# Patient Record
Sex: FEMALE | Race: WHITE | Employment: OTHER | URBAN - METROPOLITAN AREA
[De-identification: names, ages, dates, MRNs, and addresses within clinical notes are randomized per-mention and may not be internally consistent; named-entity substitution may affect disease eponyms.]

---

## 2017-01-17 DIAGNOSIS — M51.36 DDD (DEGENERATIVE DISC DISEASE), LUMBAR: ICD-10-CM

## 2017-01-17 DIAGNOSIS — F11.20 CHRONIC NARCOTIC DEPENDENCE (HCC): ICD-10-CM

## 2017-01-18 RX ORDER — HYDROCODONE BITARTRATE AND ACETAMINOPHEN 5; 325 MG/1; MG/1
TABLET ORAL
Qty: 60 TABLET | Refills: 0 | Status: SHIPPED | OUTPATIENT
Start: 2017-01-18 | End: 2017-02-15 | Stop reason: SDUPTHER

## 2017-02-15 ENCOUNTER — PATIENT MESSAGE (OUTPATIENT)
Dept: FAMILY MEDICINE CLINIC | Age: 62
End: 2017-02-15

## 2017-02-15 DIAGNOSIS — M51.36 DDD (DEGENERATIVE DISC DISEASE), LUMBAR: ICD-10-CM

## 2017-02-15 DIAGNOSIS — F11.20 CHRONIC NARCOTIC DEPENDENCE (HCC): ICD-10-CM

## 2017-02-15 RX ORDER — HYDROCODONE BITARTRATE AND ACETAMINOPHEN 5; 325 MG/1; MG/1
TABLET ORAL
Qty: 60 TABLET | Refills: 0 | Status: SHIPPED | OUTPATIENT
Start: 2017-02-15 | End: 2017-03-16 | Stop reason: SDUPTHER

## 2017-02-27 RX ORDER — TOPIRAMATE 100 MG/1
TABLET, FILM COATED ORAL
Qty: 90 TABLET | Refills: 0 | Status: SHIPPED | OUTPATIENT
Start: 2017-02-27 | End: 2017-06-28 | Stop reason: SDUPTHER

## 2017-03-02 ENCOUNTER — OFFICE VISIT (OUTPATIENT)
Dept: FAMILY MEDICINE CLINIC | Age: 62
End: 2017-03-02

## 2017-03-02 VITALS
BODY MASS INDEX: 48.82 KG/M2 | SYSTOLIC BLOOD PRESSURE: 120 MMHG | DIASTOLIC BLOOD PRESSURE: 70 MMHG | WEIGHT: 293 LBS | HEART RATE: 62 BPM | TEMPERATURE: 98.2 F | HEIGHT: 65 IN | RESPIRATION RATE: 18 BRPM

## 2017-03-02 DIAGNOSIS — M72.2 PLANTAR FASCIITIS OF LEFT FOOT: ICD-10-CM

## 2017-03-02 DIAGNOSIS — Z01.810 PRE-OPERATIVE CARDIOVASCULAR EXAMINATION: Primary | ICD-10-CM

## 2017-03-02 DIAGNOSIS — J45.40 MODERATE PERSISTENT ASTHMA WITHOUT COMPLICATION: ICD-10-CM

## 2017-03-02 DIAGNOSIS — I49.3 PREMATURE VENTRICULAR CONTRACTIONS: ICD-10-CM

## 2017-03-02 DIAGNOSIS — M77.32 CALCANEAL SPUR, LEFT: ICD-10-CM

## 2017-03-02 DIAGNOSIS — M77.50 TENDONITIS OF ANKLE OR FOOT: ICD-10-CM

## 2017-03-02 PROCEDURE — 93000 ELECTROCARDIOGRAM COMPLETE: CPT | Performed by: FAMILY MEDICINE

## 2017-03-02 PROCEDURE — 99243 OFF/OP CNSLTJ NEW/EST LOW 30: CPT | Performed by: FAMILY MEDICINE

## 2017-03-15 ENCOUNTER — PATIENT MESSAGE (OUTPATIENT)
Dept: FAMILY MEDICINE CLINIC | Age: 62
End: 2017-03-15

## 2017-03-15 DIAGNOSIS — F11.20 CHRONIC NARCOTIC DEPENDENCE (HCC): ICD-10-CM

## 2017-03-15 DIAGNOSIS — M51.36 DDD (DEGENERATIVE DISC DISEASE), LUMBAR: ICD-10-CM

## 2017-03-16 RX ORDER — HYDROCODONE BITARTRATE AND ACETAMINOPHEN 5; 325 MG/1; MG/1
TABLET ORAL
Qty: 60 TABLET | Refills: 0 | Status: SHIPPED | OUTPATIENT
Start: 2017-03-16 | End: 2017-03-17 | Stop reason: HOSPADM

## 2017-03-17 ENCOUNTER — HOSPITAL ENCOUNTER (OUTPATIENT)
Dept: SURGERY | Age: 62
Discharge: HOME | End: 2017-03-17
Attending: PODIATRIST | Admitting: PODIATRIST

## 2017-03-17 VITALS
OXYGEN SATURATION: 98 % | RESPIRATION RATE: 18 BRPM | SYSTOLIC BLOOD PRESSURE: 128 MMHG | HEART RATE: 78 BPM | BODY MASS INDEX: 48.82 KG/M2 | DIASTOLIC BLOOD PRESSURE: 73 MMHG | TEMPERATURE: 97 F | HEIGHT: 65 IN | WEIGHT: 293 LBS

## 2017-03-17 LAB
ANION GAP SERPL CALCULATED.3IONS-SCNC: 15 MMOL/L (ref 3–16)
BUN BLDV-MCNC: 20 MG/DL (ref 7–20)
CALCIUM SERPL-MCNC: 9.1 MG/DL (ref 8.3–10.6)
CHLORIDE BLD-SCNC: 105 MMOL/L (ref 99–110)
CO2: 21 MMOL/L (ref 21–32)
CREAT SERPL-MCNC: 0.9 MG/DL (ref 0.6–1.2)
GFR AFRICAN AMERICAN: >60
GFR NON-AFRICAN AMERICAN: >60
GLUCOSE BLD-MCNC: 106 MG/DL (ref 70–99)
HCT VFR BLD CALC: 36.1 % (ref 36–48)
HEMOGLOBIN: 11.6 G/DL (ref 12–16)
MCH RBC QN AUTO: 26.9 PG (ref 26–34)
MCHC RBC AUTO-ENTMCNC: 32.3 G/DL (ref 31–36)
MCV RBC AUTO: 83.2 FL (ref 80–100)
PDW BLD-RTO: 15.1 % (ref 12.4–15.4)
PLATELET # BLD: 213 K/UL (ref 135–450)
PMV BLD AUTO: 8.8 FL (ref 5–10.5)
POTASSIUM SERPL-SCNC: 4.1 MMOL/L (ref 3.5–5.1)
RBC # BLD: 4.33 M/UL (ref 4–5.2)
SODIUM BLD-SCNC: 141 MMOL/L (ref 136–145)
WBC # BLD: 8.5 K/UL (ref 4–11)

## 2017-03-17 RX ORDER — FESOTERODINE FUMARATE 8 MG/1
8 TABLET, EXTENDED RELEASE ORAL DAILY
Qty: 90 TABLET | Refills: 3 | Status: SHIPPED | OUTPATIENT
Start: 2017-03-17 | End: 2017-05-09 | Stop reason: SDUPTHER

## 2017-03-17 RX ORDER — OXYCODONE HYDROCHLORIDE AND ACETAMINOPHEN 5; 325 MG/1; MG/1
2 TABLET ORAL PRN
Status: ACTIVE | OUTPATIENT
Start: 2017-03-17 | End: 2017-03-17

## 2017-03-17 RX ORDER — SODIUM CHLORIDE 0.9 % (FLUSH) 0.9 %
10 SYRINGE (ML) INJECTION EVERY 12 HOURS SCHEDULED
Status: DISCONTINUED | OUTPATIENT
Start: 2017-03-17 | End: 2017-03-18 | Stop reason: HOSPADM

## 2017-03-17 RX ORDER — FENTANYL CITRATE 50 UG/ML
25 INJECTION, SOLUTION INTRAMUSCULAR; INTRAVENOUS EVERY 5 MIN PRN
Status: DISCONTINUED | OUTPATIENT
Start: 2017-03-17 | End: 2017-03-18 | Stop reason: HOSPADM

## 2017-03-17 RX ORDER — ONDANSETRON 2 MG/ML
4 INJECTION INTRAMUSCULAR; INTRAVENOUS
Status: ACTIVE | OUTPATIENT
Start: 2017-03-17 | End: 2017-03-17

## 2017-03-17 RX ORDER — SODIUM CHLORIDE 9 MG/ML
INJECTION, SOLUTION INTRAVENOUS CONTINUOUS
Status: DISCONTINUED | OUTPATIENT
Start: 2017-03-17 | End: 2017-03-18 | Stop reason: HOSPADM

## 2017-03-17 RX ORDER — OXYCODONE HYDROCHLORIDE AND ACETAMINOPHEN 5; 325 MG/1; MG/1
1 TABLET ORAL PRN
Status: ACTIVE | OUTPATIENT
Start: 2017-03-17 | End: 2017-03-17

## 2017-03-17 RX ORDER — OXYCODONE HYDROCHLORIDE AND ACETAMINOPHEN 5; 325 MG/1; MG/1
TABLET ORAL
Qty: 30 TABLET | Refills: 0 | Status: SHIPPED | OUTPATIENT
Start: 2017-03-17 | End: 2017-04-21 | Stop reason: SDUPTHER

## 2017-03-17 RX ORDER — APREPITANT 40 MG/1
40 CAPSULE ORAL ONCE
Status: COMPLETED | OUTPATIENT
Start: 2017-03-17 | End: 2017-03-17

## 2017-03-17 RX ORDER — SODIUM CHLORIDE 0.9 % (FLUSH) 0.9 %
10 SYRINGE (ML) INJECTION PRN
Status: DISCONTINUED | OUTPATIENT
Start: 2017-03-17 | End: 2017-03-18 | Stop reason: HOSPADM

## 2017-03-17 RX ADMIN — APREPITANT 40 MG: 40 CAPSULE ORAL at 14:24

## 2017-03-17 RX ADMIN — SODIUM CHLORIDE: 9 INJECTION, SOLUTION INTRAVENOUS at 13:46

## 2017-03-17 ASSESSMENT — PAIN SCALES - GENERAL
PAINLEVEL_OUTOF10: 0
PAINLEVEL_OUTOF10: 0

## 2017-03-17 ASSESSMENT — PAIN DESCRIPTION - DESCRIPTORS: DESCRIPTORS: THROBBING;SHOOTING

## 2017-03-17 ASSESSMENT — PAIN - FUNCTIONAL ASSESSMENT: PAIN_FUNCTIONAL_ASSESSMENT: 0-10

## 2017-04-04 RX ORDER — LEVOTHYROXINE SODIUM 0.1 MG/1
100 TABLET ORAL DAILY
Qty: 90 TABLET | Refills: 1 | Status: SHIPPED | OUTPATIENT
Start: 2017-04-04 | End: 2017-09-06 | Stop reason: SDUPTHER

## 2017-04-04 RX ORDER — DICLOFENAC SODIUM 75 MG/1
75 TABLET, DELAYED RELEASE ORAL 2 TIMES DAILY
Qty: 60 TABLET | Refills: 5 | Status: SHIPPED | OUTPATIENT
Start: 2017-04-04 | End: 2017-06-05 | Stop reason: SDUPTHER

## 2017-04-17 ENCOUNTER — PATIENT MESSAGE (OUTPATIENT)
Dept: FAMILY MEDICINE CLINIC | Age: 62
End: 2017-04-17

## 2017-04-17 RX ORDER — TROSPIUM CHLORIDE 20 MG/1
20 TABLET, FILM COATED ORAL 2 TIMES DAILY
Qty: 180 TABLET | Refills: 0 | Status: SHIPPED | OUTPATIENT
Start: 2017-04-17 | End: 2017-06-05 | Stop reason: ALTCHOICE

## 2017-04-21 ENCOUNTER — PATIENT MESSAGE (OUTPATIENT)
Dept: FAMILY MEDICINE CLINIC | Age: 62
End: 2017-04-21

## 2017-04-21 RX ORDER — OXYCODONE HYDROCHLORIDE AND ACETAMINOPHEN 5; 325 MG/1; MG/1
TABLET ORAL
Qty: 30 TABLET | Refills: 0 | Status: SHIPPED | OUTPATIENT
Start: 2017-04-21 | End: 2017-06-05 | Stop reason: ALTCHOICE

## 2017-05-04 ENCOUNTER — HOSPITAL ENCOUNTER (OUTPATIENT)
Dept: OTHER | Age: 62
Discharge: HOME | End: 2017-05-31
Attending: PODIATRIST | Admitting: PODIATRIST

## 2017-05-08 ENCOUNTER — PATIENT MESSAGE (OUTPATIENT)
Dept: FAMILY MEDICINE CLINIC | Age: 62
End: 2017-05-08

## 2017-05-08 ENCOUNTER — HOSPITAL ENCOUNTER (OUTPATIENT)
Dept: PHYSICAL THERAPY | Age: 62
Discharge: HOME | End: 2017-05-09
Admitting: PODIATRIST

## 2017-05-08 ENCOUNTER — TELEPHONE (OUTPATIENT)
Dept: FAMILY MEDICINE CLINIC | Age: 62
End: 2017-05-08

## 2017-05-08 DIAGNOSIS — M51.36 DDD (DEGENERATIVE DISC DISEASE), LUMBAR: ICD-10-CM

## 2017-05-08 DIAGNOSIS — F11.20 CHRONIC NARCOTIC DEPENDENCE (HCC): ICD-10-CM

## 2017-05-08 RX ORDER — HYDROCODONE BITARTRATE AND ACETAMINOPHEN 5; 325 MG/1; MG/1
TABLET ORAL
Qty: 60 TABLET | Refills: 0 | Status: SHIPPED | OUTPATIENT
Start: 2017-05-08 | End: 2017-06-05 | Stop reason: SDUPTHER

## 2017-05-09 RX ORDER — FESOTERODINE FUMARATE 8 MG/1
8 TABLET, EXTENDED RELEASE ORAL DAILY
Qty: 90 TABLET | Refills: 3 | Status: SHIPPED | OUTPATIENT
Start: 2017-05-09 | End: 2018-04-11 | Stop reason: SDUPTHER

## 2017-05-11 ENCOUNTER — HOSPITAL ENCOUNTER (OUTPATIENT)
Dept: PHYSICAL THERAPY | Age: 62
Discharge: HOME | End: 2017-05-12
Admitting: PODIATRIST

## 2017-05-15 ENCOUNTER — HOSPITAL ENCOUNTER (OUTPATIENT)
Dept: PHYSICAL THERAPY | Age: 62
Discharge: HOME | End: 2017-05-16
Admitting: PODIATRIST

## 2017-05-18 ENCOUNTER — HOSPITAL ENCOUNTER (OUTPATIENT)
Dept: PHYSICAL THERAPY | Age: 62
Discharge: HOME | End: 2017-05-19
Admitting: PODIATRIST

## 2017-05-20 DIAGNOSIS — F40.10 SOCIAL PHOBIA: ICD-10-CM

## 2017-05-20 DIAGNOSIS — F32.4 MAJOR DEPRESSIVE DISORDER WITH SINGLE EPISODE, IN PARTIAL REMISSION (HCC): ICD-10-CM

## 2017-05-22 ENCOUNTER — HOSPITAL ENCOUNTER (OUTPATIENT)
Dept: PHYSICAL THERAPY | Age: 62
Discharge: HOME | End: 2017-05-23
Admitting: PODIATRIST

## 2017-05-22 RX ORDER — FLUOXETINE 10 MG/1
TABLET, FILM COATED ORAL
Qty: 90 TABLET | Refills: 1 | Status: SHIPPED | OUTPATIENT
Start: 2017-05-22 | End: 2017-06-05 | Stop reason: SDUPTHER

## 2017-05-26 ENCOUNTER — HOSPITAL ENCOUNTER (OUTPATIENT)
Dept: PHYSICAL THERAPY | Age: 62
Discharge: HOME | End: 2017-05-27
Admitting: PODIATRIST

## 2017-06-01 ENCOUNTER — HOSPITAL ENCOUNTER (OUTPATIENT)
Dept: PHYSICAL THERAPY | Age: 62
Discharge: HOME | End: 2017-06-02
Admitting: PODIATRIST

## 2017-06-01 RX ORDER — DULOXETIN HYDROCHLORIDE 60 MG/1
CAPSULE, DELAYED RELEASE ORAL
Qty: 90 CAPSULE | Refills: 0 | Status: SHIPPED | OUTPATIENT
Start: 2017-06-01 | End: 2017-11-27 | Stop reason: SDUPTHER

## 2017-06-02 ENCOUNTER — PATIENT MESSAGE (OUTPATIENT)
Dept: FAMILY MEDICINE CLINIC | Age: 62
End: 2017-06-02

## 2017-06-02 DIAGNOSIS — F11.20 CHRONIC NARCOTIC DEPENDENCE (HCC): ICD-10-CM

## 2017-06-02 DIAGNOSIS — M51.36 DDD (DEGENERATIVE DISC DISEASE), LUMBAR: ICD-10-CM

## 2017-06-05 ENCOUNTER — OFFICE VISIT (OUTPATIENT)
Dept: FAMILY MEDICINE CLINIC | Age: 62
End: 2017-06-05

## 2017-06-05 VITALS
BODY MASS INDEX: 52.22 KG/M2 | WEIGHT: 293 LBS | OXYGEN SATURATION: 98 % | TEMPERATURE: 98.3 F | SYSTOLIC BLOOD PRESSURE: 130 MMHG | RESPIRATION RATE: 18 BRPM | DIASTOLIC BLOOD PRESSURE: 74 MMHG | HEART RATE: 75 BPM

## 2017-06-05 DIAGNOSIS — Z12.31 SCREENING MAMMOGRAM, ENCOUNTER FOR: ICD-10-CM

## 2017-06-05 DIAGNOSIS — G47.09 OTHER INSOMNIA: ICD-10-CM

## 2017-06-05 DIAGNOSIS — E55.9 VITAMIN D DEFICIENCY: ICD-10-CM

## 2017-06-05 DIAGNOSIS — R73.9 HYPERGLYCEMIA: Primary | ICD-10-CM

## 2017-06-05 DIAGNOSIS — F32.4 MAJOR DEPRESSIVE DISORDER WITH SINGLE EPISODE, IN PARTIAL REMISSION (HCC): ICD-10-CM

## 2017-06-05 DIAGNOSIS — F40.10 SOCIAL PHOBIA: ICD-10-CM

## 2017-06-05 DIAGNOSIS — M62.81 MUSCLE WEAKNESS OF RIGHT UPPER EXTREMITY: ICD-10-CM

## 2017-06-05 DIAGNOSIS — R25.3 MUSCLE TWITCH: ICD-10-CM

## 2017-06-05 DIAGNOSIS — M51.36 DDD (DEGENERATIVE DISC DISEASE), LUMBAR: ICD-10-CM

## 2017-06-05 PROBLEM — G47.00 INSOMNIA: Status: ACTIVE | Noted: 2017-06-05

## 2017-06-05 LAB — HBA1C MFR BLD: 6 %

## 2017-06-05 PROCEDURE — 99214 OFFICE O/P EST MOD 30 MIN: CPT | Performed by: FAMILY MEDICINE

## 2017-06-05 PROCEDURE — 83036 HEMOGLOBIN GLYCOSYLATED A1C: CPT | Performed by: FAMILY MEDICINE

## 2017-06-05 RX ORDER — FLUOXETINE 10 MG/1
TABLET, FILM COATED ORAL
Qty: 90 TABLET | Refills: 1 | Status: SHIPPED | OUTPATIENT
Start: 2017-06-05 | End: 2017-10-27 | Stop reason: SDUPTHER

## 2017-06-05 RX ORDER — DICLOFENAC SODIUM 75 MG/1
75 TABLET, DELAYED RELEASE ORAL 2 TIMES DAILY
Qty: 60 TABLET | Refills: 5 | Status: SHIPPED | OUTPATIENT
Start: 2017-06-05 | End: 2018-01-11 | Stop reason: SDUPTHER

## 2017-06-05 RX ORDER — HYDROCODONE BITARTRATE AND ACETAMINOPHEN 5; 325 MG/1; MG/1
TABLET ORAL
Qty: 60 TABLET | Refills: 0 | Status: SHIPPED | OUTPATIENT
Start: 2017-06-05 | End: 2017-07-13 | Stop reason: SDUPTHER

## 2017-06-05 RX ORDER — ERGOCALCIFEROL 1.25 MG/1
CAPSULE ORAL
Qty: 18 CAPSULE | Refills: 1 | Status: SHIPPED | OUTPATIENT
Start: 2017-06-05 | End: 2017-10-16 | Stop reason: SDUPTHER

## 2017-06-05 ASSESSMENT — PATIENT HEALTH QUESTIONNAIRE - PHQ9
SUM OF ALL RESPONSES TO PHQ QUESTIONS 1-9: 0
1. LITTLE INTEREST OR PLEASURE IN DOING THINGS: 0
SUM OF ALL RESPONSES TO PHQ9 QUESTIONS 1 & 2: 0
2. FEELING DOWN, DEPRESSED OR HOPELESS: 0

## 2017-06-07 ENCOUNTER — TELEPHONE (OUTPATIENT)
Dept: FAMILY MEDICINE CLINIC | Age: 62
End: 2017-06-07

## 2017-06-07 ENCOUNTER — HOSPITAL ENCOUNTER (OUTPATIENT)
Dept: PHYSICAL THERAPY | Age: 62
Discharge: HOME | End: 2017-06-08
Admitting: PODIATRIST

## 2017-06-07 DIAGNOSIS — M25.561 CHRONIC PAIN OF RIGHT KNEE: ICD-10-CM

## 2017-06-07 DIAGNOSIS — G89.29 CHRONIC PAIN OF RIGHT KNEE: ICD-10-CM

## 2017-06-07 DIAGNOSIS — M22.2X9 PATELLOFEMORAL PAIN SYNDROME, UNSPECIFIED LATERALITY: Primary | ICD-10-CM

## 2017-06-27 ENCOUNTER — PATIENT MESSAGE (OUTPATIENT)
Dept: FAMILY MEDICINE CLINIC | Age: 62
End: 2017-06-27

## 2017-06-28 RX ORDER — TOPIRAMATE 100 MG/1
TABLET, FILM COATED ORAL
Qty: 90 TABLET | Refills: 1 | Status: SHIPPED | OUTPATIENT
Start: 2017-06-28 | End: 2017-06-28 | Stop reason: SDUPTHER

## 2017-06-28 RX ORDER — TOPIRAMATE 100 MG/1
TABLET, FILM COATED ORAL
Qty: 90 TABLET | Refills: 1 | Status: SHIPPED | OUTPATIENT
Start: 2017-06-28 | End: 2017-10-27 | Stop reason: SDUPTHER

## 2017-07-13 ENCOUNTER — PATIENT MESSAGE (OUTPATIENT)
Dept: FAMILY MEDICINE CLINIC | Age: 62
End: 2017-07-13

## 2017-07-13 DIAGNOSIS — M51.36 DDD (DEGENERATIVE DISC DISEASE), LUMBAR: ICD-10-CM

## 2017-07-13 DIAGNOSIS — F11.20 CHRONIC NARCOTIC DEPENDENCE (HCC): ICD-10-CM

## 2017-07-13 RX ORDER — HYDROCODONE BITARTRATE AND ACETAMINOPHEN 5; 325 MG/1; MG/1
TABLET ORAL
Qty: 60 TABLET | Refills: 0 | Status: SHIPPED | OUTPATIENT
Start: 2017-07-13 | End: 2017-08-18 | Stop reason: SDUPTHER

## 2017-08-18 ENCOUNTER — PATIENT MESSAGE (OUTPATIENT)
Dept: FAMILY MEDICINE CLINIC | Age: 62
End: 2017-08-18

## 2017-08-18 DIAGNOSIS — F11.20 CHRONIC NARCOTIC DEPENDENCE (HCC): ICD-10-CM

## 2017-08-18 DIAGNOSIS — M51.36 DDD (DEGENERATIVE DISC DISEASE), LUMBAR: ICD-10-CM

## 2017-08-18 RX ORDER — HYDROCODONE BITARTRATE AND ACETAMINOPHEN 5; 325 MG/1; MG/1
TABLET ORAL
Qty: 60 TABLET | Refills: 0 | Status: SHIPPED | OUTPATIENT
Start: 2017-08-18 | End: 2017-09-22 | Stop reason: SDUPTHER

## 2017-08-18 RX ORDER — HYDROCODONE BITARTRATE AND ACETAMINOPHEN 5; 325 MG/1; MG/1
TABLET ORAL
Qty: 60 TABLET | Refills: 0 | Status: SHIPPED | OUTPATIENT
Start: 2017-08-18 | End: 2017-08-18 | Stop reason: SDUPTHER

## 2017-09-01 ENCOUNTER — TELEPHONE (OUTPATIENT)
Dept: FAMILY MEDICINE CLINIC | Age: 62
End: 2017-09-01

## 2017-09-01 RX ORDER — PRAMIPEXOLE DIHYDROCHLORIDE 0.5 MG/1
0.5 TABLET ORAL 2 TIMES DAILY
Qty: 180 TABLET | Refills: 1 | Status: SHIPPED | OUTPATIENT
Start: 2017-09-01 | End: 2017-09-01 | Stop reason: SDUPTHER

## 2017-09-01 RX ORDER — PRAMIPEXOLE DIHYDROCHLORIDE 0.5 MG/1
0.5 TABLET ORAL 2 TIMES DAILY
Qty: 60 TABLET | Refills: 0 | Status: SHIPPED | OUTPATIENT
Start: 2017-09-01 | End: 2017-09-25 | Stop reason: SDUPTHER

## 2017-09-06 RX ORDER — LEVOTHYROXINE SODIUM 0.1 MG/1
TABLET ORAL
Qty: 90 TABLET | Refills: 0 | Status: SHIPPED | OUTPATIENT
Start: 2017-09-06 | End: 2017-11-17 | Stop reason: SDUPTHER

## 2017-09-21 ENCOUNTER — PATIENT MESSAGE (OUTPATIENT)
Dept: FAMILY MEDICINE CLINIC | Age: 62
End: 2017-09-21

## 2017-09-21 DIAGNOSIS — F11.20 CHRONIC NARCOTIC DEPENDENCE (HCC): ICD-10-CM

## 2017-09-21 DIAGNOSIS — M51.36 DDD (DEGENERATIVE DISC DISEASE), LUMBAR: ICD-10-CM

## 2017-09-22 RX ORDER — HYDROCODONE BITARTRATE AND ACETAMINOPHEN 5; 325 MG/1; MG/1
TABLET ORAL
Qty: 60 TABLET | Refills: 0 | Status: SHIPPED | OUTPATIENT
Start: 2017-09-22 | End: 2017-10-31 | Stop reason: SDUPTHER

## 2017-09-25 RX ORDER — PRAMIPEXOLE DIHYDROCHLORIDE 0.5 MG/1
TABLET ORAL
Qty: 60 TABLET | Refills: 0 | Status: SHIPPED | OUTPATIENT
Start: 2017-09-25 | End: 2018-02-28

## 2017-10-16 DIAGNOSIS — E55.9 VITAMIN D DEFICIENCY: ICD-10-CM

## 2017-10-16 NOTE — TELEPHONE ENCOUNTER
From: Raina Kelley  Sent: 10/16/2017 1:51 PM EDT  Subject: Medication Renewal Request    [facility].  Althea Root would like a refill of the following medications:  esomeprazole (NEXIUM) 40 MG capsule Lacho Cao MD]    Preferred pharmacy: [place] [phone] - F [phone]    Comment:

## 2017-10-17 RX ORDER — ERGOCALCIFEROL 1.25 MG/1
CAPSULE ORAL
Qty: 18 CAPSULE | Refills: 1 | Status: SHIPPED | OUTPATIENT
Start: 2017-10-17 | End: 2018-01-31 | Stop reason: SDUPTHER

## 2017-10-17 RX ORDER — ESOMEPRAZOLE MAGNESIUM 40 MG/1
40 CAPSULE, DELAYED RELEASE ORAL
Qty: 90 CAPSULE | Refills: 3 | Status: SHIPPED | OUTPATIENT
Start: 2017-10-17 | End: 2018-12-03

## 2017-10-25 RX ORDER — ATENOLOL 25 MG/1
TABLET ORAL
Qty: 90 TABLET | Refills: 1 | Status: SHIPPED | OUTPATIENT
Start: 2017-10-25 | End: 2018-03-06 | Stop reason: SDUPTHER

## 2017-10-27 DIAGNOSIS — F32.4 MAJOR DEPRESSIVE DISORDER WITH SINGLE EPISODE, IN PARTIAL REMISSION (HCC): ICD-10-CM

## 2017-10-27 DIAGNOSIS — F40.10 SOCIAL PHOBIA: ICD-10-CM

## 2017-10-27 RX ORDER — TOPIRAMATE 100 MG/1
TABLET, FILM COATED ORAL
Qty: 90 TABLET | Refills: 0 | Status: SHIPPED | OUTPATIENT
Start: 2017-10-27 | End: 2018-01-07 | Stop reason: SDUPTHER

## 2017-10-27 RX ORDER — FLUOXETINE 10 MG/1
TABLET, FILM COATED ORAL
Qty: 90 TABLET | Refills: 0 | Status: SHIPPED | OUTPATIENT
Start: 2017-10-27 | End: 2017-11-27 | Stop reason: SDUPTHER

## 2017-10-31 DIAGNOSIS — F11.20 CHRONIC NARCOTIC DEPENDENCE (HCC): ICD-10-CM

## 2017-10-31 DIAGNOSIS — M51.36 DDD (DEGENERATIVE DISC DISEASE), LUMBAR: ICD-10-CM

## 2017-10-31 RX ORDER — HYDROCODONE BITARTRATE AND ACETAMINOPHEN 5; 325 MG/1; MG/1
TABLET ORAL
Qty: 60 TABLET | Refills: 0 | Status: SHIPPED | OUTPATIENT
Start: 2017-10-31 | End: 2017-11-30

## 2017-11-07 ENCOUNTER — OFFICE VISIT (OUTPATIENT)
Dept: FAMILY MEDICINE CLINIC | Age: 62
End: 2017-11-07

## 2017-11-07 ENCOUNTER — HOSPITAL ENCOUNTER (OUTPATIENT)
Dept: OTHER | Age: 62
Discharge: HOME | End: 2017-11-07
Attending: NURSE PRACTITIONER | Admitting: NURSE PRACTITIONER

## 2017-11-07 VITALS
HEIGHT: 64 IN | WEIGHT: 293 LBS | SYSTOLIC BLOOD PRESSURE: 130 MMHG | HEART RATE: 74 BPM | DIASTOLIC BLOOD PRESSURE: 70 MMHG | OXYGEN SATURATION: 97 % | BODY MASS INDEX: 50.02 KG/M2 | TEMPERATURE: 98.2 F

## 2017-11-07 DIAGNOSIS — R60.0 BILATERAL LEG EDEMA: ICD-10-CM

## 2017-11-07 DIAGNOSIS — N30.01 ACUTE CYSTITIS WITH HEMATURIA: ICD-10-CM

## 2017-11-07 DIAGNOSIS — R06.09 DYSPNEA ON EXERTION: ICD-10-CM

## 2017-11-07 DIAGNOSIS — R35.0 URINARY FREQUENCY: Primary | ICD-10-CM

## 2017-11-07 DIAGNOSIS — R05.9 COUGH: ICD-10-CM

## 2017-11-07 LAB
A/G RATIO: 1.6 (ref 1.1–2.2)
ALBUMIN SERPL-MCNC: 4.2 G/DL (ref 3.4–5)
ALP BLD-CCNC: 133 U/L (ref 40–129)
ALT SERPL-CCNC: 16 U/L (ref 10–40)
ANION GAP SERPL CALCULATED.3IONS-SCNC: 13 MMOL/L (ref 3–16)
AST SERPL-CCNC: 14 U/L (ref 15–37)
BASOPHILS ABSOLUTE: 0.1 K/UL (ref 0–0.2)
BASOPHILS RELATIVE PERCENT: 0.6 %
BILIRUB SERPL-MCNC: 0.4 MG/DL (ref 0–1)
BILIRUBIN, POC: NORMAL
BLOOD URINE, POC: NORMAL
BUN BLDV-MCNC: 21 MG/DL (ref 7–20)
CALCIUM SERPL-MCNC: 9.4 MG/DL (ref 8.3–10.6)
CHLORIDE BLD-SCNC: 106 MMOL/L (ref 99–110)
CLARITY, POC: NORMAL
CO2: 25 MMOL/L (ref 21–32)
COLOR, POC: NORMAL
CREAT SERPL-MCNC: 1 MG/DL (ref 0.6–1.2)
EOSINOPHILS ABSOLUTE: 0.2 K/UL (ref 0–0.6)
EOSINOPHILS RELATIVE PERCENT: 2.5 %
GFR AFRICAN AMERICAN: >60
GFR NON-AFRICAN AMERICAN: 56
GLOBULIN: 2.6 G/DL
GLUCOSE BLD-MCNC: 88 MG/DL (ref 70–99)
GLUCOSE URINE, POC: NEGATIVE
HCT VFR BLD CALC: 36.7 % (ref 36–48)
HEMOGLOBIN: 11.8 G/DL (ref 12–16)
KETONES, POC: NORMAL
LEUKOCYTE EST, POC: NORMAL
LYMPHOCYTES ABSOLUTE: 1.9 K/UL (ref 1–5.1)
LYMPHOCYTES RELATIVE PERCENT: 21.7 %
MCH RBC QN AUTO: 26.6 PG (ref 26–34)
MCHC RBC AUTO-ENTMCNC: 32.2 G/DL (ref 31–36)
MCV RBC AUTO: 82.8 FL (ref 80–100)
MONOCYTES ABSOLUTE: 0.5 K/UL (ref 0–1.3)
MONOCYTES RELATIVE PERCENT: 5.6 %
NEUTROPHILS ABSOLUTE: 6.1 K/UL (ref 1.7–7.7)
NEUTROPHILS RELATIVE PERCENT: 69.6 %
NITRITE, POC: POSITIVE
PDW BLD-RTO: 15.7 % (ref 12.4–15.4)
PH, POC: 6
PLATELET # BLD: 215 K/UL (ref 135–450)
PMV BLD AUTO: 9.3 FL (ref 5–10.5)
POTASSIUM SERPL-SCNC: 4 MMOL/L (ref 3.5–5.1)
PRO-BNP: 188 PG/ML (ref 0–124)
PROTEIN, POC: 300
RBC # BLD: 4.42 M/UL (ref 4–5.2)
SODIUM BLD-SCNC: 144 MMOL/L (ref 136–145)
SPECIFIC GRAVITY, POC: 1.03
TOTAL PROTEIN: 6.8 G/DL (ref 6.4–8.2)
TSH REFLEX: 2.67 UIU/ML (ref 0.27–4.2)
UROBILINOGEN, POC: 1
WBC # BLD: 8.7 K/UL (ref 4–11)

## 2017-11-07 PROCEDURE — 81002 URINALYSIS NONAUTO W/O SCOPE: CPT | Performed by: NURSE PRACTITIONER

## 2017-11-07 PROCEDURE — 93000 ELECTROCARDIOGRAM COMPLETE: CPT | Performed by: NURSE PRACTITIONER

## 2017-11-07 PROCEDURE — 99214 OFFICE O/P EST MOD 30 MIN: CPT | Performed by: NURSE PRACTITIONER

## 2017-11-07 RX ORDER — CLONAZEPAM 0.5 MG/1
0.5 TABLET ORAL DAILY PRN
COMMUNITY

## 2017-11-07 RX ORDER — SULFAMETHOXAZOLE AND TRIMETHOPRIM 800; 160 MG/1; MG/1
1 TABLET ORAL 2 TIMES DAILY
Qty: 20 TABLET | Refills: 0 | Status: SHIPPED | OUTPATIENT
Start: 2017-11-07 | End: 2017-11-17

## 2017-11-07 ASSESSMENT — ENCOUNTER SYMPTOMS
SHORTNESS OF BREATH: 1
COUGH: 1
VOMITING: 0
NAUSEA: 0

## 2017-11-07 NOTE — PROGRESS NOTES
11/7/2017    This is a 65 y.o. female   Chief Complaint   Patient presents with    Urinary Tract Infection     poss x5    . Urinary Frequency    This is a new problem. The current episode started in the past 7 days. The problem occurs every urination. The problem has been gradually worsening. The quality of the pain is described as burning. The pain is at a severity of 7/10. The pain is moderate. There has been no fever. Associated symptoms include chills, flank pain, frequency, hematuria, hesitancy and urgency. Pertinent negatives include no nausea or vomiting. She has tried increased fluids for the symptoms. The treatment provided no relief. Patient also complains of increased shortness of breath. Was walking in the hospital to visit her mother yesterday and got severe short of breath. Felt like she had to sit down and rest, but she did not. Denies chest pain. Positive for dry cough and increased leg swelling.         Patient Active Problem List   Diagnosis    Strain of thoracic region    Social phobia    Eczema    Rotator cuff syndrome    Hypothyroidism    Hyperlipidemia LDL goal <160    GERD (gastroesophageal reflux disease)    IBS (irritable bowel syndrome)    Sciatica    Premature ventricular contractions    Hearing loss,Tinnitus    RLS (restless legs syndrome)    Sleep apnea    Migraine headache    Depression    Screening mammogram, encounter for    Vitamin B12 deficiency    Vitamin D deficiency    DDD (degenerative disc disease), lumbar    Obesity    Allergic Conjunctivitis    Mixed incontinence- failed tropesium 2017, failed detrol 2016, failed ditropan 2013    Moderate persistent asthma without complication    Needs flu shot    Osteopenia DXA -1.7 (10/13), -2.1 (11/16)    Intertrigo labialis    Well adult health check    Chronic narcotic dependence ([facility] [address]    Eczema of hand    Obesity, morbid, BMI 40.0-49.9 ([facility])    Chronic thoracic spine pain    Chronic neck pain    Screening mammogram, encounter for    Insomnia    Muscle twitch    Muscle weakness of right upper extremity       Current Outpatient Prescriptions   Medication Sig Dispense Refill    clonazePAM (KLONOPIN) 0.5 MG tablet Take 0.5 mg by mouth 2 times daily as needed .  HYDROcodone-acetaminophen (NORCO) 5-325 MG per tablet TAKE ONE TABLET EVERY 8 HOURS AS NEEDED (MAX 60 PER MONTH). . 60 tablet 0    topiramate (TOPAMAX) 100 MG tablet TAKE 1 TABLET BY MOUTH AT BEDTIME 90 tablet 0    FLUoxetine (PROZAC) 10 MG tablet TAKE 1 TABLET BY MOUTH     DAILY 90 tablet 0    atenolol (TENORMIN) 25 MG tablet Take 1 tablet by mouth  daily 90 tablet 1    esomeprazole (NEXIUM) 40 MG delayed release capsule Take 1 capsule by mouth every morning (before breakfast) 90 capsule 3    vitamin D (ERGOCALCIFEROL) 48004 units CAPS capsule TAKE 1 CAPSULE TWICE WEEKLY 18 capsule 1    pramipexole (MIRAPEX) 0.5 MG tablet TAKE 1 TABLET BY MOUTH TWICE DAILY 60 tablet 0    levothyroxine (SYNTHROID) 100 MCG tablet TAKE 1 TABLET BY MOUTH DAILY 90 tablet 0    diclofenac (VOLTAREN) 75 MG EC tablet Take 1 tablet by mouth 2 times daily 60 tablet 5    DULoxetine (CYMBALTA) 60 MG extended release capsule Take 1 capsule by mouth  daily 90 capsule 0    Fesoterodine Fumarate ER (TOVIAZ) 8 MG TB24 Take 8 mg by mouth daily 90 tablet 3    atorvastatin (LIPITOR) 20 MG tablet Take 1 tablet by mouth  daily (Patient taking differently: Take 1 tablet by mouth nightly) 90 tablet 1    SUMAtriptan (IMITREX) 50 MG tablet Take 1 tablet by mouth once as needed .  May repeat in 2 hours, maximum 200mg per  day 27 tablet 0    triamcinolone (KENALOG) 0.1 % cream Apply topically two times  daily (Patient taking differently: Apply topically two times  daily prn) 90 g 1    albuterol sulfate  (90 BASE) MCG/ACT inhaler Inhale 2 puffs into the lungs every 4 hours as needed for Wheezing May substitute for insurance preferred (Ventolin, Proventil, ProAir) 1

## 2017-11-09 LAB
ORGANISM: ABNORMAL
URINE CULTURE, ROUTINE: ABNORMAL
URINE CULTURE, ROUTINE: ABNORMAL

## 2017-11-17 RX ORDER — LEVOTHYROXINE SODIUM 0.1 MG/1
TABLET ORAL
Qty: 90 TABLET | Refills: 0 | Status: SHIPPED | OUTPATIENT
Start: 2017-11-17 | End: 2018-01-31 | Stop reason: SDUPTHER

## 2017-11-21 ENCOUNTER — OFFICE VISIT (OUTPATIENT)
Dept: FAMILY MEDICINE CLINIC | Age: 62
End: 2017-11-21

## 2017-11-21 VITALS
BODY MASS INDEX: 50.02 KG/M2 | WEIGHT: 293 LBS | SYSTOLIC BLOOD PRESSURE: 118 MMHG | HEIGHT: 64 IN | TEMPERATURE: 97.8 F | DIASTOLIC BLOOD PRESSURE: 78 MMHG | HEART RATE: 70 BPM | RESPIRATION RATE: 18 BRPM

## 2017-11-21 DIAGNOSIS — N30.00 ACUTE CYSTITIS WITHOUT HEMATURIA: Primary | ICD-10-CM

## 2017-11-21 DIAGNOSIS — R07.9 CHEST PAIN ON EXERTION: ICD-10-CM

## 2017-11-21 DIAGNOSIS — R06.09 DYSPNEA ON EXERTION: ICD-10-CM

## 2017-11-21 LAB
BILIRUBIN, POC: ABNORMAL
BLOOD URINE, POC: ABNORMAL
CLARITY, POC: CLEAR
COLOR, POC: YELLOW
GLUCOSE URINE, POC: ABNORMAL
KETONES, POC: ABNORMAL
LEUKOCYTE EST, POC: ABNORMAL
NITRITE, POC: ABNORMAL
PH, POC: 6
PRO-BNP: 102 PG/ML (ref 0–124)
PROTEIN, POC: ABNORMAL
SPECIFIC GRAVITY, POC: 1.02
UROBILINOGEN, POC: 1

## 2017-11-21 PROCEDURE — 99213 OFFICE O/P EST LOW 20 MIN: CPT | Performed by: NURSE PRACTITIONER

## 2017-11-21 PROCEDURE — 81002 URINALYSIS NONAUTO W/O SCOPE: CPT | Performed by: NURSE PRACTITIONER

## 2017-11-21 ASSESSMENT — ENCOUNTER SYMPTOMS
VOMITING: 0
CHEST TIGHTNESS: 0
SHORTNESS OF BREATH: 1
DIARRHEA: 0
CONSTIPATION: 0
COUGH: 0
NAUSEA: 0

## 2017-11-21 NOTE — PROGRESS NOTES
Constitutional: Negative for activity change and fever. Respiratory: Positive for shortness of breath. Negative for cough and chest tightness. Cardiovascular: Positive for leg swelling. Negative for chest pain and palpitations. Gastrointestinal: Negative for constipation, diarrhea, nausea and vomiting. Genitourinary: Negative for difficulty urinating, dysuria, frequency, hematuria and pelvic pain. Neurological: Negative for dizziness, syncope, weakness and headaches. Psychiatric/Behavioral: Negative for confusion. Vitals:    11/21/17 1112   BP: 118/78   Site: Right Arm   Position: Sitting   Cuff Size: Large Adult   Pulse: 70   Resp: 18   Temp: 97.8 °F (36.6 °C)   TempSrc: Oral   Weight: 297 lb (134.7 kg)   Height: 5' 4\" (1.626 m)       Body mass index is 50.98 kg/m². Wt Readings from Last 3 Encounters:   11/21/17 297 lb (134.7 kg)   11/07/17 (!) 304 lb (137.9 kg)   06/05/17 (!) 309 lb (140.2 kg)       BP Readings from Last 3 Encounters:   11/21/17 118/78   11/07/17 130/70   06/05/17 130/74       Physical Exam   Constitutional: She is oriented to person, place, and time. She appears well-developed and well-nourished. No distress. HENT:   Head: Normocephalic and atraumatic. Eyes: EOM are normal.   Neck: Neck supple. Cardiovascular: Normal rate, regular rhythm and normal heart sounds. Exam reveals no gallop and no friction rub. No murmur heard. Pulmonary/Chest: Effort normal and breath sounds normal.   Abdominal: Soft. Normal appearance. There is no tenderness. There is no rebound, no guarding and no CVA tenderness. Musculoskeletal: She exhibits edema. Trace rocío ankle edema. Neurological: She is alert and oriented to person, place, and time. Skin: Skin is warm and dry. Psychiatric: She has a normal mood and affect. Her behavior is normal. Judgment and thought content normal.   Nursing note and vitals reviewed.       Assessment and Plan  Venessa Orellana was seen today for follow-up. Diagnoses and all orders for this visit:    Acute cystitis without hematuria: treated with bactrim BID for 10 days on 11/7/17.   -     POCT Urinalysis no Micro  -     URINE CULTURE  Symptoms mostly have resolved. Increase fluids    Dyspnea on exertion/ Chest pain on exertion: chronic issue, but worsening symptoms.   -     Stress test, lexiscan  -     BRAIN NATRIURETIC PEPTIDE (BNP); Future  Chest xray was normal on 11/7/17. EKG on 11/7/17 was unchanged from prior tracing. Patient is to call or go to the ER if symptoms worsen before testing is completed. Return in about 4 weeks (around 12/19/2017), or if symptoms worsen or fail to improve, for physical, with Dr. Naya Youssef.

## 2017-11-23 LAB — URINE CULTURE, ROUTINE: NORMAL

## 2017-11-27 DIAGNOSIS — F40.10 SOCIAL PHOBIA: ICD-10-CM

## 2017-11-27 DIAGNOSIS — F32.4 MAJOR DEPRESSIVE DISORDER WITH SINGLE EPISODE, IN PARTIAL REMISSION (HCC): ICD-10-CM

## 2017-11-28 RX ORDER — FLUOXETINE 10 MG/1
TABLET, FILM COATED ORAL
Qty: 90 TABLET | Refills: 0 | Status: SHIPPED | OUTPATIENT
Start: 2017-11-28 | End: 2018-04-18

## 2017-11-28 RX ORDER — DULOXETIN HYDROCHLORIDE 60 MG/1
CAPSULE, DELAYED RELEASE ORAL
Qty: 90 CAPSULE | Refills: 0 | Status: SHIPPED | OUTPATIENT
Start: 2017-11-28 | End: 2018-02-03 | Stop reason: SDUPTHER

## 2017-12-11 ENCOUNTER — TELEPHONE (OUTPATIENT)
Dept: FAMILY MEDICINE CLINIC | Age: 62
End: 2017-12-11

## 2017-12-11 DIAGNOSIS — M51.36 DDD (DEGENERATIVE DISC DISEASE), LUMBAR: ICD-10-CM

## 2017-12-11 DIAGNOSIS — F11.20 CHRONIC NARCOTIC DEPENDENCE (HCC): ICD-10-CM

## 2017-12-11 RX ORDER — HYDROCODONE BITARTRATE AND ACETAMINOPHEN 5; 325 MG/1; MG/1
TABLET ORAL
Qty: 60 TABLET | Refills: 0 | Status: SHIPPED | OUTPATIENT
Start: 2017-12-11 | End: 2018-01-26 | Stop reason: SDUPTHER

## 2018-01-08 RX ORDER — TOPIRAMATE 100 MG/1
TABLET, FILM COATED ORAL
Qty: 90 TABLET | Refills: 0 | Status: SHIPPED | OUTPATIENT
Start: 2018-01-08 | End: 2018-04-18

## 2018-01-11 DIAGNOSIS — M51.36 DDD (DEGENERATIVE DISC DISEASE), LUMBAR: ICD-10-CM

## 2018-01-11 RX ORDER — DICLOFENAC SODIUM 75 MG/1
TABLET, DELAYED RELEASE ORAL
Qty: 60 TABLET | Refills: 5 | Status: SHIPPED | OUTPATIENT
Start: 2018-01-11 | End: 2018-06-10 | Stop reason: SDUPTHER

## 2018-01-22 ENCOUNTER — HOSPITAL ENCOUNTER (OUTPATIENT)
Dept: WOMENS IMAGING | Age: 63
Discharge: HOME | End: 2018-01-22
Attending: FAMILY MEDICINE | Admitting: FAMILY MEDICINE

## 2018-01-22 DIAGNOSIS — Z12.31 SCREENING MAMMOGRAM, ENCOUNTER FOR: ICD-10-CM

## 2018-01-26 ENCOUNTER — PATIENT MESSAGE (OUTPATIENT)
Dept: FAMILY MEDICINE CLINIC | Age: 63
End: 2018-01-26

## 2018-01-26 DIAGNOSIS — F11.20 CHRONIC NARCOTIC DEPENDENCE (HCC): ICD-10-CM

## 2018-01-26 DIAGNOSIS — M51.36 DDD (DEGENERATIVE DISC DISEASE), LUMBAR: ICD-10-CM

## 2018-01-26 RX ORDER — HYDROCODONE BITARTRATE AND ACETAMINOPHEN 5; 325 MG/1; MG/1
TABLET ORAL
Qty: 60 TABLET | Refills: 0 | Status: SHIPPED | OUTPATIENT
Start: 2018-01-26 | End: 2018-02-28 | Stop reason: SDUPTHER

## 2018-01-31 ENCOUNTER — OFFICE VISIT (OUTPATIENT)
Dept: FAMILY MEDICINE CLINIC | Age: 63
End: 2018-01-31

## 2018-01-31 VITALS
BODY MASS INDEX: 50.02 KG/M2 | WEIGHT: 293 LBS | RESPIRATION RATE: 18 BRPM | TEMPERATURE: 98.3 F | SYSTOLIC BLOOD PRESSURE: 116 MMHG | HEIGHT: 64 IN | DIASTOLIC BLOOD PRESSURE: 68 MMHG | HEART RATE: 82 BPM

## 2018-01-31 DIAGNOSIS — E78.5 HYPERLIPIDEMIA LDL GOAL <160: ICD-10-CM

## 2018-01-31 DIAGNOSIS — R73.03 PREDIABETES: ICD-10-CM

## 2018-01-31 DIAGNOSIS — K21.9 GASTROESOPHAGEAL REFLUX DISEASE WITHOUT ESOPHAGITIS: ICD-10-CM

## 2018-01-31 DIAGNOSIS — E53.8 VITAMIN B12 DEFICIENCY: ICD-10-CM

## 2018-01-31 DIAGNOSIS — F32.4 MAJOR DEPRESSIVE DISORDER WITH SINGLE EPISODE, IN PARTIAL REMISSION (HCC): ICD-10-CM

## 2018-01-31 DIAGNOSIS — N39.46 MIXED INCONTINENCE: ICD-10-CM

## 2018-01-31 DIAGNOSIS — I49.3 PREMATURE VENTRICULAR CONTRACTIONS: ICD-10-CM

## 2018-01-31 DIAGNOSIS — E55.9 VITAMIN D DEFICIENCY: ICD-10-CM

## 2018-01-31 DIAGNOSIS — F40.10 SOCIAL PHOBIA: ICD-10-CM

## 2018-01-31 DIAGNOSIS — Z00.00 WELL ADULT HEALTH CHECK: Primary | ICD-10-CM

## 2018-01-31 DIAGNOSIS — E03.9 ACQUIRED HYPOTHYROIDISM: ICD-10-CM

## 2018-01-31 DIAGNOSIS — Z11.4 SCREENING FOR HIV (HUMAN IMMUNODEFICIENCY VIRUS): ICD-10-CM

## 2018-01-31 DIAGNOSIS — J45.40 MODERATE PERSISTENT ASTHMA WITHOUT COMPLICATION: ICD-10-CM

## 2018-01-31 PROCEDURE — 99396 PREV VISIT EST AGE 40-64: CPT | Performed by: FAMILY MEDICINE

## 2018-01-31 RX ORDER — ERGOCALCIFEROL 1.25 MG/1
CAPSULE ORAL
Qty: 18 CAPSULE | Refills: 1 | Status: SHIPPED | OUTPATIENT
Start: 2018-01-31 | End: 2018-05-22 | Stop reason: SDUPTHER

## 2018-01-31 RX ORDER — LEVOTHYROXINE SODIUM 0.1 MG/1
TABLET ORAL
Qty: 90 TABLET | Refills: 0 | Status: SHIPPED | OUTPATIENT
Start: 2018-01-31 | End: 2018-02-05 | Stop reason: SDUPTHER

## 2018-01-31 ASSESSMENT — PATIENT HEALTH QUESTIONNAIRE - PHQ9
SUM OF ALL RESPONSES TO PHQ9 QUESTIONS 1 & 2: 2
1. LITTLE INTEREST OR PLEASURE IN DOING THINGS: 1
2. FEELING DOWN, DEPRESSED OR HOPELESS: 1
SUM OF ALL RESPONSES TO PHQ QUESTIONS 1-9: 2

## 2018-01-31 NOTE — PATIENT INSTRUCTIONS
and insulin levels to remain in the normal range. What happens with insulin resistance? In insulin resistance, muscle, fat, and liver cells do not respond properly to insulin and thus cannot easily absorb glucose from the bloodstream. As a result, the body needs higher levels of insulin to help glucose enter cells. The beta cells in the pancreas try to keep up with this increased demand for insulin by producing more. As long as the beta cells are able to produce enough insulin to overcome the insulin resistance, blood glucose levels stay in the healthy range. Over time, insulin resistance can lead to type 2 diabetes and prediabetes because the beta cells fail to keep up with the body's increased need for insulin. Without enough insulin, excess glucose builds up in the bloodstream, leading to diabetes, prediabetes, and other serious health disorders. The pancreas contains clusters of cells called islets. Beta cells within the islets make insulin and release it into the blood. What causes insulin resistance? Although the exact causes of insulin resistance are not completely understood, scientists think the major contributors to insulin resistance are excess weight and physical inactivity. Excess Weight  Some experts believe obesity, especially excess fat around the waist, is a primary cause of insulin resistance. Scientists used to think that fat tissue functioned solely as energy storage. However, studies have shown that belly fat produces hormones and other substances that can cause serious health problems such as insulin resistance, high blood pressure, imbalanced cholesterol, and cardiovascular disease (CVD). Belly fat plays a part in developing chronic, or long-lasting, inflammation in the body. Chronic inflammation can damage the body over time, without any signs or symptoms.  Scientists have found that complex interactions in fat tissue draw immune cells to the area and trigger low-level chronic inflammation. This inflammation can contribute to the development of insulin resistance, type 2 diabetes, and CVD. Studies show that losing the weight can reduce insulin resistance and prevent or delay type 2 diabetes. Physical Inactivity  Many studies have shown that physical inactivity is associated with insulin resistance, often leading to type 2 diabetes. In the body, more glucose is used by muscle than other tissues. Normally, active muscles burn their stored glucose for energy and refill their reserves with glucose taken from the bloodstream, keeping blood glucose levels in balance. Studies show that after exercising, muscles become more sensitive to insulin, reversing insulin resistance and lowering blood glucose levels. Exercise also helps muscles absorb more glucose without the need for insulin. The more muscle a body has, the more glucose it can burn to control blood glucose levels. Other Causes  Other causes of insulin resistance may include ethnicity; certain diseases; hormones; steroid use; some medications; older age; sleep problems, especially sleep apnea; and cigarette smoking. Does sleep matter? Yes. Studies show that untreated sleep problems, especially sleep apnea, can increase the risk of obesity, insulin resistance, and type 2 diabetes. Night shift workers may also be at increased risk for these problems. Sleep apnea is a common disorder in which a person's breathing is interrupted during sleep. People may often move out of deep sleep and into light sleep when their breathing pauses or becomes shallow. This results in poor sleep quality that causes problem sleepiness, or excessive tiredness, during the day. Many people aren't aware of their symptoms and aren't diagnosed. People who think they might have sleep problems should talk with their health care provider. What is Prediabetes?   Prediabetes is a condition in which blood glucose or A1C levelswhich reflect average blood glucose levelsare higher than normal but not high enough for a diagnosis of diabetes. Prediabetes is becoming more common in the [place]. The U.S. Department of Health and Human Services estimates that at least 80 million [place] adults ages 21 or older had prediabetes in 2012.1 People with prediabetes are at increased risk of developing type 2 diabetes and CVD, which can lead to heart attack or stroke. How does insulin resistance relate to type 2 diabetes and prediabetes? Insulin resistance increases the risk of developing type 2 diabetes and prediabetes. Prediabetes usually occurs in people who already have insulin resistance. Although insulin resistance alone does not cause type 2 diabetes, it often sets the stage for the disease by placing a high demand on the insulin-producing beta cells. In prediabetes, the beta cells can no longer produce enough insulin to overcome insulin resistance, causing blood glucose levels to rise above the normal range. Once a person has prediabetes, continued loss of beta cell function usually leads to type 2 diabetes. People with type 2 diabetes have high blood glucose. Over time, high blood glucose damages nerves and blood vessels, leading to complications such as heart disease, stroke, blindness, kidney failure, and lower-limb amputations. Studies have shown that most people with prediabetes develop type 2 diabetes within 10 years, unless they change their lifestyle. Lifestyle changes include losing 5 to 7 percent of their body weight10 to 14 pounds for people who weigh 200 poundsby making changes in their diet and level of physical activity. What are the symptoms of insulin resistance and prediabetes? Insulin resistance and prediabetes usually have no symptoms. People may have one or both conditions for several years without knowing they have them.  Even without symptoms, health care providers can identify people at high risk by their physical CVD.    How are insulin resistance and prediabetes diagnosed? Health care providers use blood tests to determine whether a person has prediabetes, but they do not usually test specifically for insulin resistance. Insulin resistance can be assessed by measuring the level of insulin in the blood. However, the test that most accurately measures insulin resistance, called the euglycemic clamp, is too costly and complicated to be used in most health care providers' offices. The clamp is a research tool used by scientists to learn more about glucose metabolism. Research has shown that if blood tests indicate prediabetes, insulin resistance most likely is present. Blood Tests for Prediabetes  All blood tests involve drawing blood at a health care provider's office or commercial facility and sending the sample to a lab for analysis. Lab analysis of blood is needed to ensure test results are accurate. Glucose measuring devices used in a health care provider's office, such as finger-stick devices, are not accurate enough for diagnosis but may be used as a quick indicator of high blood glucose. Prediabetes can be detected with one of the following blood tests:   the A1C test   the fasting plasma glucose (FPG) test   the oral glucose tolerance test (OGTT)    A1C test. Sometimes called hemoglobin A1c, HbA1c, or glycohemoglobin test, this test reflects average blood glucose levels over the past 3 months. This test is the most reliable test for prediabetes, but it is not as sensitive as the other tests. In some individuals, it may miss prediabetes that could be caught by glucose tests. Although some health care providers can quickly measure A1C in their office, that type of measurementcalled a point-of-care testis not considered reliable for diagnosis. For diagnosis of prediabetes, the A1C test should be analyzed in a laboratory using a method that is certified by the [facility].   The A1C test can be unreliable for diagnosing test results may be in an early stage of the disease, where blood glucose levels have not risen high enough to show on every test.  Health care providers repeat laboratory tests to confirm test results. Diabetes develops over time, so even with variations in test results, health care providers can tell when overall blood glucose levels are becoming too high. Can insulin resistance and prediabetes be reversed? Yes. Physical activity and weight loss help the body respond better to insulin. The Diabetes Prevention Program (DPP) was a federally funded study of 3,234 people at high risk for diabetes. The DPP and other large studies proved that people with prediabetes can often prevent or delay diabetes if they lose a modest amount of weight by cutting fat and calorie intake and increasing physical activityfor example, walking 30 minutes a day, 5 days a week. People at [address] [place] [address] for Diabetes  DPP study participants were overweight and had prediabetes. Many had family members with type 2 diabetes. Prediabetes, obesity, and a family history of diabetes are strong risk factors for type 2 diabetes. About half of the DPP participants were from minority groups with high rates of diabetes, including  Americans, [address] [place] [address] Dr , American Indians,  Americans, Hispanics/Latinos, and  Americans. DPP participants also included others at high risk for developing type 2 diabetes, such as women with a history of gestational diabetes and people ages 61 and older. Approaches to Preventing Diabetes  The DPP tested three approaches to preventing diabetes:   Making lifestyle changes. People in the lifestyle change group exercised, usually by walking 5 days a week for about 30 minutes a day, and lowered their intake of fat and calories. Taking the diabetes medication metformin. Those who took metformin also received information about physical activity and diet. Receiving education about diabetes. The third group only received information about physical activity and diet and took a placeboa pill without medication in it. People in the lifestyle change group showed the best outcomes. However people who took metformin also benefited. The results showed that by losing an average of 15 pounds in the first year of the study, people in the lifestyle change group reduced their risk of developing type 2 diabetes by 58 percent over 3 years. Lifestyle change was even more effective in those ages 61 and older. People in this group reduced their risk by 71 percent. People in the metformin group also benefited, reducing their risk by 31 percent. Lasting Results  The Diabetes Prevention Program Outcomes Study (DPPOS) has shown that the benefits of weight loss and metformin last for at least 10 years. The DPPOS has continued to follow most DPP participants since the DPP ended in 2001. The DPPOS showed that 10 years after enrolling in the DPP   people in the lifestyle change group reduced their risk for developing diabetes by 34 percent   those in the lifestyle change group ages 61 or older had even greater benefit, reducing their risk of developing diabetes by 49 percent   participants in the lifestyle change group also had fewer heart and blood vessel disease risk factors, including lower blood pressure and triglyceride levels, even though they took fewer medications to control their heart disease risk   those in the metformin group reduced their risk of developing diabetes by 18 percent  Even though controlling weight with lifestyle changes is challenging, it produces long-term health rewards by lowering the risk for type 2 diabetes, lowering blood glucose levels, and reducing other heart disease risk factors. What steps can help reverse insulin resistance and prediabetes?   By losing weight and being more physically active, people can reverse insulin resistance and prediabetes, thus preventing or delaying type 2 diabetes. People can decrease their risk by   eating a healthy diet and reaching and maintaining a healthy weight   increasing physical activity   not smoking   taking medication    Eating, Diet, and Nutrition  Adopting healthy eating habits can help people lose a modest amount of weight and reverse insulin resistance. Experts encourage people to slowly adopt healthy eating habits that they can maintain, rather than trying extreme weight-loss solutions. People may need to get help from a dietitian or join a weight-loss program for support. In general, people should lose weight by choosing healthy foods, controlling portions, eating less fat, and increasing physical activity. People are better able to lose weight and keep it off when they learn how to adapt their favorite foods to a healthy eating plan. The DASH (Dietary Approaches to Stop Hypertension) eating plan, developed by the NIH, has been shown to be effective in decreasing insulin resistance when combined with weight loss and physical activity. More information on DASH is available at www.nhlbi.nih.gov/health/health-topics/topics/dash . The U.S[place] Dietary Guidelines for Americans also offers healthy eating advice and tools for changing eating habits at www. choosemyplate.gov . Dietary Supplements  Vitamin D studies show a link between people's ability to maintain healthy blood glucose levels and having enough vitamin D in their blood. However, studies to determine the proper vitamin D levels for preventing diabetes are ongoing; no special recommendations have been made about vitamin D levels or supplements for people with prediabetes. Currently, the [address] (IOM), the agency that recommends supplementation levels based on current science, provides the following guidelines for daily vitamin D intake:   People ages 1 to 79 years may require 600 International Units (IUs). People ages 70 and older may require as much as 800 IUs.   The IOM and have   combined IGT and IFG   A1C above 6 percent   low HDL cholesterol   elevated triglycerides   a parent or sibling with diabetes   a BMI of at least 35  Metformin also lowers the risk of diabetes in women who have had gestational diabetes. People at high risk should ask their health care provider if they should take metformin to prevent type 2 diabetes. Several medications have been shown to reduce type 2 diabetes risk to varying degrees, but the only medication recommended by the ADA for type 2 diabetes prevention is metformin. Other medications that have delayed diabetes have side effects or haven't shown long-lasting benefits. No medication, including metformin, is approved by the U.S. Food and Drug Administration to treat insulin resistance or prediabetes or to prevent type 2 diabetes. Points to Remember  Insulin is a hormone that helps cells throughout the body absorb glucose and use it for energy. Insulin resistance is a condition in which the body produces insulin but does not use it effectively. Insulin resistance increases the risk of developing type 2 diabetes and prediabetes. The major contributors to insulin resistance are excess weight, especially around the waist, and physical inactivity. Prediabetes is a condition in which blood glucose or A1C levelswhich reflect average blood glucose levelsare higher than normal but not high enough for a diagnosis of diabetes. The Diabetes Prevention Program (DPP) study and its follow-up study, the Diabetes Prevention Program Outcomes Study (DPPOS), confirmed that people with prediabetes can often prevent or delay diabetes if they lose a modest amount of weight by cutting fat and calorie intake and increasing physical activity. By losing weight and being more physically active, people can reverse insulin resistance and prediabetes, thus preventing or delaying type 2 diabetes.    People with insulin resistance and prediabetes can decrease their

## 2018-02-05 DIAGNOSIS — F40.10 SOCIAL PHOBIA: ICD-10-CM

## 2018-02-05 DIAGNOSIS — F32.4 MAJOR DEPRESSIVE DISORDER WITH SINGLE EPISODE, IN PARTIAL REMISSION (HCC): ICD-10-CM

## 2018-02-05 RX ORDER — DULOXETIN HYDROCHLORIDE 60 MG/1
CAPSULE, DELAYED RELEASE ORAL
Qty: 90 CAPSULE | Refills: 1 | Status: SHIPPED | OUTPATIENT
Start: 2018-02-05 | End: 2018-09-18

## 2018-02-07 RX ORDER — LEVOTHYROXINE SODIUM 0.1 MG/1
TABLET ORAL
Qty: 90 TABLET | Refills: 0 | Status: SHIPPED | OUTPATIENT
Start: 2018-02-07 | End: 2018-11-27 | Stop reason: SDUPTHER

## 2018-02-07 RX ORDER — FLUOXETINE 10 MG/1
TABLET, FILM COATED ORAL
Qty: 90 TABLET | Refills: 0 | Status: SHIPPED | OUTPATIENT
Start: 2018-02-07 | End: 2018-04-17 | Stop reason: SDUPTHER

## 2018-02-13 DIAGNOSIS — Z11.4 SCREENING FOR HIV (HUMAN IMMUNODEFICIENCY VIRUS): ICD-10-CM

## 2018-02-13 DIAGNOSIS — E53.8 VITAMIN B12 DEFICIENCY: ICD-10-CM

## 2018-02-13 DIAGNOSIS — E55.9 VITAMIN D DEFICIENCY: ICD-10-CM

## 2018-02-13 DIAGNOSIS — E78.5 HYPERLIPIDEMIA LDL GOAL <160: ICD-10-CM

## 2018-02-13 DIAGNOSIS — R73.03 PREDIABETES: ICD-10-CM

## 2018-02-13 LAB
CHOLESTEROL, TOTAL: 177 MG/DL (ref 0–199)
ESTIMATED AVERAGE GLUCOSE: 111.2 MG/DL
HBA1C MFR BLD: 5.5 %
HDLC SERPL-MCNC: 54 MG/DL (ref 40–60)
LDL CHOLESTEROL CALCULATED: 94 MG/DL
TRIGL SERPL-MCNC: 145 MG/DL (ref 0–150)
VITAMIN B-12: 412 PG/ML (ref 211–911)
VITAMIN D 25-HYDROXY: 51.4 NG/ML
VLDLC SERPL CALC-MCNC: 29 MG/DL

## 2018-02-14 LAB
HIV AG/AB: NORMAL
HIV ANTIGEN: NORMAL
HIV-1 ANTIBODY: NORMAL
HIV-2 AB: NORMAL

## 2018-02-28 ENCOUNTER — PATIENT MESSAGE (OUTPATIENT)
Dept: FAMILY MEDICINE CLINIC | Age: 63
End: 2018-02-28

## 2018-02-28 DIAGNOSIS — M51.36 DDD (DEGENERATIVE DISC DISEASE), LUMBAR: ICD-10-CM

## 2018-02-28 DIAGNOSIS — F11.20 CHRONIC NARCOTIC DEPENDENCE (HCC): ICD-10-CM

## 2018-02-28 RX ORDER — HYDROCODONE BITARTRATE AND ACETAMINOPHEN 5; 325 MG/1; MG/1
TABLET ORAL
Qty: 60 TABLET | Refills: 0 | Status: SHIPPED | OUTPATIENT
Start: 2018-02-28 | End: 2018-03-31

## 2018-02-28 RX ORDER — PRAMIPEXOLE DIHYDROCHLORIDE 0.5 MG/1
TABLET ORAL
Qty: 180 TABLET | Refills: 1 | Status: SHIPPED | OUTPATIENT
Start: 2018-02-28 | End: 2018-08-27 | Stop reason: SDUPTHER

## 2018-02-28 NOTE — TELEPHONE ENCOUNTER
From: Landy Baca  To: Leeann Fernandez MD  Sent: 2/28/2018 6:10 AM EST  Subject: Prescription Question    Good Morning Dr. Anneliese Danielson. Would you send my monthly prescription for hydrocodone to the [facility] at the [facility] and [facility]?     Thank you,    Lei Whalen

## 2018-03-07 RX ORDER — ATENOLOL 25 MG/1
TABLET ORAL
Qty: 90 TABLET | Refills: 1 | Status: SHIPPED | OUTPATIENT
Start: 2018-03-07 | End: 2018-09-05 | Stop reason: SDUPTHER

## 2018-04-11 ENCOUNTER — PATIENT MESSAGE (OUTPATIENT)
Dept: FAMILY MEDICINE CLINIC | Age: 63
End: 2018-04-11

## 2018-04-11 DIAGNOSIS — F11.20 CHRONIC NARCOTIC DEPENDENCE (HCC): ICD-10-CM

## 2018-04-11 DIAGNOSIS — M51.36 DDD (DEGENERATIVE DISC DISEASE), LUMBAR: ICD-10-CM

## 2018-04-12 RX ORDER — HYDROCODONE BITARTRATE AND ACETAMINOPHEN 5; 325 MG/1; MG/1
TABLET ORAL
Qty: 60 TABLET | Refills: 0 | Status: SHIPPED | OUTPATIENT
Start: 2018-04-12 | End: 2018-05-22 | Stop reason: SDUPTHER

## 2018-04-12 RX ORDER — FESOTERODINE FUMARATE 8 MG/1
TABLET, FILM COATED, EXTENDED RELEASE ORAL
Qty: 90 TABLET | Refills: 3 | Status: SHIPPED | OUTPATIENT
Start: 2018-04-12 | End: 2019-04-10 | Stop reason: SDUPTHER

## 2018-04-17 DIAGNOSIS — F32.4 MAJOR DEPRESSIVE DISORDER WITH SINGLE EPISODE, IN PARTIAL REMISSION (HCC): ICD-10-CM

## 2018-04-17 DIAGNOSIS — F40.10 SOCIAL PHOBIA: ICD-10-CM

## 2018-04-18 RX ORDER — FLUOXETINE 10 MG/1
TABLET, FILM COATED ORAL
Qty: 90 TABLET | Refills: 1 | Status: SHIPPED | OUTPATIENT
Start: 2018-04-18 | End: 2018-08-06 | Stop reason: SDUPTHER

## 2018-04-18 RX ORDER — TOPIRAMATE 100 MG/1
TABLET, FILM COATED ORAL
Qty: 90 TABLET | Refills: 1 | Status: SHIPPED | OUTPATIENT
Start: 2018-04-18 | End: 2018-08-06 | Stop reason: SDUPTHER

## 2018-05-17 ENCOUNTER — TELEPHONE (OUTPATIENT)
Dept: FAMILY MEDICINE CLINIC | Age: 63
End: 2018-05-17

## 2018-05-21 DIAGNOSIS — J45.40 RAD (REACTIVE AIRWAY DISEASE), MODERATE PERSISTENT, UNCOMPLICATED: ICD-10-CM

## 2018-05-22 ENCOUNTER — PATIENT MESSAGE (OUTPATIENT)
Dept: FAMILY MEDICINE CLINIC | Age: 63
End: 2018-05-22

## 2018-05-22 DIAGNOSIS — M51.36 DDD (DEGENERATIVE DISC DISEASE), LUMBAR: ICD-10-CM

## 2018-05-22 DIAGNOSIS — E55.9 VITAMIN D DEFICIENCY: ICD-10-CM

## 2018-05-22 DIAGNOSIS — F11.20 CHRONIC NARCOTIC DEPENDENCE (HCC): ICD-10-CM

## 2018-05-22 RX ORDER — ALBUTEROL SULFATE 90 UG/1
2 AEROSOL, METERED RESPIRATORY (INHALATION) EVERY 4 HOURS PRN
Qty: 1 INHALER | Refills: 3 | Status: SHIPPED | OUTPATIENT
Start: 2018-05-22 | End: 2020-06-08 | Stop reason: SDUPTHER

## 2018-05-23 RX ORDER — ERGOCALCIFEROL 1.25 MG/1
CAPSULE ORAL
Qty: 18 CAPSULE | Refills: 1 | Status: SHIPPED | OUTPATIENT
Start: 2018-05-23 | End: 2018-09-23 | Stop reason: SDUPTHER

## 2018-05-23 RX ORDER — HYDROCODONE BITARTRATE AND ACETAMINOPHEN 5; 325 MG/1; MG/1
TABLET ORAL
Qty: 60 TABLET | Refills: 0 | Status: SHIPPED | OUTPATIENT
Start: 2018-05-23 | End: 2018-07-16 | Stop reason: SDUPTHER

## 2018-06-10 DIAGNOSIS — M51.36 DDD (DEGENERATIVE DISC DISEASE), LUMBAR: ICD-10-CM

## 2018-06-11 RX ORDER — DICLOFENAC SODIUM 75 MG/1
TABLET, DELAYED RELEASE ORAL
Qty: 60 TABLET | Refills: 5 | Status: SHIPPED | OUTPATIENT
Start: 2018-06-11 | End: 2019-01-03 | Stop reason: SDUPTHER

## 2018-07-16 ENCOUNTER — PATIENT MESSAGE (OUTPATIENT)
Dept: FAMILY MEDICINE CLINIC | Age: 63
End: 2018-07-16

## 2018-07-16 DIAGNOSIS — M51.36 DDD (DEGENERATIVE DISC DISEASE), LUMBAR: ICD-10-CM

## 2018-07-16 DIAGNOSIS — F11.20 CHRONIC NARCOTIC DEPENDENCE (HCC): ICD-10-CM

## 2018-07-16 RX ORDER — DULOXETIN HYDROCHLORIDE 60 MG/1
CAPSULE, DELAYED RELEASE ORAL
Qty: 90 CAPSULE | Refills: 0 | Status: SHIPPED | OUTPATIENT
Start: 2018-07-16 | End: 2018-09-18

## 2018-07-17 RX ORDER — HYDROCODONE BITARTRATE AND ACETAMINOPHEN 5; 325 MG/1; MG/1
TABLET ORAL
Qty: 60 TABLET | Refills: 0 | Status: SHIPPED | OUTPATIENT
Start: 2018-07-17 | End: 2018-07-18 | Stop reason: SDUPTHER

## 2018-07-18 ENCOUNTER — PATIENT MESSAGE (OUTPATIENT)
Dept: FAMILY MEDICINE CLINIC | Age: 63
End: 2018-07-18

## 2018-07-18 DIAGNOSIS — F11.20 CHRONIC NARCOTIC DEPENDENCE (HCC): ICD-10-CM

## 2018-07-18 DIAGNOSIS — M51.36 DDD (DEGENERATIVE DISC DISEASE), LUMBAR: ICD-10-CM

## 2018-07-18 RX ORDER — HYDROCODONE BITARTRATE AND ACETAMINOPHEN 5; 325 MG/1; MG/1
TABLET ORAL
Qty: 60 TABLET | Refills: 0 | Status: SHIPPED | OUTPATIENT
Start: 2018-07-18 | End: 2018-09-11 | Stop reason: SDUPTHER

## 2018-07-18 NOTE — TELEPHONE ENCOUNTER
From: Deniz Paez  To: Andres Box MD  Sent: 7/18/2018 11:58 AM EDT  Subject: Prescription Question    I sent a request for a refill of my hydrocodone on Monday and haven't received it yet, so I'm checking to see if you received my original request.

## 2018-08-01 ENCOUNTER — OFFICE VISIT (OUTPATIENT)
Dept: FAMILY MEDICINE CLINIC | Age: 63
End: 2018-08-01

## 2018-08-01 VITALS
RESPIRATION RATE: 18 BRPM | SYSTOLIC BLOOD PRESSURE: 122 MMHG | DIASTOLIC BLOOD PRESSURE: 82 MMHG | OXYGEN SATURATION: 96 % | TEMPERATURE: 98.1 F | HEART RATE: 70 BPM | WEIGHT: 289 LBS | BODY MASS INDEX: 49.34 KG/M2 | HEIGHT: 64 IN

## 2018-08-01 DIAGNOSIS — R73.03 PREDIABETES: Primary | ICD-10-CM

## 2018-08-01 DIAGNOSIS — M51.36 DDD (DEGENERATIVE DISC DISEASE), LUMBAR: ICD-10-CM

## 2018-08-01 DIAGNOSIS — N30.00 ACUTE CYSTITIS WITHOUT HEMATURIA: ICD-10-CM

## 2018-08-01 DIAGNOSIS — R35.0 URINARY FREQUENCY: ICD-10-CM

## 2018-08-01 DIAGNOSIS — Z23 NEED FOR TETANUS BOOSTER: ICD-10-CM

## 2018-08-01 DIAGNOSIS — L30.9 ECZEMA, UNSPECIFIED TYPE: ICD-10-CM

## 2018-08-01 DIAGNOSIS — K58.0 IRRITABLE BOWEL SYNDROME WITH DIARRHEA: ICD-10-CM

## 2018-08-01 LAB — HBA1C MFR BLD: 5.9 %

## 2018-08-01 PROCEDURE — 83036 HEMOGLOBIN GLYCOSYLATED A1C: CPT | Performed by: FAMILY MEDICINE

## 2018-08-01 PROCEDURE — 90715 TDAP VACCINE 7 YRS/> IM: CPT | Performed by: FAMILY MEDICINE

## 2018-08-01 PROCEDURE — 90471 IMMUNIZATION ADMIN: CPT | Performed by: FAMILY MEDICINE

## 2018-08-01 PROCEDURE — 99214 OFFICE O/P EST MOD 30 MIN: CPT | Performed by: FAMILY MEDICINE

## 2018-08-01 PROCEDURE — 81002 URINALYSIS NONAUTO W/O SCOPE: CPT | Performed by: FAMILY MEDICINE

## 2018-08-01 RX ORDER — CIPROFLOXACIN 500 MG/1
500 TABLET, FILM COATED ORAL 2 TIMES DAILY
Qty: 14 TABLET | Refills: 0 | Status: SHIPPED | OUTPATIENT
Start: 2018-08-01 | End: 2018-08-08

## 2018-08-01 RX ORDER — DICYCLOMINE HCL 20 MG
20 TABLET ORAL 3 TIMES DAILY PRN
Qty: 270 TABLET | Refills: 3 | Status: ON HOLD | OUTPATIENT
Start: 2018-08-01 | End: 2019-06-17

## 2018-08-01 NOTE — PROGRESS NOTES
11/07/17 130/70   06/05/17 130/74     Weight is decreased. Wt Readings from Last 5 Encounters:   08/01/18 289 lb (131.1 kg)   01/31/18 297 lb (134.7 kg)   11/21/17 297 lb (134.7 kg)   11/07/17 (!) 304 lb (137.9 kg)   06/05/17 (!) 309 lb (140.2 kg)      GENERAL:   · well-developed, well-nourished, alert, no distress. EYES:   · External findings: lids and lashes normal and conjunctivae and sclerae normal  LUNGS:    · Breathing unlabored  · clear to auscultation bilaterally and good air movement  · Palpation: Normal  CARDIOVASC:   · regular rate and rhythm, S1, S2 normal. No murmur, click, rub or gallop  · Apical impulse normal  · LEGS:  Lower extremity edema: none    SKIN: warm and dry  · Dry skin fingertips with some scaling patches and cracking  PSYCH:    · Alert and oriented  · Normal reasoning, insight good  · Facial expressions full, mood appropriate  · No memory disturbance noted  MUSCULOSKEL:    · No significant finger or nail findings     Assessment and Plan:      Diagnosis Orders   1. Prediabetes  POCT glycosylated hemoglobin (Hb A1C)   2. Urinary frequency  POCT Urinalysis no Micro    Urine Culture   3. Eczema, unspecified type     4. Need for tetanus booster  Tdap (age 10y-63y) IM (Adacel)   5. Irritable bowel syndrome with diarrhea  dicyclomine (BENTYL) 20 MG tablet   6. DDD (degenerative disc disease), lumbar     7. Acute cystitis without hematuria  ciprofloxacin (CIPRO) 500 MG tablet   RISK FACTORS AND COUNSELLING  · Behavioral Risks- :\"None identified\". Counseling provided on the following healthy behaviors: N/A.     INSTRUCTIONS  NEXT APPOINTMENT: Please schedule fasting check-up in 4 months. OK to have water, black coffee and medications. · PLEASE TAKE THIS FORM TO CHECK-OUT WINDOW TO SCHEDULE NEXT VISIT. · Please get flu vaccine when available in fall. Can get either at this office or at stores such as [employer]. · STOP diclofenac. See if eczema improves.   · May take

## 2018-08-01 NOTE — PATIENT INSTRUCTIONS
baths or showers. Keep them less than 15 minutes. Try showering every other day to avoid dryness. Use mild, unscented bar soap or non-soap cleanser (eg, Arlin, Neutrogena, Dove, Oil of [employer] [facility]). Use it sparingly. Air-dry or gently pat dry after bathing. Apply moisturizer right after, every time. Apply moisturizer 2-3 times per day. Follow the skin care guidelines above even after your symptoms go away. Avoid skin infection by protecting the affected area from contact with other surfaces or people. Infections can spread in spa or public settings if you have open sores. Treat skin infections immediately. Diet   Certain foods may worsen eczema. Try keeping a food diary to determine which foods, if any, make your condition worse. Discuss your findings with your doctor or dermatologist before you eliminate foods entirely. It is important to get enough nutrients in your diet. Physical Activity   You may continue to be physically active. Keep in mind that sweating may make your condition worse. Medications   Your doctor may recommend the following medications to treat your symptoms:   Over-the-counter nonsteriod medicated creams and ointments   Prescription creams and ointments containing a cortisone, tacrolimus , or pimecrolimus   Oral medications (eg, prednisone or cyclosporine )for the most severe cases   Antibiotics applied directly to the skin or taken by mouth (only for treating infections)   Prescription or over-the-counter antihistamines to help prevent itching     Lifestyle Changes   You and your doctor will plan changes that will help you recover. Some things to keep in mind include:   Avoid known allergens and irritants. It may be helpful to keep a diary of activity when your symptoms seem to get worse. Wear loose, soft clothing made of cotton. Avoid wool. Apply moisturizer every day to keep your skin from getting dry, especially in winter. Continue to moisturize even when your symptoms go away. Alter your bathing schedule and products to keep your skin from irritation and drying. Prevention   It is difficult to prevent eczema. This is most true when there is a strong family history. If you already have eczema, there are several things you can do to try to control it: Follow guidelines to limit house dust mites in bedding. Avoid direct contact with wool to the skin. Talk to your doctor about any natural or herbal treatments. Some of these may make eczema worse. Avoid scratching or rubbing when possible. Wear gloves in cold weather and when using water for long periods of time. (eg, cleaning dishes)   Follow your treatment plan. Improvement may take several weeks or even months after a new medicine is started. Maintain a cool stable environment. Keep consistent humidity levels. Recognize and limit emotional stress.

## 2018-08-03 LAB
ORGANISM: ABNORMAL
URINE CULTURE, ROUTINE: ABNORMAL
URINE CULTURE, ROUTINE: ABNORMAL

## 2018-08-04 LAB
BILIRUBIN, POC: ABNORMAL
BLOOD URINE, POC: ABNORMAL
CLARITY, POC: ABNORMAL
COLOR, POC: ABNORMAL
GLUCOSE URINE, POC: ABNORMAL
KETONES, POC: ABNORMAL
LEUKOCYTE EST, POC: ABNORMAL
NITRITE, POC: ABNORMAL
PH, POC: 6.5
PROTEIN, POC: ABNORMAL
SPECIFIC GRAVITY, POC: 1.02
UROBILINOGEN, POC: ABNORMAL

## 2018-08-06 DIAGNOSIS — F40.10 SOCIAL PHOBIA: ICD-10-CM

## 2018-08-06 DIAGNOSIS — F32.4 MAJOR DEPRESSIVE DISORDER WITH SINGLE EPISODE, IN PARTIAL REMISSION (HCC): ICD-10-CM

## 2018-08-07 RX ORDER — FLUOXETINE 10 MG/1
TABLET, FILM COATED ORAL
Qty: 90 TABLET | Refills: 1 | Status: SHIPPED | OUTPATIENT
Start: 2018-08-07 | End: 2019-08-02

## 2018-08-07 RX ORDER — TOPIRAMATE 100 MG/1
TABLET, FILM COATED ORAL
Qty: 90 TABLET | Refills: 1 | Status: SHIPPED | OUTPATIENT
Start: 2018-08-07 | End: 2019-05-30 | Stop reason: SDUPTHER

## 2018-08-27 RX ORDER — PRAMIPEXOLE DIHYDROCHLORIDE 0.5 MG/1
TABLET ORAL
Qty: 180 TABLET | Refills: 1 | Status: SHIPPED | OUTPATIENT
Start: 2018-08-27 | End: 2018-10-17 | Stop reason: SDUPTHER

## 2018-09-05 RX ORDER — ATENOLOL 25 MG/1
TABLET ORAL
Qty: 90 TABLET | Refills: 1 | Status: SHIPPED | OUTPATIENT
Start: 2018-09-05 | End: 2019-03-13 | Stop reason: SDUPTHER

## 2018-09-11 ENCOUNTER — PATIENT MESSAGE (OUTPATIENT)
Dept: FAMILY MEDICINE CLINIC | Age: 63
End: 2018-09-11

## 2018-09-11 DIAGNOSIS — M51.36 DDD (DEGENERATIVE DISC DISEASE), LUMBAR: ICD-10-CM

## 2018-09-11 DIAGNOSIS — F11.20 CHRONIC NARCOTIC DEPENDENCE (HCC): ICD-10-CM

## 2018-09-11 RX ORDER — HYDROCODONE BITARTRATE AND ACETAMINOPHEN 5; 325 MG/1; MG/1
1 TABLET ORAL EVERY 8 HOURS PRN
Qty: 60 TABLET | Refills: 0 | Status: SHIPPED | OUTPATIENT
Start: 2018-09-11 | End: 2018-11-21 | Stop reason: SDUPTHER

## 2018-09-11 NOTE — TELEPHONE ENCOUNTER
From: Radha Galeano  To: Edna Bai MD  Sent: 9/11/2018 8:19 AM EDT  Subject: Prescription Question    Hi Dr. Danilo Weiss. It's time for my monthly refill of hydrocodone.  I'm on vacation in [place] this week so would you please call the prescription into the following [facility] this time?:    [address]  [phone]    Thank you

## 2018-09-18 RX ORDER — DULOXETIN HYDROCHLORIDE 60 MG/1
CAPSULE, DELAYED RELEASE ORAL
Qty: 90 CAPSULE | Refills: 0 | Status: SHIPPED | OUTPATIENT
Start: 2018-09-18 | End: 2018-11-27 | Stop reason: SDUPTHER

## 2018-09-23 DIAGNOSIS — E55.9 VITAMIN D DEFICIENCY: ICD-10-CM

## 2018-09-24 RX ORDER — ERGOCALCIFEROL 1.25 MG/1
CAPSULE ORAL
Qty: 18 CAPSULE | Refills: 1 | Status: SHIPPED | OUTPATIENT
Start: 2018-09-24 | End: 2019-01-29 | Stop reason: SDUPTHER

## 2018-09-26 PROBLEM — Z12.31 SCREENING MAMMOGRAM, ENCOUNTER FOR: Status: RESOLVED | Noted: 2017-06-05 | Resolved: 2018-09-26

## 2018-10-17 RX ORDER — PRAMIPEXOLE DIHYDROCHLORIDE 0.5 MG/1
0.5 TABLET ORAL 2 TIMES DAILY
Qty: 180 TABLET | Refills: 1 | Status: SHIPPED | OUTPATIENT
Start: 2018-10-17 | End: 2018-10-18 | Stop reason: SDUPTHER

## 2018-10-18 ENCOUNTER — PATIENT MESSAGE (OUTPATIENT)
Dept: FAMILY MEDICINE CLINIC | Age: 63
End: 2018-10-18

## 2018-10-18 RX ORDER — PRAMIPEXOLE DIHYDROCHLORIDE 0.5 MG/1
0.5 TABLET ORAL 2 TIMES DAILY
Qty: 30 TABLET | Refills: 0 | Status: SHIPPED | OUTPATIENT
Start: 2018-10-18 | End: 2019-03-13

## 2018-11-28 RX ORDER — DULOXETIN HYDROCHLORIDE 60 MG/1
CAPSULE, DELAYED RELEASE ORAL
Qty: 90 CAPSULE | Refills: 0 | Status: SHIPPED | OUTPATIENT
Start: 2018-11-28 | End: 2019-01-25 | Stop reason: SDUPTHER

## 2018-11-28 RX ORDER — LEVOTHYROXINE SODIUM 0.1 MG/1
TABLET ORAL
Qty: 90 TABLET | Refills: 0 | Status: SHIPPED | OUTPATIENT
Start: 2018-11-28 | End: 2019-02-28 | Stop reason: SDUPTHER

## 2018-12-03 ENCOUNTER — OFFICE VISIT (OUTPATIENT)
Dept: FAMILY MEDICINE CLINIC | Age: 63
End: 2018-12-03
Payer: COMMERCIAL

## 2018-12-03 ENCOUNTER — PATIENT MESSAGE (OUTPATIENT)
Dept: FAMILY MEDICINE CLINIC | Age: 63
End: 2018-12-03

## 2018-12-03 VITALS
BODY MASS INDEX: 50.02 KG/M2 | WEIGHT: 293 LBS | RESPIRATION RATE: 16 BRPM | DIASTOLIC BLOOD PRESSURE: 78 MMHG | SYSTOLIC BLOOD PRESSURE: 124 MMHG | HEIGHT: 64 IN | HEART RATE: 66 BPM | TEMPERATURE: 97.9 F

## 2018-12-03 DIAGNOSIS — Z23 NEEDS FLU SHOT: ICD-10-CM

## 2018-12-03 DIAGNOSIS — E53.8 VITAMIN B12 DEFICIENCY: ICD-10-CM

## 2018-12-03 DIAGNOSIS — E55.9 VITAMIN D DEFICIENCY: ICD-10-CM

## 2018-12-03 DIAGNOSIS — F40.10 SOCIAL PHOBIA: ICD-10-CM

## 2018-12-03 DIAGNOSIS — E78.5 HYPERLIPIDEMIA LDL GOAL <160: ICD-10-CM

## 2018-12-03 DIAGNOSIS — E03.9 ACQUIRED HYPOTHYROIDISM: ICD-10-CM

## 2018-12-03 DIAGNOSIS — K21.9 GASTROESOPHAGEAL REFLUX DISEASE WITHOUT ESOPHAGITIS: ICD-10-CM

## 2018-12-03 DIAGNOSIS — R73.03 PREDIABETES: Primary | ICD-10-CM

## 2018-12-03 DIAGNOSIS — J45.40 MODERATE PERSISTENT ASTHMA WITHOUT COMPLICATION: ICD-10-CM

## 2018-12-03 DIAGNOSIS — Z90.3 HISTORY OF SLEEVE GASTRECTOMY: ICD-10-CM

## 2018-12-03 DIAGNOSIS — F32.4 MAJOR DEPRESSIVE DISORDER WITH SINGLE EPISODE, IN PARTIAL REMISSION (HCC): ICD-10-CM

## 2018-12-03 DIAGNOSIS — K58.0 IRRITABLE BOWEL SYNDROME WITH DIARRHEA: ICD-10-CM

## 2018-12-03 DIAGNOSIS — E66.01 OBESITY, MORBID, BMI 50 OR HIGHER (HCC): ICD-10-CM

## 2018-12-03 PROBLEM — R25.3 MUSCLE TWITCH: Status: RESOLVED | Noted: 2017-06-05 | Resolved: 2018-12-03

## 2018-12-03 PROBLEM — M62.81 MUSCLE WEAKNESS OF RIGHT UPPER EXTREMITY: Status: RESOLVED | Noted: 2017-06-05 | Resolved: 2018-12-03

## 2018-12-03 LAB
A/G RATIO: 1.8 (ref 1.1–2.2)
ALBUMIN SERPL-MCNC: 4.4 G/DL (ref 3.4–5)
ALP BLD-CCNC: 121 U/L (ref 40–129)
ALT SERPL-CCNC: 16 U/L (ref 10–40)
ANION GAP SERPL CALCULATED.3IONS-SCNC: 14 MMOL/L (ref 3–16)
AST SERPL-CCNC: 15 U/L (ref 15–37)
BASOPHILS ABSOLUTE: 0 K/UL (ref 0–0.2)
BASOPHILS RELATIVE PERCENT: 0.6 %
BILIRUB SERPL-MCNC: 0.4 MG/DL (ref 0–1)
BUN BLDV-MCNC: 18 MG/DL (ref 7–20)
CALCIUM SERPL-MCNC: 9.3 MG/DL (ref 8.3–10.6)
CHLORIDE BLD-SCNC: 110 MMOL/L (ref 99–110)
CHOLESTEROL, TOTAL: 209 MG/DL (ref 0–199)
CO2: 21 MMOL/L (ref 21–32)
CREAT SERPL-MCNC: 1 MG/DL (ref 0.6–1.2)
EOSINOPHILS ABSOLUTE: 0.2 K/UL (ref 0–0.6)
EOSINOPHILS RELATIVE PERCENT: 2.5 %
GFR AFRICAN AMERICAN: >60
GFR NON-AFRICAN AMERICAN: 56
GLOBULIN: 2.4 G/DL
GLUCOSE BLD-MCNC: 90 MG/DL (ref 70–99)
HBA1C MFR BLD: 6 %
HCT VFR BLD CALC: 35.5 % (ref 36–48)
HDLC SERPL-MCNC: 56 MG/DL (ref 40–60)
HEMOGLOBIN: 11.2 G/DL (ref 12–16)
LDL CHOLESTEROL CALCULATED: 119 MG/DL
LYMPHOCYTES ABSOLUTE: 1.9 K/UL (ref 1–5.1)
LYMPHOCYTES RELATIVE PERCENT: 30.4 %
MCH RBC QN AUTO: 26.4 PG (ref 26–34)
MCHC RBC AUTO-ENTMCNC: 31.6 G/DL (ref 31–36)
MCV RBC AUTO: 83.5 FL (ref 80–100)
MONOCYTES ABSOLUTE: 0.3 K/UL (ref 0–1.3)
MONOCYTES RELATIVE PERCENT: 4.4 %
NEUTROPHILS ABSOLUTE: 3.9 K/UL (ref 1.7–7.7)
NEUTROPHILS RELATIVE PERCENT: 62.1 %
PDW BLD-RTO: 16.3 % (ref 12.4–15.4)
PLATELET # BLD: 225 K/UL (ref 135–450)
PMV BLD AUTO: 10 FL (ref 5–10.5)
POTASSIUM SERPL-SCNC: 4.2 MMOL/L (ref 3.5–5.1)
RBC # BLD: 4.26 M/UL (ref 4–5.2)
SODIUM BLD-SCNC: 145 MMOL/L (ref 136–145)
TOTAL PROTEIN: 6.8 G/DL (ref 6.4–8.2)
TRIGL SERPL-MCNC: 169 MG/DL (ref 0–150)
TSH SERPL DL<=0.05 MIU/L-ACNC: 2.9 UIU/ML (ref 0.27–4.2)
VITAMIN B-12: 456 PG/ML (ref 211–911)
VITAMIN D 25-HYDROXY: 75.4 NG/ML
VLDLC SERPL CALC-MCNC: 34 MG/DL
WBC # BLD: 6.4 K/UL (ref 4–11)

## 2018-12-03 PROCEDURE — 90682 RIV4 VACC RECOMBINANT DNA IM: CPT | Performed by: FAMILY MEDICINE

## 2018-12-03 PROCEDURE — 99214 OFFICE O/P EST MOD 30 MIN: CPT | Performed by: FAMILY MEDICINE

## 2018-12-03 PROCEDURE — 90471 IMMUNIZATION ADMIN: CPT | Performed by: FAMILY MEDICINE

## 2018-12-03 PROCEDURE — 83036 HEMOGLOBIN GLYCOSYLATED A1C: CPT | Performed by: FAMILY MEDICINE

## 2018-12-03 RX ORDER — ESOMEPRAZOLE MAGNESIUM 40 MG/1
CAPSULE, DELAYED RELEASE ORAL
Qty: 90 CAPSULE | Refills: 3 | Status: SHIPPED | OUTPATIENT
Start: 2018-12-03 | End: 2019-11-08 | Stop reason: SDUPTHER

## 2018-12-03 RX ORDER — METFORMIN HYDROCHLORIDE 500 MG/1
1000 TABLET, EXTENDED RELEASE ORAL
Qty: 60 TABLET | Refills: 3 | Status: SHIPPED | OUTPATIENT
Start: 2018-12-03 | End: 2019-03-22 | Stop reason: SDUPTHER

## 2018-12-03 NOTE — PROGRESS NOTES
Vaccine Information Sheet, \"Influenza - Inactivated\"  given to Poncho Alvares, or parent/legal guardian of  Poncho Alvares and verbalized understanding. Patient responses:    Have you ever had a reaction to a flu vaccine? No  Are you able to eat eggs without adverse effects? Yes  Do you have any current illness? No  Have you ever had Guillian Standish Syndrome? No    Flu vaccine given per order. Please see immunization tab.

## 2018-12-03 NOTE — PROGRESS NOTES
CARDIOVASCULAR VISIT NOTE  Scribe documentation: Cameron Vincent am scribing for Joshua Jean-Baptiste MD. Electronically signed by Elda Cadena  on 12/3/2018 at 11:14 AM.  MD Attestation: Antoni Yadav,  personally performed the services described in this documentation, as scribed by the user listed above, and it is both accurate and complete.    CHART REVIEW  Health Maintenance   Topic Date Due    Shingles Vaccine (1 of 2 - 2 Dose Series) 12/26/2016    Flu vaccine (1) 09/01/2018    TSH testing  11/07/2018    A1C test (Diabetic or Prediabetic)  08/01/2019    Colon cancer screen colonoscopy  11/01/2019    Breast cancer screen  01/22/2020    Lipid screen  02/13/2023    DTaP/Tdap/Td vaccine (3 - Td) 08/01/2028    Pneumococcal med risk  Completed    Hepatitis C screen  Completed    HIV screen  Completed      Patient Active Problem List   Diagnosis    Strain of thoracic region    Social phobia    Eczema    Rotator cuff syndrome    Hypothyroidism    Hyperlipidemia LDL goal <160    GERD (gastroesophageal reflux disease)    IBS (irritable bowel syndrome)    Sciatica    Premature ventricular contractions    Hearing loss,Tinnitus    RLS (restless legs syndrome)    Sleep apnea    Migraine headache    Depression    Vitamin B12 deficiency    Vitamin D deficiency    DDD (degenerative disc disease), lumbar    Allergic Conjunctivitis    Mixed incontinence- failed tropesium 2017, failed detrol 2016, failed ditropan 2013    Moderate persistent asthma without complication    Needs flu shot    Osteopenia DXA -1.7 (10/13), -2.1 (11/16)    Intertrigo labialis    Chronic narcotic dependence (HCC)    Eczema of hand    Obesity, morbid, BMI 50 or higher (HCC)    Chronic thoracic spine pain    Chronic neck pain    Insomnia    Prediabetes    History of sleeve gastrectomy     The 10-year ASCVD risk score (Troy Givens, et al., 2013) is: 3.6%    Values used to calculate the score:      Age: 59 years

## 2018-12-03 NOTE — PATIENT INSTRUCTIONS
INSTRUCTIONS  NEXT APPOINTMENT: Please schedule annual complete physical (30 minutes) in 6 months. · PLEASE TAKE THIS FORM TO CHECK-OUT WINDOW TO SCHEDULE NEXT VISIT. · PLEASE GET BLOODWORK DRAWN TODAY ON FIRST FLOOR in 170. Take orders with you. RESULTS- most blood tests back in couple days. We will call you if any problems. If bloodwork good, you will get letter in mail or notified thru [address] (if signed up) within 2 weeks. If you do not, please call office. · Eat less, move more! You can do it! · Snack on lower calories foods. · Join fitness club. · Work on stress reduction. · Check mg on Prozac and let me know by [facility]. · START metformin in morning. If lots of GI upset, take only one for a week and then go to 2 tabs. · Resume taking dicyclomine. Patient Education     STRESS: HOW TO BETTER COPE    What causes stress? Feelings of stress are caused by the body's instinct to defend itself. This instinct is good in emergencies, such as getting out of the way of a speeding car. But stress can cause unhealthy physical symptoms if it goes on for too long, such as in response to life's daily challenges and changes. When this happens, it's as though your body gets ready to jump out of the way of the car, but you're sitting still. Your body is working overtime, with no place to put all the extra energy. This can make you feel anxious, afraid, worried and uptight. What changes may be stressful? Any sort of change can make you feel stressed, even good change. It's not just the change or event itself, but also how you react to it that matters. What's stressful is different for each person. For example, one person may feel stressed by retiring from work, while someone else may not.   Other things that may be stressful include being laid off from your job, your child leaving or returning home, the death of your spouse, divorce or marriage, an illness, an injury, a job promotion, money problems, moving, or having a baby. Can stress hurt my health? Stress can cause health problems or make health problems worse. Talk to your family doctor if you think some of your symptoms are caused by stress. It's important to make sure that your symptoms aren't caused by other health problems. Possible signs of stress  Anxiety   Back pain   Constipation or diarrhea   Depression   Fatigue   Headaches   High blood pressure   Trouble sleeping or insomnia   Problems with relationships   Shortness of breath   Stiff neck or jaw   Upset stomach   Weight gain or loss     What can I do to manage my stress? The first step is to learn to recognize when you're feeling stressed. Early warning signs of stress include tension in your shoulders and neck, or clenching your hands into fists. The next step is to choose a way to deal with your stress. One way is to avoid the event or thing that leads to your stress--but often this is not possible. A second way is to change how you react to stress. This is often the more practical way. Tips for dealing with stress  Don't worry about things you can't control, such as the weather. Solve the little problems. This can help you gain a feeling of control. Prepare to the best of your ability for events you know may be stressful, such as a job interview. Try to look at change as a positive challenge, not as a threat. Work to resolve conflicts with other people. Talk with a trusted friend, family member or counselor. Set realistic goals at home and at work. Avoid overscheduling. Exercise on a regular basis. Eat regular, well-balanced meals and get enough sleep. Meditate. Participate in something you don't find stressful, such as sports, social events or hobbies. Why is exercise useful? Exercise is a good way to deal with stress because it's a healthy way to relieve your pent-up energy and tension. Exercise is known to release feel-good brain chemicals.  It also helps you get in better shape, which makes you feel better overall. Steps to deep breathing  Lie down on a flat surface. Place a hand on your stomach, just above your navel. Place the other hand on your chest.   Breathe in slowly and try to make your stomach rise a little. Hold your breath for a second. Breathe out slowly and let your stomach go back down. What is meditation? Meditation is a form of guided thought. It can take many forms. You can do it with exercise that uses the same motions over and over, like walking or swimming. You can meditate by practicing relaxation training, by stretching or by breathing deeply. Relaxation training is simple. Start with one muscle. Hold it tight for a few seconds then relax the muscle. Do this with each of your muscles, beginning with the toes and feet and working your way up through the rest of your body, one muscle group at a time. Stretching can also help relieve tension. Roll your head in a gentle [place]. Reach toward the ceiling and bend side to side slowly. Roll your shoulders. Deep, relaxed breathing by itself may help relieve stress (see the box to the right). This helps you get plenty of oxygen and activates the relaxation response, the bodys antidote to stress. TIPS ON WEIGHT LOSS    Weight loss maintenance is considered successful if you lose at least 10 percent of your body weight and keep that weight off for at least one year. Ideas for better weight control. Try implementing one a week. · Drink only sugar free beverages. · Drink at least 8 cups per day. · Do not eat after 7 PM.  · Snack every 2 hours during the day on 100 calorie snacks (apple, strawberry, almonds, pistachio, walnuts, cheese cubes, raw veggies like bell peppers, tomato, celery, zucchini, snow peas, broccoli). · At meals, limit portion sizes to what you could hold in your hand of a meat.   · Eat all of the raw vegetables and salads that you want with vinegar or low jaden dressing. · Sleep 8 hours at night. · Minimize white starches- bread, pasta, rice potatoes. Try high fiber cereal, breads, granola. · Move more- try walking 15 minutes per day. · Use small plates and bowls to make serving look bigger. · Keeping track of calories and fat grams. Try cell phone rey called \"Lose-it\"  · Planning your meals ahead of time  · Eating breakfast every day  · Keeping your diet steady. Eating the same on weekends,vacations and special occasions. · Watching less than 10 hours of television per week    Should I take weight loss medicines? Taking medicines may work better than diet and exercise alone for some people. OTC orlistat (brand: thePlatform Ivory) can help weight loss. A multivitamin must be taken every day to prevent vitamin deficiencies. Many people regain weight after they stop taking these medicines. Should I have weight loss surgery? Surgery can help with long-term weight loss maintenance, BUT people who make major lifestyle changes can get the same results.

## 2018-12-11 RX ORDER — ATORVASTATIN CALCIUM 20 MG/1
20 TABLET, FILM COATED ORAL DAILY
Qty: 90 TABLET | Refills: 3 | Status: SHIPPED | OUTPATIENT
Start: 2018-12-11 | End: 2019-11-25 | Stop reason: SDUPTHER

## 2018-12-11 NOTE — TELEPHONE ENCOUNTER
From: Raghav Wolf  Sent: 12/8/2018 10:35 AM EST  Subject: Medication Renewal Request    Hiral Pérez Poster would like a refill of the following medications:     atorvastatin (LIPITOR) 20 MG tablet Cody Boucher MD]    Preferred pharmacy: [address] [place] [address] 75 [phone] - F [phone]

## 2019-01-03 DIAGNOSIS — M51.36 DDD (DEGENERATIVE DISC DISEASE), LUMBAR: ICD-10-CM

## 2019-01-03 RX ORDER — DICLOFENAC SODIUM 75 MG/1
TABLET, DELAYED RELEASE ORAL
Qty: 60 TABLET | Refills: 2 | Status: SHIPPED | OUTPATIENT
Start: 2019-01-03 | End: 2019-03-13

## 2019-01-29 DIAGNOSIS — E55.9 VITAMIN D DEFICIENCY: ICD-10-CM

## 2019-01-29 RX ORDER — ERGOCALCIFEROL 1.25 MG/1
CAPSULE ORAL
Qty: 18 CAPSULE | Refills: 0 | Status: SHIPPED | OUTPATIENT
Start: 2019-01-29 | End: 2019-03-22

## 2019-02-08 ENCOUNTER — PATIENT MESSAGE (OUTPATIENT)
Dept: FAMILY MEDICINE CLINIC | Age: 64
End: 2019-02-08

## 2019-02-08 DIAGNOSIS — F11.20 CHRONIC NARCOTIC DEPENDENCE (HCC): ICD-10-CM

## 2019-02-08 DIAGNOSIS — M51.36 DDD (DEGENERATIVE DISC DISEASE), LUMBAR: ICD-10-CM

## 2019-02-11 RX ORDER — HYDROCODONE BITARTRATE AND ACETAMINOPHEN 5; 325 MG/1; MG/1
1 TABLET ORAL EVERY 8 HOURS PRN
Qty: 60 TABLET | Refills: 0 | Status: SHIPPED | OUTPATIENT
Start: 2019-02-11 | End: 2019-03-11

## 2019-03-01 RX ORDER — LEVOTHYROXINE SODIUM 0.1 MG/1
TABLET ORAL
Qty: 90 TABLET | Refills: 1 | Status: SHIPPED | OUTPATIENT
Start: 2019-03-01 | End: 2019-09-24 | Stop reason: SDUPTHER

## 2019-03-13 DIAGNOSIS — M51.36 DDD (DEGENERATIVE DISC DISEASE), LUMBAR: ICD-10-CM

## 2019-03-13 RX ORDER — DICLOFENAC SODIUM 75 MG/1
TABLET, DELAYED RELEASE ORAL
Qty: 60 TABLET | Refills: 2 | Status: ON HOLD | OUTPATIENT
Start: 2019-03-13 | End: 2019-06-18 | Stop reason: HOSPADM

## 2019-03-13 RX ORDER — ATENOLOL 25 MG/1
TABLET ORAL
Qty: 90 TABLET | Refills: 1 | Status: SHIPPED | OUTPATIENT
Start: 2019-03-13 | End: 2019-09-06 | Stop reason: SDUPTHER

## 2019-03-13 RX ORDER — PRAMIPEXOLE DIHYDROCHLORIDE 0.5 MG/1
TABLET ORAL
Qty: 180 TABLET | Refills: 1 | Status: SHIPPED | OUTPATIENT
Start: 2019-03-13 | End: 2019-04-29

## 2019-03-22 DIAGNOSIS — R73.03 PREDIABETES: ICD-10-CM

## 2019-03-22 RX ORDER — METFORMIN HYDROCHLORIDE 500 MG/1
TABLET, EXTENDED RELEASE ORAL
Qty: 60 TABLET | Refills: 3 | Status: SHIPPED | OUTPATIENT
Start: 2019-03-22 | End: 2019-03-25 | Stop reason: SDUPTHER

## 2019-03-25 DIAGNOSIS — R73.03 PREDIABETES: ICD-10-CM

## 2019-03-26 RX ORDER — METFORMIN HYDROCHLORIDE 500 MG/1
TABLET, EXTENDED RELEASE ORAL
Qty: 60 TABLET | Refills: 3 | Status: SHIPPED | OUTPATIENT
Start: 2019-03-26 | End: 2019-07-20 | Stop reason: SDUPTHER

## 2019-04-10 RX ORDER — FESOTERODINE FUMARATE 8 MG/1
TABLET, FILM COATED, EXTENDED RELEASE ORAL
Qty: 90 TABLET | Refills: 0 | Status: SHIPPED | OUTPATIENT
Start: 2019-04-10 | End: 2019-06-13

## 2019-04-12 RX ORDER — TROSPIUM CHLORIDE 20 MG/1
20 TABLET, FILM COATED ORAL 2 TIMES DAILY
Qty: 180 TABLET | Refills: 1 | Status: SHIPPED | OUTPATIENT
Start: 2019-04-12 | End: 2020-02-21

## 2019-04-15 ENCOUNTER — PATIENT MESSAGE (OUTPATIENT)
Dept: FAMILY MEDICINE CLINIC | Age: 64
End: 2019-04-15

## 2019-04-15 RX ORDER — HYDROXYZINE HYDROCHLORIDE 25 MG/1
12.5-25 TABLET, FILM COATED ORAL EVERY 6 HOURS PRN
Qty: 30 TABLET | Refills: 0 | Status: SHIPPED | OUTPATIENT
Start: 2019-04-15 | End: 2019-05-15

## 2019-04-15 NOTE — TELEPHONE ENCOUNTER
From: Alverto Calabrese  To: José Miguel Nam MD  Sent: 4/15/2019 12:18 PM EDT  Subject: Prescription Question    Would you prescribe me something to relieve anxiety? 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 It's getting harder and harder for me to be able to put thought processes together in a coherent way and I feel like just running away. I need something to calm me down.

## 2019-04-18 ENCOUNTER — TELEPHONE (OUTPATIENT)
Dept: INTERNAL MEDICINE CLINIC | Age: 64
End: 2019-04-18

## 2019-04-29 RX ORDER — PRAMIPEXOLE DIHYDROCHLORIDE 0.5 MG/1
TABLET ORAL
Qty: 180 TABLET | Refills: 0 | Status: SHIPPED | OUTPATIENT
Start: 2019-04-29 | End: 2019-11-02 | Stop reason: SDUPTHER

## 2019-05-04 DIAGNOSIS — F40.10 SOCIAL PHOBIA: ICD-10-CM

## 2019-05-04 DIAGNOSIS — F32.4 MAJOR DEPRESSIVE DISORDER WITH SINGLE EPISODE, IN PARTIAL REMISSION (HCC): ICD-10-CM

## 2019-05-04 RX ORDER — FLUOXETINE 10 MG/1
TABLET, FILM COATED ORAL
Qty: 90 TABLET | Refills: 0 | Status: SHIPPED | OUTPATIENT
Start: 2019-05-04 | End: 2019-06-13

## 2019-05-30 RX ORDER — TOPIRAMATE 100 MG/1
TABLET, FILM COATED ORAL
Qty: 90 TABLET | Refills: 0 | Status: SHIPPED | OUTPATIENT
Start: 2019-05-30 | End: 2019-08-20 | Stop reason: SDUPTHER

## 2019-06-03 ENCOUNTER — TELEPHONE (OUTPATIENT)
Dept: ORTHOPEDIC SURGERY | Age: 64
End: 2019-06-03

## 2019-06-03 ENCOUNTER — OFFICE VISIT (OUTPATIENT)
Dept: FAMILY MEDICINE CLINIC | Age: 64
End: 2019-06-03
Payer: COMMERCIAL

## 2019-06-03 ENCOUNTER — APPOINTMENT (OUTPATIENT)
Dept: GENERAL RADIOLOGY | Age: 64
End: 2019-06-03
Payer: COMMERCIAL

## 2019-06-03 ENCOUNTER — APPOINTMENT (OUTPATIENT)
Dept: CT IMAGING | Age: 64
End: 2019-06-03
Payer: COMMERCIAL

## 2019-06-03 ENCOUNTER — HOSPITAL ENCOUNTER (EMERGENCY)
Age: 64
Discharge: HOME | End: 2019-06-03
Payer: COMMERCIAL

## 2019-06-03 VITALS
OXYGEN SATURATION: 100 % | RESPIRATION RATE: 16 BRPM | SYSTOLIC BLOOD PRESSURE: 122 MMHG | WEIGHT: 293 LBS | BODY MASS INDEX: 52.05 KG/M2 | DIASTOLIC BLOOD PRESSURE: 68 MMHG | TEMPERATURE: 97.6 F | HEART RATE: 62 BPM

## 2019-06-03 VITALS
RESPIRATION RATE: 18 BRPM | HEART RATE: 60 BPM | DIASTOLIC BLOOD PRESSURE: 76 MMHG | TEMPERATURE: 97.6 F | HEIGHT: 64 IN | BODY MASS INDEX: 49.34 KG/M2 | WEIGHT: 289 LBS | OXYGEN SATURATION: 100 % | SYSTOLIC BLOOD PRESSURE: 116 MMHG

## 2019-06-03 DIAGNOSIS — Z00.00 WELL ADULT HEALTH CHECK: Primary | ICD-10-CM

## 2019-06-03 DIAGNOSIS — N39.46 MIXED INCONTINENCE: ICD-10-CM

## 2019-06-03 DIAGNOSIS — S42.91XA CLOSED FRACTURE OF RIGHT SHOULDER, INITIAL ENCOUNTER: Primary | ICD-10-CM

## 2019-06-03 DIAGNOSIS — E53.8 VITAMIN B12 DEFICIENCY: ICD-10-CM

## 2019-06-03 DIAGNOSIS — E66.01 OBESITY, MORBID, BMI 50 OR HIGHER (HCC): ICD-10-CM

## 2019-06-03 DIAGNOSIS — N95.1 MENOPAUSAL STATE: ICD-10-CM

## 2019-06-03 DIAGNOSIS — E55.9 VITAMIN D DEFICIENCY: ICD-10-CM

## 2019-06-03 DIAGNOSIS — F11.20 CHRONIC NARCOTIC DEPENDENCE (HCC): ICD-10-CM

## 2019-06-03 DIAGNOSIS — J45.40 MODERATE PERSISTENT ASTHMA WITHOUT COMPLICATION: ICD-10-CM

## 2019-06-03 DIAGNOSIS — E78.5 HYPERLIPIDEMIA LDL GOAL <160: ICD-10-CM

## 2019-06-03 DIAGNOSIS — R73.03 PREDIABETES: ICD-10-CM

## 2019-06-03 DIAGNOSIS — E03.9 ACQUIRED HYPOTHYROIDISM: ICD-10-CM
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
## 2019-06-03 DIAGNOSIS — S49.91XA INJURY OF RIGHT SHOULDER, INITIAL ENCOUNTER: ICD-10-CM

## 2019-06-03 LAB — HBA1C MFR BLD: 5.5 %

## 2019-06-03 PROCEDURE — 73030 X-RAY EXAM OF SHOULDER: CPT

## 2019-06-03 PROCEDURE — 6370000000 HC RX 637 (ALT 250 FOR IP): Performed by: PHYSICIAN ASSISTANT

## 2019-06-03 PROCEDURE — 83036 HEMOGLOBIN GLYCOSYLATED A1C: CPT | Performed by: FAMILY MEDICINE

## 2019-06-03 PROCEDURE — 99396 PREV VISIT EST AGE 40-64: CPT | Performed by: FAMILY MEDICINE

## 2019-06-03 PROCEDURE — 73200 CT UPPER EXTREMITY W/O DYE: CPT

## 2019-06-03 PROCEDURE — 99284 EMERGENCY DEPT VISIT MOD MDM: CPT

## 2019-06-03 RX ORDER — HYDROCODONE BITARTRATE AND ACETAMINOPHEN 5; 325 MG/1; MG/1
1 TABLET ORAL ONCE
Status: COMPLETED | OUTPATIENT
Start: 2019-06-03 | End: 2019-06-03

## 2019-06-03 RX ORDER — HYDROCODONE BITARTRATE AND ACETAMINOPHEN 5; 325 MG/1; MG/1
1 TABLET ORAL EVERY 6 HOURS PRN
Qty: 15 TABLET | Refills: 0 | Status: SHIPPED | OUTPATIENT
Start: 2019-06-03 | End: 2019-06-05 | Stop reason: SDUPTHER

## 2019-06-03 RX ADMIN — HYDROCODONE BITARTRATE AND ACETAMINOPHEN 1 TABLET: 5; 325 TABLET ORAL at 10:38

## 2019-06-03 RX ADMIN — HYDROCODONE BITARTRATE AND ACETAMINOPHEN 1 TABLET: 5; 325 TABLET ORAL at 14:07

## 2019-06-03 ASSESSMENT — PAIN - FUNCTIONAL ASSESSMENT
PAIN_FUNCTIONAL_ASSESSMENT: PREVENTS OR INTERFERES SOME ACTIVE ACTIVITIES AND ADLS
PAIN_FUNCTIONAL_ASSESSMENT: PREVENTS OR INTERFERES WITH ALL ACTIVE AND SOME PASSIVE ACTIVITIES
PAIN_FUNCTIONAL_ASSESSMENT: 0-10
PAIN_FUNCTIONAL_ASSESSMENT: PREVENTS OR INTERFERES SOME ACTIVE ACTIVITIES AND ADLS

## 2019-06-03 ASSESSMENT — PAIN SCALES - GENERAL
PAINLEVEL_OUTOF10: 10
PAINLEVEL_OUTOF10: 9
PAINLEVEL_OUTOF10: 9
PAINLEVEL_OUTOF10: 10
PAINLEVEL_OUTOF10: 9

## 2019-06-03 ASSESSMENT — PAIN DESCRIPTION - ORIENTATION
ORIENTATION: RIGHT

## 2019-06-03 ASSESSMENT — PAIN DESCRIPTION - PAIN TYPE
TYPE: ACUTE PAIN
TYPE: ACUTE PAIN

## 2019-06-03 ASSESSMENT — PAIN DESCRIPTION - FREQUENCY
FREQUENCY: CONTINUOUS
FREQUENCY: CONTINUOUS

## 2019-06-03 ASSESSMENT — PAIN DESCRIPTION - ONSET
ONSET: ON-GOING
ONSET: ON-GOING

## 2019-06-03 ASSESSMENT — PAIN DESCRIPTION - LOCATION
LOCATION: SHOULDER
LOCATION: ARM
LOCATION: SHOULDER

## 2019-06-03 ASSESSMENT — PAIN DESCRIPTION - PROGRESSION
CLINICAL_PROGRESSION: NOT CHANGED
CLINICAL_PROGRESSION: GRADUALLY IMPROVING

## 2019-06-03 ASSESSMENT — PAIN DESCRIPTION - DESCRIPTORS
DESCRIPTORS: ACHING
DESCRIPTORS: ACHING;CONSTANT;SHARP

## 2019-06-03 NOTE — TELEPHONE ENCOUNTER
Patient in ED seen by KALLIE Miranda shoulder fx.     [phone]) Please call   ED         Notified Shruthi

## 2019-06-03 NOTE — ED PROVIDER NOTES
light-headedness, numbness and headaches. Except as noted above the remainder of the review of systems was reviewed and negative. PAST MEDICAL HISTORY         Diagnosis Date    Abnormal finding on pulmonary function testing- nl except decreased diffusing capacity 4/11 7/11/2011    Activities treadmill- neg GXT arron, EF 82% 3/11     Allergic rhinitis     Depression     GERD (gastroesophageal reflux disease)     Hearing loss,Tinnitus     Hyperlipidemia     Hypothyroidism     IBS (irritable bowel syndrome)     Migraine headache     Nocturia     PONV (postoperative nausea and vomiting)     Premature ventricular contractions     Pulmonary function testing- nl except decreased diffusing capacity 4/11     Relevant prior imaging: normal examination- neg CT chest 4/11     RLS (restless legs syndrome)     Sleep apnea        SURGICAL HISTORY           Procedure Laterality Date    CHOLECYSTECTOMY      EYE SURGERY      FOOT SURGERY Left 03/17/2017    HYSTERECTOMY      SLEEVE GASTRECTOMY  7.2012       CURRENT MEDICATIONS       Discharge Medication List as of 6/3/2019  1:37 PM      CONTINUE these medications which have NOT CHANGED    Details   topiramate (TOPAMAX) 100 MG tablet TAKE 1 TABLET AT BEDTIME, Disp-90 tablet, R-0Normal      !!  FLUoxetine (PROZAC) 10 MG tablet TAKE 1 TABLET BY MOUTH     DAILY, Disp-90 tablet, R-0Normal      pramipexole (MIRAPEX) 0.5 MG tablet TAKE 1 TABLET TWICE A DAY, Disp-180 tablet, R-0Normal      trospium (SANCTURA) 20 MG tablet Take 1 tablet by mouth 2 times daily Replacing Toviaz, Disp-180 tablet, R-1Normal      TOVIAZ 8 MG TB24 TAKE 1 TABLET DAILY, Disp-90 tablet, R-0Normal      metFORMIN (GLUCOPHAGE-XR) 500 MG extended release tablet TAKE 2 TABLETS DAILY WITH  BREAKFAST, Disp-60 tablet, R-3Normal      vitamin D (ERGOCALCIFEROL) 38461 units CAPS capsule TAKE 1 CAPSULE TWICE WEEKLY, Disp-18 capsule, R-3Normal      diclofenac (VOLTAREN) 75 MG EC tablet TAKE 1 TABLET TWICE A DAY, Disp-60 tablet, R-2Normal      atenolol (TENORMIN) 25 MG tablet TAKE 1 TABLET DAILY, Disp-90 tablet, R-1Normal      levothyroxine (SYNTHROID) 100 MCG tablet TAKE 1 TABLET BY MOUTH DAILY, Disp-90 tablet, R-1Normal      DULoxetine (CYMBALTA) 60 MG extended release capsule TAKE 1 CAPSULE DAILY, Disp-90 capsule, R-1Normal      atorvastatin (LIPITOR) 20 MG tablet Take 1 tablet by mouth daily, Disp-90 tablet, R-3Normal      esomeprazole (NEXIUM) 40 MG delayed release capsule Take 1 capsule by mouth  every morning, Disp-90 capsule, R-3Normal      !! FLUoxetine (PROZAC) 10 MG tablet TAKE 1 TABLET DAILY, Disp-90 tablet, R-1Normal      dicyclomine (BENTYL) 20 MG tablet Take 1 tablet by mouth 3 times daily as needed (ibs), Disp-270 tablet, R-3Normal      albuterol sulfate  (90 Base) MCG/ACT inhaler Inhale 2 puffs into the lungs every 4 hours as needed for Wheezing May substitute for insurance preferred (Ventolin, Proventil, ProAir), Disp-1 Inhaler, R-3Normal      clonazePAM (KLONOPIN) 0.5 MG tablet Take 0.5 mg by mouth 2 times daily as needed . Historical Med      triamcinolone (KENALOG) 0.1 % cream Apply topically two times  daily, Disp-90 g, R-1, Normal      SUMAtriptan (IMITREX) 50 MG tablet Take 1 tablet by mouth once as needed . May repeat in 2 hours, maximum 200mg per  day, Disp-27 tablet, R-0       !! - Potential duplicate medications found. Please discuss with provider. ALLERGIES     Iodine and Contrast [iodides]    FAMILY HISTORY           Problem Relation Age of Onset    Lung Cancer Father     Heart Disease Maternal Grandmother     Tuberculosis Maternal Grandmother     Other Brother      Family Status   Relation Name Status    Father  (Not Specified)    MGM  (Not Specified)    Brother  (Not Specified)        recent PE 2017        SOCIAL HISTORY      reports that she quit smoking about 29 years ago. Her smoking use included cigarettes. She has a 30.00 pack-year smoking history.  She has never used smokeless tobacco. She reports that she drinks alcohol. She reports that she does not use drugs. PHYSICAL EXAM    (up to 7 for level 4, 8 or more for level 5)     ED Triage Vitals [06/03/19 0955]   BP Temp Temp Source Pulse Resp SpO2 Height Weight   122/68 97.6 °F (36.4 °C) Oral 62 16 100 % -- 298 lb 8 oz (135.4 kg)       Constitutional:  Appearing well-developed and well-nourished. Patient in some distress. HENT:  Normocephalic and atraumatic. Cardiovascular:  Normal rate, regular rhythm, normal heart sounds and intact distal pulses. Pulmonary/Chest:  Effort normal and breath sounds normal. No respiratory distress. Musculoskeletal:  Decreased range of motion. Pain reported with passive range of motion. Significant tenderness with palpation over the anterior right shoulder, no visible deformity. No edema exhibited. No midline tenderness of neck and back. 2+ radial pulse on the right. Normal examination of the right elbow and wrist, negative for bony tenderness, with full range of motion. Neurological:  Oriented to person, place, and time. No cranial nerve deficit. Bilateral  strength intact. Skin:  Skin is warm and dry. Not diaphoretic. Psychiatric:  Normal mood, affect, behavior, judgment and thought content. DIAGNOSTIC RESULTS     RADIOLOGY:     Interpretation per the Radiologist below, if available at the time of this note:    CT SHOULDER RIGHT WO CONTRAST   Preliminary Result   Acute comminuted proximal right humeral head neck fracture with moderate   impaction. Inferior subluxation of the major humeral head fragment relation   to the bony glenoid. XR SHOULDER RIGHT (MIN 2 VIEWS)   Final Result   Comminuted acute proximal right humeral fracture with suspected impaction and   distraction of a fragment laterally. Glenohumeral subluxation is noted.       Mild cortical irregularity along the posterior aspect of the bony glenoid on   one view of uncertain significance. This could represent fracture. CT scan of the right shoulder is now recommended. This will better define   the fracture anatomy of the proximal humerus and better evaluate alignment of   the glenohumeral joint. Additionally, it could better detect acute bony   abnormality of the scapula/glenoid. Orthopedic consultation recommended. LABS:  Labs Reviewed - No data to display    All other labs were within normal range or not returned as of this dictation. EMERGENCY DEPARTMENT COURSE and DIFFERENTIAL DIAGNOSIS/MDM:   Vitals:    Vitals:    06/03/19 0955 06/03/19 1414   BP: 122/68 122/68   Pulse: 62 62   Resp: 16 16   Temp: 97.6 °F (36.4 °C) 97.6 °F (36.4 °C)   TempSrc: Oral Oral   SpO2: 100%    Weight: 298 lb 8 oz (135.4 kg)        The patient's condition in the ED was good, the patient was afebrile and nontoxic in appearance, and the patient's physical exam was unremarkable other then for the right upper extremity findings noted above. Good neurovascular status in the extremity. No visible deformity. X-ray revealed a right humeral head and neck fracture, confirmed on CT scan without contrast, no visible glenoid fracture. There was some comminution of the fracture, and some inferior subluxation. I consulted the orthopedic physician assistant on call, who then spoke to the orthopedic specialist Dr. Marika Gross, who advised discharge with close follow-up for likely surgery. There was no indication for hospitalization or further workup. Patient was given a sling and swath in the ED, along with pain medication. She'll be discharged with instructions for follow-up with the orthopedic specialist Dr. Alejandro Munroe, on the advice of Dr. Marika Gross, and a prescription for a course of pain medication. The patient verbalized understanding and agreement with this plan of care.  The patient was advised to return to the emergency department if symptoms should significantly worsen or if new and concerning symptoms should appear. I estimate there is LOW risk for COMPARTMENT SYNDROME, DEEP VENOUS THROMBOSIS, SEPTIC ARTHRITIS, TENDON OR NEUROVASCULAR INJURY, thus I consider the discharge disposition reasonable. PROCEDURES:  None    FINAL IMPRESSION      1. Closed fracture of right shoulder, initial encounter          DISPOSITION/PLAN   DISPOSITION        PATIENT REFERRED TO:  Brock Tomlinson, [address] [facility]  [address] [place] [address]  [phone]    Schedule an appointment as soon as possible for a visit   For orthopedic follow-up care      DISCHARGE MEDICATIONS:  Discharge Medication List as of 6/3/2019  1:37 PM      START taking these medications    Details   HYDROcodone-acetaminophen (NORCO) 5-325 MG per tablet Take 1 tablet by mouth every 6 hours as needed for Pain for up to 5 days. , Disp-15 tablet, R-0Print             (Please note that portions of this note were completed with a voice recognition program.  Efforts were made to edit the dictations but occasionally words are mis-transcribed.)    Kareem Santamaria, [address] [place], [place]  06/03/19 7774

## 2019-06-03 NOTE — PATIENT INSTRUCTIONS
INSTRUCTIONS  NEXT APPOINTMENT: Please schedule check-up in 2 weeks. ·  Since you are taking a CONTROLLED MEDICATION, will need to be seen at least every 6 months AND will need to have a future appointment scheduled for any refills. Be sure to schedule on way out of office today to avoid denial of future refills. · Go to ER now.

## 2019-06-03 NOTE — PROGRESS NOTES
PHYSICAL-VISIT NOTE   Subjective:     Chief Complaint   Patient presents with    Annual Exam       Padmini Gaitan is a 59 y.o. female who presents for annual testing/preventive review and check-up of medical problems listed below:  1. Well adult health check    2. Vitamin B12 deficiency    3. Vitamin D deficiency    4. Prediabetes    5. Obesity, morbid, BMI 50 or higher ([facility] [address]    6. Moderate persistent asthma without complication    7. Mixed incontinence- failed tropesium 2017, failed detrol 2016, failed ditropan 2013    8. Acquired hypothyroidism    9. Hyperlipidemia LDL goal <160    10. Chronic narcotic dependence ([facility] [address]    11. Menopausal state    12. Injury of right shoulder, initial encounter    13. Fall, initial encounter        Complaints: feel and right arm and shoulder all the way to her hands hurt, worried about her shrinking and bone density. Natalee Hanksderick in office hallway just outside exam room. Landed on R shoulder. Lots of pain and unable to move it. Thinks tripped over her ne foot which seems to keep catching in floor recently. * Documentation provided by medical assistant reviewed and updated by provider. ROS  See scanned \"Annual Adult Health Checklist\". Pertinent positives addressed above. HISTORY:  Patient's medications, allergies, past medical, and social histories were reviewed and updated as appropriate.      CHART REVIEW  Health Maintenance   Topic Date Due    Shingles Vaccine (2 of 3) 06/03/2020 (Originally 12/21/2016)    Colon cancer screen colonoscopy  11/01/2019    A1C test (Diabetic or Prediabetic)  12/03/2019    TSH testing  12/03/2019    Breast cancer screen  01/22/2020    Lipid screen  12/03/2023    DTaP/Tdap/Td vaccine (3 - Td) 08/01/2028    Flu vaccine  Completed    Pneumococcal 0-64 years Vaccine  Completed    Hepatitis C screen  Completed    HIV screen  Completed     The 10-year ASCVD risk score ([place]., et al., 2013) is: 3.4%    Values used to calculate the score:      Age: 59 years      Sex: Female      Is Non- : No      Diabetic: No      Tobacco smoker: No      Systolic Blood Pressure: 747 mmHg      Is BP treated: No      HDL Cholesterol: 56 mg/dL      Total Cholesterol: 209 mg/dL  Prior to Visit Medications    Medication Sig Taking?  Authorizing Provider   topiramate (TOPAMAX) 100 MG tablet TAKE 1 TABLET AT BEDTIME Yes Freddy Guillaume MD   FLUoxetine (PROZAC) 10 MG tablet TAKE 1 TABLET BY MOUTH     DAILY Yes Freddy Guillaume MD   pramipexole (MIRAPEX) 0.5 MG tablet TAKE 1 TABLET TWICE A DAY Yes Freddy Guillaume MD   trospium (SANCTURA) 20 MG tablet Take 1 tablet by mouth 2 times daily Replacing Adam Mireles Yes Freddy Guillaume MD   TOVIAZ 8 MG TB24 TAKE 1 TABLET DAILY Yes Freddy Guillaume MD   metFORMIN (GLUCOPHAGE-XR) 500 MG extended release tablet TAKE 2 TABLETS DAILY WITH  BREAKFAST Yes Freddy Guillaume MD   vitamin D (ERGOCALCIFEROL) 65431 units CAPS capsule TAKE 1 CAPSULE TWICE WEEKLY Yes Freddy Guillaume MD   diclofenac (VOLTAREN) 75 MG EC tablet TAKE 1 TABLET TWICE A DAY Yes Freddy Guillaume MD   atenolol (TENORMIN) 25 MG tablet TAKE 1 TABLET DAILY Yes Freddy Guillaume MD   levothyroxine (SYNTHROID) 100 MCG tablet TAKE 1 TABLET BY MOUTH DAILY Yes Freddy Guillaume MD   DULoxetine (CYMBALTA) 60 MG extended release capsule TAKE 1 CAPSULE DAILY Yes Freddy Guillaume MD   atorvastatin (LIPITOR) 20 MG tablet Take 1 tablet by mouth daily Yes Freddy Guillaume MD   esomeprazole (NEXIUM) 40 MG delayed release capsule Take 1 capsule by mouth  every morning Yes Freddy Guillaume MD   FLUoxetine (PROZAC) 10 MG tablet TAKE 1 TABLET DAILY Yes Freddy Guillaume MD   dicyclomine (BENTYL) 20 MG tablet Take 1 tablet by mouth 3 times daily as needed (ibs) Yes Freddy Guillaume MD   albuterol sulfate  (90 Base) MCG/ACT inhaler Inhale 2 puffs into the lungs every 4 hours as needed for Wheezing May substitute for insurance preferred (Ventolin, Proventil, ProAir) Yes Freddy Guillaume MD   clonazePAM (KLONOPIN) 0.5 MG tablet Take 0.5 mg by mouth 2 times daily as needed . Yes Historical Provider, MD   triamcinolone (KENALOG) 0.1 % cream Apply topically two times  daily  Patient taking differently: Apply topically two times  daily prn Yes Joselyn Flores MD   SUMAtriptan (IMITREX) 50 MG tablet Take 1 tablet by mouth once as needed . May repeat in 2 hours, maximum 200mg per  day Yes Nestgurjit Burnson, APRN - CNP      Family History   Problem Relation Age of Onset    Lung Cancer Father     Heart Disease Maternal Grandmother     Tuberculosis Maternal Grandmother     Other Brother      Social History     Tobacco Use    Smoking status: Former Smoker     Packs/day: 2.00     Years: 15.00     Pack years: 30.00     Types: Cigarettes     Last attempt to quit: 1989     Years since quittin.9    Smokeless tobacco: Never Used    Tobacco comment: congrats on quitting   Substance Use Topics    Alcohol use: Yes     Alcohol/week: 0.0 oz     Comment: occ.      Drug use: No      LAST LABS  Cholesterol, Total   Date Value Ref Range Status   2018 209 (H) 0 - 199 mg/dL Final     LDL Calculated   Date Value Ref Range Status   2018 119 (H) <100 mg/dL Final     HDL   Date Value Ref Range Status   2018 56 40 - 60 mg/dL Final   2012 49 40 - 60 mg/dl Final     Triglycerides   Date Value Ref Range Status   2018 169 (H) 0 - 150 mg/dL Final     Lab Results   Component Value Date    GLUCOSE 90 2018     Lab Results   Component Value Date     2018    K 4.2 2018    CREATININE 1.0 2018     Lab Results   Component Value Date    WBC 6.4 2018    HGB 11.2 (L) 2018    HCT 35.5 (L) 2018    MCV 83.5 2018     2018     Lab Results   Component Value Date    ALT 16 2018    AST 15 2018    ALKPHOS 121 2018    BILITOT 0.4 2018     TSH (uIU/mL)   Date Value   2018 2.90     Lab Results   Component Value Date    LABA1C 6.0 12/03/2018     Objective:   PHYSICAL EXAM   /76 (Site: Left Upper Arm, Position: Sitting, Cuff Size: Large Adult)   Pulse 60   Temp 97.6 °F (36.4 °C) (Oral)   Resp 18   Ht 5' 3.5\" (1.613 m)   Wt 289 lb (131.1 kg)   SpO2 100%   BMI 50.39 kg/m²   BP Readings from Last 5 Encounters:   06/03/19 116/76   12/03/18 124/78   08/01/18 122/82   01/31/18 116/68   11/21/17 118/78     Wt Readings from Last 5 Encounters:   06/03/19 289 lb (131.1 kg)   12/03/18 296 lb (134.3 kg)   08/01/18 289 lb (131.1 kg)   01/31/18 297 lb (134.7 kg)   11/21/17 297 lb (134.7 kg)      GENERAL:   · well-developed, well-nourished, alert, no distress. EYES:   · External findings: lids and lashes normal and conjunctivae and sclerae normal  · Eyes: no periorbital cellulitis. ENT:   · External nose and ears appear normal  · normal TM's and external ear canals both ears  · Pharynx: normal. Exudates: None  · Lips, mucosa, and tongue normal  · Hearing grossly normal.     NECK:   · Supple, symmetrical, trachea midline  · Thyroid not enlarged, symmetric, no tenderness/mass/nodules  LYMPH:  · no cervical nodes, no supraclavicular nodes  LUNGS:    · Breathing unlabored  · clear to auscultation bilaterally and good air movement  CARDIOVASC:   · regular rate and rhythm, S1, S2 normal. No murmur, click, rub or gallop  · Apical impulse normal  · LEGS:  Lower extremity edema: none    · No carotid bruits  SKIN: warm and dry  · No rashes or suspicious lesions  · No nodules or induration  PSYCH:    · Alert and oriented  · Normal reasoning, insight good  · Facial expressions full, mood appropriate  · No memory disturbance noted  MUSCULOSKEL:    · Gait normal, assistive device: none  · No significant finger or nail findings  · Spine symmetric, moderate- severe kyphosis   · R shoulder, unable to move due to pain  · R shoulder much lower than left. Clavicle non-tender. Assessment and Plan:      Diagnosis Orders   1. Well adult health check     2. Vitamin B12 deficiency     3. Vitamin D deficiency     4. Prediabetes  POCT glycosylated hemoglobin (Hb A1C)   5. Obesity, morbid, BMI 50 or higher ([facility] [address]     6. Moderate persistent asthma without complication     7. Mixed incontinence- failed tropesium 2017, failed detrol 2016, failed ditropan 2013     8. Acquired hypothyroidism     9. Hyperlipidemia LDL goal <160     10. Chronic narcotic dependence ([facility] [address]     11. Menopausal state  HM DEXA SCAN   12. Injury of right shoulder, initial encounter     13. Fall, initial encounter     Medical issues stable. Injury NEW. Suspect dislocation. Sent to ER. Report given to PA. INSTRUCTIONS  NEXT APPOINTMENT: Please schedule check-up in 2 weeks shoulder and gait. ·  Since you are taking a CONTROLLED MEDICATION, will need to be seen at least every 6 months AND will need to have a future appointment scheduled for any refills. Be sure to schedule on way out of office today to avoid denial of future refills. · Go to ER now.

## 2019-06-04 DIAGNOSIS — S42.91XA SHOULDER FRACTURE, RIGHT, CLOSED, INITIAL ENCOUNTER: Primary | ICD-10-CM

## 2019-06-11 ENCOUNTER — OFFICE VISIT (OUTPATIENT)
Dept: ORTHOPEDIC SURGERY | Age: 64
End: 2019-06-11
Payer: COMMERCIAL

## 2019-06-11 ENCOUNTER — PREP FOR PROCEDURE (OUTPATIENT)
Dept: ORTHOPEDIC SURGERY | Age: 64
End: 2019-06-11

## 2019-06-11 VITALS — BODY MASS INDEX: 51.91 KG/M2 | HEIGHT: 63 IN | WEIGHT: 293 LBS | RESPIRATION RATE: 16 BRPM

## 2019-06-11 DIAGNOSIS — S42.291A CLOSED 4-PART FRACTURE OF PROXIMAL HUMERUS, RIGHT, INITIAL ENCOUNTER: Primary | ICD-10-CM

## 2019-06-11 DIAGNOSIS — S42.91XA SHOULDER FRACTURE, RIGHT, CLOSED, INITIAL ENCOUNTER: Primary | ICD-10-CM

## 2019-06-11 DIAGNOSIS — E66.01 MORBID OBESITY WITH BMI OF 50.0-59.9, ADULT (HCC): ICD-10-CM

## 2019-06-11 PROCEDURE — L3660 SO 8 AB RSTR CAN/WEB PRE OTS: HCPCS | Performed by: ORTHOPAEDIC SURGERY

## 2019-06-11 PROCEDURE — 99204 OFFICE O/P NEW MOD 45 MIN: CPT | Performed by: ORTHOPAEDIC SURGERY

## 2019-06-11 NOTE — LETTER
Ht Readings from Last 1 Encounters:   06/11/19 5' 3\" (1.6 m)      Wt Readings from Last 1 Encounters:   06/11/19 298 lb (135.2 kg)     TOTAL SHOULDER REPLACEMENT PHYSICIANS ORDERS   I hereby authorize the pharmacy, under the formulary system to dispense a different brand of drug identical composition and comparable quality unless the brand name I have prescribed is circled on this order sheet  All orders without checkboxes will be processed automatically unless crossed out. Orders with checkboxes MUST be checked in order to be carried out.     PRE-OP [email or web address]  [ ] X-ray operative site-standing view  Agus.Melchor ] CBC without differential [X] Albumin  Agus.Melchor ] BMP   [ ] Coag profile  [X] PT/INR    [X] aPTT   [ ] Sed rate on revisions of total joints only Agus.Melchor ] EKG Agus.Melchor ] Type and screen  Agus.Melchor ] UAR       [X] A1C  Agus.Melchor ] Clean catch urine for routine analysis, if positive for nitrites and/or leukocytes, do urine for C & S  Lucian ] Nasal culture for MRSA  Agus.Melchor ] Physical therapy evaluation and teaching  AgusGilma ] Occupational therapy evaluation and teaching  Agus.Melchor ] Inform patient to stop all anti-inflammatory medications including aspirin for 7 days before surgery    DAY OF SURGERY  Agus.Melchor ] Cefazolin 2 gram IVPB; if patient weighs > 80 kg and serum creatinine <2.5 mg/dl give 2 gram dose within 1 hour of incision  OR  If the pre-op nasal culture for MRSA was positive, repeat nasal swab before surgery and give: Vancomycin 1 gram IVPB, reduce dose of Vancomycin to 500 mg IVPB if PT < 55 kg or serum creatinine > 2 mg/dl (Vancomycin must be administered over 1 hour)& Cefazolin 2 gram IVPB; if patient weighs>80 kg and serum creatinine <2.3 mg/dl give 2 gram dose within 1 hour of incision  OR  If allergic to Penicillin or Cephalosporins give: Vancomycin 1 gram IVPB, reduce dose to 500 mg IVPB if PT < 55 kg or serum creatinine > 2 mg/dl (Vancomycin must be administered over 1 hour)  [ ] Low molecular weight Heparin therapy   chest guidelines suggest Enoxaparin 30 mg S.C. BID               Drug: _____________________  Dose: __________   Administer: _____ hours pre-op    Conchis.Cons ] Apply thigh high antiembolic hose and pneumonboots to unoperative leg    Other order: Celebrex 400mg pre-op    Conchis.Cons ] Type and screen    T.O: _____________________________/___________________Date:_______Time:_______   Physician    RN    Physician Signature:       6/11/19 11:45am

## 2019-06-11 NOTE — PROGRESS NOTES
ORTHOPAEDIC H&P NOTE    Chief Complaint   Patient presents with    Shoulder Injury     rt shoulder injury        HPI  59 y.o. female seen for evaluation of right shoulder injury/fracture  She is RHD    Onset 6/3/2019  History of symptoms denies  Injury/trauma yes, mechanical fall onto right side  Pain is located right shoulder, down arm  Worse with any movement, pressure, WB  Better with arm at side  Associated with swelling and bruising down arm    Neck pain = no  Radicular symptoms = no  Numbness and/or tingling = no      I have reviewed and discussed the below pain assessment findings with the patient. Pain Assessment  Location of Pain: Shoulder  Location Modifiers: Right  Severity of Pain: 4  Quality of Pain: Dull, Aching  Duration of Pain: Persistent  Frequency of Pain: Constant  Date Pain First Started: 06/03/19  Aggravating Factors: Stretching  Limiting Behavior: Yes  Relieving Factors: Rest  Result of Injury: Yes  Work-Related Injury: No  Are there other pain locations you wish to document?: No    Review of Systems  I have read over the ROS from the Patient History Form dated on 6/11/2019  Pertinent positives include hearing aids, thyroid disease, asthma, SOB, peipheral edema, hx of cholecystectomy, urinary incontinence, frequency, back pain, left foot neuropathy, chronic LBP, depression  Rest of 13 point ROS otherwise negative except per HPI, and scanned into the patient's chart under the Media tab.       Allergies   Allergen Reactions    Iodine Hives    Contrast [Iodides]      Hives          Current Outpatient Medications   Medication Sig Dispense Refill    topiramate (TOPAMAX) 100 MG tablet TAKE 1 TABLET AT BEDTIME 90 tablet 0    FLUoxetine (PROZAC) 10 MG tablet TAKE 1 TABLET BY MOUTH     DAILY 90 tablet 0    pramipexole (MIRAPEX) 0.5 MG tablet TAKE 1 TABLET TWICE A  tablet 0    trospium (SANCTURA) 20 MG tablet Take 1 tablet by mouth 2 times daily Replacing Toviaz 180 tablet 1    TOVIAZ 8 MG TB24 TAKE 1 TABLET DAILY 90 tablet 0    metFORMIN (GLUCOPHAGE-XR) 500 MG extended release tablet TAKE 2 TABLETS DAILY WITH  BREAKFAST 60 tablet 3    vitamin D (ERGOCALCIFEROL) 63498 units CAPS capsule TAKE 1 CAPSULE TWICE WEEKLY 18 capsule 3    diclofenac (VOLTAREN) 75 MG EC tablet TAKE 1 TABLET TWICE A DAY 60 tablet 2    atenolol (TENORMIN) 25 MG tablet TAKE 1 TABLET DAILY 90 tablet 1    levothyroxine (SYNTHROID) 100 MCG tablet TAKE 1 TABLET BY MOUTH DAILY 90 tablet 1    DULoxetine (CYMBALTA) 60 MG extended release capsule TAKE 1 CAPSULE DAILY 90 capsule 1    atorvastatin (LIPITOR) 20 MG tablet Take 1 tablet by mouth daily 90 tablet 3    esomeprazole (NEXIUM) 40 MG delayed release capsule Take 1 capsule by mouth  every morning 90 capsule 3    FLUoxetine (PROZAC) 10 MG tablet TAKE 1 TABLET DAILY 90 tablet 1    dicyclomine (BENTYL) 20 MG tablet Take 1 tablet by mouth 3 times daily as needed (ibs) 270 tablet 3    albuterol sulfate  (90 Base) MCG/ACT inhaler Inhale 2 puffs into the lungs every 4 hours as needed for Wheezing May substitute for insurance preferred (Ventolin, Proventil, ProAir) 1 Inhaler 3    clonazePAM (KLONOPIN) 0.5 MG tablet Take 0.5 mg by mouth 2 times daily as needed .  triamcinolone (KENALOG) 0.1 % cream Apply topically two times  daily (Patient taking differently: Apply topically two times  daily prn) 90 g 1    SUMAtriptan (IMITREX) 50 MG tablet Take 1 tablet by mouth once as needed . May repeat in 2 hours, maximum 200mg per  day 27 tablet 0     No current facility-administered medications for this visit.         Past Medical History:   Diagnosis Date    Abnormal finding on pulmonary function testing- nl except decreased diffusing capacity 4/11 7/11/2011    Activities treadmill- neg GXT arron, EF 82% 3/11     Allergic rhinitis     Depression     GERD (gastroesophageal reflux disease)     Hearing loss,Tinnitus     Hyperlipidemia     Hypothyroidism     IBS (irritable bowel syndrome)     Migraine headache     Nocturia     PONV (postoperative nausea and vomiting)     Premature ventricular contractions     Pulmonary function testing- nl except decreased diffusing capacity      Relevant prior imaging: normal examination- neg CT chest      RLS (restless legs syndrome)     Sleep apnea         Past Surgical History:   Procedure Laterality Date    CHOLECYSTECTOMY      EYE SURGERY      FOOT SURGERY Left 2017    HYSTERECTOMY      SLEEVE GASTRECTOMY  7.       Family History   Problem Relation Age of Onset    Lung Cancer Father     Heart Disease Maternal Grandmother     Tuberculosis Maternal Grandmother     Other Brother        Social History     Socioeconomic History    Marital status:      Spouse name: Not on file    Number of children: Not on file    Years of education: Not on file    Highest education level: Not on file   Occupational History    Not on file   Social Needs    Financial resource strain: Not on file    Food insecurity:     Worry: Not on file     Inability: Not on file    Transportation needs:     Medical: Not on file     Non-medical: Not on file   Tobacco Use    Smoking status: Former Smoker     Packs/day: 2.00     Years: 15.00     Pack years: 30.00     Types: Cigarettes     Last attempt to quit: 1989     Years since quittin.0    Smokeless tobacco: Never Used    Tobacco comment: congrats on quitting   Substance and Sexual Activity    Alcohol use: Yes     Alcohol/week: 0.0 oz     Comment: occ.      Drug use: No    Sexual activity: Yes     Partners: Male     Comment:    Lifestyle    Physical activity:     Days per week: Not on file     Minutes per session: Not on file    Stress: Not on file   Relationships    Social connections:     Talks on phone: Not on file     Gets together: Not on file     Attends Cheondoism service: Not on file     Active member of club or organization: Not on file     Attends meetings of clubs or organizations: Not on file     Relationship status: Not on file    Intimate partner violence:     Fear of current or ex partner: Not on file     Emotionally abused: Not on file     Physically abused: Not on file     Forced sexual activity: Not on file   Other Topics Concern    Not on file   Social History Narrative    Self-breast exams: yes. Exercise: walking and cardiovascular equipment sometimes. Seatbelt use: Always. Living will: yes,   copy requested        Vitals:    06/11/19 0817   Resp: 16   Weight: 298 lb (135.2 kg)   Height: 5' 3\" (1.6 m)       Physical Exam  Constitutional - well-groomed, well-nourished, Body mass index is 52.79 kg/m². , morbidly obese  Psychiatric - pleasant, normal mood & affect, oriented to place, person, and situation  Cardiovascular - RRR, positive peripheral edema, radial pulse 2+  Respiratory - respirations even and unlabored  Gastrointestinal- protuberant belly  Skin - no rashes, wounds, or lesions seen  Neck/Spine - mildly decreased cervical ROM, no TTP of spinous processes, no TTP of paraspinal musculature,  negative Spurling's  Neurological - SILT M/U/R/A nerve distributions; AIN/PIN/IO intact  Right shoulder - TTP proximal humerus   No TTP clavicle or scapula   Swelling and ecchymosis shoulder/arm   Gentle elbow, forearm, wrist, hand ROM maintained without issue    Imaging:  Images were personally reviewed by myself and discussed with the patient  Narrative   EXAMINATION:   3 XRAY VIEWS OF THE RIGHT SHOULDER       6/3/2019 10:22 am       COMPARISON:   10/24/2006       HISTORY:   ORDERING SYSTEM PROVIDED HISTORY: Trauma, R/O fx   TECHNOLOGIST PROVIDED HISTORY:   Reason for exam:->Trauma, R/O fx   Ordering Physician Provided Reason for Exam: fll this am stacy to md office   Acuity: Acute   Type of Exam: Initial       FINDINGS:   Comminuted proximal humeral fracture is noted.  There is lateral distraction   of a fragment, possibly involving the greater tubercle. [place] is suspected   impaction.  There is subluxation of the glenohumeral joint without convincing   evidence of gross dislocation.       Distal clavicle unremarkable.       Minimal cortical irregularity along the posterior bony glenoid on one view.           Impression   Comminuted acute proximal right humeral fracture with suspected impaction and   distraction of a fragment laterally.  Glenohumeral subluxation is noted.       Mild cortical irregularity along the posterior aspect of the bony glenoid on   one view of uncertain significance.  This could represent fracture.       CT scan of the right shoulder is now recommended.  This will better define   the fracture anatomy of the proximal humerus and better evaluate alignment of   the glenohumeral joint.  Additionally, it could better detect acute bony   abnormality of the scapula/glenoid.       Orthopedic consultation recommended.           Narrative   EXAMINATION:   CT OF THE RIGHT SHOULDER WITHOUT CONTRAST 6/3/2019 11:00 am       TECHNIQUE:   CT of the right shoulder was performed without the administration of   intravenous contrast.  Multiplanar reformatted images are provided for   review. Dose modulation, iterative reconstruction, and/or weight based   adjustment of the mA/kV was utilized to reduce the radiation dose to as low   as reasonably achievable.       COMPARISON:   Radiographs dated 06/03/2019       HISTORY   ORDERING SYSTEM PROVIDED HISTORY: trauma, abnormal xray   TECHNOLOGIST PROVIDED HISTORY:   Ordering Physician Provided Reason for Exam: Shoulder Injury (Right shoulder   pain from mechanical fall at doctor's office today. )   Acuity: Acute   Type of Exam: Initial       FINDINGS:   There is a severely comminuted proximal right humeral fracture involving the   head and neck.  There is inferior subluxation of the major humeral head   fragment in relation to the bony glenoid.  There is moderate impaction.    There is displacement of greater tuberosity fracture fragments.  The fracture   involves the surgical neck.  Majority of the anatomic neck is spared.       A bony glenoid fracture is not identified.  The AC joint is intact with mild   degenerative change.  The rotator cuff muscular bulk is within normal limits. The visualized right lung is clear.           Impression   Acute comminuted proximal right humeral head neck fracture with moderate   impaction.  Inferior subluxation of the major humeral head fragment relation   to the bony glenoid.           Right shoulder 4 views performed today in clinic   - 4-part proximal humerus fracture with humeral head impaction. Greater tuberosity fragment is displaced posterosuperiorly. The is progressive valgus collapse. Fracture through anatomic neck. Inferior humeral head subluxation is present as well. Assessment & Plan:  59 y.o. female who presents with    Diagnosis Orders   1. Closed 4-part fracture of proximal humerus, right, initial encounter  XR SHOULDER RIGHT (MIN 2 VIEWS)    Velpeau/Shure Shoulder Immobilizer Brace   2. Morbid obesity with BMI of 50.0-59.9, adult ([facility] [address]             Procedures    Velpeau/Shure Shoulder Immobilizer Brace     Patient was prescribed a Breg Shure Shoulder Immobilizer. The right shoulder will require stabilization / immobilization from this orthosis. The orthosis will assist in protecting the affected area, provide functional support and facilitate healing. The patient was educated and fit by a healthcare professional with expert knowledge and specialization in brace application while under the direct supervision of the treating physician. Verbal and written instructions for the use of and application of this item were provided. They were instructed to contact the office immediately should the brace result in increased pain, decreased sensation, increased swelling or worsening of the condition. BMI - Body mass index is 52.79 kg/m².  - morbidly obese  DM - prediabetic, on metformin   If yes, most recent A1c = 5.5  Tobacco - no  On blood thinners - no  Personal Hx of DVT/PE - no  Afib - no  CAD - no  Hx of stroke/TIA - no  Narcotics pre-op - yes - chronic back pain - hydrocodone few tabs/week  Depression - yes, cymbalta  Anxiety - yes  HIV - no  Hep C - no  DMARDs and/or biologics - none  Dentition - yes    Discussed both conservative and surgical treatment options  Recommend surgery due to fracture pattern and displacement  Recommend reverse shoulder arthroplasty over ORIF due to patient's age, bone quality, fracture pattern, and functional demands     Realistic expectations and outcomes discussed in relation to reverse TSA in setting of proximal humerus fx  Primary goal is pain relief, with some improvement in function as well  Overhead function is difficult to predict, but forward elevation to shoulder level or slightly above is a realistic goal  Internal and external rotation as well as strength are hard to predict    Informed the patient main goal of surgery is pain relief; shoulder level function or just above is to be expected, but not full elevation. Additionally, internal and external rotation are much harder to predict. Risks and benefits of right reverse shoulder arthroplasty with tuberosity fixation were discussed at length with the patient and an opportunity for questions was afforded. Possible complications of this surgery include, but are not limited to, dislocation, stiffness, loss of range of motion, persistent pain, weakness, component loosening and wear, periprosthetic fracture, scapular notching, scapular and/or acromial stress fractures, infection, neurovascular injury, bleeding, poor wound healing, and hypertrophic scarring/keloids.  The patient demonstrated a good understanding of the procedure, anticipated outcomes, possible complications, the post-operative restrictions and therapy required, and at this time voiced desire to proceed. An informed consent form was signed in the office and the patient was given pre-operative instructions. Preoperative clearance and H&P from primary care physician will be required prior to surgery, JET class attendance this week, and I will see the patient back in clinic for the first post-operative visit. Given her morbid obesity with BMI > 50, she is at increased risk for dislocation and possibly infection. The patient is at risk for osteoporosis and likely has or is at risk for fragility fractures. Therefore, I have recommended treatment with bisphosphonates, and DEXA screening if not recently performed to establish baseline values. Calcium and vitamin D supplementation is indicated as well. Will defer to PCP.     Marie Reynolds

## 2019-06-11 NOTE — PROGRESS NOTES
RAPT  RISK ASSESSMENT and PREDICTION TOOL    Name: Octavio Brown  YOB: 1955  Surgeon: Clair Ochoa MD         Value Score    1). What is your age group? 50 - 65 years  = 2      66 - 75 years = 1     > 75 years = 0       Your score = 2   2). Gender? Male = 2     Female = 1       Your score = 1   3). How far on average can you walk? Two blocks or more (+/- rest) = 2    (a block is 200 mxmbql=130 ft)  1 - 2 blocks (+/- rest) = 1     Housebound (most of time) = 0       Your score = 2   4). Which gait aid do you use? None = 2    (more often than not) Single-point stick = 1     Crutches/walker = 0       Your score = 2   5). Do you use community supports? None or one per week = 1    (home help, meals on wheels, district nursing) Two or more per week = 0       Your score = 1   6). Will you live with someone who can care for you after your operation? yes = 3     no = 0       Your score = 3    Your Total Score (out of 12) = 11       Key: Destination at discharge from acute care predicted by score.   Score < 6  = extended inpatient rehabilitation  Score 6 - 9  = additional intervention to discharge directly home (Rehab in the home)  Score > 9  = directly home      Patient's Preference Prediction Score Agreed destination   outpatient 11 home   Octavio Brown  Date: 6/11/19      will be with patient after surgery

## 2019-06-12 ENCOUNTER — HOSPITAL ENCOUNTER (OUTPATIENT)
Dept: PREADMISSION TESTING | Age: 64
Discharge: HOME | End: 2019-06-16
Payer: COMMERCIAL

## 2019-06-12 DIAGNOSIS — S42.91XA SHOULDER FRACTURE, RIGHT, CLOSED, INITIAL ENCOUNTER: ICD-10-CM

## 2019-06-12 LAB
ALBUMIN SERPL-MCNC: 4.1 G/DL (ref 3.4–5)
ANION GAP SERPL CALCULATED.3IONS-SCNC: 12 MMOL/L (ref 3–16)
BASOPHILS ABSOLUTE: 0 K/UL (ref 0–0.2)
BASOPHILS RELATIVE PERCENT: 0.5 %
BILIRUBIN URINE: NEGATIVE
BLOOD, URINE: NEGATIVE
BUN BLDV-MCNC: 14 MG/DL (ref 7–20)
CALCIUM SERPL-MCNC: 9.4 MG/DL (ref 8.3–10.6)
CHLORIDE BLD-SCNC: 106 MMOL/L (ref 99–110)
CLARITY: CLEAR
CO2: 23 MMOL/L (ref 21–32)
COLOR: YELLOW
CREAT SERPL-MCNC: 1 MG/DL (ref 0.6–1.2)
EOSINOPHILS ABSOLUTE: 0.2 K/UL (ref 0–0.6)
EOSINOPHILS RELATIVE PERCENT: 3.7 %
ESTIMATED AVERAGE GLUCOSE: 108.3 MG/DL
GFR AFRICAN AMERICAN: >60
GFR NON-AFRICAN AMERICAN: 56
GLUCOSE BLD-MCNC: 94 MG/DL (ref 70–99)
GLUCOSE URINE: NEGATIVE MG/DL
HBA1C MFR BLD: 5.4 %
HCT VFR BLD CALC: 31.6 % (ref 36–48)
HEMOGLOBIN: 10.4 G/DL (ref 12–16)
INR BLD: 1.01 (ref 0.86–1.14)
KETONES, URINE: NEGATIVE MG/DL
LEUKOCYTE ESTERASE, URINE: NEGATIVE
LYMPHOCYTES ABSOLUTE: 1.5 K/UL (ref 1–5.1)
LYMPHOCYTES RELATIVE PERCENT: 26.1 %
MCH RBC QN AUTO: 28.4 PG (ref 26–34)
MCHC RBC AUTO-ENTMCNC: 32.8 G/DL (ref 31–36)
MCV RBC AUTO: 86.5 FL (ref 80–100)
MICROSCOPIC EXAMINATION: NORMAL
MONOCYTES ABSOLUTE: 0.3 K/UL (ref 0–1.3)
MONOCYTES RELATIVE PERCENT: 4.8 %
MRSA SCREEN RT-PCR: NORMAL
NEUTROPHILS ABSOLUTE: 3.8 K/UL (ref 1.7–7.7)
NEUTROPHILS RELATIVE PERCENT: 64.9 %
NITRITE, URINE: NEGATIVE
PDW BLD-RTO: 15.9 % (ref 12.4–15.4)
PH UA: 7 (ref 5–8)
PLATELET # BLD: 227 K/UL (ref 135–450)
PMV BLD AUTO: 8 FL (ref 5–10.5)
POTASSIUM SERPL-SCNC: 4.1 MMOL/L (ref 3.5–5.1)
PROTEIN UA: NEGATIVE MG/DL
PROTHROMBIN TIME: 11.5 SEC (ref 9.8–13)
RBC # BLD: 3.66 M/UL (ref 4–5.2)
SODIUM BLD-SCNC: 141 MMOL/L (ref 136–145)
SPECIFIC GRAVITY UA: 1.01 (ref 1–1.03)
URINE REFLEX TO CULTURE: NORMAL
URINE TYPE: NORMAL
UROBILINOGEN, URINE: 1 E.U./DL
WBC # BLD: 5.8 K/UL (ref 4–11)

## 2019-06-12 PROCEDURE — 93005 ELECTROCARDIOGRAM TRACING: CPT

## 2019-06-12 PROCEDURE — 87641 MR-STAPH DNA AMP PROBE: CPT

## 2019-06-12 PROCEDURE — 82040 ASSAY OF SERUM ALBUMIN: CPT

## 2019-06-12 PROCEDURE — 85610 PROTHROMBIN TIME: CPT

## 2019-06-12 PROCEDURE — 80048 BASIC METABOLIC PNL TOTAL CA: CPT

## 2019-06-12 PROCEDURE — 83036 HEMOGLOBIN GLYCOSYLATED A1C: CPT

## 2019-06-12 PROCEDURE — 86900 BLOOD TYPING SEROLOGIC ABO: CPT

## 2019-06-12 PROCEDURE — 81003 URINALYSIS AUTO W/O SCOPE: CPT

## 2019-06-12 PROCEDURE — 86850 RBC ANTIBODY SCREEN: CPT

## 2019-06-12 PROCEDURE — 86901 BLOOD TYPING SEROLOGIC RH(D): CPT

## 2019-06-12 PROCEDURE — 85025 COMPLETE CBC W/AUTO DIFF WBC: CPT

## 2019-06-12 NOTE — CARE COORDINATION
DATE OF JET CLASS INTERVIEW: 6/12/19 - here alone  FIRST JOINT REPLACEMENT? yes     CHOICES FOR HHC, DME VENDORS AND SKILLED/ REHAB FACILITIES PROVIDED TO PT DURING THIS INTERVIEW. DISCHARGE PLAN:/ INCLUDING WHO WILL BE STAYING WITH YOU AT HOME? Patient lives at home with her  and plans to return there at [place]. They live in a house with 13 steps in with a railing. Once inside all living space is on one level. She currently uses a cane for ambulating     LENGTH OF STAY HAS BEEN DISCUSSED WITH THE PT, APPROPRIATE TO HIS/ HER PROCEDURE. PT HAS BEEN INFORMED THAT THEY WILL BE DISCHARGED WHEN THE PHYSICIAN DEEMS THEM MEDICALLY READY. MOST PATIENTS CAN EXPECT  TO BE IN THE HOSPITAL ONE NIGHT AS AN OBSERVATION ONLY, OR 1-2 DAYS AS AN ADMISSION FOR THOSE WITH MEDICAL HEALTH  ISSUES/COMPLICATIONS. HOME CARE:  None needed  SNF/REHAB:  Denies need  DME:  Currently uses a cane for ambulating --denies other needs  PT AGREEABLE TO MEDS TO BEDS FROM [facility]. TRANSPORTATION:   transports  Contact information for case management provided to pt. Will follow with therapies and social service.   Electronically signed by Denise Boone on 6/12/2019 at 4:35 PM

## 2019-06-13 ENCOUNTER — PREP FOR PROCEDURE (OUTPATIENT)
Dept: ORTHOPEDICS UNIT | Age: 64
End: 2019-06-13

## 2019-06-13 ENCOUNTER — OFFICE VISIT (OUTPATIENT)
Dept: FAMILY MEDICINE CLINIC | Age: 64
End: 2019-06-13
Payer: COMMERCIAL

## 2019-06-13 VITALS
DIASTOLIC BLOOD PRESSURE: 76 MMHG | HEIGHT: 63 IN | HEART RATE: 73 BPM | WEIGHT: 293 LBS | TEMPERATURE: 97.8 F | BODY MASS INDEX: 51.91 KG/M2 | SYSTOLIC BLOOD PRESSURE: 120 MMHG | OXYGEN SATURATION: 99 % | RESPIRATION RATE: 16 BRPM

## 2019-06-13 DIAGNOSIS — D64.9 ANEMIA, UNSPECIFIED TYPE: ICD-10-CM

## 2019-06-13 DIAGNOSIS — Z01.810 PREOP CARDIOVASCULAR EXAM: Primary | ICD-10-CM

## 2019-06-13 DIAGNOSIS — S42.91XK CLOSED FRACTURE OF RIGHT SHOULDER WITH NONUNION, SUBSEQUENT ENCOUNTER: ICD-10-CM

## 2019-06-13 DIAGNOSIS — J45.40 MODERATE PERSISTENT ASTHMA WITHOUT COMPLICATION: ICD-10-CM

## 2019-06-13 DIAGNOSIS — G47.33 OBSTRUCTIVE SLEEP APNEA SYNDROME: ICD-10-CM

## 2019-06-13 DIAGNOSIS — I49.3 PREMATURE VENTRICULAR CONTRACTIONS: ICD-10-CM

## 2019-06-13 LAB
EKG ATRIAL RATE: 70 BPM
EKG DIAGNOSIS: NORMAL
EKG P AXIS: 32 DEGREES
EKG P-R INTERVAL: 164 MS
EKG Q-T INTERVAL: 438 MS
EKG QRS DURATION: 84 MS
EKG QTC CALCULATION (BAZETT): 473 MS
EKG R AXIS: 2 DEGREES
EKG T AXIS: 20 DEGREES
EKG VENTRICULAR RATE: 70 BPM

## 2019-06-13 PROCEDURE — 93010 ELECTROCARDIOGRAM REPORT: CPT | Performed by: INTERNAL MEDICINE

## 2019-06-13 PROCEDURE — 99242 OFF/OP CONSLTJ NEW/EST SF 20: CPT | Performed by: FAMILY MEDICINE

## 2019-06-13 RX ORDER — CELECOXIB 200 MG/1
400 CAPSULE ORAL ONCE
Status: CANCELLED | OUTPATIENT
Start: 2019-06-13 | End: 2019-06-13

## 2019-06-13 RX ORDER — HYDROCODONE BITARTRATE AND ACETAMINOPHEN 5; 325 MG/1; MG/1
1 TABLET ORAL EVERY 6 HOURS PRN
COMMUNITY
End: 2019-06-13 | Stop reason: SDUPTHER

## 2019-06-13 RX ORDER — CALCIUM CARBONATE 500(1250)
500 TABLET ORAL DAILY
COMMUNITY

## 2019-06-13 RX ORDER — HYDROCODONE BITARTRATE AND ACETAMINOPHEN 5; 325 MG/1; MG/1
1 TABLET ORAL EVERY 6 HOURS PRN
Qty: 90 TABLET | Refills: 0 | Status: ON HOLD | OUTPATIENT
Start: 2019-06-13 | End: 2019-06-18 | Stop reason: SDUPTHER

## 2019-06-13 RX ORDER — FERROUS SULFATE 325(65) MG
325 TABLET ORAL 2 TIMES DAILY
Qty: 60 TABLET | Refills: 0 | Status: SHIPPED | OUTPATIENT
Start: 2019-06-13 | End: 2020-02-21

## 2019-06-13 NOTE — PROGRESS NOTES
Phone message left on home/cell #, to call PAT dept at [phone]  for history review and surgery instructions.

## 2019-06-13 NOTE — PATIENT INSTRUCTIONS
INSTRUCTIONS  · Hold diclofenac for now. · AM of surgery take the following with a sip of water: Nexium and atenolol  · NEXT APPOINTMENT: Please schedule check-up in 6 months. · PLEASE TAKE THIS FORM TO CHECK-OUT WINDOW TO SCHEDULE NEXT VISIT. · Please get flu vaccine when available in fall. Can get either at this office or at stores such as [employer] and [address]. · Take iron twice a day for at least a week.

## 2019-06-13 NOTE — PROGRESS NOTES
C-Difficile admission screening and protocol:     * Admitted with diarrhea?no   *Prior history of C-Diff. In last 3 months?no     *Antibiotic use in the past 6-8 weeks?no     *Prior hospitalization or nursing home in the last month?  no        PRE-OP INSTRUCTIONS   BRING C-PAP    · Do not eat or drink anything after 12:00 midnight prior to surgery. · This includes water, chewing gum, mints and ice chips. · You may brush your teeth and gargle the morning of surgery but DO  NOT SWALLOW THE WATER. Take the following medications with a small sip of water on the morning of procedure. Melyssa Umanzor Follow your MD/Surgeons pre-procedure instructions regarding your medications    · You may be asked to stop blood thinners such as:  Coumadin, Plavix, Fragmin and lovenox. Please check with your doctor before stopping these or any other medications. · Aspirin, ibuprofen, advil and naproxen, any anti-inflammatory products should be stopped for a week prior to your surgery. · Do not smoke and do not drink any alcoholic beverages 24 hours prior to your surgery. · Please do not wear any jewelry or body piercings on the day of surgery. · Please wear something simple, loose fitting clothing to the hospital.  Do not wear any make-up(including eye make-up) or nail polish on your fingers and toes. As part of our patient safety program to minimize surgical infections, we ask you to do the following:    Please notify your surgeon if you develop any illness between now and the day of your surgery. This includes a cough, cold, fever, sore  throat, nausea, vomiting, diarrhea, etc.   Please notify your surgeon if you experience dizziness, shortness of  breath or blurred vision between now and the time of your surgery. Please notify your surgeon of any open or redden areas that may look infected       DO NOT shave your operative site 96 hours(four days) prior to surgery.           Shower the week before surgery with an antibacterial soap such as:dial,safeguard, etc.       Three(3) days prior to your surgery, cleanse the operative site with Hibiclens(anti-microbial soap). This soap may dry your skin, please do not apply any oils or lotions     · Please bring your insurance card and picture ID day of surgery    · If you have a living will or durable power of attorny. Please bring in a copy of you advanced directives. · If you have dentures, they will be removed before going to the OR, we will provide you with a container. If you wear contact lenses/glasses, they will be removes, please bring a case    · Have you seen your family doctor for a pre-op history and physical.      · Surgery scheduler will call you 48 hours prior to your surgery to notify you of the time of your surgery and the time you will need to be at hospital...patients are asked to arrive 2 1/2 hours prior to surgery. ·  Please call Pre-Admission testing if you have any further questions.                   [id number] [facility] [place] testing phone number:  [phone]      Thank You for choosing [facility]!!

## 2019-06-13 NOTE — PROGRESS NOTES
<160    GERD (gastroesophageal reflux disease)    IBS (irritable bowel syndrome)    Sciatica    Premature ventricular contractions    Hearing loss,Tinnitus    RLS (restless legs syndrome)    Sleep apnea    Migraine headache    Depression    Vitamin B12 deficiency    Vitamin D deficiency    DDD (degenerative disc disease), lumbar    Allergic Conjunctivitis    Mixed incontinence- failed tropesium 2017, failed detrol 2016, failed ditropan 2013    Moderate persistent asthma without complication    Needs flu shot    Osteopenia DXA -1.7 (10/13), -2.1 (11/16)    Intertrigo labialis    Well adult health check    Chronic narcotic dependence (HCC)    Eczema of hand    Obesity, morbid, BMI 50 or higher (HCC)    Chronic thoracic spine pain    Chronic neck pain    Insomnia    Prediabetes    History of sleeve gastrectomy     Past Medical History:   Diagnosis Date    Abnormal finding on pulmonary function testing- nl except decreased diffusing capacity 4/11 7/11/2011    Activities treadmill- neg GXT arron, EF 82% 3/11     Allergic rhinitis     Asthma     exerise induced    Depression     GERD (gastroesophageal reflux disease)     Hearing loss,Tinnitus     Hyperlipidemia     Hypothyroidism     IBS (irritable bowel syndrome)     Migraine headache     Nocturia     PONV (postoperative nausea and vomiting)     Premature ventricular contractions     Pulmonary function testing- nl except decreased diffusing capacity 4/11     Relevant prior imaging: normal examination- neg CT chest 4/11     RLS (restless legs syndrome)     Sleep apnea     uses C-PAP     Past Surgical History:   Procedure Laterality Date    CHOLECYSTECTOMY      COLONOSCOPY      ENDOSCOPY, COLON, DIAGNOSTIC      EYE SURGERY Bilateral     cataracts    FOOT SURGERY Left 03/17/2017    HYSTERECTOMY      SLEEVE GASTRECTOMY  7.2012    WISDOM TOOTH EXTRACTION       Family History   Problem Relation Age of Onset    Lung Cancer Father     Heart Disease Maternal Grandmother     Tuberculosis Maternal Grandmother     Other Brother      Social History     Tobacco Use    Smoking status: Former Smoker     Packs/day: 2.00     Years: 15.00     Pack years: 30.00     Types: Cigarettes     Last attempt to quit: 1989     Years since quittin.0    Smokeless tobacco: Never Used    Tobacco comment: congrats on quitting   Substance Use Topics    Alcohol use: Yes     Alcohol/week: 0.0 oz     Comment: occ.  Drug use: No     Allergies   Allergen Reactions    Iodine Hives    Contrast [Iodides]      Hives       Prior to Visit Medications    Medication Sig Taking? Authorizing Provider   calcium carbonate (OSCAL) 500 MG TABS tablet Take 500 mg by mouth daily Yes Historical Provider, MD   HYDROcodone-acetaminophen (NORCO) 5-325 MG per tablet Take 1 tablet by mouth every 6 hours as needed for Pain for up to 30 days.  Yes Fidel Montana MD   ferrous sulfate 325 (65 Fe) MG tablet Take 1 tablet by mouth 2 times daily Yes Fidel Montana MD   topiramate (TOPAMAX) 100 MG tablet TAKE 1 TABLET AT BEDTIME Yes Fidel Montana MD   pramipexole (MIRAPEX) 0.5 MG tablet TAKE 1 TABLET TWICE A DAY Yes Fidel Montana MD   trospium (SANCTURA) 20 MG tablet Take 1 tablet by mouth 2 times daily Replacing Sadaf Jimenez Yes Fidel Montana MD   metFORMIN (GLUCOPHAGE-XR) 500 MG extended release tablet TAKE 2 TABLETS DAILY WITH  BREAKFAST Yes Fidel Montana MD   vitamin D (ERGOCALCIFEROL) 79589 units CAPS capsule TAKE 1 CAPSULE TWICE WEEKLY Yes Fidel Montana MD   diclofenac (VOLTAREN) 75 MG EC tablet TAKE 1 TABLET TWICE A DAY Yes Fidel Montana MD   atenolol (TENORMIN) 25 MG tablet TAKE 1 TABLET DAILY Yes Fidel Montana MD   levothyroxine (SYNTHROID) 100 MCG tablet TAKE 1 TABLET BY MOUTH DAILY Yes Fidel Montana MD   DULoxetine (CYMBALTA) 60 MG extended release capsule TAKE 1 CAPSULE DAILY Yes Fidel Montana MD   atorvastatin (LIPITOR) 20 MG tablet Take 1 tablet by mouth daily  Patient taking differently: Take 20 mg by mouth every evening  Yes Joe Guzman MD   esomeprazole (NEXIUM) 40 MG delayed release capsule Take 1 capsule by mouth  every morning Yes Joe Guzman MD   FLUoxetine (PROZAC) 10 MG tablet TAKE 1 TABLET DAILY Yes Joe Guzman MD   dicyclomine (BENTYL) 20 MG tablet Take 1 tablet by mouth 3 times daily as needed (ibs) Yes Joe Guzman MD   albuterol sulfate  (90 Base) MCG/ACT inhaler Inhale 2 puffs into the lungs every 4 hours as needed for Wheezing May substitute for insurance preferred (Ventolin, Proventil, ProAir) Yes Joe Guzman MD   clonazePAM (KLONOPIN) 0.5 MG tablet Take 0.5 mg by mouth 2 times daily as needed . Yes Historical Provider, MD   triamcinolone (KENALOG) 0.1 % cream Apply topically two times  daily  Patient taking differently: Apply topically two times  daily prn Yes Joe Guzman MD   SUMAtriptan (IMITREX) 50 MG tablet Take 1 tablet by mouth once as needed . May repeat in 2 hours, maximum 200mg per  day Yes DEA Greco CNP       HISTORY:  Patient's medications, allergies, past medical, surgical, social and family histories were reviewed and updated as appropriate (See above). Objective:   PHYSICAL EXAM  /76 (Site: Left Upper Arm, Position: Sitting, Cuff Size: Large Adult)   Pulse 73   Temp 97.8 °F (36.6 °C) (Oral)   Resp 16   Ht 5' 3\" (1.6 m)   Wt 294 lb (133.4 kg)   SpO2 99%   BMI 52.08 kg/m²   BP Readings from Last 5 Encounters:   06/13/19 120/76   06/03/19 122/68   06/03/19 116/76   12/03/18 124/78   08/01/18 122/82     Wt Readings from Last 5 Encounters:   06/13/19 294 lb (133.4 kg)   06/11/19 298 lb (135.2 kg)   06/03/19 298 lb 8 oz (135.4 kg)   06/03/19 289 lb (131.1 kg)   12/03/18 296 lb (134.3 kg)      GENERAL:   · well-developed, well-nourished, alert, no distress. EYES:   · External findings: lids and lashes normal and conjunctivae and sclerae normal  · Eyes: no periorbital cellulitis.    ENT:   · External nose and ears appear normal  · normal TM's and external ear canals both ears  · Pharynx: normal. Exudates: None  · Lips, mucosa, and tongue normal and teeth normal   · Hearing grossly normal.     NECK:   · No adenopathy, supple, symmetrical, trachea midline  · Thyroid not enlarged, symmetric, no tenderness/mass/nodules  LYMPH:  · no cervical nodes, no supraclavicular nodes  LUNGS:    · Breathing unlabored  · clear to auscultation bilaterally and good air movement  CARDIOVASC:   · regular rate and rhythm, S1, S2 normal. No murmur, click, rub or gallop  · Apical impulse normal  · LEGS:  Lower extremity edema: none    · No carotid bruits  SKIN: warm and dry  · No rashes or suspicious lesions  PSYCH:    · Alert and oriented  · Normal reasoning, insight good  · Facial expressions full, mood appropriate  · No memory disturbance noted    Lab Review   Lab Results   Component Value Date     06/12/2019    K 4.1 06/12/2019    CREATININE 1.0 06/12/2019     Lab Results   Component Value Date    WBC 5.8 06/12/2019    HGB 10.4 (L) 06/12/2019    HCT 31.6 (L) 06/12/2019    MCV 86.5 06/12/2019     06/12/2019     Lab Results   Component Value Date    LABA1C 5.4 06/12/2019      Assessment:    59 y.o. female with planned surgery as above. Diagnosis Orders   1. Preop cardiovascular exam     2. 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 Moderate persistent asthma without complication     4. Obstructive sleep apnea syndrome     5. Premature ventricular contractions     6. Anemia, unspecified type  ferrous sulfate 325 (65 Fe) MG tablet       Plan:   Ok to proceed with the surgery. Low cardiac risk. Pt. advised to stop all Rx except beta blocker and PPI the a.m. of surgery. Patient advised to hold blood thinning medication 7 days prior to procedure, if applicable.   Prophylaxis for cardiac events with perioperative beta-blockers:

## 2019-06-14 ENCOUNTER — ANESTHESIA EVENT (OUTPATIENT)
Dept: OPERATING ROOM | Age: 64
End: 2019-06-14
Payer: COMMERCIAL

## 2019-06-14 LAB
ABO/RH: NORMAL
ABO/RH: NORMAL
ANTIBODY SCREEN: NORMAL

## 2019-06-17 ENCOUNTER — HOSPITAL ENCOUNTER (OUTPATIENT)
Age: 64
Discharge: HOME | End: 2019-06-18
Attending: ORTHOPAEDIC SURGERY | Admitting: ORTHOPAEDIC SURGERY
Payer: COMMERCIAL

## 2019-06-17 ENCOUNTER — ANESTHESIA (OUTPATIENT)
Dept: OPERATING ROOM | Age: 64
End: 2019-06-17
Payer: COMMERCIAL

## 2019-06-17 ENCOUNTER — APPOINTMENT (OUTPATIENT)
Dept: GENERAL RADIOLOGY | Age: 64
End: 2019-06-17
Attending: ORTHOPAEDIC SURGERY
Payer: COMMERCIAL

## 2019-06-17 VITALS
OXYGEN SATURATION: 100 % | DIASTOLIC BLOOD PRESSURE: 58 MMHG | RESPIRATION RATE: 9 BRPM | SYSTOLIC BLOOD PRESSURE: 109 MMHG | TEMPERATURE: 96.1 F

## 2019-06-17 DIAGNOSIS — S42.91XK CLOSED FRACTURE OF RIGHT SHOULDER WITH NONUNION, SUBSEQUENT ENCOUNTER: ICD-10-CM

## 2019-06-17 PROBLEM — Z96.611 STATUS POST REVERSE ARTHROPLASTY OF RIGHT SHOULDER: Status: ACTIVE | Noted: 2019-06-17

## 2019-06-17 PROBLEM — E66.01 MORBID OBESITY WITH BMI OF 50.0-59.9, ADULT (HCC): Status: ACTIVE | Noted: 2019-06-17

## 2019-06-17 PROBLEM — S42.291A CLOSED 4-PART FRACTURE OF PROXIMAL HUMERUS, RIGHT, INITIAL ENCOUNTER: Status: ACTIVE | Noted: 2019-06-17

## 2019-06-17 LAB
ABO/RH: NORMAL
ABO/RH: NORMAL
ANTIBODY SCREEN: NORMAL
GLUCOSE BLD-MCNC: 107 MG/DL (ref 70–99)
GLUCOSE BLD-MCNC: 200 MG/DL (ref 70–99)
GLUCOSE BLD-MCNC: 208 MG/DL (ref 70–99)
GLUCOSE BLD-MCNC: 99 MG/DL (ref 70–99)
PERFORMED ON: ABNORMAL
PERFORMED ON: NORMAL

## 2019-06-17 PROCEDURE — 6360000002 HC RX W HCPCS: Performed by: ORTHOPAEDIC SURGERY

## 2019-06-17 PROCEDURE — 94760 N-INVAS EAR/PLS OXIMETRY 1: CPT

## 2019-06-17 PROCEDURE — 2580000003 HC RX 258: Performed by: ORTHOPAEDIC SURGERY

## 2019-06-17 PROCEDURE — C1776 JOINT DEVICE (IMPLANTABLE): HCPCS | Performed by: ORTHOPAEDIC SURGERY

## 2019-06-17 PROCEDURE — 2580000003 HC RX 258: Performed by: ANESTHESIOLOGY

## 2019-06-17 PROCEDURE — L3650 SO 8 ABD RESTRAINT PRE OTS: HCPCS | Performed by: ORTHOPAEDIC SURGERY

## 2019-06-17 PROCEDURE — 2500000003 HC RX 250 WO HCPCS: Performed by: NURSE ANESTHETIST, CERTIFIED REGISTERED

## 2019-06-17 PROCEDURE — 7100000001 HC PACU RECOVERY - ADDTL 15 MIN: Performed by: ORTHOPAEDIC SURGERY

## 2019-06-17 PROCEDURE — 2500000003 HC RX 250 WO HCPCS: Performed by: ORTHOPAEDIC SURGERY

## 2019-06-17 PROCEDURE — 94660 CPAP INITIATION&MGMT: CPT

## 2019-06-17 PROCEDURE — 86850 RBC ANTIBODY SCREEN: CPT

## 2019-06-17 PROCEDURE — 23472 RECONSTRUCT SHOULDER JOINT: CPT | Performed by: ORTHOPAEDIC SURGERY

## 2019-06-17 PROCEDURE — 7100000000 HC PACU RECOVERY - FIRST 15 MIN: Performed by: ORTHOPAEDIC SURGERY

## 2019-06-17 PROCEDURE — 3700000000 HC ANESTHESIA ATTENDED CARE: Performed by: ORTHOPAEDIC SURGERY

## 2019-06-17 PROCEDURE — 3600000015 HC SURGERY LEVEL 5 ADDTL 15MIN: Performed by: ORTHOPAEDIC SURGERY

## 2019-06-17 PROCEDURE — 3600000005 HC SURGERY LEVEL 5 BASE: Performed by: ORTHOPAEDIC SURGERY

## 2019-06-17 PROCEDURE — 2580000003 HC RX 258: Performed by: NURSE ANESTHETIST, CERTIFIED REGISTERED

## 2019-06-17 PROCEDURE — 88307 TISSUE EXAM BY PATHOLOGIST: CPT

## 2019-06-17 PROCEDURE — 3700000001 HC ADD 15 MINUTES (ANESTHESIA): Performed by: ORTHOPAEDIC SURGERY

## 2019-06-17 PROCEDURE — 86901 BLOOD TYPING SEROLOGIC RH(D): CPT

## 2019-06-17 PROCEDURE — 6360000002 HC RX W HCPCS

## 2019-06-17 PROCEDURE — 86900 BLOOD TYPING SEROLOGIC ABO: CPT

## 2019-06-17 PROCEDURE — 6360000002 HC RX W HCPCS: Performed by: NURSE ANESTHETIST, CERTIFIED REGISTERED

## 2019-06-17 PROCEDURE — C1713 ANCHOR/SCREW BN/BN,TIS/BN: HCPCS | Performed by: ORTHOPAEDIC SURGERY

## 2019-06-17 PROCEDURE — 73020 X-RAY EXAM OF SHOULDER: CPT

## 2019-06-17 PROCEDURE — 6360000002 HC RX W HCPCS: Performed by: ANESTHESIOLOGY

## 2019-06-17 PROCEDURE — 6370000000 HC RX 637 (ALT 250 FOR IP): Performed by: ORTHOPAEDIC SURGERY

## 2019-06-17 PROCEDURE — 94664 DEMO&/EVAL PT USE INHALER: CPT

## 2019-06-17 PROCEDURE — 88311 DECALCIFY TISSUE: CPT

## 2019-06-17 PROCEDURE — 1200000000 HC SEMI PRIVATE

## 2019-06-17 PROCEDURE — 2709999900 HC NON-CHARGEABLE SUPPLY: Performed by: ORTHOPAEDIC SURGERY

## 2019-06-17 PROCEDURE — 3209999900 FLUORO FOR SURGICAL PROCEDURES

## 2019-06-17 PROCEDURE — 94150 VITAL CAPACITY TEST: CPT

## 2019-06-17 DEVICE — SUTURE N ABSRB BRAIDED 5-0 24 IN 49 MM GRN NICELOOP SMSL50101: Type: IMPLANTABLE DEVICE | Site: SHOULDER | Status: FUNCTIONAL

## 2019-06-17 DEVICE — BASEPLATE GLEN DIA25MM STD REVERSED AEQUALIS PERFORM: Type: IMPLANTABLE DEVICE | Site: SHOULDER | Status: FUNCTIONAL

## 2019-06-17 DEVICE — INSERT SHLDR DIA33MM THK6MM 7.5DEG REVERSED FLX CONV: Type: IMPLANTABLE DEVICE | Site: SHOULDER | Status: FUNCTIONAL

## 2019-06-17 DEVICE — SCREW BONE L22MM DIA5MM TI ST FULL THRD PERIPH FOR GLEN: Type: IMPLANTABLE DEVICE | Site: SHOULDER | Status: FUNCTIONAL

## 2019-06-17 DEVICE — SUTURE NICELOOP SZ 5 L24IN NONABSORBABLE WHT KAC-25 L49MM SMSL50201: Type: IMPLANTABLE DEVICE | Site: SHOULDER | Status: FUNCTIONAL

## 2019-06-17 DEVICE — SPHERE GLEN L33MM +3MM LAT AEQUALIS PERFORM REVERSED: Type: IMPLANTABLE DEVICE | Site: SHOULDER | Status: FUNCTIONAL

## 2019-06-17 DEVICE — TRAY HUM THK+0MM 0MM OFFSET CNTR REVERSED AEQUALIS ASCEND: Type: IMPLANTABLE DEVICE | Site: SHOULDER | Status: FUNCTIONAL

## 2019-06-17 DEVICE — SCREW BONE L35MM DIA6.5MM TI ST FULL THRD CTRL FOR GLEN: Type: IMPLANTABLE DEVICE | Site: SHOULDER | Status: FUNCTIONAL

## 2019-06-17 DEVICE — IMPLANTABLE DEVICE: Type: IMPLANTABLE DEVICE | Site: SHOULDER | Status: FUNCTIONAL

## 2019-06-17 DEVICE — SCREW BONE L30MM DIA5MM TI ST FULL THRD PERIPH FOR GLEN: Type: IMPLANTABLE DEVICE | Site: SHOULDER | Status: FUNCTIONAL

## 2019-06-17 RX ORDER — SUCCINYLCHOLINE/SOD CL,ISO/PF 200MG/10ML
SYRINGE (ML) INTRAVENOUS PRN
Status: DISCONTINUED | OUTPATIENT
Start: 2019-06-17 | End: 2019-06-17 | Stop reason: SDUPTHER

## 2019-06-17 RX ORDER — OXYCODONE HYDROCHLORIDE AND ACETAMINOPHEN 5; 325 MG/1; MG/1
2 TABLET ORAL PRN
Status: DISCONTINUED | OUTPATIENT
Start: 2019-06-17 | End: 2019-06-17 | Stop reason: HOSPADM

## 2019-06-17 RX ORDER — ONDANSETRON 2 MG/ML
INJECTION INTRAMUSCULAR; INTRAVENOUS PRN
Status: DISCONTINUED | OUTPATIENT
Start: 2019-06-17 | End: 2019-06-17 | Stop reason: SDUPTHER

## 2019-06-17 RX ORDER — OXYCODONE HYDROCHLORIDE AND ACETAMINOPHEN 5; 325 MG/1; MG/1
2 TABLET ORAL EVERY 4 HOURS PRN
Status: DISCONTINUED | OUTPATIENT
Start: 2019-06-17 | End: 2019-06-18 | Stop reason: HOSPADM

## 2019-06-17 RX ORDER — EPHEDRINE SULFATE/0.9% NACL/PF 50 MG/5 ML
SYRINGE (ML) INTRAVENOUS PRN
Status: DISCONTINUED | OUTPATIENT
Start: 2019-06-17 | End: 2019-06-17 | Stop reason: SDUPTHER

## 2019-06-17 RX ORDER — SODIUM CHLORIDE 0.9 % (FLUSH) 0.9 %
10 SYRINGE (ML) INJECTION PRN
Status: DISCONTINUED | OUTPATIENT
Start: 2019-06-17 | End: 2019-06-17 | Stop reason: HOSPADM

## 2019-06-17 RX ORDER — SODIUM CHLORIDE 9 MG/ML
INJECTION, SOLUTION INTRAVENOUS CONTINUOUS PRN
Status: DISCONTINUED | OUTPATIENT
Start: 2019-06-17 | End: 2019-06-17 | Stop reason: SDUPTHER

## 2019-06-17 RX ORDER — FENTANYL CITRATE 50 UG/ML
25 INJECTION, SOLUTION INTRAMUSCULAR; INTRAVENOUS EVERY 5 MIN PRN
Status: DISCONTINUED | OUTPATIENT
Start: 2019-06-17 | End: 2019-06-17 | Stop reason: HOSPADM

## 2019-06-17 RX ORDER — FERROUS SULFATE TAB EC 324 MG (65 MG FE EQUIVALENT) 324 (65 FE) MG
324 TABLET DELAYED RESPONSE ORAL 2 TIMES DAILY WITH MEALS
Status: DISCONTINUED | OUTPATIENT
Start: 2019-06-17 | End: 2019-06-18 | Stop reason: HOSPADM

## 2019-06-17 RX ORDER — PROPOFOL 10 MG/ML
INJECTION, EMULSION INTRAVENOUS PRN
Status: DISCONTINUED | OUTPATIENT
Start: 2019-06-17 | End: 2019-06-17 | Stop reason: SDUPTHER

## 2019-06-17 RX ORDER — ATORVASTATIN CALCIUM 20 MG/1
20 TABLET, FILM COATED ORAL NIGHTLY
Status: DISCONTINUED | OUTPATIENT
Start: 2019-06-17 | End: 2019-06-18 | Stop reason: HOSPADM

## 2019-06-17 RX ORDER — OXYCODONE HYDROCHLORIDE AND ACETAMINOPHEN 5; 325 MG/1; MG/1
1 TABLET ORAL EVERY 4 HOURS PRN
Status: DISCONTINUED | OUTPATIENT
Start: 2019-06-17 | End: 2019-06-18 | Stop reason: HOSPADM

## 2019-06-17 RX ORDER — SODIUM CHLORIDE 0.9 % (FLUSH) 0.9 %
10 SYRINGE (ML) INJECTION PRN
Status: DISCONTINUED | OUTPATIENT
Start: 2019-06-17 | End: 2019-06-18 | Stop reason: HOSPADM

## 2019-06-17 RX ORDER — INSULIN GLARGINE 100 [IU]/ML
0.25 INJECTION, SOLUTION SUBCUTANEOUS NIGHTLY
Status: DISCONTINUED | OUTPATIENT
Start: 2019-06-17 | End: 2019-06-17

## 2019-06-17 RX ORDER — DEXTROSE MONOHYDRATE 25 G/50ML
12.5 INJECTION, SOLUTION INTRAVENOUS PRN
Status: DISCONTINUED | OUTPATIENT
Start: 2019-06-17 | End: 2019-06-18 | Stop reason: HOSPADM

## 2019-06-17 RX ORDER — APREPITANT 40 MG/1
40 CAPSULE ORAL ONCE
Status: COMPLETED | OUTPATIENT
Start: 2019-06-17 | End: 2019-06-17

## 2019-06-17 RX ORDER — DOCUSATE SODIUM 100 MG/1
100 CAPSULE, LIQUID FILLED ORAL 2 TIMES DAILY
Status: DISCONTINUED | OUTPATIENT
Start: 2019-06-17 | End: 2019-06-18 | Stop reason: HOSPADM

## 2019-06-17 RX ORDER — SODIUM CHLORIDE 9 MG/ML
INJECTION, SOLUTION INTRAVENOUS CONTINUOUS
Status: DISCONTINUED | OUTPATIENT
Start: 2019-06-17 | End: 2019-06-17

## 2019-06-17 RX ORDER — PANTOPRAZOLE SODIUM 40 MG/1
40 TABLET, DELAYED RELEASE ORAL
Status: DISCONTINUED | OUTPATIENT
Start: 2019-06-18 | End: 2019-06-18 | Stop reason: HOSPADM

## 2019-06-17 RX ORDER — LEVOTHYROXINE SODIUM 0.1 MG/1
100 TABLET ORAL
Status: DISCONTINUED | OUTPATIENT
Start: 2019-06-18 | End: 2019-06-18 | Stop reason: HOSPADM

## 2019-06-17 RX ORDER — PRAMIPEXOLE DIHYDROCHLORIDE 0.5 MG/1
0.5 TABLET ORAL 2 TIMES DAILY
Status: DISCONTINUED | OUTPATIENT
Start: 2019-06-17 | End: 2019-06-18 | Stop reason: HOSPADM

## 2019-06-17 RX ORDER — ONDANSETRON 2 MG/ML
4 INJECTION INTRAMUSCULAR; INTRAVENOUS
Status: DISCONTINUED | OUTPATIENT
Start: 2019-06-17 | End: 2019-06-17 | Stop reason: HOSPADM

## 2019-06-17 RX ORDER — SODIUM CHLORIDE 0.9 % (FLUSH) 0.9 %
10 SYRINGE (ML) INJECTION EVERY 12 HOURS SCHEDULED
Status: DISCONTINUED | OUTPATIENT
Start: 2019-06-17 | End: 2019-06-17 | Stop reason: HOSPADM

## 2019-06-17 RX ORDER — GLYCOPYRROLATE 0.2 MG/ML
INJECTION INTRAMUSCULAR; INTRAVENOUS PRN
Status: DISCONTINUED | OUTPATIENT
Start: 2019-06-17 | End: 2019-06-17 | Stop reason: SDUPTHER

## 2019-06-17 RX ORDER — SODIUM CHLORIDE, SODIUM LACTATE, POTASSIUM CHLORIDE, CALCIUM CHLORIDE 600; 310; 30; 20 MG/100ML; MG/100ML; MG/100ML; MG/100ML
INJECTION, SOLUTION INTRAVENOUS CONTINUOUS PRN
Status: DISCONTINUED | OUTPATIENT
Start: 2019-06-17 | End: 2019-06-17 | Stop reason: SDUPTHER

## 2019-06-17 RX ORDER — MORPHINE SULFATE 2 MG/ML
2 INJECTION, SOLUTION INTRAMUSCULAR; INTRAVENOUS EVERY 4 HOURS PRN
Status: DISCONTINUED | OUTPATIENT
Start: 2019-06-17 | End: 2019-06-18 | Stop reason: HOSPADM

## 2019-06-17 RX ORDER — CELECOXIB 200 MG/1
400 CAPSULE ORAL ONCE
Status: COMPLETED | OUTPATIENT
Start: 2019-06-17 | End: 2019-06-17

## 2019-06-17 RX ORDER — LIDOCAINE HYDROCHLORIDE 20 MG/ML
INJECTION, SOLUTION EPIDURAL; INFILTRATION; INTRACAUDAL; PERINEURAL PRN
Status: DISCONTINUED | OUTPATIENT
Start: 2019-06-17 | End: 2019-06-17 | Stop reason: SDUPTHER

## 2019-06-17 RX ORDER — ONDANSETRON 2 MG/ML
4 INJECTION INTRAMUSCULAR; INTRAVENOUS EVERY 6 HOURS PRN
Status: DISCONTINUED | OUTPATIENT
Start: 2019-06-17 | End: 2019-06-18 | Stop reason: HOSPADM

## 2019-06-17 RX ORDER — SODIUM CHLORIDE 0.9 % (FLUSH) 0.9 %
10 SYRINGE (ML) INJECTION EVERY 12 HOURS SCHEDULED
Status: DISCONTINUED | OUTPATIENT
Start: 2019-06-17 | End: 2019-06-18 | Stop reason: HOSPADM

## 2019-06-17 RX ORDER — CALCIUM CARBONATE 500(1250)
500 TABLET ORAL DAILY
Status: DISCONTINUED | OUTPATIENT
Start: 2019-06-18 | End: 2019-06-18 | Stop reason: HOSPADM

## 2019-06-17 RX ORDER — DULOXETIN HYDROCHLORIDE 60 MG/1
60 CAPSULE, DELAYED RELEASE ORAL NIGHTLY
Status: DISCONTINUED | OUTPATIENT
Start: 2019-06-17 | End: 2019-06-18 | Stop reason: HOSPADM

## 2019-06-17 RX ORDER — NICOTINE POLACRILEX 4 MG
15 LOZENGE BUCCAL PRN
Status: DISCONTINUED | OUTPATIENT
Start: 2019-06-17 | End: 2019-06-18 | Stop reason: HOSPADM

## 2019-06-17 RX ORDER — CLONAZEPAM 0.5 MG/1
0.5 TABLET ORAL 2 TIMES DAILY PRN
Status: DISCONTINUED | OUTPATIENT
Start: 2019-06-17 | End: 2019-06-18 | Stop reason: HOSPADM

## 2019-06-17 RX ORDER — OXYCODONE HYDROCHLORIDE AND ACETAMINOPHEN 5; 325 MG/1; MG/1
1 TABLET ORAL PRN
Status: DISCONTINUED | OUTPATIENT
Start: 2019-06-17 | End: 2019-06-17 | Stop reason: HOSPADM

## 2019-06-17 RX ORDER — MIDAZOLAM HYDROCHLORIDE 1 MG/ML
INJECTION INTRAMUSCULAR; INTRAVENOUS PRN
Status: DISCONTINUED | OUTPATIENT
Start: 2019-06-17 | End: 2019-06-17 | Stop reason: SDUPTHER

## 2019-06-17 RX ORDER — ALBUTEROL SULFATE 90 UG/1
2 AEROSOL, METERED RESPIRATORY (INHALATION) EVERY 4 HOURS PRN
Status: DISCONTINUED | OUTPATIENT
Start: 2019-06-17 | End: 2019-06-18 | Stop reason: HOSPADM

## 2019-06-17 RX ORDER — MORPHINE SULFATE 4 MG/ML
4 INJECTION, SOLUTION INTRAMUSCULAR; INTRAVENOUS EVERY 4 HOURS PRN
Status: DISCONTINUED | OUTPATIENT
Start: 2019-06-17 | End: 2019-06-18 | Stop reason: HOSPADM

## 2019-06-17 RX ORDER — FENTANYL CITRATE 50 UG/ML
INJECTION, SOLUTION INTRAMUSCULAR; INTRAVENOUS PRN
Status: DISCONTINUED | OUTPATIENT
Start: 2019-06-17 | End: 2019-06-17 | Stop reason: SDUPTHER

## 2019-06-17 RX ORDER — DEXTROSE MONOHYDRATE 50 MG/ML
100 INJECTION, SOLUTION INTRAVENOUS PRN
Status: DISCONTINUED | OUTPATIENT
Start: 2019-06-17 | End: 2019-06-18 | Stop reason: HOSPADM

## 2019-06-17 RX ORDER — ROCURONIUM BROMIDE 10 MG/ML
INJECTION, SOLUTION INTRAVENOUS PRN
Status: DISCONTINUED | OUTPATIENT
Start: 2019-06-17 | End: 2019-06-17 | Stop reason: SDUPTHER

## 2019-06-17 RX ORDER — FLUOXETINE 10 MG/1
10 TABLET, FILM COATED ORAL NIGHTLY
Status: DISCONTINUED | OUTPATIENT
Start: 2019-06-17 | End: 2019-06-18 | Stop reason: HOSPADM

## 2019-06-17 RX ORDER — DEXAMETHASONE SODIUM PHOSPHATE 4 MG/ML
INJECTION, SOLUTION INTRA-ARTICULAR; INTRALESIONAL; INTRAMUSCULAR; INTRAVENOUS; SOFT TISSUE PRN
Status: DISCONTINUED | OUTPATIENT
Start: 2019-06-17 | End: 2019-06-17 | Stop reason: SDUPTHER

## 2019-06-17 RX ORDER — ATENOLOL 25 MG/1
25 TABLET ORAL DAILY
Status: DISCONTINUED | OUTPATIENT
Start: 2019-06-18 | End: 2019-06-18 | Stop reason: HOSPADM

## 2019-06-17 RX ADMIN — ONDANSETRON 4 MG: 2 INJECTION INTRAMUSCULAR; INTRAVENOUS at 09:01

## 2019-06-17 RX ADMIN — PRAMIPEXOLE DIHYDROCHLORIDE 0.5 MG: 0.5 TABLET ORAL at 20:49

## 2019-06-17 RX ADMIN — SUGAMMADEX 200 MG: 100 INJECTION, SOLUTION INTRAVENOUS at 10:34

## 2019-06-17 RX ADMIN — SODIUM CHLORIDE: 9 INJECTION, SOLUTION INTRAVENOUS at 07:17

## 2019-06-17 RX ADMIN — Medication 10 MG: at 08:26

## 2019-06-17 RX ADMIN — SODIUM CHLORIDE, SODIUM LACTATE, POTASSIUM CHLORIDE, AND CALCIUM CHLORIDE: .6; .31; .03; .02 INJECTION, SOLUTION INTRAVENOUS at 10:01

## 2019-06-17 RX ADMIN — ROCURONIUM BROMIDE 30 MG: 10 INJECTION INTRAVENOUS at 07:45

## 2019-06-17 RX ADMIN — DOCUSATE SODIUM 100 MG: 100 CAPSULE, LIQUID FILLED ORAL at 20:49

## 2019-06-17 RX ADMIN — PROPOFOL 150 MG: 10 INJECTION, EMULSION INTRAVENOUS at 07:38

## 2019-06-17 RX ADMIN — Medication 100 MG: at 07:38

## 2019-06-17 RX ADMIN — GLYCOPYRROLATE 0.2 MG: 0.2 INJECTION, SOLUTION INTRAMUSCULAR; INTRAVENOUS at 08:07

## 2019-06-17 RX ADMIN — FENTANYL CITRATE 50 MCG: 50 INJECTION INTRAMUSCULAR; INTRAVENOUS at 07:28

## 2019-06-17 RX ADMIN — OXYCODONE HYDROCHLORIDE AND ACETAMINOPHEN 2 TABLET: 5; 325 TABLET ORAL at 17:57

## 2019-06-17 RX ADMIN — ATORVASTATIN CALCIUM 20 MG: 20 TABLET, FILM COATED ORAL at 20:49

## 2019-06-17 RX ADMIN — CELECOXIB 400 MG: 200 CAPSULE ORAL at 06:54

## 2019-06-17 RX ADMIN — DULOXETINE HYDROCHLORIDE 60 MG: 60 CAPSULE, DELAYED RELEASE ORAL at 20:50

## 2019-06-17 RX ADMIN — FLUOXETINE 10 MG: 10 TABLET, FILM COATED ORAL at 20:49

## 2019-06-17 RX ADMIN — INSULIN LISPRO 1 UNITS: 100 INJECTION, SOLUTION INTRAVENOUS; SUBCUTANEOUS at 20:53

## 2019-06-17 RX ADMIN — SODIUM CHLORIDE: 9 INJECTION, SOLUTION INTRAVENOUS at 15:44

## 2019-06-17 RX ADMIN — Medication 3 G: at 16:38

## 2019-06-17 RX ADMIN — FENTANYL CITRATE 50 MCG: 50 INJECTION INTRAMUSCULAR; INTRAVENOUS at 07:38

## 2019-06-17 RX ADMIN — LIDOCAINE HYDROCHLORIDE 40 MG: 20 INJECTION, SOLUTION EPIDURAL; INFILTRATION; INTRACAUDAL; PERINEURAL at 07:38

## 2019-06-17 RX ADMIN — MIDAZOLAM 2 MG: 1 INJECTION INTRAMUSCULAR; INTRAVENOUS at 07:28

## 2019-06-17 RX ADMIN — TRANEXAMIC ACID 1 G: 100 INJECTION, SOLUTION INTRAVENOUS at 08:26

## 2019-06-17 RX ADMIN — SODIUM CHLORIDE, PRESERVATIVE FREE 10 ML: 5 INJECTION INTRAVENOUS at 09:00

## 2019-06-17 RX ADMIN — SODIUM CHLORIDE, PRESERVATIVE FREE 10 ML: 5 INJECTION INTRAVENOUS at 20:51

## 2019-06-17 RX ADMIN — APREPITANT 40 MG: 40 CAPSULE ORAL at 07:33

## 2019-06-17 RX ADMIN — Medication 3 G: at 23:32

## 2019-06-17 RX ADMIN — ROCURONIUM BROMIDE 10 MG: 10 INJECTION INTRAVENOUS at 09:20

## 2019-06-17 RX ADMIN — DEXAMETHASONE SODIUM PHOSPHATE 8 MG: 4 INJECTION, SOLUTION INTRAMUSCULAR; INTRAVENOUS at 09:01

## 2019-06-17 RX ADMIN — SODIUM CHLORIDE: 9 INJECTION, SOLUTION INTRAVENOUS at 06:55

## 2019-06-17 ASSESSMENT — PULMONARY FUNCTION TESTS
PIF_VALUE: 19
PIF_VALUE: 18
PIF_VALUE: 20
PIF_VALUE: 19
PIF_VALUE: 20
PIF_VALUE: 18
PIF_VALUE: 21
PIF_VALUE: 17
PIF_VALUE: 20
PIF_VALUE: 9
PIF_VALUE: 27
PIF_VALUE: 20
PIF_VALUE: 19
PIF_VALUE: 19
PIF_VALUE: 1
PIF_VALUE: 21
PIF_VALUE: 21
PIF_VALUE: 22
PIF_VALUE: 24
PIF_VALUE: 18
PIF_VALUE: 20
PIF_VALUE: 18
PIF_VALUE: 17
PIF_VALUE: 20
PIF_VALUE: 29
PIF_VALUE: 21
PIF_VALUE: 18
PIF_VALUE: 19
PIF_VALUE: 21
PIF_VALUE: 17
PIF_VALUE: 20
PIF_VALUE: 20
PIF_VALUE: 28
PIF_VALUE: 18
PIF_VALUE: 20
PIF_VALUE: 18
PIF_VALUE: 20
PIF_VALUE: 20
PIF_VALUE: 19
PIF_VALUE: 18
PIF_VALUE: 20
PIF_VALUE: 17
PIF_VALUE: 20
PIF_VALUE: 21
PIF_VALUE: 20
PIF_VALUE: 24
PIF_VALUE: 21
PIF_VALUE: 20
PIF_VALUE: 19
PIF_VALUE: 21
PIF_VALUE: 17
PIF_VALUE: 18
PIF_VALUE: 20
PIF_VALUE: 0
PIF_VALUE: 18
PIF_VALUE: 10
PIF_VALUE: 20
PIF_VALUE: 1
PIF_VALUE: 18
PIF_VALUE: 20
PIF_VALUE: 20
PIF_VALUE: 17
PIF_VALUE: 17
PIF_VALUE: 15
PIF_VALUE: 20
PIF_VALUE: 21
PIF_VALUE: 20
PIF_VALUE: 18
PIF_VALUE: 18
PIF_VALUE: 20
PIF_VALUE: 17
PIF_VALUE: 20
PIF_VALUE: 18
PIF_VALUE: 20
PIF_VALUE: 19
PIF_VALUE: 20
PIF_VALUE: 4
PIF_VALUE: 20
PIF_VALUE: 18
PIF_VALUE: 14
PIF_VALUE: 20
PIF_VALUE: 18
PIF_VALUE: 20
PIF_VALUE: 17
PIF_VALUE: 20
PIF_VALUE: 18
PIF_VALUE: 20
PIF_VALUE: 20
PIF_VALUE: 27
PIF_VALUE: 19
PIF_VALUE: 20
PIF_VALUE: 19
PIF_VALUE: 20
PIF_VALUE: 18
PIF_VALUE: 18
PIF_VALUE: 20
PIF_VALUE: 18
PIF_VALUE: 19
PIF_VALUE: 19
PIF_VALUE: 17
PIF_VALUE: 18
PIF_VALUE: 17
PIF_VALUE: 20
PIF_VALUE: 19
PIF_VALUE: 21
PIF_VALUE: 20
PIF_VALUE: 20
PIF_VALUE: 18
PIF_VALUE: 14
PIF_VALUE: 19
PIF_VALUE: 15
PIF_VALUE: 20
PIF_VALUE: 20
PIF_VALUE: 19
PIF_VALUE: 20
PIF_VALUE: 17
PIF_VALUE: 20
PIF_VALUE: 2
PIF_VALUE: 20
PIF_VALUE: 2
PIF_VALUE: 20
PIF_VALUE: 21
PIF_VALUE: 20
PIF_VALUE: 20
PIF_VALUE: 17
PIF_VALUE: 6
PIF_VALUE: 19
PIF_VALUE: 20
PIF_VALUE: 21
PIF_VALUE: 20
PIF_VALUE: 17
PIF_VALUE: 20
PIF_VALUE: 17
PIF_VALUE: 20
PIF_VALUE: 18
PIF_VALUE: 17
PIF_VALUE: 20
PIF_VALUE: 1
PIF_VALUE: 18
PIF_VALUE: 19
PIF_VALUE: 18
PIF_VALUE: 19
PIF_VALUE: 21
PIF_VALUE: 18
PIF_VALUE: 20
PIF_VALUE: 20
PIF_VALUE: 21
PIF_VALUE: 19
PIF_VALUE: 19
PIF_VALUE: 18
PIF_VALUE: 20
PIF_VALUE: 19
PIF_VALUE: 20
PIF_VALUE: 18
PIF_VALUE: 19
PIF_VALUE: 19
PIF_VALUE: 20
PIF_VALUE: 17
PIF_VALUE: 20
PIF_VALUE: 19
PIF_VALUE: 20

## 2019-06-17 ASSESSMENT — PAIN DESCRIPTION - DESCRIPTORS
DESCRIPTORS: ACHING

## 2019-06-17 ASSESSMENT — PAIN DESCRIPTION - PROGRESSION
CLINICAL_PROGRESSION: GRADUALLY IMPROVING
CLINICAL_PROGRESSION: GRADUALLY WORSENING

## 2019-06-17 ASSESSMENT — PAIN - FUNCTIONAL ASSESSMENT
PAIN_FUNCTIONAL_ASSESSMENT: PREVENTS OR INTERFERES SOME ACTIVE ACTIVITIES AND ADLS
PAIN_FUNCTIONAL_ASSESSMENT: PREVENTS OR INTERFERES SOME ACTIVE ACTIVITIES AND ADLS
PAIN_FUNCTIONAL_ASSESSMENT: 0-10
PAIN_FUNCTIONAL_ASSESSMENT: PREVENTS OR INTERFERES SOME ACTIVE ACTIVITIES AND ADLS

## 2019-06-17 ASSESSMENT — PAIN SCALES - GENERAL
PAINLEVEL_OUTOF10: 4
PAINLEVEL_OUTOF10: 7
PAINLEVEL_OUTOF10: 0
PAINLEVEL_OUTOF10: 3
PAINLEVEL_OUTOF10: 0
PAINLEVEL_OUTOF10: 0
PAINLEVEL_OUTOF10: 3
PAINLEVEL_OUTOF10: 0
PAINLEVEL_OUTOF10: 0

## 2019-06-17 ASSESSMENT — PAIN DESCRIPTION - FREQUENCY
FREQUENCY: CONTINUOUS
FREQUENCY: CONTINUOUS

## 2019-06-17 ASSESSMENT — PAIN DESCRIPTION - ONSET
ONSET: ON-GOING
ONSET: ON-GOING

## 2019-06-17 ASSESSMENT — PAIN DESCRIPTION - PAIN TYPE
TYPE: SURGICAL PAIN
TYPE: SURGICAL PAIN

## 2019-06-17 ASSESSMENT — PAIN DESCRIPTION - ORIENTATION
ORIENTATION: RIGHT
ORIENTATION: RIGHT

## 2019-06-17 ASSESSMENT — PAIN DESCRIPTION - LOCATION
LOCATION: ARM;SHOULDER
LOCATION: ARM;SHOULDER

## 2019-06-17 NOTE — PROCEDURES
[facility] Department of Anesthesiology  Procedure Note - Interscalene Nerve Block (Single Shot)       Name:  Vandana Etienne                                         Age:  59 y.o. MRN:  [id number]     ALLERGIES: Iodine and Contrast [iodides]    NERVE BLOCK SPECIFICALLY REQUESTED FOR MANAGEMENT OF PAIN BY: Dr. Poncho Santacruz  Risks and Benefits of the peripheral nerve block(s) was discussed and the patient was given the opportunity to ask questions. Informed consent was obtained. PERIPHERAL NERVE BLOCK TYPE:  right Interscalene Nerve Block    POSITION: supine    ULTRASOUND GUIDANCE:  Yes    NERVE STIMULATOR GUIDED:  Yes. Appropriate Motor Evoked Response obtained at 1.5mAmp. The motor response was maintained with a current as low as 0.5mA. PRIMARY INDICATION:  Post operative analgesia    H&P STATUS: H&P was reviewed, the patient was examined and no change has occurred in patient's condition since H&P was completed. DIAGNOSTIC DATA REVIEW:    Lab Results   Component Value Date    PROTIME 11.5 06/12/2019    INR 1.01 06/12/2019       FULL BARRIER PRECAUTIONS & STERILE TECHNIQUE:    As documented on Pre-Procedure Check List  Chlorhexadine         TECHNIQUE:  The block was performed with a 21 gauge, 3 cm, Stimuplex needle. Injection was made through the needle 30ml Ropivacaine 0.5%. Injection was made incrementally with constant monitoring and aspiration every 5 mls.     CATHETER PLACED:  No    LOCAL ANESTHETIC:    Bolus: Ropivicaine 0.5% x 30mls   Infusion: None    Local anesthetic injected incrementally with intermittent aspiration negative for blood:  yes    COMPLICATIONS: no     Konrad Orosco MD  11:39 AM

## 2019-06-17 NOTE — PROGRESS NOTES
The patients OARRS report has been reviewed online and any prescribing of pain related medications is based on our findings. The patient has been issued narcotics to safely reduce postoperative pain and promote tolerance with physical therapies and ADL's. Reduction in dosing will be addressed with the next narcotic refill request. Dosing is adjusted for patients with history of chronic pain disorders. Pt received #90 Hydrocodone 5mg from Dr Demian Agosto on 6/13. Will plan to continue this med at home postop perhaps with increase in frequency.  Will eval postop

## 2019-06-17 NOTE — PROGRESS NOTES
1049--pt admitted to PACU from OR. Monitors placed. o2 on at 3l/nc. IV patent. Right shoulder dressing dry and intact with mepilex. Ice pack to right shoulder. Right arm immobilized with sling. Right fingers warm with quick cap refill. Palpable right radial pulse.   Moves right fingers to command

## 2019-06-17 NOTE — PROGRESS NOTES
Pt alert and oriented, aware of procedure to be done today. Pt's  at bedside. Vital signs within normal limits. Pt's arm in sling. Occasional numbness/tingling in right arm. Pain 4/10. Goal 5/10.

## 2019-06-17 NOTE — BRIEF OP NOTE
Brief Postoperative Note  ______________________________________________________________    Patient: Vandana Etienne  YOB: 1955  MRN: [id number]  Date of Procedure: 6/17/2019    Pre-Op Diagnosis: PROXIMAL HUMERUS FRACTURE- RIGHT SHOULDER    Post-Op Diagnosis: Same       Procedure(s):  REVERSE SHOULDER ARTHROPLASTY WITH TUBEROSITY FIXATION - RIGHT SHOULDER WITH MINI C-ARM    Anesthesia: General    Surgeon(s):  Scott Blackwood MD    Assistant: angie hale and farida gonzalez    Estimated Blood Loss (mL): 248BN    Complications: None    Specimens:   ID Type Source Tests Collected by Time Destination   A : A) Right shoulder bone and tissue Specimen Joint, Shoulder SURGICAL PATHOLOGY Scott Blackwood MD 6/17/2019 5721        Implants:  Implant Name Type Inv.  Item Serial No.  Lot No. LRB No. Used   ANCHOR NICELOOP SZ5 GRN Fastener ANCHOR NICELOOP 5 [facility] [employer] [id number] Right 1   ANCHOR NICELOOP SZ5 GRN Fastener ANCHOR NICELOOP 5 [facility] Ordr[employer]in [id number] Right 1   ANCHOR NICELOOP SZ5 GRN Fastener ANCHOR NICELOOP SZ5 [employer] [id number] Right 1   IMPL SHLDR BASE STD PERFM REV 25MM - [id number] Shoulder/Arm/Wrist/Hand IMPL SHLDR BASE STD PERFM REV 25MM [id number] [employer]  Right 1   SCREW PERFORM REV CENTRAL 6.5X35MM Screw/Plate/Nail/Oswaldo SCREW PERFORM REV CENTRAL 6.5X35MM  [employer]  Right 1   SCREW PERF REVRSED PERIPHERAL 22MM Screw/Plate/Nail/Oswaldo SCREW PERF REVRSED PERIPHERAL 22MM  [employer]  Right 1   SCREW PERFORM REV PERIPHERAL 30MM Screw/Plate/Nail/Oswaldo SCREW PERFORM REV PERIPHERAL 30MM  [employer]  Right 1   IMPL SHOULDER PERFORM REV Brendan Brooklyn Shoulder/Arm/Wrist/Hand IMPL SHOULDER PERFORM REV Bonnee Nail [id number] [employer]  Right 1   ANCHOR [address] [place] [address] [id number] Right 1   IMPL SHLDR STEM ASCEND FLX LNG PTC SZ 3B 98MM -

## 2019-06-17 NOTE — ANESTHESIA POSTPROCEDURE EVALUATION
Department of Anesthesiology  Postprocedure Note    Patient: Kem Beal  MRN: [id number]  YOB: 1955  Date of evaluation: 6/17/2019  Time:  11:07 AM     Procedure Summary     Date:  06/17/19 Room / Location:  [address]    Anesthesia Start:  0357 Anesthesia Stop:  1053    Procedure:  REVERSE SHOULDER ARTHROPLASTY- RIGHT SHOULDER WITH MINI C-ARM (Right Shoulder) Diagnosis:  (PROXIMAL HUMERUS FRACTURE- RIGHT SHOULDER)    Surgeon:  Pao Mclaughlin MD Responsible Provider:  Parag Klein MD    Anesthesia Type:  general ASA Status:  3          Anesthesia Type: general    Live Phase I: Live Score: 8    Live Phase II:      Last vitals: Reviewed and per EMR flowsheets.        Anesthesia Post Evaluation    Patient location during evaluation: PACU  Patient participation: complete - patient participated  Level of consciousness: awake  Pain score: 3  Airway patency: patent  Nausea & Vomiting: no nausea and no vomiting  Complications: no  Cardiovascular status: blood pressure returned to baseline  Respiratory status: acceptable  Hydration status: euvolemic

## 2019-06-17 NOTE — PROGRESS NOTES
[facility] Orthopedic Surgery   Progress Note      S/P :  SUBJECTIVE  In PACU. Talita Wright Pain is   described in right shoulder and with the intensity of none but right hand \"tingling burn\". Alert and oriented. OBJECTIVE              Physical                      VITALS:  /62   Pulse 69   Temp 97 °F (36.1 °C) (Temporal)   Resp 16   Ht 5' 3\" (1.6 m)   Wt 290 lb 11.2 oz (131.9 kg)   SpO2 100%   BMI 51.50 kg/m²                     MUSCULOSKELETAL:  Right hand grossly NVI, some numbness due to shoulder block                   NEUROLOGIC:                                  Sensory:  Touch:  Right Upper Extremity:  abnormal - tingling in hand only                                                 Surgical wound appears clean and dry . ARm in sling in bed. 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      Data       CBC:   Lab Results   Component Value Date    WBC 5.8 06/12/2019    RBC 3.66 06/12/2019    HGB 10.4 06/12/2019    HCT 31.6 06/12/2019    MCV 86.5 06/12/2019    MCH 28.4 06/12/2019    MCHC 32.8 06/12/2019    RDW 15.9 06/12/2019     06/12/2019    MPV 8.0 06/12/2019        WBC:    Lab Results   Component Value Date    WBC 5.8 06/12/2019        Hemoglobin/Hematocrit:    Lab Results   Component Value Date    HGB 10.4 06/12/2019    HCT 31.6 06/12/2019        PT/INR:    Lab Results   Component Value Date    PROTIME 11.5 06/12/2019    INR 1.01 06/12/2019              Current Inpatient Medications             Current Facility-Administered Medications: 0.9 % sodium chloride infusion, , Intravenous, Continuous  sodium chloride flush 0.9 % injection 10 mL, 10 mL, Intravenous, 2 times per day  sodium chloride flush 0.9 % injection 10 mL, 10 mL, Intravenous, PRN  fentaNYL (SUBLIMAZE) injection 25 mcg, 25 mcg, Intravenous, Q5 Min PRN  HYDROmorphone (DILAUDID) injection 0.5 mg, 0.5 mg, Intravenous, Q5 Min PRN  fentaNYL (SUBLIMAZE) injection 25 mcg, 25 mcg, Intravenous, Q5 Min PRN  HYDROmorphone (DILAUDID) injection 0.5 mg, 0.5 mg, Intravenous, Q5 Min PRN  oxyCODONE-acetaminophen (PERCOCET) 5-325 MG per tablet 1 tablet, 1 tablet, Oral, PRN **OR** oxyCODONE-acetaminophen (PERCOCET) 5-325 MG per tablet 2 tablet, 2 tablet, Oral, PRN  ondansetron (ZOFRAN) injection 4 mg, 4 mg, Intravenous, Once PRN  tranexamic acid (CYKLOKAPRON) 3,000 mg in dextrose 5 % 100 mL IVPB, , , Continuous PRN    ASSESSMENT AND PLAN      Post right shoulder arthroplasty with tuberosity fixation per DR Rocael Remy, stable exam  Right hand numb, right eye Horners.  Both stable after shoulder block, should improve with time  NWB right arm in sling  Plan DC home tomorrow    Nai Giraldo [facility]  6/17/2019  11:45 AM

## 2019-06-17 NOTE — CARE COORDINATION
6/17 met with patient re:DC plan - as per JET-patient plans to return home with her  -- denies needs. .Electronically signed by Duyen Merino on 6/17/2019 at 3:17 PM

## 2019-06-17 NOTE — ANESTHESIA PRE PROCEDURE
Department of Anesthesiology  Preprocedure Note       Name:  Jerson Hobson   Age:  59 y.o.  :  1955                                          MRN:  [id number]         Date:  2019      Surgeon: Jagjit Desai):  Alberto Sandoval MD    Procedure: REVERSE SHOULDER ARTHROPLASTY- RIGHT SHOULDER WITH MINI C-ARM (Right Shoulder)    Medications prior to admission:   Prior to Admission medications    Medication Sig Start Date End Date Taking? Authorizing Provider   calcium carbonate (OSCAL) 500 MG TABS tablet Take 500 mg by mouth daily   Yes Historical Provider, MD   HYDROcodone-acetaminophen (NORCO) 5-325 MG per tablet Take 1 tablet by mouth every 6 hours as needed for Pain for up to 30 days.  19 Yes Vanesa Lopez MD   topiramate (TOPAMAX) 100 MG tablet TAKE 1 TABLET AT BEDTIME 19  Yes Vanesa Lopez MD   pramipexole (MIRAPEX) 0.5 MG tablet TAKE 1 TABLET TWICE A DAY 19  Yes Vanesa Lopez MD   metFORMIN (GLUCOPHAGE-XR) 500 MG extended release tablet TAKE 2 TABLETS DAILY WITH  BREAKFAST 3/26/19  Yes Vanesa Lopez MD   vitamin D (ERGOCALCIFEROL) 04927 units CAPS capsule TAKE 1 CAPSULE TWICE WEEKLY 3/22/19  Yes Vanesa Lopez MD   diclofenac (VOLTAREN) 75 MG EC tablet TAKE 1 TABLET TWICE A DAY 3/13/19  Yes Vanesa Lopez MD   atenolol (TENORMIN) 25 MG tablet TAKE 1 TABLET DAILY 3/13/19  Yes Vanesa Lopez MD   levothyroxine (SYNTHROID) 100 MCG tablet TAKE 1 TABLET BY MOUTH DAILY 3/1/19  Yes Vanesa Lopez MD   DULoxetine (CYMBALTA) 60 MG extended release capsule TAKE 1 CAPSULE DAILY 19  Yes Vanesa Lopez MD   atorvastatin (LIPITOR) 20 MG tablet Take 1 tablet by mouth daily  Patient taking differently: Take 20 mg by mouth every evening  18  Yes Vanesa Lopez MD   esomeprazole ([address]) 40 MG delayed release capsule Take 1 capsule by mouth  every morning 12/3/18  Yes Vanesa Lopez MD   FLUoxetine (PROZAC) 10 MG tablet TAKE 1 TABLET DAILY 18  Yes Vanesa Lopez MD   albuterol sulfate  (90 Base) MCG/ACT inhaler Inhale 2 puffs into the lungs every 4 hours as needed for Wheezing May substitute for insurance preferred (Ventolin, Proventil, ProAir) 5/22/18 6/13/19 Yes Joe Guzman MD   clonazePAM (KLONOPIN) 0.5 MG tablet Take 0.5 mg by mouth 2 times daily as needed . Yes Historical Provider, MD   triamcinolone (KENALOG) 0.1 % cream Apply topically two times  daily  Patient taking differently: Apply topically two times  daily prn 12/1/16  Yes Joe Guzman MD   ferrous sulfate 325 (65 Fe) MG tablet Take 1 tablet by mouth 2 times daily 6/13/19 6/12/20  Joe Guzman MD   trospium (SANCTURA) 20 MG tablet Take 1 tablet by mouth 2 times daily Replacing Toviaz 4/12/19   Joe Guzman MD   SUMAtriptan (IMITREX) 50 MG tablet Take 1 tablet by mouth once as needed . May repeat in 2 hours, maximum 200mg per  day 5/26/16   DEA Greco - CNP       Current medications:    Current Facility-Administered Medications   Medication Dose Route Frequency Provider Last Rate Last Dose    0.9 % sodium chloride infusion   Intravenous Continuous Chivo Akers MD        sodium chloride flush 0.9 % injection 10 mL  10 mL Intravenous 2 times per day Chivo Akers MD        sodium chloride flush 0.9 % injection 10 mL  10 mL Intravenous PRN Chivo Akers MD        celecoxib (CELEBREX) capsule 400 mg  400 mg Oral Once Karina Dan MD        ceFAZolin (ANCEF) 3 g in dextrose 5 % 100 mL IVPB  3 g Intravenous Once Karina Dan MD           Allergies:     Allergies   Allergen Reactions    Iodine Hives    Contrast [Iodides]      Hives         Problem List:    Patient Active Problem List   Diagnosis Code    Strain of thoracic region S29.019A    Social phobia F40.10    Eczema L30.9    Rotator cuff syndrome M75.100    Hypothyroidism E03.9    Hyperlipidemia LDL goal <160 E78.5    GERD (gastroesophageal reflux disease) K21.9    IBS (irritable bowel syndrome) K58.9    Sciatica M54.30    Premature ventricular contractions I49.3    Hearing loss,Tinnitus H91.90    RLS (restless legs syndrome) G25.81    Sleep apnea G47.30    Migraine headache G43.909    Depression F32.9    Vitamin B12 deficiency E53.8    Vitamin D deficiency E55.9    DDD (degenerative disc disease), lumbar M51.36    Allergic Conjunctivitis     Mixed incontinence- failed tropesium 2017, failed detrol 2016, failed ditropan 2013 N39.46    Moderate persistent asthma without complication S17.76    Needs flu shot Z23    Osteopenia DXA -1.7 (10/13), -2.1 (11/16) M85.80    Intertrigo labialis K13.0    Well adult health check Z00.00    Chronic narcotic dependence (HCC) F11.20    Eczema of hand L30.9    Obesity, morbid, BMI 50 or higher (HCC) E66.01    Chronic thoracic spine pain M54.6, G89.29    Chronic neck pain M54.2, G89.29    Insomnia G47.00    Prediabetes R73.03    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       Past Medical History:        Diagnosis Date    Abnormal finding on pulmonary function testing- nl except decreased diffusing capacity 4/11 7/11/2011    Activities treadmill- neg GXT arron, EF 82% 3/11     Allergic rhinitis     Asthma     exerise induced    Depression     GERD (gastroesophageal reflux disease)     Hearing loss,Tinnitus     Hyperlipidemia     Hypothyroidism     IBS (irritable bowel syndrome)     Migraine headache     Nocturia     PONV (postoperative nausea and vomiting)     Premature ventricular contractions     Pulmonary function testing- nl except decreased diffusing capacity 4/11     Relevant prior imaging: normal examination- neg CT chest 4/11     RLS (restless legs syndrome)     Sleep apnea     uses C-PAP       Past Surgical History:        Procedure Laterality Date    CHOLECYSTECTOMY      COLONOSCOPY      ENDOSCOPY, COLON, DIAGNOSTIC      EYE SURGERY Bilateral     cataracts    FOOT SURGERY Left 03/17/2017    HYSTERECTOMY      SLEEVE GASTRECTOMY  7.2012    WISDOM TOOTH 12/03/2018       POC Tests: No results for input(s): POCGLU, POCNA, POCK, POCCL, POCBUN, POCHEMO, POCHCT in the last 72 hours. Coags:   Lab Results   Component Value Date    PROTIME 11.5 06/12/2019    INR 1.01 06/12/2019       HCG (If Applicable): No results found for: PREGTESTUR, PREGSERUM, HCG, HCGQUANT     ABGs: No results found for: PHART, PO2ART, PEL9MEK, VLD8FJC, BEART, X8QWCVUA     Type & Screen (If Applicable):  No results found for: LABABO, [address]    Anesthesia Evaluation     history of anesthetic complications: PONV. Airway: Mallampati: III  TM distance: >3 FB   Neck ROM: full  Mouth opening: > = 3 FB Dental:          Pulmonary: breath sounds clear to auscultation  (+) sleep apnea:  asthma: exercise-induced asthma,                            Cardiovascular:        (-) pacemaker, valvular problems/murmurs, past MI, dysrhythmias,  angina and  CHF      Rhythm: regular  Rate: normal                 ROS comment: Preserved EF     Neuro/Psych:   (+) neuromuscular disease:, headaches:, psychiatric history:             ROS comment: sciatica GI/Hepatic/Renal:   (+) GERD:, morbid obesity         ROS comment: IBS. Endo/Other:    (+) hypothyroidism::., .                 Abdominal:           Vascular:                                        Anesthesia Plan      general     ASA 3       Induction: intravenous. MIPS: Prophylactic antiemetics administered. Anesthetic plan and risks discussed with patient. Plan discussed with CRNA. Attending anesthesiologist reviewed and agrees with Pre Eval content        This pre-anesthesia assessment may be used as a history and physical.    DOS STAFF ADDENDUM:    Pt seen and examined, chart reviewed (including anesthesia, drug and allergy history). No interval changes to history and physical examination. Anesthetic plan, risks, benefits, alternatives, and personnel involved discussed with patient. Patient verbalized an understanding and agrees to proceed.       Rivas Gayle Tia Irizarry MD  June 17, 2019  6:54 AM        Naren Reid MD   6/17/2019

## 2019-06-18 ENCOUNTER — TELEPHONE (OUTPATIENT)
Dept: FAMILY MEDICINE CLINIC | Age: 64
End: 2019-06-18

## 2019-06-18 VITALS
OXYGEN SATURATION: 100 % | TEMPERATURE: 98.5 F | HEIGHT: 63 IN | HEART RATE: 77 BPM | DIASTOLIC BLOOD PRESSURE: 71 MMHG | SYSTOLIC BLOOD PRESSURE: 106 MMHG | WEIGHT: 293 LBS | RESPIRATION RATE: 14 BRPM | BODY MASS INDEX: 51.91 KG/M2

## 2019-06-18 LAB
GLUCOSE BLD-MCNC: 130 MG/DL (ref 70–99)
PERFORMED ON: ABNORMAL

## 2019-06-18 PROCEDURE — 94760 N-INVAS EAR/PLS OXIMETRY 1: CPT

## 2019-06-18 PROCEDURE — 99024 POSTOP FOLLOW-UP VISIT: CPT | Performed by: ORTHOPAEDIC SURGERY

## 2019-06-18 PROCEDURE — 6370000000 HC RX 637 (ALT 250 FOR IP): Performed by: ORTHOPAEDIC SURGERY

## 2019-06-18 RX ORDER — ASPIRIN 81 MG/1
81 TABLET ORAL 2 TIMES DAILY
Qty: 60 TABLET | Refills: 0
Start: 2019-06-18 | End: 2020-02-21

## 2019-06-18 RX ORDER — HYDROCODONE BITARTRATE AND ACETAMINOPHEN 5; 325 MG/1; MG/1
1-2 TABLET ORAL EVERY 6 HOURS PRN
Qty: 1 TABLET | Refills: 0 | Status: SHIPPED | OUTPATIENT
Start: 2019-06-18 | End: 2019-08-08 | Stop reason: SDUPTHER

## 2019-06-18 RX ORDER — ASPIRIN 81 MG/1
81 TABLET ORAL 2 TIMES DAILY
Qty: 60 TABLET | Refills: 0 | Status: SHIPPED | OUTPATIENT
Start: 2019-06-18 | End: 2019-06-18 | Stop reason: HOSPADM

## 2019-06-18 RX ADMIN — LEVOTHYROXINE SODIUM 100 MCG: 100 TABLET ORAL at 05:12

## 2019-06-18 RX ADMIN — PANTOPRAZOLE SODIUM 40 MG: 40 TABLET, DELAYED RELEASE ORAL at 05:12

## 2019-06-18 RX ADMIN — PRAMIPEXOLE DIHYDROCHLORIDE 0.5 MG: 0.5 TABLET ORAL at 08:26

## 2019-06-18 RX ADMIN — ATENOLOL 25 MG: 25 TABLET ORAL at 08:27

## 2019-06-18 RX ADMIN — OXYCODONE HYDROCHLORIDE AND ACETAMINOPHEN 2 TABLET: 5; 325 TABLET ORAL at 08:26

## 2019-06-18 RX ADMIN — CALCIUM 500 MG: 500 TABLET ORAL at 08:27

## 2019-06-18 RX ADMIN — OXYCODONE HYDROCHLORIDE AND ACETAMINOPHEN 2 TABLET: 5; 325 TABLET ORAL at 04:16

## 2019-06-18 RX ADMIN — DOCUSATE SODIUM 100 MG: 100 CAPSULE, LIQUID FILLED ORAL at 08:27

## 2019-06-18 ASSESSMENT — PAIN DESCRIPTION - FREQUENCY: FREQUENCY: CONTINUOUS

## 2019-06-18 ASSESSMENT — PAIN DESCRIPTION - ORIENTATION
ORIENTATION: RIGHT

## 2019-06-18 ASSESSMENT — PAIN DESCRIPTION - LOCATION
LOCATION: ARM
LOCATION: SHOULDER
LOCATION: ARM

## 2019-06-18 ASSESSMENT — PAIN DESCRIPTION - ONSET: ONSET: ON-GOING

## 2019-06-18 ASSESSMENT — PAIN DESCRIPTION - DESCRIPTORS
DESCRIPTORS: ACHING
DESCRIPTORS: ACHING

## 2019-06-18 ASSESSMENT — PAIN DESCRIPTION - PAIN TYPE
TYPE: ACUTE PAIN;SURGICAL PAIN
TYPE: SURGICAL PAIN
TYPE: SURGICAL PAIN

## 2019-06-18 ASSESSMENT — PAIN SCALES - GENERAL
PAINLEVEL_OUTOF10: 7
PAINLEVEL_OUTOF10: 5
PAINLEVEL_OUTOF10: 7
PAINLEVEL_OUTOF10: 2
PAINLEVEL_OUTOF10: 3

## 2019-06-18 NOTE — PROGRESS NOTES
Data- discharge order received, patient verbalized agreement to discharge, disposition to previous residence. Action- discharge instructions prepared and given to patient and family, patient verbalized understanding. Medication information packet given r/t NEW and/or CHANGED prescriptions emphasizing name/purpose/side effects, pt verbalized understanding. Provided dressing handouts for shirt jacket and sling, patient demonstrated understanding. Educated on removal of silver dressing and prineo surgical glue instructions, patient verbalize understanding. Discharge instruction summary: Diet- general, Activity- NWB RUE, Primary Care Physician as follows: Marianne Sandifer, [phone]. f/u appointment with orthopedic office noted below, immunizations reviewed and discussed with patient, prescription medications to be filled by retail pharmacy and then delivered. Inpatient surgical procedure precautions reviewed: . Neurovascular check performed and patient is WNLs, denies numbness/tingling in extremties. Incision site silver dressing assessed and is  clean,dry, and intact, no signs of redness, drainage, or odor noted. patient's bedside RN bridger notified of patient completing discharge instructions. Response- Medications to be delivered to patient via meds to bed program. Disposition is home with family, to be transported with family, no complications.      Future Appointments   Date Time Provider Roderick Allred   7/9/2019  8:15 AM MD Carmelina Adair MMA           Electronically signed by Trae Echols RN on 6/18/2019 at 9:51 AM

## 2019-06-18 NOTE — OP NOTE
[address] [place] [address] [facility]                                OPERATIVE REPORT    PATIENT NAME: Amelia Marshall                   :        1955  MED REC NO:   [id number]                          ROOM:       3126  ACCOUNT NO:   [de-identified]                           ADMIT DATE: 2019  PROVIDER:     Karina Dan MD      DATE OF PROCEDURE:  2019    PREOPERATIVE DIAGNOSES:  1. Right acute displaced four-part proximal humerus fracture. 2.  Morbid obesity with BMI of 51.5. POSTOPERATIVE DIAGNOSES:  1. Right acute displaced four-part proximal humerus fracture. 2.  Morbid obesity with BMI of 51.5. OPERATION PERFORMED:  Right reverse shoulder arthroplasty with  tuberosity fixation. SURGEON:  Karina Dan MD    ASSISTANTS:  1. Nolan Forrest. [address] [place] [address]. ANESTHESIA:  General endotracheal with preoperative interscalene nerve  block. BLOOD LOSS:  Approximately 150 mL. COMPLICATIONS:  None. IMPLANTS:  Tornier shoulder arthroplasty system. 1.  25-mm standard glenoid baseplate. 2.  6.5 mm x 35 mm central screw. 3.  22 mm x 5.0 mm peripheral screw, 30 mm x 5.0 mm peripheral screw. 4.  33 mm, +3 mm lateralized Glenosphere. 5.  Long size 3B humeral stem. 6.  0-mm standard humeral tray. 7.  33 mm, +6 mm polyethylene insert. INDICATIONS:  The patient is a 71-year-old female who was seen in the  clinic after sustaining a right proximal humerus fracture after a  mechanical fall. Given the patient's age, functional demands as well as  fracture pattern, I did recommend reverse shoulder arthroplasty. Please  refer to my preoperative office note for full details of my discussion  with the patient including risks and benefits and the postoperative  course.     OPERATIVE PROCEDURE:  The patient was brought back to the OR and  transferred onto the operating room, general anesthesia was  administered. A Tenet shoulder attachment was used for modified  beach-chair positioning as well as spider-arm kaur to facilitate  shoulder positioning. A wedge was placed under both legs to relieve  tension on the sciatic nerve and all pressure points were well padded. The entire right upper extremity up to the base of the neck was  subsequently prepped and draped in the usual sterile fashion. Jovanna Dakotah was  used to occlude off the axillary region. A full time-out was performed  with all parties in agreement and the patient did receive preoperative  IV antibiotics as well as IV tranexamic acid. A deltopectoral incision was made and medial and lateral skin flaps were  elevated. Dissection was carried out medially until the fat stripe   was encountered, and the cephalic vein was mobilized and retracted  medially. The deltopectoral interval was entered and the clavipectoral  fascia was incised just lateral to conjoint tendon and the deep deltoid  retractor was then placed. The biceps tendon was unroofed and tenodesed  to the superior pectoralis major tendon. The rotator interval was then split in line with the proximal biceps. The main fracture line was posterior to the bicipital groove and this  plane was developed with an osteotome. The humeral head was easily  identified and extracted. There was no soft tissue attachment to the  humeral head fracture fragment. The greater tuberosity fracture  fragment was comminuted and with two major pieces, this was mobilized  and then four NiceLoops were placed at the musculotendinous junction and  tagged for retraction and later repair. The lesser tuberosity with the  subscapularis was also tagged with #5 Ethibond and used for retraction  and also later repair as well. Circumferential retractors were now placed around the glenoid and a  360-degree labrectomy was performed.   Inferior capsular releases were  performed from the 3 to 9 o'clock positions topical  tranexamic acid was also applied topically at this time and allowed to  sit in the wound for a few minutes. This was then suctioned out. The  deltopectoral interval was then reapproximated in a watertight fashion  and the skin was closed with inverted Vicryls and 3-0 Monocryl running  subcuticular stitch. It was cleaned with wet and dry and then dressed  with Steri-Strips and a simple Mepilex dressing. The drapes were  removed and a simple sling was applied. The patient was transferred  back onto hospital bed where she was extubated and then transferred to  the PACU in stable condition. 25 MODIFIER:  Given the patient's body habitus and morbid obesity with  BMI of 51.5, the entire procedure took approximately one and a half  hours longer than typical.  This extra time was spent moving the  patient, positioning the patient, obtaining adequate exposure,  manipulating the arm in order to adequately broach, ensuring appropriate  alignment of implants, and finally layered wound closure.         Jamie Costa MD    D: 06/17/2019 11:03:31       T: 06/17/2019 13:57:21     [email or web address]  Job#: [id number]     Doc#: [id number]    CC:

## 2019-06-18 NOTE — TELEPHONE ENCOUNTER
Janett 45 Transitions Initial Follow Up Call    Outreach made within 2 business days of discharge: Yes    Patient: Jerson Hobson Patient : 1955   MRN: [id number]  Reason for Admission: There are no discharge diagnoses documented for the most recent discharge. Discharge Date: 19       Spoke with: [facility]    Discharge department/facility: [facility] Interactive Patient Contact:  Was patient able to fill all prescriptions: NA  Was patient instructed to bring all medications to the follow-up visit: NA  Is patient taking all medications as directed in the discharge summary?  NADoes patient understand their discharge instructions: NA  Does patient have questions or concerns that need addressed prior to 7-14 day follow up office visit: NA    Scheduled appointment with PCP within 7-14 days  Pt will f/u w surgeon for post op care  Follow Up  Future Appointments   Date Time Provider Roderick Allred   2019  8:15 AM MD MARVA Al MA

## 2019-06-18 NOTE — DISCHARGE INSTR - COC
Continuity of Care Form    Patient Name: Octavio Brown   :  1955  MRN:  [id number]    Admit date:  2019  Discharge date:  ***    Code Status Order: Full Code   Advance Directives:   Advance Care Flowsheet Documentation     Date/Time Healthcare Directive Type of Healthcare Directive Copy in [address] Agent's Name Healthcare Agent's Phone Number    19  No, patient does not have an advance directive for healthcare treatment  --  -- -- --  --    19 1335  Yes, patient has an advance directive for healthcare treatment  Durable power of  for health care  No, copy requested from family -- --   Marnie negrete to share medical information-cell # [phone]          Admitting Physician:  Samara Parada MD  PCP: Shameka Jamil MD    Discharging Nurse: [facility] Unit/Room#: S2J-2749/3126-01  Discharging Unit Phone Number: ***    Emergency Contact:   Extended Emergency Contact Information  Primary Emergency Contact: Chidi Schulte  Address: [address] [place] [address], [address] [place] [address],[address] Phone: [phone]  Mobile Phone: [phone]  Relation: Spouse    Past Surgical History:  Past Surgical History:   Procedure Laterality Date    CHOLECYSTECTOMY      COLONOSCOPY      ENDOSCOPY, COLON, DIAGNOSTIC      EYE SURGERY Bilateral     cataracts    FOOT SURGERY Left 2017    HYSTERECTOMY      REVISION OF TOTAL SHOULDER Right 2019    REVERSE SHOULDER ARTHROPLASTY- RIGHT SHOULDER WITH MINI C-ARM performed by Samara Parada MD at [address] [place] [address]  7.    WISDOM TOOTH EXTRACTION         Immunization History:   Immunization History   Administered Date(s) Administered    Influenza Virus Vaccine 2012, 2018    Influenza, Intradermal, Preservative free 2012, 10/24/2013, 10/02/2014, 2015    Influenza, Intradermal, Quadrivalent, Preservative Free 10/26/2016    Influenza, Quadv, Recombinant, IM PF (Flublok 18 yrs and older) 12/03/2018    Pneumococcal Polysaccharide ([id number]) 04/21/2008    Tdap (Boostrix, Adacel) 04/21/2008, 08/01/2018    Zoster Live (Zostavax) 10/26/2016       Active Problems:  Patient Active Problem List   Diagnosis Code    Strain of thoracic region S29.019A    Social phobia F40.10    Eczema L30.9    Rotator cuff syndrome M75.100    Hypothyroidism E03.9    Hyperlipidemia LDL goal <160 E78.5    GERD (gastroesophageal reflux disease) K21.9    IBS (irritable bowel syndrome) K58.9    Sciatica M54.30    Premature ventricular contractions I49.3    Hearing loss,Tinnitus H91.90    RLS (restless legs syndrome) G25.81    Sleep apnea G47.30    Migraine headache G43.909    Depression F32.9    Vitamin B12 deficiency E53.8    Vitamin D deficiency E55.9    DDD (degenerative disc disease), lumbar M51.36    Allergic Conjunctivitis     Mixed incontinence- failed tropesium 2017, failed detrol 2016, failed ditropan 2013 N39.46    Moderate persistent asthma without complication P65.11    Needs flu shot Z23    Osteopenia DXA -1.7 (10/13), -2.1 (11/16) M85.80    Intertrigo labialis K13.0    Well adult health check Z00.00    Chronic narcotic dependence (HCC) F11.20    Eczema of hand L30.9    Obesity, morbid, BMI 50 or higher ([facility]) E66.01    Chronic thoracic spine pain M54.6, G89.29    Chronic neck pain M54.2, G89.29    Insomnia G47.00    Prediabetes R73.03    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    Morbid obesity with BMI of 50.0-59.9, adult (HCC) E66.01, Z68.43    Closed 4-part fracture of proximal humerus, right, initial encounter S42.291A    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       Isolation/Infection:   Isolation          No Isolation            Nurse Assessment:  Last Vital Signs: /71   Pulse 77   Temp 98.5 °F (36.9 °C) (Oral)   Resp 14   Ht 5' 3\" (1.6 m)   Wt 295 lb 13.7 oz (134.2 kg)   SpO2 100% BMI 52.41 kg/m²     Last documented pain score (0-10 scale): Pain Level: 3  Last Weight:   Wt Readings from Last 1 Encounters:   19 295 lb 13.7 oz (134.2 kg)     Mental Status:  {IP PT MENTAL STATUS:90679}    IV Access:  { XOCHILT IV ACCESS:113025984}    Nursing Mobility/ADLs:  Walking   {CHP DME QLEJ:004917251}  Transfer  {CHP DME ZDLQ:389465079}  Bathing  {CHP DME NQJL:138512360}  Dressing  {CHP DME GEKF:428423733}  Toileting  {CHP DME TYHE:239290576}  Feeding  {P DME RMAV:273653910}  Med Admin  {P DME JNLE:241826777}  Med Delivery   { XOCHILT MED Delivery:736504513}    Wound Care Documentation and Therapy:  Incision 17 Foot Left (Active)   Number of days: 822        Elimination:  Continence:   · Bowel: {YES / YQ:86367}  · Bladder: {YES / SI:94360}  Urinary Catheter: {Urinary Catheter:463011127}   Colostomy/Ileostomy/Ileal Conduit: {YES / FD:46071}       Date of Last BM: ***    Intake/Output Summary (Last 24 hours) at 2019 0825  Last data filed at 2019 0405  Gross per 24 hour   Intake 3180 ml   Output 1400 ml   Net 1780 ml     I/O last 3 completed shifts:   In: 3180 [P.O.:480; I.V.:2700]  Out: 1400 [Urine:1100; Blood:300]    Safety Concerns:     [address] Safety Concerns:250283881}    Impairments/Disabilities:      [address] Impairments/Disabilities:994349563}    Nutrition Therapy:  Current Nutrition Therapy:   [address] Diet List:297986464}    Routes of Feeding: {[facility] DME Other Feedings:335057859}  Liquids: {Slp liquid thickness:38248}  Daily Fluid Restriction: {CHP DME Yes amt example:519384028}  Last Modified Barium Swallow with Video (Video Swallowing Test): {Done Not Done KHWY:857641491}    Treatments at the Time of Hospital Discharge:   Respiratory Treatments: ***  Oxygen Therapy:  {Therapy; copd oxygen:19145}  Ventilator:    {LUCY DURAN Vent JDKD:304464477}    Rehab Therapies: {THERAPEUTIC INTERVENTION:8803018258}  Weight Bearing Status/Restrictions: {LUCY DURAN Weight Bearin}  Other

## 2019-06-18 NOTE — PROGRESS NOTES
Nightly  pantoprazole (PROTONIX) tablet 40 mg, 40 mg, Oral, QAM AC  ferrous sulfate EC tablet 324 mg, 324 mg, Oral, BID WC  FLUoxetine (PROZAC) tablet 10 mg, 10 mg, Oral, Nightly  levothyroxine (SYNTHROID) tablet 100 mcg, 100 mcg, Oral, QAM AC  pramipexole (MIRAPEX) tablet 0.5 mg, 0.5 mg, Oral, BID  sodium chloride flush 0.9 % injection 10 mL, 10 mL, Intravenous, 2 times per day  sodium chloride flush 0.9 % injection 10 mL, 10 mL, Intravenous, PRN  oxyCODONE-acetaminophen (PERCOCET) 5-325 MG per tablet 1 tablet, 1 tablet, Oral, Q4H PRN **OR** oxyCODONE-acetaminophen (PERCOCET) 5-325 MG per tablet 2 tablet, 2 tablet, Oral, Q4H PRN  morphine (PF) injection 2 mg, 2 mg, Intravenous, Q4H PRN **OR** morphine (PF) injection 4 mg, 4 mg, Intravenous, Q4H PRN  docusate sodium (COLACE) capsule 100 mg, 100 mg, Oral, BID  ondansetron (ZOFRAN) injection 4 mg, 4 mg, Intravenous, Q6H PRN  insulin lispro (HUMALOG) injection vial 0-6 Units, 0-6 Units, Subcutaneous, TID WC  insulin lispro (HUMALOG) injection vial 0-3 Units, 0-3 Units, Subcutaneous, Nightly  glucose (GLUTOSE) 40 % oral gel 15 g, 15 g, Oral, PRN  dextrose 50 % IV solution, 12.5 g, Intravenous, PRN  glucagon (rDNA) injection 1 mg, 1 mg, Intramuscular, PRN  dextrose 5 % solution, 100 mL/hr, Intravenous, PRN    ASSESSMENT AND PLAN    Post right shoulder arthroplasty with tuberosity fixation per DR Rufina Neal, stable exam  Plan ASA at home for 30 days as ordered to limited mobility and obesity  NWB right arm in sling unless dressing or bathing, Keep right arm close to side all times. May use hand for pencil or knitting if elbow close to side. NO reaching with right arm in any direction. These instructions reviewed with pt and  at this time and thye both verbalized understanding. Take Norco as she has at home from DR Shahram Quinones using the new frequency orders on new DO NOT FILL rx I gave her. Home today  FU Dr Rufina Neal in 2 weeks.          Jacob Delgado  6/18/2019  9:35 AM 6

## 2019-06-18 NOTE — PROGRESS NOTES
Patient to room 3126 from PACU by bed transport . Patient is alert and oriented X4. Fluids: infusing. Vitals: stable.l Dressing: to the right shoulder is clean, dry, intact. Pulse to the  Right wrist is intact, she can wiggle her fingers, the hand is warm, she has brisk cap refill. Review of floor, room, call light, phone and hospital policies complete. Patient and family verbalized understanding. All questions have been answered.  Will monitor

## 2019-06-18 NOTE — PLAN OF CARE
Problem: Discharge Planning:  Goal: Discharged to appropriate level of care  Outcome: Ongoing     Problem: Mobility - Impaired:  Goal: Mobility will improve  Outcome: Ongoing  Note:   Pt assessed for barriers to mobility and patient encouraged to ambulate and participate in activities as able to tolerate. Patient encouraged to ambulate and participate, and emotional and physiological responses monitored to ensure patients activity level remains appropriate. Adaptive equipment provided as needed. Will continue to monitor. Problem: Infection - Surgical Site:  Goal: Will show no infection signs and symptoms  Outcome: Ongoing  Note:   Assessment of surgical site complete. No signs of redness, warmth or swelling noted. Afebrile. Education provided on signs and symptoms of surgical site infections. Problem: Pain - Acute:  Goal: Pain level will decrease  Outcome: Ongoing  Note:   Pain/discomfort being managed with PRN analgesics per MD orders. Pt able to express presence and absence of pain and rate pain appropriately using numerical scale. Problem: Pain - Acute:  Goal: Pain level will decrease  Outcome: Ongoing  Note:   Pain/discomfort being managed with PRN analgesics per MD orders. Pt able to express presence and absence of pain and rate pain appropriately using numerical scale. Problem: Falls - Risk of:  Goal: Will remain free from falls  Description  Will remain free from falls  Outcome: Ongoing  Note:   Fall risk assessment completed every shift. All precautions in place. Pt has call light within reach at all times. Room clear of clutter. Pt aware to call for assistance when getting up.

## 2019-06-18 NOTE — PROGRESS NOTES
Data- discharge order received, pt verbalized agreement to discharge, disposition to previous residence, no needs for HHC/DME. Action- discharge instructions prepared and given to pt. And spouse  pt verbalized understanding. Medication information packet given r/t NEW and/or CHANGED prescriptions emphasizing name/purpose/side effects, pt verbalized understanding. Discharge instruction summary: Diet- general , Activity- up with assist , Primary Care Physician as follows: Litzy Rivers -[phone] f/u appointment can be made outpatient , immunizations reviewed and  Up to date  prescription medications filled for baby asa . Inpatient surgical procedure precautions reviewed: with spouse and pt. Response- Pt belongings gathered, IV removed. Disposition is home (no HHC/DME needs), transported with spouse , taken to lobby via w/c w/ spouse , no complications.

## 2019-06-20 ENCOUNTER — TELEPHONE (OUTPATIENT)
Dept: ORTHOPEDICS UNIT | Age: 64
End: 2019-06-20

## 2019-06-20 NOTE — TELEPHONE ENCOUNTER
Spoke with patient regarding post discharge from hospital.    Incision status: No drainage, odor, or redness noted per patient    Edema/Swelling/Teds: edema noted \"not bad\", wearing teds    Pain level and status: 4/10 tolerable     Use of pain medications: yes ; Patient stated they are taking their pain medication norco as prescribed. Use of ice therapy: yes     Blood thinner: aspirin ; Verified with patient that they are taking their anticoagulant as prescribed twice a day. Bowels: no bm since Sunday, educated patient to increase fluid and fiber intake and to start laxative today , patient verbalized understanding. Home Care Agency active: n/a   Outpatient therapy: n/a    Do you have all of your medications: yes    Changes in medications: no    Ortho Vitals: n/a      No other questions/concerns at this time. Encouraged patient to call Orthopedic Nurse [phone] or Orthopedic office if has any questions/concerns.       Follow up appointments:    Future Appointments   Date Time Provider Roderick Allred   7/9/2019  8:15 AM MD Monica Morrison [facility]     Electronically signed by Ivan Benito RN on 6/20/2019 at 4:37 PM

## 2019-07-09 ENCOUNTER — OFFICE VISIT (OUTPATIENT)
Dept: ORTHOPEDIC SURGERY | Age: 64
End: 2019-07-09

## 2019-07-09 VITALS — HEIGHT: 63 IN | BODY MASS INDEX: 51.91 KG/M2 | WEIGHT: 293 LBS

## 2019-07-09 DIAGNOSIS — Z96.611 S/P REVERSE TOTAL SHOULDER ARTHROPLASTY, RIGHT: ICD-10-CM

## 2019-07-09 DIAGNOSIS — S42.291A CLOSED 4-PART FRACTURE OF PROXIMAL HUMERUS, RIGHT, INITIAL ENCOUNTER: Primary | ICD-10-CM

## 2019-07-09 PROCEDURE — 99024 POSTOP FOLLOW-UP VISIT: CPT | Performed by: ORTHOPAEDIC SURGERY

## 2019-07-15 DIAGNOSIS — K58.0 IRRITABLE BOWEL SYNDROME WITH DIARRHEA: ICD-10-CM

## 2019-07-16 RX ORDER — DICYCLOMINE HCL 20 MG
TABLET ORAL
Qty: 270 TABLET | Refills: 3 | Status: SHIPPED | OUTPATIENT
Start: 2019-07-16 | End: 2020-12-29

## 2019-07-20 DIAGNOSIS — R73.03 PREDIABETES: ICD-10-CM

## 2019-07-20 RX ORDER — METFORMIN HYDROCHLORIDE 500 MG/1
TABLET, EXTENDED RELEASE ORAL
Qty: 60 TABLET | Refills: 0 | Status: SHIPPED | OUTPATIENT
Start: 2019-07-20 | End: 2019-08-20 | Stop reason: SDUPTHER

## 2019-08-02 DIAGNOSIS — F40.10 SOCIAL PHOBIA: ICD-10-CM

## 2019-08-02 DIAGNOSIS — F32.4 MAJOR DEPRESSIVE DISORDER WITH SINGLE EPISODE, IN PARTIAL REMISSION (HCC): ICD-10-CM

## 2019-08-02 RX ORDER — FLUOXETINE 10 MG/1
TABLET, FILM COATED ORAL
Qty: 90 TABLET | Refills: 0 | Status: SHIPPED | OUTPATIENT
Start: 2019-08-02 | End: 2019-10-18 | Stop reason: SDUPTHER

## 2019-08-06 ENCOUNTER — OFFICE VISIT (OUTPATIENT)
Dept: ORTHOPEDIC SURGERY | Age: 64
End: 2019-08-06

## 2019-08-06 VITALS — BODY MASS INDEX: 51.91 KG/M2 | HEIGHT: 63 IN | WEIGHT: 293 LBS | RESPIRATION RATE: 16 BRPM

## 2019-08-06 DIAGNOSIS — S42.291D: ICD-10-CM

## 2019-08-06 DIAGNOSIS — Z96.611 S/P REVERSE TOTAL SHOULDER ARTHROPLASTY, RIGHT: Primary | ICD-10-CM

## 2019-08-06 PROCEDURE — 99024 POSTOP FOLLOW-UP VISIT: CPT | Performed by: ORTHOPAEDIC SURGERY

## 2019-08-07 ENCOUNTER — PATIENT MESSAGE (OUTPATIENT)
Dept: FAMILY MEDICINE CLINIC | Age: 64
End: 2019-08-07

## 2019-08-07 ENCOUNTER — HOSPITAL ENCOUNTER (OUTPATIENT)
Dept: PHYSICAL THERAPY | Age: 64
Setting detail: THERAPIES SERIES
Discharge: HOME | End: 2019-08-07
Payer: COMMERCIAL

## 2019-08-07 DIAGNOSIS — S42.91XA CLOSED FRACTURE OF RIGHT SHOULDER, INITIAL ENCOUNTER: ICD-10-CM

## 2019-08-07 PROCEDURE — 97162 PT EVAL MOD COMPLEX 30 MIN: CPT

## 2019-08-07 PROCEDURE — 97110 THERAPEUTIC EXERCISES: CPT

## 2019-08-07 PROCEDURE — 97140 MANUAL THERAPY 1/> REGIONS: CPT

## 2019-08-07 NOTE — TELEPHONE ENCOUNTER
From: Catrachito Randall  To: Anisha Ramírez MD  Sent: 8/7/2019 9:46 AM EDT  Subject: Prescription Question    Would you please send my monthly prescription for hydrocodone to [facility] at the [address] of [id number]? Thank you.     Austen Segura

## 2019-08-08 ENCOUNTER — PATIENT MESSAGE (OUTPATIENT)
Dept: FAMILY MEDICINE CLINIC | Age: 64
End: 2019-08-08

## 2019-08-08 DIAGNOSIS — S42.91XK CLOSED FRACTURE OF RIGHT SHOULDER WITH NONUNION, SUBSEQUENT ENCOUNTER: ICD-10-CM

## 2019-08-08 RX ORDER — HYDROCODONE BITARTRATE AND ACETAMINOPHEN 5; 325 MG/1; MG/1
1 TABLET ORAL EVERY 6 HOURS PRN
OUTPATIENT
Start: 2019-08-08

## 2019-08-08 RX ORDER — HYDROCODONE BITARTRATE AND ACETAMINOPHEN 5; 325 MG/1; MG/1
1-2 TABLET ORAL EVERY 6 HOURS PRN
Qty: 1 TABLET | Refills: 0 | Status: SHIPPED | OUTPATIENT
Start: 2019-08-08 | End: 2019-08-08 | Stop reason: SDUPTHER

## 2019-08-08 RX ORDER — HYDROCODONE BITARTRATE AND ACETAMINOPHEN 5; 325 MG/1; MG/1
1 TABLET ORAL EVERY 6 HOURS PRN
Qty: 20 TABLET | Refills: 0 | Status: SHIPPED | OUTPATIENT
Start: 2019-08-08 | End: 2019-08-08

## 2019-08-08 RX ORDER — HYDROCODONE BITARTRATE AND ACETAMINOPHEN 5; 325 MG/1; MG/1
1-2 TABLET ORAL EVERY 6 HOURS PRN
Qty: 90 TABLET | Refills: 0 | Status: SHIPPED | OUTPATIENT
Start: 2019-08-08 | End: 2019-11-06 | Stop reason: SDUPTHER

## 2019-08-08 NOTE — TELEPHONE ENCOUNTER
From: Tiago Gomez  To: Jared Villanueva MD  Sent: 8/8/2019 10:16 AM EDT  Subject: Prescription Question    Gonzalez Ibrahim,    Did you see my request for hydrocodone to Dr. Magan Adames yesterday?     Ney Goodson

## 2019-08-13 ENCOUNTER — HOSPITAL ENCOUNTER (OUTPATIENT)
Dept: PHYSICAL THERAPY | Age: 64
Setting detail: THERAPIES SERIES
Discharge: HOME | End: 2019-08-13
Payer: COMMERCIAL

## 2019-08-13 ENCOUNTER — TELEPHONE (OUTPATIENT)
Dept: FAMILY MEDICINE CLINIC | Age: 64
End: 2019-08-13

## 2019-08-13 PROCEDURE — 97140 MANUAL THERAPY 1/> REGIONS: CPT

## 2019-08-13 PROCEDURE — 97110 THERAPEUTIC EXERCISES: CPT

## 2019-08-15 ENCOUNTER — HOSPITAL ENCOUNTER (OUTPATIENT)
Dept: PHYSICAL THERAPY | Age: 64
Setting detail: THERAPIES SERIES
Discharge: HOME | End: 2019-08-15
Payer: COMMERCIAL

## 2019-08-15 PROCEDURE — 97140 MANUAL THERAPY 1/> REGIONS: CPT

## 2019-08-15 PROCEDURE — 97110 THERAPEUTIC EXERCISES: CPT

## 2019-08-15 NOTE — FLOWSHEET NOTE
Patient tolerated treatment well [] Patient limited by fatigue  [] Patient limited by pain  [] Patient limited by other medical complications  [] Other:         Prognosis: [x] Good [] Fair  [] Poor    Patient Requires Follow-up: [x] Yes  [] No    Plan:   [x] Continue per plan of care [] Alter current plan (see comments)  [] Plan of care initiated [] Hold pending MD visit [] Discharge    Plan for Next Session:   Start full PROM, AAROM, and AROM  No cuff/band work until 3 months postop - 8/19/18  CP for pain    Electronically signed by:  Jono king, PT

## 2019-08-20 ENCOUNTER — OFFICE VISIT (OUTPATIENT)
Dept: FAMILY MEDICINE CLINIC | Age: 64
End: 2019-08-20
Payer: COMMERCIAL

## 2019-08-20 ENCOUNTER — HOSPITAL ENCOUNTER (OUTPATIENT)
Dept: PHYSICAL THERAPY | Age: 64
Setting detail: THERAPIES SERIES
Discharge: HOME | End: 2019-08-20
Payer: COMMERCIAL

## 2019-08-20 VITALS
DIASTOLIC BLOOD PRESSURE: 76 MMHG | HEIGHT: 63 IN | RESPIRATION RATE: 16 BRPM | HEART RATE: 72 BPM | TEMPERATURE: 98.1 F | WEIGHT: 278 LBS | SYSTOLIC BLOOD PRESSURE: 118 MMHG | BODY MASS INDEX: 49.26 KG/M2

## 2019-08-20 DIAGNOSIS — R73.03 PREDIABETES: ICD-10-CM

## 2019-08-20 DIAGNOSIS — H93.292 AUDITORY COMPLAINTS OF LEFT EAR: Primary | ICD-10-CM

## 2019-08-20 PROCEDURE — 99213 OFFICE O/P EST LOW 20 MIN: CPT | Performed by: FAMILY MEDICINE

## 2019-08-20 PROCEDURE — 97110 THERAPEUTIC EXERCISES: CPT

## 2019-08-20 PROCEDURE — 97140 MANUAL THERAPY 1/> REGIONS: CPT

## 2019-08-20 NOTE — PROGRESS NOTES
PROBLEM VISIT NOTE     Subjective:     Chief Complaint   Patient presents with   Blas Sánchez is a 59 y.o. female   who presents pt hears air in ear when her hearing aid isn't in. Feels like she is in wind storm. No pain just constantwhooshing sound and haven't been swimming. HeaRING slightly worse than usual. Constant whirring of middle pitch  Duration x 2 weeks   Denies pain  Tried put otc wax drops in it and it didn't help at all   Mild nasaL congestion  Worse when lean over  Mild balance issue    Patient's medications, allergies, past medical, and social histories were reviewed and updated as appropriate (see below). Review of Systems   General ROS: fever? No,    Night sweats? No  Endocrine ROS:malaise/lethargy? No   Unexpected weight changes? No  Respiratory ROS: cough? No   Shortness of breath? No  Cardiovascular ROS:chest pain? No   Shortness of breath with exertion? No  Gastrointestinal ROS: abdominal pain? No   Change in stools? No  Genito-Urinary ROS: painful urination? No   Trouble voiding? No  Neurological ROS: TIA or stroke symptoms? No   Numbness/tingling in feet? No    * Documentation provided by medical assistant reviewed and updated by provider. HISTORY:  Patient's medications, allergies, past medical, and social histories were reviewed and updated as appropriate.      CHART REVIEW  Health Maintenance   Topic Date Due    Shingles Vaccine (2 of 3) 06/03/2020 (Originally 12/21/2016)    Flu vaccine (1) 09/01/2019    Colon cancer screen colonoscopy  11/01/2019    TSH testing  12/03/2019    Breast cancer screen  01/22/2020    A1C test (Diabetic or Prediabetic)  06/12/2020    Lipid screen  12/03/2023    DTaP/Tdap/Td vaccine (3 - Td) 08/01/2028    Pneumococcal 0-64 years Vaccine  Completed    Hepatitis C screen  Completed    HIV screen  Completed     The 10-year ASCVD risk score (Rafael Si., et al., 2013) is: 3.5%    Values used to calculate the score:      Age: 59 differently: Take 20 mg by mouth every evening  Yes David Fenton MD   esomeprazole (NEXIUM) 40 MG delayed release capsule Take 1 capsule by mouth  every morning Yes David Fenton MD   albuterol sulfate  (90 Base) MCG/ACT inhaler Inhale 2 puffs into the lungs every 4 hours as needed for Wheezing May substitute for insurance preferred (Ventolin, Proventil, ProAir) Yes David Fenton MD   clonazePAM (KLONOPIN) 0.5 MG tablet Take 0.5 mg by mouth 2 times daily as needed . Yes Historical Provider, MD   triamcinolone (KENALOG) 0.1 % cream Apply topically two times  daily  Patient taking differently: Apply topically two times  daily prn Yes David Fenton MD   SUMAtriptan (IMITREX) 50 MG tablet Take 1 tablet by mouth once as needed . May repeat in 2 hours, maximum 200mg per  day Yes Anita Mc, APRN - CNP      Family History   Problem Relation Age of Onset    Lung Cancer Father     Heart Disease Maternal Grandmother     Tuberculosis Maternal Grandmother     Other Brother      Social History     Tobacco Use    Smoking status: Former Smoker     Packs/day: 2.00     Years: 15.00     Pack years: 30.00     Types: Cigarettes     Last attempt to quit: 1989     Years since quittin.2    Smokeless tobacco: Never Used    Tobacco comment: congrats on quitting   Substance Use Topics    Alcohol use: Yes     Alcohol/week: 0.0 standard drinks     Comment: occ.      Drug use: No      LAST LABS  Cholesterol, Total   Date Value Ref Range Status   2018 209 (H) 0 - 199 mg/dL Final     LDL Calculated   Date Value Ref Range Status   2018 119 (H) <100 mg/dL Final     HDL   Date Value Ref Range Status   2018 56 40 - 60 mg/dL Final   2012 49 40 - 60 mg/dl Final     Triglycerides   Date Value Ref Range Status   2018 169 (H) 0 - 150 mg/dL Final     Lab Results   Component Value Date    GLUCOSE 94 2019     Lab Results   Component Value Date     2019    K 4.1 2019

## 2019-08-21 RX ORDER — DULOXETIN HYDROCHLORIDE 60 MG/1
CAPSULE, DELAYED RELEASE ORAL
Qty: 90 CAPSULE | Refills: 1 | Status: SHIPPED | OUTPATIENT
Start: 2019-08-21 | End: 2020-02-07

## 2019-08-21 RX ORDER — TOPIRAMATE 100 MG/1
TABLET, FILM COATED ORAL
Qty: 90 TABLET | Refills: 0 | Status: SHIPPED | OUTPATIENT
Start: 2019-08-21 | End: 2019-11-20 | Stop reason: SDUPTHER

## 2019-08-21 RX ORDER — METFORMIN HYDROCHLORIDE 500 MG/1
TABLET, EXTENDED RELEASE ORAL
Qty: 60 TABLET | Refills: 0 | Status: SHIPPED | OUTPATIENT
Start: 2019-08-21 | End: 2019-09-18 | Stop reason: SDUPTHER

## 2019-08-22 ENCOUNTER — HOSPITAL ENCOUNTER (OUTPATIENT)
Dept: PHYSICAL THERAPY | Age: 64
Setting detail: THERAPIES SERIES
Discharge: HOME | End: 2019-08-22
Payer: COMMERCIAL

## 2019-08-22 PROCEDURE — 97110 THERAPEUTIC EXERCISES: CPT

## 2019-08-22 PROCEDURE — 97140 MANUAL THERAPY 1/> REGIONS: CPT

## 2019-08-27 ENCOUNTER — HOSPITAL ENCOUNTER (OUTPATIENT)
Dept: PHYSICAL THERAPY | Age: 64
Setting detail: THERAPIES SERIES
Discharge: HOME | End: 2019-08-27
Payer: COMMERCIAL

## 2019-08-27 PROCEDURE — 97140 MANUAL THERAPY 1/> REGIONS: CPT

## 2019-08-27 PROCEDURE — 97110 THERAPEUTIC EXERCISES: CPT

## 2019-08-29 ENCOUNTER — HOSPITAL ENCOUNTER (OUTPATIENT)
Dept: PHYSICAL THERAPY | Age: 64
Setting detail: THERAPIES SERIES
Discharge: HOME | End: 2019-08-29
Payer: COMMERCIAL

## 2019-08-29 ENCOUNTER — OFFICE VISIT (OUTPATIENT)
Dept: ENT CLINIC | Age: 64
End: 2019-08-29
Payer: COMMERCIAL

## 2019-08-29 ENCOUNTER — PROCEDURE VISIT (OUTPATIENT)
Dept: AUDIOLOGY | Age: 64
End: 2019-08-29
Payer: COMMERCIAL

## 2019-08-29 VITALS
HEART RATE: 67 BPM | SYSTOLIC BLOOD PRESSURE: 107 MMHG | TEMPERATURE: 97.8 F | WEIGHT: 282 LBS | DIASTOLIC BLOOD PRESSURE: 72 MMHG | HEIGHT: 63 IN | BODY MASS INDEX: 49.96 KG/M2

## 2019-08-29 DIAGNOSIS — H93.13 TINNITUS OF BOTH EARS: ICD-10-CM

## 2019-08-29 DIAGNOSIS — H93.12 TINNITUS OF LEFT EAR: Primary | ICD-10-CM

## 2019-08-29 DIAGNOSIS — H90.3 SENSORINEURAL HEARING LOSS, BILATERAL: Primary | ICD-10-CM

## 2019-08-29 PROCEDURE — 92557 COMPREHENSIVE HEARING TEST: CPT | Performed by: AUDIOLOGIST

## 2019-08-29 PROCEDURE — 97140 MANUAL THERAPY 1/> REGIONS: CPT

## 2019-08-29 PROCEDURE — 99243 OFF/OP CNSLTJ NEW/EST LOW 30: CPT | Performed by: OTOLARYNGOLOGY

## 2019-08-29 PROCEDURE — 92567 TYMPANOMETRY: CPT | Performed by: AUDIOLOGIST

## 2019-08-29 PROCEDURE — 97110 THERAPEUTIC EXERCISES: CPT

## 2019-08-29 ASSESSMENT — ENCOUNTER SYMPTOMS
TROUBLE SWALLOWING: 0
NAUSEA: 0
SHORTNESS OF BREATH: 0
VOICE CHANGE: 0
RHINORRHEA: 0
PHOTOPHOBIA: 0
WHEEZING: 0
COUGH: 0
EYE ITCHING: 0
SINUS PRESSURE: 0
DIARRHEA: 0
BACK PAIN: 0
STRIDOR: 0
VOMITING: 0
COLOR CHANGE: 0
CHOKING: 0
EYE DISCHARGE: 0
FACIAL SWELLING: 0
SORE THROAT: 0
CONSTIPATION: 0
SINUS PAIN: 0
BLOOD IN STOOL: 0

## 2019-08-29 NOTE — PATIENT INSTRUCTIONS
giving your brain better access to the sounds it is missing. There are some hearing aids with built-in noise generator programs, which may help when amplification alone is not enough. Additional resources may be found through the [employer] at www.alyssa.org    When should you call for help? Call 911 anytime you think you may need emergency care. For example, call if:    · You have symptoms of a stroke. These may include:  ? Sudden numbness, tingling, weakness, or loss of movement in your face, arm, or leg, especially on only one side of your body. ? Sudden vision changes. ? Sudden trouble speaking. ? Sudden confusion or trouble understanding simple statements. ? Sudden problems with walking or balance. ? A sudden, severe headache that is different from past headaches. Call your doctor now or seek immediate medical care if:    · You develop other symptoms. These may include hearing loss (or worse hearing loss), balance problems, dizziness, nausea, or vomiting. Watch closely for changes in your health, and be sure to contact your doctor if:    · Your tinnitus moves from both ears to one ear. · Your hearing loss gets worse within 1 day after an ear injury. · Your tinnitus or hearing loss does not get better within 1 week after an ear injury. · Your tinnitus bothers you enough that you want to take medicines to help you cope with it. If you notice changes in your tinnitus and/or your hearing, it is recommended that you have your hearing tested by your audiologist and to follow-up with your physician that manages your hearing loss (such as your ENT or Primary Care doctor). Hearing Loss: Care Instructions  Your Care Instructions      Hearing loss is a sudden or slow decrease in how well you hear. It can range from mild to profound. Permanent hearing loss can occur with aging, and it can happen when you are exposed long-term to loud noise.  Examples include listening to loud music, riding motorcycles, or being around other loud machines. Hearing loss can affect your work and home life. It can make you feel lonely or depressed. You may feel that you have lost your independence. But hearing aids and other devices can help you hear better and feel connected to others. Follow-up care is a key part of your treatment and safety. Be sure to make and go to all appointments, and call your doctor if you are having problems. It's also a good idea to know your test results and keep a list of the medicines you take. How can you care for yourself at home? · Avoid loud noises whenever possible. This helps keep your hearing from getting worse. Always wear hearing protection around loud noises. · If appropriate, wear hearing aid(s) as directed. It is recommended that hearing aids are worn during all waking hours to keep your brain active and give it access to the sounds it is missing. · If you are beginning your process with hearing aid(s), schedule a \"Hearing Aid Evaluation\" with an audiologist to discuss your lifestyle, features of hearing aid technology, and styles of hearing aids available. It is recommended that you contact your insurance company to determine if you have a hearing aid benefit, as this may dictate who you can see for these services. · Have hearing tests as your doctor suggests. They can show whether your hearing has changed. Your hearing aid may need to be adjusted. · Use other assistive devices as needed. These may include:  ? Telephone amplifiers and hearing aids that can connect to a television, stereo, radio, or microphone. ? Devices that use lights or vibrations. These alert you to the doorbell, a ringing telephone, or a baby monitor. ? Television closed-captioning. This shows the words at the bottom of the screen. Most new TVs can do this. ? TTY (text telephone).  This lets you type messages back and forth on the telephone instead of talking or

## 2019-08-29 NOTE — PROGRESS NOTES
Not on file   Social History Narrative    Self-breast exams: yes. Exercise: walking and cardiovascular equipment sometimes. Seatbelt use: Always. Living will: yes,   copy requested       Allergies     Allergies   Allergen Reactions    Iodine Hives    Contrast [Iodides]      Hives         Medications     Current Outpatient Medications   Medication Sig Dispense Refill    metFORMIN (GLUCOPHAGE-XR) 500 MG extended release tablet TAKE 2 TABLETS DAILY WITH  BREAKFAST 60 tablet 0    DULoxetine (CYMBALTA) 60 MG extended release capsule TAKE 1 CAPSULE DAILY 90 capsule 1    topiramate (TOPAMAX) 100 MG tablet TAKE 1 TABLET AT BEDTIME 90 tablet 0    diclofenac (VOLTAREN) 75 MG EC tablet TAKE 1 TABLET TWICE A  tablet 0    HYDROcodone-acetaminophen (NORCO) 5-325 MG per tablet Take 1-2 tablets by mouth every 6 hours as needed for Pain for up to 30 days. 90 tablet 0    FLUoxetine (PROZAC) 10 MG tablet TAKE 1 TABLET BY MOUTH     DAILY 90 tablet 0    dicyclomine (BENTYL) 20 MG tablet TAKE 1 TABLET 3 TIMES DAILYAS NEEDED FOR IRRITABLE    BOWEL SYNDROME 270 tablet 3    aspirin EC 81 MG EC tablet Take 1 tablet by mouth 2 times daily Please avoid missing doses.  60 tablet 0    calcium carbonate (OSCAL) 500 MG TABS tablet Take 500 mg by mouth daily      ferrous sulfate 325 (65 Fe) MG tablet Take 1 tablet by mouth 2 times daily 60 tablet 0    pramipexole (MIRAPEX) 0.5 MG tablet TAKE 1 TABLET TWICE A  tablet 0    trospium (SANCTURA) 20 MG tablet Take 1 tablet by mouth 2 times daily Replacing Toviaz 180 tablet 1    vitamin D (ERGOCALCIFEROL) 99076 units CAPS capsule TAKE 1 CAPSULE TWICE WEEKLY 18 capsule 3    atenolol (TENORMIN) 25 MG tablet TAKE 1 TABLET DAILY 90 tablet 1    levothyroxine (SYNTHROID) 100 MCG tablet TAKE 1 TABLET BY MOUTH DAILY 90 tablet 1    atorvastatin (LIPITOR) 20 MG tablet Take 1 tablet by mouth daily (Patient taking differently: Take 20 mg by mouth every evening ) 90 tablet 3    esomeprazole (NEXIUM) 40 MG delayed release capsule Take 1 capsule by mouth  every morning 90 capsule 3    albuterol sulfate  (90 Base) MCG/ACT inhaler Inhale 2 puffs into the lungs every 4 hours as needed for Wheezing May substitute for insurance preferred (Ventolin, Proventil, ProAir) 1 Inhaler 3    clonazePAM (KLONOPIN) 0.5 MG tablet Take 0.5 mg by mouth 2 times daily as needed .  triamcinolone (KENALOG) 0.1 % cream Apply topically two times  daily (Patient taking differently: Apply topically two times  daily prn) 90 g 1    SUMAtriptan (IMITREX) 50 MG tablet Take 1 tablet by mouth once as needed . May repeat in 2 hours, maximum 200mg per  day 27 tablet 0     No current facility-administered medications for this visit. Review of Systems     Review of Systems   Constitutional: Negative for activity change, appetite change, chills, diaphoresis, fatigue, fever and unexpected weight change. HENT: Positive for hearing loss and tinnitus. Negative for congestion, dental problem, drooling, ear discharge, ear pain, facial swelling, mouth sores, nosebleeds, postnasal drip, rhinorrhea, sinus pressure, sinus pain, sneezing, sore throat, trouble swallowing and voice change. Eyes: Negative for photophobia, discharge, itching and visual disturbance. Respiratory: Negative for cough, choking, shortness of breath, wheezing and stridor. Gastrointestinal: Negative for blood in stool, constipation, diarrhea, nausea and vomiting. Endocrine: Negative for cold intolerance, heat intolerance, polyphagia and polyuria. Musculoskeletal: Negative for back pain, gait problem, neck pain and neck stiffness. Skin: Negative for color change, pallor, rash and wound. Neurological: Negative for dizziness, syncope, facial asymmetry, speech difficulty, light-headedness, numbness and headaches. Hematological: Negative for adenopathy. Does not bruise/bleed easily.    Psychiatric/Behavioral: Negative for agitation, Future      Return for after CT. Portions of this note were dictated using Dragon.  There may be linguistic errors secondary to the use of this program.

## 2019-09-04 ENCOUNTER — HOSPITAL ENCOUNTER (OUTPATIENT)
Dept: PHYSICAL THERAPY | Age: 64
Setting detail: THERAPIES SERIES
Discharge: HOME | End: 2019-09-04
Payer: COMMERCIAL

## 2019-09-04 PROCEDURE — 97110 THERAPEUTIC EXERCISES: CPT

## 2019-09-04 PROCEDURE — 97140 MANUAL THERAPY 1/> REGIONS: CPT

## 2019-09-04 NOTE — FLOWSHEET NOTE
Physical Therapy Daily Treatment Note  Date:  2019    Patient Name:  Pennie Paiz    :  1955  MRN: [id number]  Restrictions/Precautions:  R TSA on 19. 5 lb lifting restriction at waist level   Pertinent Medical History: Back Pain-bulging disc, L foot sx, R TSA 19  Medical/Treatment Diagnosis Information:  · Diagnosis: R TSA   Treatment Diagnosis: Decreased R shoulder AROM and strength  Insurance/Certification information:  PT Insurance Information: [place]- 60 visits  Physician Information:  Referring Practitioner: Dr. Adeline Lopez of care signed (Y/N):    Visit# / total visits:    Pain level: 3/10     G-Code (if applicable):      Date / Visit # G-Code Applied:  /       Progress Note: []  Yes  []  No  Next due by: Visit #10      History of Injury:  Pt states on 19 she had reverse TSA. States she fell and broke her arm on 6/3/9. MD took the sling off yesterday and said no lifting more than 10# but can use her arm otherwise. Pain is 2-6/10 and is mostly in R elbow and shoulder. Pain is sharp. Wants to be able to use her arm again. Subjective:     19: States her shoulder has been hurting is she moves it a certain way. At rest it is not bad  8/15/19: States her shoulder is not hurting as bad  19: States her shoulder has been aching. Using it more during the day  19: Patient reports increased soreness in right shoulder last few days for some reason. 19: States she is not as sore.   19: Feeling pretty good, just a little sore today  19: Pt states she is feeling a little more sore    Objective:  Observation:   Test measurements:      Exercises:  Exercise/Equipment Resistance/Repetitions Other comments   Pulley X 4 min    Pendulum     Elbow Curl X 20 R    Tslide High Row 2 x 20 Blue    Wand Press Up x 20  Flexion  x 20    Sub Max  Isometrics Shoulder in all directions 10 x 10\" R    AROM [place]  x 20 R    HEP     REviewed current HEP      Added Scap

## 2019-09-05 ENCOUNTER — TELEPHONE (OUTPATIENT)
Dept: FAMILY MEDICINE CLINIC | Age: 64
End: 2019-09-05

## 2019-09-06 ENCOUNTER — HOSPITAL ENCOUNTER (OUTPATIENT)
Dept: PHYSICAL THERAPY | Age: 64
Setting detail: THERAPIES SERIES
Discharge: HOME | End: 2019-09-06
Payer: COMMERCIAL

## 2019-09-06 PROCEDURE — 97140 MANUAL THERAPY 1/> REGIONS: CPT

## 2019-09-06 PROCEDURE — 97110 THERAPEUTIC EXERCISES: CPT

## 2019-09-06 NOTE — FLOWSHEET NOTE
Physical Therapy Daily Treatment Note  Date:  2019    Patient Name:  Pennie Paiz    :  1955  MRN: [id number]  Restrictions/Precautions:  R TSA on 19. 5 lb lifting restriction at waist level   Pertinent Medical History: Back Pain-bulging disc, L foot sx, R TSA 19  Medical/Treatment Diagnosis Information:  · Diagnosis: R TSA   Treatment Diagnosis: Decreased R shoulder AROM and strength  Insurance/Certification information:  PT Insurance Information: [place]- 60 visits  Physician Information:  Referring Practitioner: Dr. Adeline Lopez of care signed (Y/N):    Visit# / total visits:    Pain level: 3/10     G-Code (if applicable):      Date / Visit # G-Code Applied:  /       Progress Note: []  Yes  []  No  Next due by: Visit #10      History of Injury:  Pt states on 19 she had reverse TSA. States she fell and broke her arm on 6/3/9. MD took the sling off yesterday and said no lifting more than 10# but can use her arm otherwise. Pain is 2-6/10 and is mostly in R elbow and shoulder. Pain is sharp. Wants to be able to use her arm again. Subjective:     19: States her shoulder has been hurting is she moves it a certain way. At rest it is not bad  8/15/19: States her shoulder is not hurting as bad  19: States her shoulder has been aching. Using it more during the day  19: Patient reports increased soreness in right shoulder last few days for some reason. 19: States she is not as sore. 19: Feeling pretty good, just a little sore today  19: Pt states she is feeling a little more sore  19: Patient reports shoulder is doing ok,still gets sore after she does her hair or dressing.     Objective:  Observation:   Test measurements:      Exercises:  Exercise/Equipment Resistance/Repetitions Other comments   Pulley X 4 min    Pendulum     Elbow Curl X 20 R    Tslide High Row 2 x 20 Blue    Wand Press Up x 20  Flexion  x 20    Sub Max  Isometrics Shoulder in all directions 10 x 10\" R    AROM [place]  x 20 R    HEP     REviewed current HEP  8/8    Added Scap squeeze and table slide-flexion and scaption  8/8   Table Slide Flexion 8/8                    Other Therapeutic Activities:      Home Exercise Program:      Manual Treatments:    PROM R shoulder - Flexion to 90 deg, Abd to 45 deg, ER to 20 deg in open pack.     Modalities:  CP x 10 min    Timed Code Treatment Minutes:  40    Total Treatment Minutes:  55    Assessment  [x] Patient tolerated treatment well [] Patient limited by fatigue  [] Patient limited by pain  [] Patient limited by other medical complications  [] Other:         Prognosis: [x] Good [] Fair  [] Poor    Patient Requires Follow-up: [x] Yes  [] No    Plan:   [x] Continue per plan of care [] Alter current plan (see comments)  [] Plan of care initiated [] Hold pending MD visit [] Discharge    Plan for Next Session:   Start full PROM, AAROM, and AROM  No cuff/band work until 3 months postop - 8/19/18  CP for pain    Electronically signed by:  David Perry PTA

## 2019-09-09 ENCOUNTER — HOSPITAL ENCOUNTER (OUTPATIENT)
Dept: CT IMAGING | Age: 64
Discharge: HOME | End: 2019-09-09
Payer: COMMERCIAL

## 2019-09-09 DIAGNOSIS — H93.12 TINNITUS OF LEFT EAR: ICD-10-CM

## 2019-09-09 PROCEDURE — 70480 CT ORBIT/EAR/FOSSA W/O DYE: CPT

## 2019-09-09 RX ORDER — ATENOLOL 25 MG/1
TABLET ORAL
Qty: 90 TABLET | Refills: 0 | Status: SHIPPED | OUTPATIENT
Start: 2019-09-09 | End: 2019-09-16 | Stop reason: SDUPTHER

## 2019-09-10 ENCOUNTER — HOSPITAL ENCOUNTER (OUTPATIENT)
Dept: PHYSICAL THERAPY | Age: 64
Setting detail: THERAPIES SERIES
Discharge: HOME | End: 2019-09-10
Payer: COMMERCIAL

## 2019-09-10 PROCEDURE — 97110 THERAPEUTIC EXERCISES: CPT

## 2019-09-10 PROCEDURE — 97140 MANUAL THERAPY 1/> REGIONS: CPT

## 2019-09-12 ENCOUNTER — HOSPITAL ENCOUNTER (OUTPATIENT)
Dept: PHYSICAL THERAPY | Age: 64
Setting detail: THERAPIES SERIES
Discharge: HOME | End: 2019-09-12
Payer: COMMERCIAL

## 2019-09-12 PROCEDURE — 97140 MANUAL THERAPY 1/> REGIONS: CPT

## 2019-09-12 PROCEDURE — 97110 THERAPEUTIC EXERCISES: CPT

## 2019-09-16 RX ORDER — ATENOLOL 25 MG/1
TABLET ORAL
Qty: 90 TABLET | Refills: 0 | Status: SHIPPED | OUTPATIENT
Start: 2019-09-16 | End: 2020-02-13

## 2019-09-17 ENCOUNTER — HOSPITAL ENCOUNTER (OUTPATIENT)
Dept: PHYSICAL THERAPY | Age: 64
Setting detail: THERAPIES SERIES
Discharge: HOME | End: 2019-09-17
Payer: COMMERCIAL

## 2019-09-18 ENCOUNTER — HOSPITAL ENCOUNTER (OUTPATIENT)
Dept: WOMENS IMAGING | Age: 64
Discharge: HOME | End: 2019-09-18
Payer: COMMERCIAL

## 2019-09-18 DIAGNOSIS — R73.03 PREDIABETES: ICD-10-CM

## 2019-09-18 DIAGNOSIS — Z78.0 MENOPAUSE: ICD-10-CM

## 2019-09-18 PROCEDURE — 77080 DXA BONE DENSITY AXIAL: CPT

## 2019-09-19 ENCOUNTER — HOSPITAL ENCOUNTER (OUTPATIENT)
Dept: PHYSICAL THERAPY | Age: 64
Setting detail: THERAPIES SERIES
Discharge: HOME | End: 2019-09-19
Payer: COMMERCIAL

## 2019-09-19 PROCEDURE — 97110 THERAPEUTIC EXERCISES: CPT

## 2019-09-19 PROCEDURE — 97140 MANUAL THERAPY 1/> REGIONS: CPT

## 2019-09-19 NOTE — PLAN OF CARE
Outpatient Physical Therapy  [] [facility]    Phone: [phone]   Fax: [phone]   [x] [facility]  Phone: [phone]              Fax: [phone]  [] [place]   Phone: [phone]   Fax: [phone]     To: Dr. Gabriela Singh      Patient: Sarahi Guillaume   : 1955   MRN: [id number]  Evaluation Date: 2019      Diagnosis Information:  · Diagnosis: R TSA   · Treatment Diagnosis: Decreased R shoulder AROM and strength     Physical Therapy Re-Certification Form  Dear Dr. Gabriela Singh  The following patient has been evaluated for physical therapy services and for therapy to continue, Medicare requires monthly physician review of the treatment plan. Please review the attached evaluation and/or summary of the patient's plan of care, and verify that you agree therapy should continue by signing the attached document and sending it back to our office. Plan of Care/Treatment to date:  [x] Therapeutic Exercise    [x] Modalities:  [x] Therapeutic Activity     [] Ultrasound  [] Electrical Stimulation  [] Gait Training      [] Cervical Traction [] Lumbar Traction  [] Neuromuscular Re-education    [x] Cold/hotpack [] Iontophoresis   [x] Instruction in HEP     Other:  [x] Manual Therapy      []             [] Aquatic Therapy      []           ? Frequency/Duration:  # Days per week: [] 1 day # Weeks: [] 1 week [] 5 weeks     [x] 2 days? [] 2 weeks [] 6 weeks     [] 3 days   [] 3 weeks [] 7 weeks     [] 4 days   [x] 4 weeks [] 8 weeks    Rehab Potential: [] Excellent [x] Good [] Fair  [] Poor       Electronically signed by:  Roxane Maza PT      If you have any questions or concerns, please don't hesitate to call.   Thank you for your referral.      Physician Signature:________________________________Date:__________________  By signing above, therapists plan is approved by physician

## 2019-09-20 RX ORDER — METFORMIN HYDROCHLORIDE 500 MG/1
TABLET, EXTENDED RELEASE ORAL
Qty: 60 TABLET | Refills: 0 | Status: SHIPPED | OUTPATIENT
Start: 2019-09-20 | End: 2019-10-18 | Stop reason: SDUPTHER

## 2019-09-24 ENCOUNTER — HOSPITAL ENCOUNTER (OUTPATIENT)
Dept: PHYSICAL THERAPY | Age: 64
Setting detail: THERAPIES SERIES
Discharge: HOME | End: 2019-09-24
Payer: COMMERCIAL

## 2019-09-24 PROCEDURE — 97140 MANUAL THERAPY 1/> REGIONS: CPT

## 2019-09-24 PROCEDURE — 97110 THERAPEUTIC EXERCISES: CPT

## 2019-09-24 RX ORDER — LEVOTHYROXINE SODIUM 0.1 MG/1
100 TABLET ORAL DAILY
Qty: 90 TABLET | Refills: 0 | Status: SHIPPED | OUTPATIENT
Start: 2019-09-24 | End: 2019-11-02 | Stop reason: SDUPTHER

## 2019-09-24 NOTE — FLOWSHEET NOTE
Physical Therapy Daily Treatment Note  Date:  2019    Patient Name:  Isaac Marquez    :  1955  MRN: [id number]  Restrictions/Precautions:  R TSA on 19. 5 lb lifting restriction at waist level   Pertinent Medical History: Back Pain-bulging disc, L foot sx, R TSA 19  Medical/Treatment Diagnosis Information:  · Diagnosis: R TSA   Treatment Diagnosis: Decreased R shoulder AROM and strength  Insurance/Certification information:  PT Insurance Information: [place]- 60 visits  Physician Information:  Referring Practitioner: Dr. Ramu Hills of care signed (Y/N):    Visit# / total visits:    Pain level: 4/10     G-Code (if applicable):      Date / Visit # G-Code Applied:  /       Progress Note: []  Yes  []  No  Next due by: Visit #10      History of Injury:  Pt states on 19 she had reverse TSA. States she fell and broke her arm on 6/3/9. MD took the sling off yesterday and said no lifting more than 10# but can use her arm otherwise. Pain is 2-6/10 and is mostly in R elbow and shoulder. Pain is sharp. Wants to be able to use her arm again. Subjective:     19: States her shoulder has been hurting is she moves it a certain way. At rest it is not bad  8/15/19: States her shoulder is not hurting as bad  19: States her shoulder has been aching. Using it more during the day  19: Patient reports increased soreness in right shoulder last few days for some reason. 19: States she is not as sore. 19: Feeling pretty good, just a little sore today  19: Pt states she is feeling a little more sore  19: Patient reports shoulder is doing ok,still gets sore after she does her hair or dressing. 09/10/19: Patient reports shoulder is improving overall. 19: Pt states she is doing better, shoulder has really improved in the past week  19: Patient reports shoulder has been achy from using it more.   19: Patient reports shoulder has been more sore

## 2019-09-26 ENCOUNTER — HOSPITAL ENCOUNTER (OUTPATIENT)
Dept: PHYSICAL THERAPY | Age: 64
Setting detail: THERAPIES SERIES
Discharge: HOME | End: 2019-09-26
Payer: COMMERCIAL

## 2019-10-01 ENCOUNTER — HOSPITAL ENCOUNTER (OUTPATIENT)
Dept: PHYSICAL THERAPY | Age: 64
Setting detail: THERAPIES SERIES
Discharge: HOME | End: 2019-10-01
Payer: COMMERCIAL

## 2019-10-01 PROCEDURE — 97140 MANUAL THERAPY 1/> REGIONS: CPT

## 2019-10-01 PROCEDURE — 97110 THERAPEUTIC EXERCISES: CPT

## 2019-10-03 ENCOUNTER — TELEPHONE (OUTPATIENT)
Dept: ORTHOPEDIC SURGERY | Age: 64
End: 2019-10-03

## 2019-10-03 ENCOUNTER — HOSPITAL ENCOUNTER (OUTPATIENT)
Dept: PHYSICAL THERAPY | Age: 64
Setting detail: THERAPIES SERIES
Discharge: HOME | End: 2019-10-03
Payer: COMMERCIAL

## 2019-10-03 DIAGNOSIS — Z96.611 S/P REVERSE TOTAL SHOULDER ARTHROPLASTY, RIGHT: Primary | ICD-10-CM

## 2019-10-03 PROCEDURE — 97110 THERAPEUTIC EXERCISES: CPT

## 2019-10-03 PROCEDURE — 97140 MANUAL THERAPY 1/> REGIONS: CPT

## 2019-10-03 RX ORDER — AMOXICILLIN AND CLAVULANATE POTASSIUM 875; 125 MG/1; MG/1
1 TABLET, FILM COATED ORAL PRN
Qty: 1 TABLET | Refills: 0 | Status: SHIPPED | OUTPATIENT
Start: 2019-10-03 | End: 2019-10-09 | Stop reason: SDUPTHER

## 2019-10-08 ENCOUNTER — OFFICE VISIT (OUTPATIENT)
Dept: ORTHOPEDIC SURGERY | Age: 64
End: 2019-10-08
Payer: COMMERCIAL

## 2019-10-08 VITALS
DIASTOLIC BLOOD PRESSURE: 81 MMHG | SYSTOLIC BLOOD PRESSURE: 123 MMHG | WEIGHT: 284.5 LBS | HEART RATE: 69 BPM | HEIGHT: 63 IN | BODY MASS INDEX: 50.41 KG/M2

## 2019-10-08 DIAGNOSIS — Z96.611 S/P REVERSE TOTAL SHOULDER ARTHROPLASTY, RIGHT: Primary | ICD-10-CM

## 2019-10-08 PROCEDURE — 99213 OFFICE O/P EST LOW 20 MIN: CPT | Performed by: ORTHOPAEDIC SURGERY

## 2019-10-18 DIAGNOSIS — F32.4 MAJOR DEPRESSIVE DISORDER WITH SINGLE EPISODE, IN PARTIAL REMISSION (HCC): ICD-10-CM

## 2019-10-18 DIAGNOSIS — F40.10 SOCIAL PHOBIA: ICD-10-CM

## 2019-10-18 DIAGNOSIS — R73.03 PREDIABETES: ICD-10-CM

## 2019-10-18 RX ORDER — METFORMIN HYDROCHLORIDE 500 MG/1
TABLET, EXTENDED RELEASE ORAL
Qty: 180 TABLET | Refills: 1 | Status: SHIPPED | OUTPATIENT
Start: 2019-10-18 | End: 2020-04-08

## 2019-10-18 RX ORDER — FLUOXETINE 10 MG/1
TABLET, FILM COATED ORAL
Qty: 90 TABLET | Refills: 1 | Status: SHIPPED | OUTPATIENT
Start: 2019-10-18 | End: 2020-04-08

## 2019-11-02 DIAGNOSIS — M51.36 DDD (DEGENERATIVE DISC DISEASE), LUMBAR: ICD-10-CM

## 2019-11-03 RX ORDER — PRAMIPEXOLE DIHYDROCHLORIDE 0.5 MG/1
TABLET ORAL
Qty: 180 TABLET | Refills: 0 | Status: SHIPPED | OUTPATIENT
Start: 2019-11-03 | End: 2020-02-25

## 2019-11-03 RX ORDER — LEVOTHYROXINE SODIUM 100 MCG
TABLET ORAL
Qty: 90 TABLET | Refills: 0 | Status: SHIPPED | OUTPATIENT
Start: 2019-11-03 | End: 2020-01-14 | Stop reason: SDUPTHER

## 2019-11-03 RX ORDER — DICLOFENAC SODIUM 75 MG/1
TABLET, DELAYED RELEASE ORAL
Qty: 180 TABLET | Refills: 0 | Status: SHIPPED | OUTPATIENT
Start: 2019-11-03 | End: 2020-02-10

## 2019-11-08 DIAGNOSIS — K21.9 GASTROESOPHAGEAL REFLUX DISEASE WITHOUT ESOPHAGITIS: ICD-10-CM

## 2019-11-08 RX ORDER — ESOMEPRAZOLE MAGNESIUM 40 MG/1
CAPSULE, DELAYED RELEASE ORAL
Qty: 90 CAPSULE | Refills: 1 | Status: SHIPPED | OUTPATIENT
Start: 2019-11-08 | End: 2020-04-28

## 2019-11-18 RX ORDER — FESOTERODINE FUMARATE 8 MG/1
TABLET, EXTENDED RELEASE ORAL
Qty: 90 TABLET | Refills: 3 | Status: SHIPPED | OUTPATIENT
Start: 2019-11-18 | End: 2020-11-23

## 2019-11-20 RX ORDER — TOPIRAMATE 100 MG/1
TABLET, FILM COATED ORAL
Qty: 90 TABLET | Refills: 0 | Status: SHIPPED | OUTPATIENT
Start: 2019-11-20 | End: 2020-02-13

## 2019-11-25 RX ORDER — ATORVASTATIN CALCIUM 20 MG/1
20 TABLET, FILM COATED ORAL EVERY EVENING
Qty: 90 TABLET | Refills: 1 | Status: SHIPPED | OUTPATIENT
Start: 2019-11-25 | End: 2020-04-28

## 2019-12-18 ENCOUNTER — OFFICE VISIT (OUTPATIENT)
Dept: FAMILY MEDICINE CLINIC | Age: 64
End: 2019-12-18
Payer: COMMERCIAL

## 2019-12-18 VITALS
TEMPERATURE: 97.7 F | BODY MASS INDEX: 49.96 KG/M2 | HEART RATE: 66 BPM | SYSTOLIC BLOOD PRESSURE: 112 MMHG | WEIGHT: 282 LBS | HEIGHT: 63 IN | DIASTOLIC BLOOD PRESSURE: 74 MMHG | OXYGEN SATURATION: 100 % | RESPIRATION RATE: 16 BRPM

## 2019-12-18 DIAGNOSIS — J45.40 MODERATE PERSISTENT ASTHMA WITHOUT COMPLICATION: ICD-10-CM

## 2019-12-18 DIAGNOSIS — J06.9 VIRAL URI: Primary | ICD-10-CM

## 2019-12-18 PROCEDURE — 99213 OFFICE O/P EST LOW 20 MIN: CPT | Performed by: FAMILY MEDICINE

## 2020-01-13 ENCOUNTER — PATIENT MESSAGE (OUTPATIENT)
Dept: FAMILY MEDICINE CLINIC | Age: 65
End: 2020-01-13

## 2020-01-14 RX ORDER — LEVOTHYROXINE SODIUM 0.1 MG/1
TABLET ORAL
Qty: 90 TABLET | Refills: 0 | Status: SHIPPED | OUTPATIENT
Start: 2020-01-14 | End: 2020-02-10

## 2020-02-07 RX ORDER — DULOXETIN HYDROCHLORIDE 60 MG/1
CAPSULE, DELAYED RELEASE ORAL
Qty: 90 CAPSULE | Refills: 1 | Status: SHIPPED | OUTPATIENT
Start: 2020-02-07 | End: 2020-08-03

## 2020-02-07 RX ORDER — DULOXETIN HYDROCHLORIDE 60 MG/1
CAPSULE, DELAYED RELEASE ORAL
Qty: 90 CAPSULE | Refills: 1 | Status: CANCELLED | OUTPATIENT
Start: 2020-02-07

## 2020-02-10 RX ORDER — DICLOFENAC SODIUM 75 MG/1
TABLET, DELAYED RELEASE ORAL
Qty: 180 TABLET | Refills: 0 | Status: SHIPPED | OUTPATIENT
Start: 2020-02-10 | End: 2020-04-28

## 2020-02-10 RX ORDER — LEVOTHYROXINE SODIUM 0.1 MG/1
TABLET ORAL
Qty: 90 TABLET | Refills: 0 | Status: SHIPPED | OUTPATIENT
Start: 2020-02-10 | End: 2020-06-11

## 2020-02-13 RX ORDER — TOPIRAMATE 100 MG/1
TABLET, FILM COATED ORAL
Qty: 90 TABLET | Refills: 0 | Status: SHIPPED | OUTPATIENT
Start: 2020-02-13 | End: 2020-05-13

## 2020-02-13 RX ORDER — ATENOLOL 25 MG/1
TABLET ORAL
Qty: 90 TABLET | Refills: 0 | Status: SHIPPED | OUTPATIENT
Start: 2020-02-13 | End: 2020-05-29

## 2020-02-21 ENCOUNTER — OFFICE VISIT (OUTPATIENT)
Dept: FAMILY MEDICINE CLINIC | Age: 65
End: 2020-02-21
Payer: COMMERCIAL

## 2020-02-21 VITALS
BODY MASS INDEX: 49.29 KG/M2 | WEIGHT: 278.2 LBS | OXYGEN SATURATION: 99 % | DIASTOLIC BLOOD PRESSURE: 76 MMHG | TEMPERATURE: 98 F | SYSTOLIC BLOOD PRESSURE: 102 MMHG | HEART RATE: 84 BPM | HEIGHT: 63 IN | RESPIRATION RATE: 20 BRPM

## 2020-02-21 DIAGNOSIS — R20.0 NUMBNESS OF RIGHT FOOT: ICD-10-CM

## 2020-02-21 LAB
FOLATE: 10.6 NG/ML (ref 4.78–24.2)
HBA1C MFR BLD: 5.7 %
VITAMIN B-12: 350 PG/ML (ref 211–911)

## 2020-02-21 PROCEDURE — 83036 HEMOGLOBIN GLYCOSYLATED A1C: CPT | Performed by: NURSE PRACTITIONER

## 2020-02-21 PROCEDURE — 99214 OFFICE O/P EST MOD 30 MIN: CPT | Performed by: NURSE PRACTITIONER

## 2020-02-21 ASSESSMENT — ENCOUNTER SYMPTOMS
SHORTNESS OF BREATH: 0
COUGH: 0

## 2020-02-21 NOTE — PROGRESS NOTES
2/21/2020    This is a 62 y.o. female   Chief Complaint   Patient presents with    Numbness     pt c/o right foot and toe numbess for 2wks. [place] MELARA  Patient reports that she started with 4th and 5th toe numbness that started in Dec 2019. In the past couple of weeks numbness has increased to lateral foot and mid foot. Numbness is on top and bottom. Denies pain. Has chronic low back pain but does not radiate down legs. Denies increased low back pain from baseline. Reports that she has chronic left foot numbness from a surgery that she had a couple of years ago. Denies increased numbness in left foot.      Seen neurologist Dr. Laron Ernst when toes were numb and was told everything was normal.     Prediabetes: takes metformin 500 mg BID   Diet- none  Exercise- rides bike 3 times per week for 30 minutes   Lab Results   Component Value Date    LABA1C 5.4 06/12/2019     Lab Results   Component Value Date    .3 06/12/2019          Patient Active Problem List   Diagnosis    Strain of thoracic region    Social phobia    Eczema    Rotator cuff syndrome    Hypothyroidism    Hyperlipidemia LDL goal <160    GERD (gastroesophageal reflux disease)    IBS (irritable bowel syndrome)    Sciatica    Premature ventricular contractions    Hearing loss,Tinnitus    RLS (restless legs syndrome)    Sleep apnea    Migraine headache    Depression    Vitamin B12 deficiency    Vitamin D deficiency    DDD (degenerative disc disease), lumbar    Allergic Conjunctivitis    Mixed incontinence- failed tropesium 2017, failed detrol 2016, failed ditropan 2013    Moderate persistent asthma without complication    Needs flu shot    Osteopenia DXA -1.7 (10/13), -2.1 (11/16), -1.3 (9/19)    Intertrigo labialis    Chronic narcotic dependence (HCC)    Eczema of hand    Obesity, morbid, BMI 50 or higher (HCC)    Chronic thoracic spine pain    Chronic neck pain    Insomnia    Prediabetes    History of sleeve gastrectomy    Morbid obesity with BMI of 50.0-59.9, adult ([facility])    Closed 4-part fracture of proximal humerus, right, initial encounter    Status post reverse arthroplasty of right shoulder    Sensorineural hearing loss, bilateral    Tinnitus of both ears          Current Outpatient Medications   Medication Sig Dispense Refill    topiramate (TOPAMAX) 100 MG tablet TAKE 1 TABLET AT BEDTIME 90 tablet 0    levothyroxine (SYNTHROID) 100 MCG tablet TAKE 1 TABLET DAILY 90 tablet 0    diclofenac (VOLTAREN) 75 MG EC tablet TAKE 1 TABLET TWICE A  tablet 0    DULoxetine (CYMBALTA) 60 MG extended release capsule TAKE 1 CAPSULE DAILY 90 capsule 1    atorvastatin (LIPITOR) 20 MG tablet Take 1 tablet by mouth every evening 90 tablet 1    Fesoterodine Fumarate ER (TOVIAZ) 8 MG TB24 TAKE 1 TABLET DAILY 90 tablet 3    esomeprazole (NEXIUM) 40 MG delayed release capsule TAKE 1 CAPSULE EVERY       MORNING 90 capsule 1    pramipexole (MIRAPEX) 0.5 MG tablet TAKE 1 TABLET TWICE A  tablet 0    FLUoxetine (PROZAC) 10 MG tablet TAKE 1 TABLET BY MOUTH     DAILY 90 tablet 1    metFORMIN (GLUCOPHAGE-XR) 500 MG extended release tablet TAKE 2 TABLETS DAILY WITH  BREAKFAST 180 tablet 1    dicyclomine (BENTYL) 20 MG tablet TAKE 1 TABLET 3 TIMES DAILYAS NEEDED FOR IRRITABLE    BOWEL SYNDROME 270 tablet 3    calcium carbonate (OSCAL) 500 MG TABS tablet Take 500 mg by mouth daily      vitamin D (ERGOCALCIFEROL) 88458 units CAPS capsule TAKE 1 CAPSULE TWICE WEEKLY 18 capsule 3    clonazePAM (KLONOPIN) 0.5 MG tablet Take 0.5 mg by mouth 2 times daily as needed .  triamcinolone (KENALOG) 0.1 % cream Apply topically two times  daily (Patient taking differently: Apply topically two times  daily prn) 90 g 1    SUMAtriptan (IMITREX) 50 MG tablet Take 1 tablet by mouth once as needed .  May repeat in 2 hours, maximum 200mg per  day 27 tablet 0    atenolol (TENORMIN) 25 MG tablet TAKE 1 TABLET DAILY (Patient not tested. 10 sites sensed. Skin integrity: Skin integrity normal.      Toenail Condition: Right toenails are normal.   Skin:     General: Skin is warm and dry. Neurological:      Mental Status: She is alert and oriented to person, place, and time. Psychiatric:         Behavior: Behavior normal.         Thought Content: Thought content normal.         Judgment: Judgment normal.         Assessmentand Plan  Clari Recinos was seen today for numbness. Diagnoses and all orders for this visit:    Numbness of right foot  -     Vitamin B12 & Folate; Future  Discussed could be start of neuropathy. She declines EMG at this time. Discussed importance of sugar control and weight loss. She is to let us know if symptoms worsen. Prediabetes  -     POCT glycosylated hemoglobin (Hb A1C)- 5.7%  Low carb diet, aerobic exercise, and weight loss discussed and needed. Continue metformin  mg BID    Morbid obesity with BMI of 45.0-49.9, adult (HCC)  Diet, aerobic exercise, and weight loss discussed and needed. Advised to try exercising in pool to help decrease her orthopedic issues with exercise. Return if symptoms worsen or fail to improve. Keep routine appt scheduled with Dr. Steven Sheridan in June 2020.

## 2020-02-25 RX ORDER — PRAMIPEXOLE DIHYDROCHLORIDE 0.5 MG/1
TABLET ORAL
Qty: 180 TABLET | Refills: 1 | Status: SHIPPED | OUTPATIENT
Start: 2020-02-25 | End: 2020-08-03

## 2020-04-01 ENCOUNTER — PATIENT MESSAGE (OUTPATIENT)
Dept: FAMILY MEDICINE CLINIC | Age: 65
End: 2020-04-01

## 2020-04-01 RX ORDER — HYDROCODONE BITARTRATE AND ACETAMINOPHEN 5; 325 MG/1; MG/1
1-2 TABLET ORAL EVERY 6 HOURS PRN
Qty: 90 TABLET | Refills: 0 | Status: SHIPPED | OUTPATIENT
Start: 2020-04-01 | End: 2020-08-05 | Stop reason: SDUPTHER

## 2020-04-08 RX ORDER — FLUOXETINE 10 MG/1
TABLET, FILM COATED ORAL
Qty: 90 TABLET | Refills: 1 | Status: SHIPPED | OUTPATIENT
Start: 2020-04-08 | End: 2020-10-05

## 2020-04-08 RX ORDER — METFORMIN HYDROCHLORIDE 500 MG/1
TABLET, EXTENDED RELEASE ORAL
Qty: 180 TABLET | Refills: 1 | Status: SHIPPED | OUTPATIENT
Start: 2020-04-08 | End: 2020-10-05

## 2020-04-28 RX ORDER — ERGOCALCIFEROL 1.25 MG/1
CAPSULE ORAL
Qty: 24 CAPSULE | Refills: 1 | Status: SHIPPED | OUTPATIENT
Start: 2020-04-28 | End: 2021-05-10

## 2020-04-28 RX ORDER — ATORVASTATIN CALCIUM 20 MG/1
TABLET, FILM COATED ORAL
Qty: 90 TABLET | Refills: 1 | Status: SHIPPED | OUTPATIENT
Start: 2020-04-28 | End: 2020-11-06

## 2020-04-28 RX ORDER — DICLOFENAC SODIUM 75 MG/1
TABLET, DELAYED RELEASE ORAL
Qty: 180 TABLET | Refills: 0 | Status: SHIPPED | OUTPATIENT
Start: 2020-04-28 | End: 2020-08-03

## 2020-04-28 RX ORDER — ESOMEPRAZOLE MAGNESIUM 40 MG/1
CAPSULE, DELAYED RELEASE ORAL
Qty: 90 CAPSULE | Refills: 1 | Status: SHIPPED | OUTPATIENT
Start: 2020-04-28 | End: 2020-10-20

## 2020-05-13 RX ORDER — TOPIRAMATE 100 MG/1
TABLET, FILM COATED ORAL
Qty: 90 TABLET | Refills: 1 | Status: SHIPPED | OUTPATIENT
Start: 2020-05-13 | End: 2020-10-20

## 2020-05-29 RX ORDER — ATENOLOL 25 MG/1
TABLET ORAL
Qty: 90 TABLET | Refills: 0 | OUTPATIENT
Start: 2020-05-29

## 2020-06-02 RX ORDER — AMOXICILLIN AND CLAVULANATE POTASSIUM 875; 125 MG/1; MG/1
TABLET, FILM COATED ORAL
Qty: 3 TABLET | Refills: 0 | Status: SHIPPED | OUTPATIENT
Start: 2020-06-02 | End: 2020-10-19 | Stop reason: SDUPTHER

## 2020-06-08 ENCOUNTER — TELEMEDICINE (OUTPATIENT)
Dept: FAMILY MEDICINE CLINIC | Age: 65
End: 2020-06-08
Payer: COMMERCIAL

## 2020-06-08 VITALS
OXYGEN SATURATION: 96 % | BODY MASS INDEX: 50.32 KG/M2 | SYSTOLIC BLOOD PRESSURE: 114 MMHG | WEIGHT: 284 LBS | DIASTOLIC BLOOD PRESSURE: 61 MMHG | HEART RATE: 80 BPM | HEIGHT: 63 IN

## 2020-06-08 PROCEDURE — 99214 OFFICE O/P EST MOD 30 MIN: CPT | Performed by: FAMILY MEDICINE

## 2020-06-08 RX ORDER — FLUTICASONE FUROATE AND VILANTEROL 100; 25 UG/1; UG/1
1 POWDER RESPIRATORY (INHALATION) DAILY
Qty: 3 EACH | Refills: 3 | Status: SHIPPED | OUTPATIENT
Start: 2020-06-08 | End: 2022-10-10 | Stop reason: SDUPTHER

## 2020-06-08 RX ORDER — ALBUTEROL SULFATE 90 UG/1
2 AEROSOL, METERED RESPIRATORY (INHALATION) EVERY 4 HOURS PRN
Qty: 2 INHALER | Refills: 3 | Status: SHIPPED | OUTPATIENT
Start: 2020-06-08 | End: 2022-07-01

## 2020-06-08 NOTE — PROGRESS NOTES
3. Major depressive disorder with single episode, in partial remission ([facility] [address]     4. Acquired hypothyroidism  TSH without Reflex   5. Gastroesophageal reflux disease without esophagitis     6. Hyperlipidemia LDL goal <160  Comprehensive Metabolic Panel    Lipid Panel   7. RAD (reactive airway disease), moderate persistent, uncomplicated  albuterol sulfate  (90 Base) MCG/ACT inhaler   Plan below. INSTRUCTIONS  · NEXT APPOINTMENT: Please schedule annual complete physical (30 minutes) in 6 months. · PLEASE GET BLOODWORK DRAWN TODAY ON FIRST FLOOR in 170. Take orders with you. · Add Breo daily inhaler. Virtual Visit (video visit) encounter employed to address concerns as mentioned above. A caregiver was present when appropriate. Due to this being a TeleHealth encounter (During [facility]-28 public health emergency), evaluation of the following organ systems was limited. Pursuant to the emergency declaration under the [facility], [address] [place] [address] authority and the [employer] Act, this Virtual Visit was conducted with patient's (and/or legal guardian's) consent, to reduce the patient's risk of exposure to COVID-19 and provide necessary medical care. The patient (and/or legal guardian) has also been advised to contact this office for worsening conditions or problems, and seek emergency medical treatment and/or call 911 if deemed necessary. Services were provided through a video synchronous discussion virtually to substitute for in-person clinic visit. Patient and provider were located at their individual homes. minutes: 11-20 minutes were spent on the digital evaluation and management of this patient. --Semaj Real MD on 6/8/2020 at 9:38 AM    An electronic signature was used to authenticate this note.

## 2020-06-11 DIAGNOSIS — E78.5 HYPERLIPIDEMIA LDL GOAL <160: ICD-10-CM

## 2020-06-11 DIAGNOSIS — E03.9 ACQUIRED HYPOTHYROIDISM: ICD-10-CM

## 2020-06-11 DIAGNOSIS — E55.9 VITAMIN D DEFICIENCY: ICD-10-CM

## 2020-06-11 LAB
A/G RATIO: 2 (ref 1.1–2.2)
ALBUMIN SERPL-MCNC: 4.5 G/DL (ref 3.4–5)
ALP BLD-CCNC: 126 U/L (ref 40–129)
ALT SERPL-CCNC: 12 U/L (ref 10–40)
ANION GAP SERPL CALCULATED.3IONS-SCNC: 20 MMOL/L (ref 3–16)
AST SERPL-CCNC: 13 U/L (ref 15–37)
BILIRUB SERPL-MCNC: 0.3 MG/DL (ref 0–1)
BUN BLDV-MCNC: 18 MG/DL (ref 7–20)
CALCIUM SERPL-MCNC: 9.4 MG/DL (ref 8.3–10.6)
CHLORIDE BLD-SCNC: 106 MMOL/L (ref 99–110)
CHOLESTEROL, TOTAL: 192 MG/DL (ref 0–199)
CO2: 19 MMOL/L (ref 21–32)
CREAT SERPL-MCNC: 1.1 MG/DL (ref 0.6–1.2)
GFR AFRICAN AMERICAN: >60
GFR NON-AFRICAN AMERICAN: 50
GLOBULIN: 2.3 G/DL
GLUCOSE BLD-MCNC: 106 MG/DL (ref 70–99)
HDLC SERPL-MCNC: 59 MG/DL (ref 40–60)
LDL CHOLESTEROL CALCULATED: 102 MG/DL
POTASSIUM SERPL-SCNC: 4.2 MMOL/L (ref 3.5–5.1)
SODIUM BLD-SCNC: 145 MMOL/L (ref 136–145)
TOTAL PROTEIN: 6.8 G/DL (ref 6.4–8.2)
TRIGL SERPL-MCNC: 156 MG/DL (ref 0–150)
TSH SERPL DL<=0.05 MIU/L-ACNC: 3.32 UIU/ML (ref 0.27–4.2)
VITAMIN D 25-HYDROXY: 79.8 NG/ML
VLDLC SERPL CALC-MCNC: 31 MG/DL

## 2020-06-11 RX ORDER — LEVOTHYROXINE SODIUM 0.1 MG/1
TABLET ORAL
Qty: 90 TABLET | Refills: 1 | Status: SHIPPED | OUTPATIENT
Start: 2020-06-11 | End: 2020-09-06 | Stop reason: SDUPTHER

## 2020-07-02 ENCOUNTER — OFFICE VISIT (OUTPATIENT)
Dept: ORTHOPEDIC SURGERY | Age: 65
End: 2020-07-02
Payer: COMMERCIAL

## 2020-07-02 VITALS — WEIGHT: 285 LBS | TEMPERATURE: 97.8 F | BODY MASS INDEX: 50.5 KG/M2 | HEIGHT: 63 IN

## 2020-07-02 PROCEDURE — 99212 OFFICE O/P EST SF 10 MIN: CPT | Performed by: ORTHOPAEDIC SURGERY

## 2020-07-30 NOTE — DISCHARGE INSTR - ACTIVITY
Activity:   Elevate your leg if swelling occurs in your ankle. Use elastic wraps/hose until swelling decreases.  Continue the exercise program as prescribed by physical therapists.  Take frequent walks.  Use sling with weight bearing instructions as indicated by the physical therapists.  Take rest periods often. Elevate leg during rest period.
No

## 2020-07-31 ENCOUNTER — PATIENT MESSAGE (OUTPATIENT)
Dept: FAMILY MEDICINE CLINIC | Age: 65
End: 2020-07-31

## 2020-07-31 NOTE — TELEPHONE ENCOUNTER
From: Shaan Brush  To: Valentin Almaraz MD  Sent: 7/31/2020 3:10 PM EDT  Subject: Prescription Question    Would you please send in my refill for hydrocodone to [facility] at [facility] phone #[phone]? Thank you.

## 2020-08-03 RX ORDER — DICLOFENAC SODIUM 75 MG/1
TABLET, DELAYED RELEASE ORAL
Qty: 180 TABLET | Refills: 1 | Status: SHIPPED | OUTPATIENT
Start: 2020-08-03 | End: 2020-12-15

## 2020-08-03 RX ORDER — PRAMIPEXOLE DIHYDROCHLORIDE 0.5 MG/1
TABLET ORAL
Qty: 180 TABLET | Refills: 1 | Status: SHIPPED | OUTPATIENT
Start: 2020-08-03 | End: 2021-02-08

## 2020-08-03 RX ORDER — DULOXETIN HYDROCHLORIDE 60 MG/1
CAPSULE, DELAYED RELEASE ORAL
Qty: 90 CAPSULE | Refills: 1 | Status: SHIPPED | OUTPATIENT
Start: 2020-08-03 | End: 2021-01-22

## 2020-08-03 NOTE — TELEPHONE ENCOUNTER
For refill of controlled medications, will need to have a future appointment scheduled. Due for physical in December. Back to me to refill once scheduled.

## 2020-08-05 RX ORDER — HYDROCODONE BITARTRATE AND ACETAMINOPHEN 5; 325 MG/1; MG/1
1-2 TABLET ORAL EVERY 6 HOURS PRN
Qty: 90 TABLET | Refills: 0 | Status: SHIPPED | OUTPATIENT
Start: 2020-08-05 | End: 2020-11-20 | Stop reason: SDUPTHER

## 2020-10-05 RX ORDER — METFORMIN HYDROCHLORIDE 500 MG/1
TABLET, EXTENDED RELEASE ORAL
Qty: 180 TABLET | Refills: 1 | Status: SHIPPED | OUTPATIENT
Start: 2020-10-05 | End: 2021-03-22

## 2020-10-05 RX ORDER — FLUOXETINE 10 MG/1
TABLET, FILM COATED ORAL
Qty: 90 TABLET | Refills: 1 | Status: SHIPPED | OUTPATIENT
Start: 2020-10-05 | End: 2021-03-22

## 2020-10-20 RX ORDER — AMOXICILLIN AND CLAVULANATE POTASSIUM 875; 125 MG/1; MG/1
TABLET, FILM COATED ORAL
Qty: 3 TABLET | Refills: 0 | Status: SHIPPED | OUTPATIENT
Start: 2020-10-20 | End: 2020-12-07 | Stop reason: SDUPTHER

## 2020-10-20 RX ORDER — ESOMEPRAZOLE MAGNESIUM 40 MG/1
CAPSULE, DELAYED RELEASE ORAL
Qty: 90 CAPSULE | Refills: 1 | Status: SHIPPED | OUTPATIENT
Start: 2020-10-20 | End: 2021-04-22

## 2020-10-20 RX ORDER — TOPIRAMATE 100 MG/1
TABLET, FILM COATED ORAL
Qty: 90 TABLET | Refills: 1 | Status: SHIPPED | OUTPATIENT
Start: 2020-10-20 | End: 2021-04-22

## 2020-11-06 RX ORDER — ATORVASTATIN CALCIUM 20 MG/1
TABLET, FILM COATED ORAL
Qty: 90 TABLET | Refills: 1 | Status: SHIPPED | OUTPATIENT
Start: 2020-11-06 | End: 2021-04-22

## 2020-11-20 ENCOUNTER — PATIENT MESSAGE (OUTPATIENT)
Dept: FAMILY MEDICINE CLINIC | Age: 65
End: 2020-11-20

## 2020-11-20 RX ORDER — HYDROCODONE BITARTRATE AND ACETAMINOPHEN 5; 325 MG/1; MG/1
1-2 TABLET ORAL EVERY 6 HOURS PRN
Qty: 90 TABLET | Refills: 0 | Status: SHIPPED | OUTPATIENT
Start: 2020-11-20 | End: 2021-01-27 | Stop reason: SDUPTHER

## 2020-11-20 NOTE — TELEPHONE ENCOUNTER
From: Dami Soto  To: Radha Esquivel MD  Sent: 11/20/2020 9:48 AM EST  Subject: Prescription Question    Would you please send in a refill for my hydrocodone prescription to [facility] pharmacy at [facility] and [address]? Thank you.     Fabian Head

## 2020-12-07 ENCOUNTER — OFFICE VISIT (OUTPATIENT)
Dept: FAMILY MEDICINE CLINIC | Age: 65
End: 2020-12-07
Payer: COMMERCIAL

## 2020-12-07 VITALS
RESPIRATION RATE: 16 BRPM | BODY MASS INDEX: 51.91 KG/M2 | WEIGHT: 293 LBS | DIASTOLIC BLOOD PRESSURE: 86 MMHG | HEIGHT: 63 IN | SYSTOLIC BLOOD PRESSURE: 120 MMHG | OXYGEN SATURATION: 99 % | TEMPERATURE: 96.8 F | HEART RATE: 77 BPM

## 2020-12-07 DIAGNOSIS — E53.8 VITAMIN B12 DEFICIENCY: ICD-10-CM

## 2020-12-07 DIAGNOSIS — R73.03 PREDIABETES: ICD-10-CM

## 2020-12-07 PROBLEM — S42.291A CLOSED 4-PART FRACTURE OF PROXIMAL HUMERUS, RIGHT, INITIAL ENCOUNTER: Status: RESOLVED | Noted: 2019-06-17 | Resolved: 2020-12-07

## 2020-12-07 PROBLEM — E66.01 MORBID OBESITY WITH BMI OF 50.0-59.9, ADULT (HCC): Status: RESOLVED | Noted: 2019-06-17 | Resolved: 2020-12-07

## 2020-12-07 LAB
BASOPHILS ABSOLUTE: 0 K/UL (ref 0–0.2)
BASOPHILS RELATIVE PERCENT: 0.4 %
EOSINOPHILS ABSOLUTE: 0.2 K/UL (ref 0–0.6)
EOSINOPHILS RELATIVE PERCENT: 2.9 %
HCT VFR BLD CALC: 30.6 % (ref 36–48)
HEMOGLOBIN: 9.5 G/DL (ref 12–16)
LYMPHOCYTES ABSOLUTE: 2.1 K/UL (ref 1–5.1)
LYMPHOCYTES RELATIVE PERCENT: 27 %
MCH RBC QN AUTO: 22.9 PG (ref 26–34)
MCHC RBC AUTO-ENTMCNC: 31.2 G/DL (ref 31–36)
MCV RBC AUTO: 73.3 FL (ref 80–100)
MONOCYTES ABSOLUTE: 0.5 K/UL (ref 0–1.3)
MONOCYTES RELATIVE PERCENT: 5.8 %
NEUTROPHILS ABSOLUTE: 5 K/UL (ref 1.7–7.7)
NEUTROPHILS RELATIVE PERCENT: 63.9 %
PDW BLD-RTO: 17.9 % (ref 12.4–15.4)
PLATELET # BLD: 287 K/UL (ref 135–450)
PMV BLD AUTO: 8.8 FL (ref 5–10.5)
RBC # BLD: 4.17 M/UL (ref 4–5.2)
WBC # BLD: 7.8 K/UL (ref 4–11)

## 2020-12-07 PROCEDURE — 99396 PREV VISIT EST AGE 40-64: CPT | Performed by: FAMILY MEDICINE

## 2020-12-07 RX ORDER — AMOXICILLIN AND CLAVULANATE POTASSIUM 875; 125 MG/1; MG/1
TABLET, FILM COATED ORAL
Qty: 3 TABLET | Refills: 0 | Status: SHIPPED | OUTPATIENT
Start: 2020-12-07 | End: 2021-06-10

## 2020-12-07 NOTE — PATIENT INSTRUCTIONS
INSTRUCTIONS  · NEXT APPOINTMENT: Please schedule fasting check-up in 6 months. OK to have water, black coffee and medications. · PLEASE TAKE THIS FORM TO CHECK-OUT WINDOW TO SCHEDULE NEXT VISIT. · Since you are taking a CONTROLLED MEDICATION, will need to be seen at least every 6 months AND will need to have a future appointment scheduled for any refills. Be sure to schedule on way out of office today to avoid denial of future refills. · PLEASE GET BLOODWORK DRAWN TODAY ON FIRST FLOOR in 170. Take orders with you. RESULTS- most blood tests back in couple days. We will call you if any problems. If bloodwork good, you will get letter in mail or notified thru [address] (if signed up) within 2 weeks. If you do not, please call office. · See ortho for further knee evaluation. May need injections pr PT.  · Please get mammogram soon to screen for breast cancer. · For pain, may take OTC tylenol arthritis (acetaminophen 8 hour) 2 tabs three times per day.   Patient Education

## 2020-12-07 NOTE — PROGRESS NOTES
vaccine  Aged Out    Meningococcal (ACWY) vaccine  Aged Out    Pneumococcal 0-64 years Vaccine  Aged Out     The 10-year ASCVD risk score (Evangelina Daugherty, et al., 2013) is: 4.2%    Values used to calculate the score:      Age: 60 years      Sex: Female      Is Non- : No      Diabetic: No      Tobacco smoker: No      Systolic Blood Pressure: 378 mmHg      Is BP treated: No      HDL Cholesterol: 59 mg/dL      Total Cholesterol: 192 mg/dL  Prior to Visit Medications    Medication Sig Taking? Authorizing Provider   amoxicillin-clavulanate (AUGMENTIN) 875-125 MG per tablet Patient to take 1 hour prior to procedure Yes Albin Velasco MD   Fesoterodine Fumarate ER (TOVIAZ) 8 MG TB24 TAKE 1 TABLET DAILY Yes Albin Velasco MD   HYDROcodone-acetaminophen (NORCO) 5-325 MG per tablet Take 1-2 tablets by mouth every 6 hours as needed for Pain for up to 30 days.  Yes Albin Velasco MD   atorvastatin (LIPITOR) 20 MG tablet TAKE 1 TABLET EVERY EVENING Yes Albin Velasco MD   esomeprazole (NEXIUM) 40 MG delayed release capsule TAKE 1 CAPSULE EVERY       MORNING Yes Albin eVlasco MD   topiramate (TOPAMAX) 100 MG tablet TAKE 1 TABLET AT BEDTIME Yes Albin Velasco MD   FLUoxetine (PROZAC) 10 MG tablet TAKE 1 TABLET DAILY Yes Albin Velasco MD   metFORMIN (GLUCOPHAGE-XR) 500 MG extended release tablet TAKE 2 TABLETS DAILY WITH  BREAKFAST Yes Albin Velasco MD   levothyroxine (SYNTHROID) 100 MCG tablet TAKE 1 TABLET DAILY Yes Albin Velasco MD   DULoxetine (CYMBALTA) 60 MG extended release capsule TAKE 1 CAPSULE DAILY Yes Albin Velasco MD   pramipexole (MIRAPEX) 0.5 MG tablet TAKE 1 TABLET TWICE A DAY Yes Albin Velasco MD   diclofenac (VOLTAREN) 75 MG EC tablet TAKE 1 TABLET TWICE A DAY Yes Albin Velasco MD   fluticasone-vilanterol (BREO ELLIPTA) 100-25 MCG/INH AEPB inhaler Inhale 1 puff into the lungs daily Yes Albin Velasco MD   albuterol sulfate  (90 Base) MCG/ACT inhaler Inhale 2 puffs into the lungs every 4 hours as needed for Wheezing May substitute for insurance preferred (Ventolin, Proventil, ProAir) Yes Smeaj Aldana MD   vitamin D (ERGOCALCIFEROL) 1.25 MG (93101 UT) CAPS capsule TAKE 1 CAPSULE TWICE WEEKLY Yes Semaj Aldana MD   dicyclomine (BENTYL) 20 MG tablet TAKE 1 TABLET 3 TIMES DAILYAS NEEDED FOR IRRITABLE    BOWEL SYNDROME Yes Semaj Aldana MD   calcium carbonate (OSCAL) 500 MG TABS tablet Take 500 mg by mouth daily Yes Historical Provider, MD   clonazePAM (KLONOPIN) 0.5 MG tablet Take 0.5 mg by mouth 2 times daily as needed . Yes Historical Provider, MD   triamcinolone (KENALOG) 0.1 % cream Apply topically two times  daily  Patient taking differently: Apply topically two times  daily prn Yes Semaj Aldana MD   SUMAtriptan (IMITREX) 50 MG tablet Take 1 tablet by mouth once as needed . May repeat in 2 hours, maximum 200mg per  day Yes Heidy Lira, APRN - CNP      Family History   Problem Relation Age of Onset    Lung Cancer Father     Heart Disease Maternal Grandmother     Tuberculosis Maternal Grandmother     Other Brother      Social History     Tobacco Use    Smoking status: Former Smoker     Packs/day: 2.00     Years: 15.00     Pack years: 30.00     Types: Cigarettes     Last attempt to quit: 1989     Years since quittin.5    Smokeless tobacco: Never Used    Tobacco comment: congrats on quitting   Substance Use Topics    Alcohol use: Yes     Alcohol/week: 0.0 standard drinks     Comment: occ.      Drug use: No      LAST LABS  Cholesterol, Total   Date Value Ref Range Status   2020 192 0 - 199 mg/dL Final     LDL Calculated   Date Value Ref Range Status   2020 102 (H) <100 mg/dL Final     HDL   Date Value Ref Range Status   2020 59 40 - 60 mg/dL Final   2012 49 40 - 60 mg/dl Final     Triglycerides   Date Value Ref Range Status   2020 156 (H) 0 - 150 mg/dL Final     Lab Results   Component Value Date    GLUCOSE 106 (H) 2020     Lab Results Component Value Date     06/11/2020    K 4.2 06/11/2020    CREATININE 1.1 06/11/2020     Lab Results   Component Value Date    WBC 5.8 06/12/2019    HGB 10.4 (L) 06/12/2019    HCT 31.6 (L) 06/12/2019    MCV 86.5 06/12/2019     06/12/2019     Lab Results   Component Value Date    ALT 12 06/11/2020    AST 13 (L) 06/11/2020    ALKPHOS 126 06/11/2020    BILITOT 0.3 06/11/2020     TSH (uIU/mL)   Date Value   06/11/2020 3.32     Lab Results   Component Value Date    LABA1C 5.7 02/21/2020     Objective:   PHYSICAL EXAM   /86 (Site: Left Upper Arm, Position: Sitting, Cuff Size: Large Adult)   Pulse 77   Temp 96.8 °F (36 °C) (Temporal)   Resp 16   Ht 5' 3\" (1.6 m)   Wt 293 lb (132.9 kg)   SpO2 99%   BMI 51.90 kg/m²   BP Readings from Last 5 Encounters:   12/07/20 120/86   06/08/20 114/61   02/21/20 102/76   12/18/19 112/74   10/08/19 123/81     Wt Readings from Last 5 Encounters:   12/07/20 293 lb (132.9 kg)   07/02/20 285 lb (129.3 kg)   06/08/20 284 lb (128.8 kg)   02/21/20 278 lb 3.2 oz (126.2 kg)   12/18/19 282 lb (127.9 kg)      GENERAL:   · well-developed, well-nourished, alert, no distress. EYES:   · External findings: lids and lashes normal and conjunctivae and sclerae normal  · Eyes: no periorbital cellulitis.   ENT:   · External nose and ears appear normal  · normal TM's and external ear canals both ears  · Pharynx: normal. Exudates: None  · Lips, mucosa, and tongue normal  · Hearing grossly normal.     NECK:   · Supple, symmetrical, trachea midline  · Thyroid not enlarged, symmetric, no tenderness/mass/nodules  LYMPH:  · no cervical nodes, no supraclavicular nodes  LUNGS:    · Breathing unlabored  · clear to auscultation bilaterally and good air movement  CARDIOVASC:   · regular rate and rhythm, S1, S2 normal. No murmur, click, rub or gallop  · Apical impulse normal  · LEGS:  Lower extremity edema: none    · No carotid bruits  ABDOMEN:   · Soft, non-tender, no masses  · No hepatosplenomegaly  · No hernias noted. Exam limited by body habitus  SKIN: warm and dry  · No rashes or suspicious lesions  · No nodules or induration  PSYCH:    · Alert and oriented  · Normal reasoning, insight good  · Facial expressions full, mood appropriate  · No memory disturbance noted  MUSCULOSKEL:    · Gait normal, assistive device: none  · No significant finger or nail findings  · Spine symmetric, no deformities, no kyphosis      Assessment and Plan:      Diagnosis Orders   1. Well adult health check     2. Colon cancer screening  POCT Fecal Immunochemical Test (FIT)   3. S/P reverse total shoulder arthroplasty, right  amoxicillin-clavulanate (AUGMENTIN) 875-125 MG per tablet   4. Major depressive disorder with single episode, in partial remission ([facility] [address]     5. Prediabetes  Hemoglobin A1C   6. Vitamin D deficiency     7. Acquired hypothyroidism     8. Moderate persistent asthma without complication     9. Vitamin B12 deficiency  CBC Auto Differential   10. Other screening mammogram  PRISCILA DIGITAL SCREEN W OR WO CAD BILATERAL   11. Bilateral chronic knee pain  Ambulatory referral to Orthopedic Surgery   Stable. Plan as above and below. INSTRUCTIONS  · NEXT APPOINTMENT: Please schedule fasting check-up in 6 months. OK to have water, black coffee and medications. · PLEASE TAKE THIS FORM TO CHECK-OUT WINDOW TO SCHEDULE NEXT VISIT. · Since you are taking a CONTROLLED MEDICATION, will need to be seen at least every 6 months AND will need to have a future appointment scheduled for any refills. Be sure to schedule on way out of office today to avoid denial of future refills. · PLEASE GET BLOODWORK DRAWN TODAY ON FIRST FLOOR in 170. Take orders with you. RESULTS- most blood tests back in couple days. We will call you if any problems. If bloodwork good, you will get letter in mail or notified thru [address] (if signed up) within 2 weeks. If you do not, please call office.    · See ortho for further knee evaluation. May need injections pr PT.  · Please get mammogram soon to screen for breast cancer.   · For pain, may take OTC tylenol arthritis (acetaminophen 8 hour) 2 tabs three times per day

## 2020-12-08 LAB
ESTIMATED AVERAGE GLUCOSE: 119.8 MG/DL
HBA1C MFR BLD: 5.8 %

## 2020-12-11 ENCOUNTER — TELEPHONE (OUTPATIENT)
Dept: FAMILY MEDICINE CLINIC | Age: 65
End: 2020-12-11

## 2020-12-11 DIAGNOSIS — D50.9 MICROCYTIC ANEMIA: ICD-10-CM

## 2020-12-11 LAB
FERRITIN: 7.2 NG/ML (ref 15–150)
HCT VFR BLD CALC: 29.9 % (ref 36–48)
HEMOGLOBIN: 9.2 G/DL (ref 12–16)
IRON SATURATION: 9 % (ref 15–50)
IRON: 40 UG/DL (ref 37–145)
TOTAL IRON BINDING CAPACITY: 434 UG/DL (ref 260–445)

## 2020-12-11 RX ORDER — FERROUS SULFATE 325(65) MG
325 TABLET ORAL 2 TIMES DAILY
Qty: 200 TABLET | Refills: 3 | Status: SHIPPED | OUTPATIENT
Start: 2020-12-11 | End: 2021-03-08 | Stop reason: DRUGHIGH

## 2020-12-14 ENCOUNTER — OFFICE VISIT (OUTPATIENT)
Dept: ORTHOPEDIC SURGERY | Age: 65
End: 2020-12-14
Payer: COMMERCIAL

## 2020-12-14 VITALS — HEIGHT: 63 IN | WEIGHT: 293 LBS | TEMPERATURE: 97.1 F | BODY MASS INDEX: 51.91 KG/M2

## 2020-12-14 PROCEDURE — 99213 OFFICE O/P EST LOW 20 MIN: CPT | Performed by: ORTHOPAEDIC SURGERY

## 2020-12-14 PROCEDURE — 20610 DRAIN/INJ JOINT/BURSA W/O US: CPT | Performed by: ORTHOPAEDIC SURGERY

## 2020-12-14 NOTE — PROGRESS NOTES
[facility]  Office Visit    Chief Complaint: Bilateral knee pain    HPI:  Whitney Sykes is a 60 y.o. who is here for evaluation of bilateral knee pain. She reports pain that is worse in the left knee and the right knee. She has no known injury. She has had a worsening knee pain over the last few months. There is no history of surgery. She has been taking Tylenol arthritis and using Aspercreme with only slightly helps with the pain. She also takes diclofenac for a foot injury. She uses a cane to walk generally but has no assistive device here in the office today. She is retired. She has a history of reverse total shoulder arthroplasty on the right with Dr. Isis Weiner last year for proximal humerus fracture. She has pain in the front and backs of her knees. The patient has difficulty climbing stairs, difficulty getting out of a chair, difficulty with activities of daily living including hygiene and pain at night.        Patient Active Problem List   Diagnosis    Social phobia    Eczema    Hypothyroidism    Hyperlipidemia LDL goal <160    GERD (gastroesophageal reflux disease)    IBS (irritable bowel syndrome)    Premature ventricular contractions    RLS (restless legs syndrome)    Sleep apnea    Migraine headache    Depression    Vitamin B12 deficiency    Vitamin D deficiency    DDD (degenerative disc disease), lumbar    Allergic Conjunctivitis    Mixed incontinence- failed tropesium 2017, failed detrol 2016, failed ditropan 2013    Moderate persistent asthma without complication    Needs flu shot    Osteopenia DXA -1.7 (10/13), -2.1 (11/16), -1.3 (9/19)    Intertrigo labialis    Chronic narcotic dependence ([facility] [address]    Obesity, morbid, BMI 50 or higher (HCC)    Chronic thoracic spine pain    Chronic neck pain    Insomnia    Prediabetes    History of sleeve gastrectomy    Status post reverse arthroplasty of right shoulder    Sensorineural hearing loss, bilateral    Tinnitus of both ears    Microcytic anemia       ROS:  Constitutional: denies fever, chills, weight loss  MSK: denies pain in other joints, muscle aches  Neurological: denies numbness, tingling, weakness    Exam:  Temperature 97.1 °F (36.2 °C), temperature source Temporal, height 5' 3\" (1.6 m), weight 293 lb (132.9 kg), currently breastfeeding. Appearance: sitting in exam room chair, appears to be in no acute distress, awake and alert  Resp: unlabored breathing on room air  Skin: warm, dry and intact with out erythema or significant increased temperature  Neuro: grossly intact both lower extremities. Intact sensation to light touch. Motor exam 4+ to 5/5 in all major motor groups. MSK: BLE - Examination reveals that range of motion is 0 to 115 degrees. There is varus deformity, positive crepitus, positive joint line tenderness, antalgic gait. Neurologically, plantar flexion and dorsiflexion is intact. Pulses palpable distally. 5/5 strength. Imaging:  3 views of bilateral knees were performed and reviewed today. Both knees are significant for decreased joint space in the medial patellofemoral compartments with associated periarticular osteophytes. Assessment:  Bilateral knee osteoarthritis    Plan:  We discussed the diagnosis and treatment options. She is already taking diclofenac. I offered bilateral knee steroid injections today and these were performed as described below. We briefly discussed total knee arthroplasty. She will come back in 3 months for reassessment and possible repeat injections. PROCEDURE NOTE:  After verbal consent was obtained, both knees were prepped with betadine. Skin was anesthetized with ethyl chloride. Each knee was then injected under sterile technique with 2mL of 0.25% marcaine and 1 mL of 40 mg/mL Kenalog. Bandages were applied. The patient tolerated the procedure well and there were no complications.     This dictation was done with Dragon dictation and may contain mechanical errors related to translation.

## 2020-12-24 ENCOUNTER — OFFICE VISIT (OUTPATIENT)
Dept: PRIMARY CARE CLINIC | Age: 65
End: 2020-12-24
Payer: COMMERCIAL

## 2020-12-24 PROCEDURE — 99211 OFF/OP EST MAY X REQ PHY/QHP: CPT | Performed by: NURSE PRACTITIONER

## 2020-12-24 NOTE — PROGRESS NOTES
Patient presented to [facility] drive up clinic for preop testing. Patient was swabbed and given information advising them to remain isolated until procedure date.

## 2020-12-25 LAB — SARS-COV-2: NOT DETECTED

## 2020-12-29 ENCOUNTER — ANESTHESIA EVENT (OUTPATIENT)
Dept: ENDOSCOPY | Age: 65
End: 2020-12-29
Payer: COMMERCIAL

## 2020-12-29 NOTE — PROGRESS NOTES
Preoperative Screening for Elective Surgery/Invasive Procedures While COVID-19 present in the community    ? Have you tested positive or have been told to self-isolate for COVID-19 like symptoms within the past 28 days? NO  ? Do you currently have any of the following symptoms? NO  o Fever >100.0 F or 99.9 F in immunocompromised patients? NO  o New onset cough, shortness of breath or difficulty breathing? NO  o New onset sore throat, myalgia (muscle aches and pains), headache, loss of taste/smell or diarrhea? NO  ? Have you had a potential exposure to COVID-19 within the past 14 days by:  o Close contact with a confirmed case? NO  o Close contact with a healthcare worker,  or essential infrastructure worker (grocery store, [employer], gas station, public utilities or transportation)? YES  o Do you reside in a congregate setting such as; skilled nursing facility, adult home, correctional facility, homeless shelter or other institutional setting? NO  o Have you had recent travel to a known COVID-19 hotspot? NO    Indicate if the patient has a positive screen by answering yes to one or more of the above questions. Patients who test positive or screen positive prior to surgery or on the day of surgery should be evaluated in conjunction with the surgeon/proceduralist/anesthesiologist to determine the urgency of the procedure.

## 2020-12-29 NOTE — PROGRESS NOTES
Phone message left to call [facility] dept at [phone]  for history review.  Basic pre-op Surgery instructions left on    12/29 @ [address]

## 2020-12-29 NOTE — PROGRESS NOTES
[address] time_0800___________        Surgery time__0930__________    Take the following medications with a sip of water: Follow your MD/Surgeons pre-procedure instructions regarding your medications    Do not eat or drink anything after 12:00 midnight prior to your surgery. This includes water chewing gum, mints and ice chips. You may brush your teeth and gargle the morning of your surgery, but do not swallow the water     Please see your family doctor/pediatrician for a history and physical and/or concerning medications. Bring any test results/reports from your physicians office. If you are under the care of a heart doctor or specialist doctor, please be aware that you may be asked to them for clearance    You may be asked to stop blood thinners such as Coumadin, Plavix, Fragmin, Lovenox, etc., or any anti-inflammatories such as:  Aspirin, Ibuprofen, Advil, Naproxen prior to your surgery. We also ask that you stop any OTC medications such as fish oil, vitamin E, glucosamine, garlic, Multivitamins, COQ 10, etc.    We ask that you do not smoke 24 hours prior to surgery  We ask that you do not  drink any alcoholic beverages 24 hours prior to surgery     You must make arrangements for a responsible adult to take you home after your surgery. For your safety you will not be allowed to leave alone or drive yourself home. Your surgery will be cancelled if you do not have a ride home. Also for your safety, it is strongly suggested that someone stay with you the first 24 hours after your surgery. A parent or legal guardian must accompany a child scheduled for surgery and plan to stay at the hospital until the child is discharged. Please do not bring other children with you. For your comfort, please wear simple loose fitting clothing to the hospital.  Please do not bring valuables.     Do not wear any make-up or nail polish on your fingers or toes Remember. [place] Safety First! Call before you Fall    For your safety, please do not wear any jewelry or body piercing's on the day of surgery. All jewelry must be removed. If you have dentures, they will be removed before going to operating room. For your convenience, we will provide you with a container. If you wear contact lenses or glasses, they will be removed, please bring a case for them. If you have a living will and a durable power of  for healthcare, please bring in a copy. As part of our patient safety program to minimize surgical site infections, we ask you to do the following:    · Please notify your surgeon if you develop any illness between         now and the  day of your surgery. · This includes a cough, cold, fever, sore throat, nausea,         or vomiting, and diarrhea, etc.  ·  Please notify your surgeon if you experience dizziness, shortness         of breath or blurred vision between now and the time of your surgery. Do not shave your operative site 96 hours prior to surgery. For face and neck surgery, men may use an electric razor 48 hours   prior to surgery. You may shower the night before surgery or the morning of   your surgery with an antibacterial soap. You will need to bring a photo ID and insurance card    [facility] has an onsite pharmacy, would you like to utilize our pharmacy     If you will be staying overnight and use a C-pap machine, please bring   your C-pap to hospital     Our goal is to provide you with excellent care, therefore, visitors will be limited to two(2) in the room at a time so that we may focus on providing this care for you. Please contact pre-admission testing if you have any further questions.                  [facility] phone number:  [phone] PAT fax number:  [phone] Please note these are generalized instructions for all surgical cases, you may be provided with more specific instructions according to your surgery.

## 2020-12-30 ENCOUNTER — ANESTHESIA (OUTPATIENT)
Dept: ENDOSCOPY | Age: 65
End: 2020-12-30
Payer: COMMERCIAL

## 2020-12-30 ENCOUNTER — HOSPITAL ENCOUNTER (OUTPATIENT)
Age: 65
Setting detail: OUTPATIENT SURGERY
Discharge: HOME | End: 2020-12-30
Attending: INTERNAL MEDICINE | Admitting: INTERNAL MEDICINE
Payer: COMMERCIAL

## 2020-12-30 VITALS
TEMPERATURE: 98.6 F | OXYGEN SATURATION: 95 % | SYSTOLIC BLOOD PRESSURE: 103 MMHG | DIASTOLIC BLOOD PRESSURE: 61 MMHG | RESPIRATION RATE: 1 BRPM

## 2020-12-30 VITALS
OXYGEN SATURATION: 99 % | DIASTOLIC BLOOD PRESSURE: 70 MMHG | WEIGHT: 280.2 LBS | RESPIRATION RATE: 20 BRPM | SYSTOLIC BLOOD PRESSURE: 117 MMHG | HEART RATE: 81 BPM | HEIGHT: 63 IN | TEMPERATURE: 96.9 F | BODY MASS INDEX: 49.65 KG/M2

## 2020-12-30 PROCEDURE — 6360000002 HC RX W HCPCS

## 2020-12-30 PROCEDURE — 7100000010 HC PHASE II RECOVERY - FIRST 15 MIN: Performed by: INTERNAL MEDICINE

## 2020-12-30 PROCEDURE — 3609012400 HC EGD TRANSORAL BIOPSY SINGLE/MULTIPLE: Performed by: INTERNAL MEDICINE

## 2020-12-30 PROCEDURE — 2709999900 HC NON-CHARGEABLE SUPPLY: Performed by: INTERNAL MEDICINE

## 2020-12-30 PROCEDURE — 3609015300 HC ESOPHAGEAL DILATION MALONEY: Performed by: INTERNAL MEDICINE

## 2020-12-30 PROCEDURE — 7100000011 HC PHASE II RECOVERY - ADDTL 15 MIN: Performed by: INTERNAL MEDICINE

## 2020-12-30 PROCEDURE — 3700000000 HC ANESTHESIA ATTENDED CARE: Performed by: INTERNAL MEDICINE

## 2020-12-30 PROCEDURE — 3700000001 HC ADD 15 MINUTES (ANESTHESIA): Performed by: INTERNAL MEDICINE

## 2020-12-30 PROCEDURE — 2580000003 HC RX 258: Performed by: ANESTHESIOLOGY

## 2020-12-30 PROCEDURE — 3609027000 HC COLONOSCOPY: Performed by: INTERNAL MEDICINE

## 2020-12-30 PROCEDURE — 88305 TISSUE EXAM BY PATHOLOGIST: CPT

## 2020-12-30 PROCEDURE — 2500000003 HC RX 250 WO HCPCS

## 2020-12-30 PROCEDURE — 7100000000 HC PACU RECOVERY - FIRST 15 MIN: Performed by: INTERNAL MEDICINE

## 2020-12-30 RX ORDER — MEPERIDINE HYDROCHLORIDE 25 MG/ML
12.5 INJECTION INTRAMUSCULAR; INTRAVENOUS; SUBCUTANEOUS EVERY 5 MIN PRN
Status: DISCONTINUED | OUTPATIENT
Start: 2020-12-30 | End: 2020-12-30 | Stop reason: HOSPADM

## 2020-12-30 RX ORDER — MORPHINE SULFATE 2 MG/ML
1 INJECTION, SOLUTION INTRAMUSCULAR; INTRAVENOUS EVERY 5 MIN PRN
Status: DISCONTINUED | OUTPATIENT
Start: 2020-12-30 | End: 2020-12-30 | Stop reason: HOSPADM

## 2020-12-30 RX ORDER — ONDANSETRON 2 MG/ML
4 INJECTION INTRAMUSCULAR; INTRAVENOUS
Status: DISCONTINUED | OUTPATIENT
Start: 2020-12-30 | End: 2020-12-30 | Stop reason: HOSPADM

## 2020-12-30 RX ORDER — SODIUM CHLORIDE 0.9 % (FLUSH) 0.9 %
10 SYRINGE (ML) INJECTION PRN
Status: DISCONTINUED | OUTPATIENT
Start: 2020-12-30 | End: 2020-12-30 | Stop reason: HOSPADM

## 2020-12-30 RX ORDER — OXYCODONE HYDROCHLORIDE 5 MG/1
5 TABLET ORAL PRN
Status: DISCONTINUED | OUTPATIENT
Start: 2020-12-30 | End: 2020-12-30 | Stop reason: HOSPADM

## 2020-12-30 RX ORDER — LIDOCAINE HYDROCHLORIDE 20 MG/ML
INJECTION, SOLUTION EPIDURAL; INFILTRATION; INTRACAUDAL; PERINEURAL PRN
Status: DISCONTINUED | OUTPATIENT
Start: 2020-12-30 | End: 2020-12-30 | Stop reason: SDUPTHER

## 2020-12-30 RX ORDER — PROPOFOL 10 MG/ML
INJECTION, EMULSION INTRAVENOUS CONTINUOUS PRN
Status: DISCONTINUED | OUTPATIENT
Start: 2020-12-30 | End: 2020-12-30 | Stop reason: SDUPTHER

## 2020-12-30 RX ORDER — PROPOFOL 10 MG/ML
INJECTION, EMULSION INTRAVENOUS PRN
Status: DISCONTINUED | OUTPATIENT
Start: 2020-12-30 | End: 2020-12-30 | Stop reason: SDUPTHER

## 2020-12-30 RX ORDER — SODIUM CHLORIDE 9 MG/ML
INJECTION, SOLUTION INTRAVENOUS CONTINUOUS
Status: DISCONTINUED | OUTPATIENT
Start: 2020-12-30 | End: 2020-12-30 | Stop reason: HOSPADM

## 2020-12-30 RX ORDER — FENTANYL CITRATE 50 UG/ML
50 INJECTION, SOLUTION INTRAMUSCULAR; INTRAVENOUS EVERY 5 MIN PRN
Status: DISCONTINUED | OUTPATIENT
Start: 2020-12-30 | End: 2020-12-30 | Stop reason: HOSPADM

## 2020-12-30 RX ORDER — ONDANSETRON 2 MG/ML
INJECTION INTRAMUSCULAR; INTRAVENOUS PRN
Status: DISCONTINUED | OUTPATIENT
Start: 2020-12-30 | End: 2020-12-30 | Stop reason: SDUPTHER

## 2020-12-30 RX ORDER — FENTANYL CITRATE 50 UG/ML
25 INJECTION, SOLUTION INTRAMUSCULAR; INTRAVENOUS EVERY 5 MIN PRN
Status: DISCONTINUED | OUTPATIENT
Start: 2020-12-30 | End: 2020-12-30 | Stop reason: HOSPADM

## 2020-12-30 RX ORDER — OXYCODONE HYDROCHLORIDE 10 MG/1
10 TABLET ORAL PRN
Status: DISCONTINUED | OUTPATIENT
Start: 2020-12-30 | End: 2020-12-30 | Stop reason: HOSPADM

## 2020-12-30 RX ORDER — GLYCOPYRROLATE 0.2 MG/ML
INJECTION INTRAMUSCULAR; INTRAVENOUS PRN
Status: DISCONTINUED | OUTPATIENT
Start: 2020-12-30 | End: 2020-12-30 | Stop reason: SDUPTHER

## 2020-12-30 RX ORDER — SODIUM CHLORIDE 0.9 % (FLUSH) 0.9 %
10 SYRINGE (ML) INJECTION EVERY 12 HOURS SCHEDULED
Status: DISCONTINUED | OUTPATIENT
Start: 2020-12-30 | End: 2020-12-30 | Stop reason: HOSPADM

## 2020-12-30 RX ORDER — MORPHINE SULFATE 2 MG/ML
2 INJECTION, SOLUTION INTRAMUSCULAR; INTRAVENOUS EVERY 5 MIN PRN
Status: DISCONTINUED | OUTPATIENT
Start: 2020-12-30 | End: 2020-12-30 | Stop reason: HOSPADM

## 2020-12-30 RX ADMIN — PROPOFOL 50 MG: 10 INJECTION, EMULSION INTRAVENOUS at 09:24

## 2020-12-30 RX ADMIN — ONDANSETRON 4 MG: 2 INJECTION INTRAMUSCULAR; INTRAVENOUS at 09:23

## 2020-12-30 RX ADMIN — PROPOFOL 30 MG: 10 INJECTION, EMULSION INTRAVENOUS at 09:38

## 2020-12-30 RX ADMIN — SODIUM CHLORIDE: 9 INJECTION, SOLUTION INTRAVENOUS at 08:54

## 2020-12-30 RX ADMIN — PROPOFOL 50 MG: 10 INJECTION, EMULSION INTRAVENOUS at 09:34

## 2020-12-30 RX ADMIN — PROPOFOL 100 MG: 10 INJECTION, EMULSION INTRAVENOUS at 09:41

## 2020-12-30 RX ADMIN — PROPOFOL 30 MG: 10 INJECTION, EMULSION INTRAVENOUS at 09:47

## 2020-12-30 RX ADMIN — PROPOFOL 70 MG: 10 INJECTION, EMULSION INTRAVENOUS at 09:30

## 2020-12-30 RX ADMIN — PROPOFOL 100 MG: 10 INJECTION, EMULSION INTRAVENOUS at 09:45

## 2020-12-30 RX ADMIN — PROPOFOL 180 MCG/KG/MIN: 10 INJECTION, EMULSION INTRAVENOUS at 09:30

## 2020-12-30 RX ADMIN — LIDOCAINE HYDROCHLORIDE 50 MG: 20 INJECTION, SOLUTION EPIDURAL; INFILTRATION; INTRACAUDAL; PERINEURAL at 09:30

## 2020-12-30 RX ADMIN — PROPOFOL 200 MCG/KG/MIN: 10 INJECTION, EMULSION INTRAVENOUS at 09:23

## 2020-12-30 RX ADMIN — PROPOFOL 30 MG: 10 INJECTION, EMULSION INTRAVENOUS at 09:50

## 2020-12-30 RX ADMIN — GLYCOPYRROLATE 0.1 MG: 0.2 INJECTION, SOLUTION INTRAMUSCULAR; INTRAVENOUS at 09:23

## 2020-12-30 ASSESSMENT — PULMONARY FUNCTION TESTS
PIF_VALUE: 1
PIF_VALUE: 0
PIF_VALUE: 0
PIF_VALUE: 1
PIF_VALUE: 0
PIF_VALUE: 1
PIF_VALUE: 1
PIF_VALUE: 0
PIF_VALUE: 1
PIF_VALUE: 0
PIF_VALUE: 1
PIF_VALUE: 0
PIF_VALUE: 0
PIF_VALUE: 1
PIF_VALUE: 0
PIF_VALUE: 1
PIF_VALUE: 0
PIF_VALUE: 1
PIF_VALUE: 0
PIF_VALUE: 1
PIF_VALUE: 0
PIF_VALUE: 1
PIF_VALUE: 0
PIF_VALUE: 1
PIF_VALUE: 0

## 2020-12-30 ASSESSMENT — PAIN SCALES - GENERAL
PAINLEVEL_OUTOF10: 0
PAINLEVEL_OUTOF10: 0

## 2020-12-30 ASSESSMENT — PAIN - FUNCTIONAL ASSESSMENT: PAIN_FUNCTIONAL_ASSESSMENT: 0-10

## 2020-12-30 NOTE — ANESTHESIA POSTPROCEDURE EVALUATION
Department of Anesthesiology  Postprocedure Note    Patient: Rossi Magana  MRN: [id number]  YOB: 1955  Date of evaluation: 12/30/2020  Time:  2:27 PM     Procedure Summary     Date: 12/30/20 Room / Location: [address] [place] [address]    Anesthesia Start: 0938 Anesthesia Stop: 9517    Procedures:       COLONOSCOPY (N/A )      EGD BIOPSY (N/A )      ESOPHAGEAL DILATION JUNIE Diagnosis: (IRON DEFICIENCY ANEMIA)    Surgeons: Jocelyn Rivera MD Responsible Provider: Whitney Joseph MD    Anesthesia Type: MAC ASA Status: 3          Anesthesia Type: MAC    Live Phase I: Live Score: 10    Live Phase II: Live Score: 10    Last vitals: Reviewed and per EMR flowsheets.        Anesthesia Post Evaluation    Patient location during evaluation: PACU  Patient participation: complete - patient participated  Level of consciousness: awake and alert  Pain score: 0  Airway patency: patent  Nausea & Vomiting: no nausea and no vomiting  Complications: no  Cardiovascular status: blood pressure returned to baseline  Respiratory status: acceptable  Hydration status: euvolemic

## 2020-12-30 NOTE — H&P
[facility]   Pre-operative History and Physical    Patient: Luis Gramajo  : 1955  Acct#:         HISTORY OF PRESENT ILLNESS:    The patient is a 60 y.o. female  who presents with iron-deficiency anemia; dysphagia  Past Medical History:        Diagnosis Date    Abnormal finding on pulmonary function testing- nl except decreased diffusing capacity 2011    Activities treadmill- neg GXT arron, EF 82% 3/11     Allergic rhinitis     Asthma     exerise induced    Depression     GERD (gastroesophageal reflux disease)     Hearing loss,Tinnitus     Uses hearing aids    Hyperlipidemia     Hypothyroidism     IBS (irritable bowel syndrome)     Migraine headache     Nocturia     PONV (postoperative nausea and vomiting)     Premature ventricular contractions     Pulmonary function testing- nl except decreased diffusing capacity      Relevant prior imaging: normal examination- neg CT chest      RLS (restless legs syndrome)     Sleep apnea     uses C-PAP     Past Surgical History:        Procedure Laterality Date    CHOLECYSTECTOMY      COLONOSCOPY      ENDOSCOPY, COLON, DIAGNOSTIC      EYE SURGERY Bilateral     cataracts    FOOT SURGERY Left 2017    HYSTERECTOMY      REVISION OF TOTAL SHOULDER Right 2019    REVERSE SHOULDER ARTHROPLASTY- RIGHT SHOULDER WITH MINI C-ARM performed by Kia Anglin MD at [address] [place] [address]  7.    WISDOM TOOTH EXTRACTION       Medications prior to admission:   Prior to Admission medications    Medication Sig Start Date End Date Taking?  Authorizing Provider   Acetaminophen (TYLENOL ARTHRITIS PAIN PO) Take 1 tablet by mouth 3 times daily   Yes Historical Provider, MD   ferrous sulfate (IRON 325) 325 (65 Fe) MG tablet Take 1 tablet by mouth 2 times daily 20 Yes Dave Ureña MD   Fesoterodine Fumarate ER (TOVIAZ) 8 MG TB24 TAKE 1 TABLET DAILY 20  Yes Dave Ureña MD atorvastatin (LIPITOR) 20 MG tablet TAKE 1 TABLET EVERY EVENING 11/6/20  Yes Edwardo Newton MD   esomeprazole ([address]) 40 MG delayed release capsule TAKE 1 CAPSULE EVERY       MORNING 10/20/20  Yes Edwardo Newton MD   topiramate (TOPAMAX) 100 MG tablet TAKE 1 TABLET AT BEDTIME 10/20/20  Yes Edwardo Newton MD   FLUoxetine (PROZAC) 10 MG tablet TAKE 1 TABLET DAILY 10/5/20  Yes Edwardo Newton MD   metFORMIN (GLUCOPHAGE-XR) 500 MG extended release tablet TAKE 2 TABLETS DAILY WITH  BREAKFAST 10/5/20  Yes Edwardo Newton MD   levothyroxine (SYNTHROID) 100 MCG tablet TAKE 1 TABLET DAILY 9/8/20  Yes Edwardo Newton MD   DULoxetine (CYMBALTA) 60 MG extended release capsule TAKE 1 CAPSULE DAILY 8/3/20  Yes Edwardo Newton MD   pramipexole (MIRAPEX) 0.5 MG tablet TAKE 1 TABLET TWICE A DAY 8/3/20  Yes Edwardo Newton MD   fluticasone-vilanterol (BREO ELLIPTA) 100-25 MCG/INH AEPB inhaler Inhale 1 puff into the lungs daily 6/8/20  Yes Edwardo Newton MD   albuterol sulfate  (90 Base) MCG/ACT inhaler Inhale 2 puffs into the lungs every 4 hours as needed for Wheezing May substitute for insurance preferred (Ventolin, Proventil, ProAir) 6/8/20 6/8/21 Yes Edwardo Newton MD   vitamin D (ERGOCALCIFEROL) 1.25 MG (35482 UT) CAPS capsule TAKE 1 CAPSULE TWICE WEEKLY 4/28/20  Yes Edwardo Newton MD   clonazePAM (KLONOPIN) 0.5 MG tablet Take 0.5 mg by mouth daily. Yes Historical Provider, MD   triamcinolone (KENALOG) 0.1 % cream Apply topically two times  daily  Patient taking differently: Apply topically two times  daily prn 12/1/16  Yes Edwardo Newton MD   amoxicillin-clavulanate (AUGMENTIN) 875-125 MG per tablet Patient to take 1 hour prior to procedure 12/7/20   Edwardo Newton MD   calcium carbonate (OSCAL) 500 MG TABS tablet Take 500 mg by mouth daily    Historical Provider, MD   SUMAtriptan (IMITREX) 50 MG tablet Take 1 tablet by mouth once as needed .  May repeat in 2 hours, maximum 200mg per  day 5/26/16   DEA Gutiérrez - CNP Allergies:    Iodine and Contrast [iodides]  Social History:   Social History     Socioeconomic History    Marital status:      Spouse name: Not on file    Number of children: Not on file    Years of education: Not on file    Highest education level: Not on file   Occupational History    Not on file   Social Needs    Financial resource strain: Not on file    Food insecurity     Worry: Not on file     Inability: Not on file    Transportation needs     Medical: Not on file     Non-medical: Not on file   Tobacco Use    Smoking status: Former Smoker     Packs/day: 2.00     Years: 15.00     Pack years: 30.00     Types: Cigarettes     Quit date: 1989     Years since quittin.5    Smokeless tobacco: Never Used    Tobacco comment: congrats on quitting   Substance and Sexual Activity    Alcohol use: Yes     Alcohol/week: 0.0 standard drinks     Comment: occ.  Drug use: No    Sexual activity: Yes     Partners: Male     Comment:    Lifestyle    Physical activity     Days per week: Not on file     Minutes per session: Not on file    Stress: Not on file   Relationships    Social connections     Talks on phone: Not on file     Gets together: Not on file     Attends [employer] service: Not on file     Active member of club or organization: Not on file     Attends meetings of clubs or organizations: Not on file     Relationship status: Not on file    Intimate partner violence     Fear of current or ex partner: Not on file     Emotionally abused: Not on file     Physically abused: Not on file     Forced sexual activity: Not on file   Other Topics Concern    Not on file   Social History Narrative    Self-breast exams: yes. Exercise: walking and cardiovascular equipment sometimes. Seatbelt use: Always.   Living will: yes,   copy requested           Family History:   Family History   Problem Relation Age of Onset    Lung Cancer Father     Heart Disease Maternal Grandmother  Tuberculosis Maternal Grandmother     Other Brother          PHYSICAL EXAM:      BP (!) 141/95   Pulse 80   Temp 97 °F (36.1 °C) (Temporal)   Resp 16   Ht 5' 3\" (1.6 m)   Wt 280 lb 3.3 oz (127.1 kg)   SpO2 100%   BMI 49.64 kg/m²  I        Heart: Normal    Lungs: Normal    Abdomen: Normal      ASA Grade: ASA 3 - Patient with moderate systemic disease with functional limitations    III (soft palate, base of uvula visible)  ASSESSMENT AND PLAN:    1. Patient is a 60 y.o. female here for EGD/Colonoscopy  2. Procedure options, risks and benefits reviewed with patient who expresses understanding.

## 2020-12-30 NOTE — PROGRESS NOTES
Pt awake, alert, and oriented. VSS. Dorean Liberty. Abdomen soft. Pt states no pain. Pt ready for transfer to [facility].

## 2020-12-30 NOTE — PROGRESS NOTES
Ambulatory Surgery/Procedure Discharge Note    Vitals:    12/30/20 1029   BP: 117/70   Pulse: 81   Resp: 20   Temp: 96.9 °F (36.1 °C)   SpO2: 99%       In: 700 [I.V.:700]  Out: -     Restroom use offered before discharge. Yes    Pain assessment:  none  Pain Level: 0    Discharge instructions given to patient and her . Verbalizes understanding    Patient discharged to home/self care.  Patient discharged via wheel chair by transporter to waiting family/S.O.       12/30/2020 10:42 AM

## 2020-12-30 NOTE — PROCEDURES
[address]           [address], [address]                                 PROCEDURE NOTE    PATIENT NAME: Alanna Arce                   :        1955  MED REC NO:   [id number]                          ROOM:  ACCOUNT NO:   [de-identified]                           ADMIT DATE: 2020  PROVIDER:     Reji Dave MD    EGD AND COLONOSCOPY    DATE OF PROCEDURE:  2020    REFERRING PROVIDER:  Emelia Pitts MD    PATIENT HISTORY:  A 79-year-old female, outpatient. INSTRUMENTS USED:  Olympus GIF-H190 and PCF-H190L. MEDICATIONS OF PROCEDURE:  The patient was premedicated with Diprivan  intravenously as administered by the anesthesiology service. INDICATIONS:  The patient has presented with iron-deficiency anemia. She also has complained of dysphagia. PROCEDURES:  EGD:  The endoscope was inserted into the esophagus without difficulty. The esophageal mucosa was entirely normal, revealing no evidence of  inflammatory or metaplastic change. The Z-line was located at 43 cm. No obvious stricture was noted. In the stomach, there was an area along  the greater curvature in the gastric body that appeared to be a  pancreatic rest.  It consisted of a rounded area of tissue with a  central umbilication. This was photo documented and biopsied. The  duodenal bulb and descending duodenum were normal.    COLONOSCOPY:  The digital and anal exams were normal.  The colonoscope  could be inserted to the cecum. However, the prep was fair and then  poor. Upon withdrawal of the colonoscope, there were large patches of  mucosa that could not be examined because of the prep. Once I had  withdrawn the colonoscope into the left side of the colon, I terminated  the procedure. ESTIMATED BLOOD LOSS:  None. IMPRESSION:  1. A possible pancreatic rest in the stomach, which was biopsied. 2.  Status post empiric esophageal dilatation. 3.  An incomplete colonoscopy due to a poor preparation. PLAN:  The colonoscopy will be rescheduled with a multi-day preparation. I will call the patient with biopsy results. BENITA Schroeder MD    D: 12/30/2020 10:20:01       T: 12/30/2020 11:00:24     [id number]  Job#: [id number]     Doc#: [id number]    CC:  Luiz Ball MD

## 2020-12-30 NOTE — BRIEF OP NOTE
Brief Postoperative Note    Jerzy Ruiz  YOB: 1955  [id number]      Pre-operative Diagnosis: Iron-deficiency anemia; dysphagia    Previous Colonoscopy: Yes  When: > 10 years ago. Greater than 3 years? Yes      Post-operative Diagnosis: Same    Procedure: Colonoscopy/EGD    The preparation was poor.     Anesthesia: MAC    Surgeons/Assistants: Erica Cook MD    Estimated Blood Loss: None    Complications: None    Specimens: Was Obtained: Gastric body    Findings: See dictated report    Electronically signed by Erica Cook MD on 7/10/2017 at 7:45 AM

## 2020-12-30 NOTE — ANESTHESIA PRE PROCEDURE
albuterol sulfate  (90 Base) MCG/ACT inhaler Inhale 2 puffs into the lungs every 4 hours as needed for Wheezing May substitute for insurance preferred (Ventolin, Proventil, ProAir) 6/8/20 6/8/21  Tashia Flores MD   vitamin D (ERGOCALCIFEROL) 1.25 MG (34147 UT) CAPS capsule TAKE 1 CAPSULE TWICE WEEKLY 4/28/20   Tashia Flores MD   calcium carbonate (OSCAL) 500 MG TABS tablet Take 500 mg by mouth daily    Historical Provider, MD   clonazePAM (KLONOPIN) 0.5 MG tablet Take 0.5 mg by mouth daily. Historical Provider, MD   triamcinolone (KENALOG) 0.1 % cream Apply topically two times  daily  Patient taking differently: Apply topically two times  daily prn 12/1/16   Tashia Flores MD   SUMAtriptan (IMITREX) 50 MG tablet Take 1 tablet by mouth once as needed . May repeat in 2 hours, maximum 200mg per  day 5/26/16   Keyana Gutierrez, APRN - CNP       Current medications:    No current facility-administered medications for this visit. No current outpatient medications on file. Facility-Administered Medications Ordered in Other Visits   Medication Dose Route Frequency Provider Last Rate Last Admin    0.9 % sodium chloride infusion   Intravenous Continuous Feliz Cifuentes MD        sodium chloride flush 0.9 % injection 10 mL  10 mL Intravenous 2 times per day Feliz Cifuentes MD        sodium chloride flush 0.9 % injection 10 mL  10 mL Intravenous PRN Feliz Cifuentes MD           Allergies:     Allergies   Allergen Reactions    Iodine Hives    Contrast [Iodides]      Hives         Problem List:    Patient Active Problem List   Diagnosis Code    Social phobia F40.10    Eczema L30.9    Hypothyroidism E03.9    Hyperlipidemia LDL goal <160 E78.5    GERD (gastroesophageal reflux disease) K21.9    IBS (irritable bowel syndrome) K58.9    Premature ventricular contractions I49.3    RLS (restless legs syndrome) G25.81    Sleep apnea G47.30    Migraine headache G43.909    Depression F32.9 REVERSE SHOULDER ARTHROPLASTY- RIGHT SHOULDER WITH MINI C-ARM performed by Divya Shaffer MD at [address] [place], [place] [id number]    WISDOM TOOTH EXTRACTION         Social History:    Social History     Tobacco Use    Smoking status: Former Smoker     Packs/day: 2.00     Years: 15.00     Pack years: 30.00     Types: Cigarettes     Quit date: 1989     Years since quittin.5    Smokeless tobacco: Never Used    Tobacco comment: congrats on quitting   Substance Use Topics    Alcohol use: Yes     Alcohol/week: 0.0 standard drinks     Comment: occ. Counseling given: Not Answered  Comment: congrats on quitting      Vital Signs (Current): There were no vitals filed for this visit. BP Readings from Last 3 Encounters:   20 120/86   20 114/61   20 102/76       NPO Status:                                                                                 BMI:   Wt Readings from Last 3 Encounters:   20 293 lb (132.9 kg)   20 293 lb (132.9 kg)   20 293 lb (132.9 kg)     There is no height or weight on file to calculate BMI.    CBC:   Lab Results   Component Value Date    WBC 7.8 2020    RBC 4.17 2020    HGB 9.2 2020    HCT 29.9 2020    MCV 73.3 2020    RDW 17.9 2020     2020       CMP:   Lab Results   Component Value Date     2020    K 4.2 2020     2020    CO2 19 2020    BUN 18 2020    CREATININE 1.1 2020    GFRAA >60 2020    GFRAA >60 2013    AGRATIO 2.0 2020    LABGLOM 50 2020    GLUCOSE 106 2020    PROT 6.8 2020    PROT 6.5 2012    CALCIUM 9.4 2020    BILITOT 0.3 2020    ALKPHOS 126 2020    AST 13 2020    ALT 12 2020       POC Tests: No results for input(s): POCGLU, POCNA, POCK, POCCL, POCBUN, POCHEMO, POCHCT in the last 72 hours. Coags:   Lab Results   Component Value Date    PROTIME 11.5 06/12/2019    INR 1.01 06/12/2019       HCG (If Applicable): No results found for: PREGTESTUR, PREGSERUM, HCG, HCGQUANT     ABGs: No results found for: PHART, PO2ART, BLG6PQW, KAQ5HUZ, BEART, W6BEHBHV     Type & Screen (If Applicable):  No results found for: LABABO, [address]    Anesthesia Evaluation     history of anesthetic complications: PONV. Airway: Mallampati: III  TM distance: >3 FB   Neck ROM: full  Mouth opening: > = 3 FB Dental: normal exam     Comment: No loose teeth    Pulmonary: breath sounds clear to auscultation  (+) sleep apnea:  asthma: exercise-induced asthma,                           ROS comment: 30 pack year smoking history   Cardiovascular:        (-) pacemaker, valvular problems/murmurs, past MI, dysrhythmias,  angina and  CHF      Rhythm: regular  Rate: normal                 ROS comment: Preserved EF     Neuro/Psych:   (+) neuromuscular disease:, headaches:, psychiatric history:             ROS comment: sciatica GI/Hepatic/Renal:   (+) GERD:, bowel prep, morbid obesity         ROS comment: IBS. Endo/Other:    (+) hypothyroidism::., .                 Abdominal:           Vascular:                                        Anesthesia Plan      MAC     ASA 3       Induction: intravenous. MIPS: Prophylactic antiemetics administered. Anesthetic plan and risks discussed with patient. Plan discussed with CRNA. Attending anesthesiologist reviewed and agrees with Pre Eval content        This pre-anesthesia assessment may be used as a history and physical.    DOS STAFF ADDENDUM:    Pt seen and examined, chart reviewed (including anesthesia, drug and allergy history). No interval changes to history and physical examination. Anesthetic plan, risks, benefits, alternatives, and personnel involved discussed with patient. Patient verbalized an understanding and agrees to proceed.       Venessa Shell MD  December 30, 2020  8:39 AM Randy Holt MD   12/30/2020

## 2021-01-20 ENCOUNTER — HOSPITAL ENCOUNTER (OUTPATIENT)
Dept: WOMENS IMAGING | Age: 66
Discharge: HOME | End: 2021-01-20
Payer: COMMERCIAL

## 2021-01-20 DIAGNOSIS — Z12.31 OTHER SCREENING MAMMOGRAM: ICD-10-CM

## 2021-01-20 PROCEDURE — 77067 SCR MAMMO BI INCL CAD: CPT

## 2021-01-22 DIAGNOSIS — M51.36 DDD (DEGENERATIVE DISC DISEASE), LUMBAR: ICD-10-CM

## 2021-01-22 RX ORDER — DICLOFENAC SODIUM 75 MG/1
TABLET, DELAYED RELEASE ORAL
Qty: 180 TABLET | Refills: 1 | OUTPATIENT
Start: 2021-01-22

## 2021-01-22 RX ORDER — DULOXETIN HYDROCHLORIDE 60 MG/1
CAPSULE, DELAYED RELEASE ORAL
Qty: 90 CAPSULE | Refills: 1 | Status: SHIPPED | OUTPATIENT
Start: 2021-01-22 | End: 2021-07-16

## 2021-01-27 ENCOUNTER — PATIENT MESSAGE (OUTPATIENT)
Dept: FAMILY MEDICINE CLINIC | Age: 66
End: 2021-01-27

## 2021-01-27 DIAGNOSIS — S42.91XK CLOSED FRACTURE OF RIGHT SHOULDER WITH NONUNION, SUBSEQUENT ENCOUNTER: ICD-10-CM

## 2021-01-27 RX ORDER — HYDROCODONE BITARTRATE AND ACETAMINOPHEN 5; 325 MG/1; MG/1
1-2 TABLET ORAL EVERY 6 HOURS PRN
Qty: 90 TABLET | Refills: 0 | Status: SHIPPED | OUTPATIENT
Start: 2021-01-27 | End: 2021-02-26

## 2021-01-27 NOTE — TELEPHONE ENCOUNTER
From: Júnior Jimenez  To: Salbador Pandey MD  Sent: 1/27/2021 1:48 PM EST  Subject: Prescription Question    Would you please send in my refill for Hydrocodone to [facility] at [facility]. [phone]? Thank you.

## 2021-01-28 ENCOUNTER — OFFICE VISIT (OUTPATIENT)
Dept: PRIMARY CARE CLINIC | Age: 66
End: 2021-01-28
Payer: COMMERCIAL

## 2021-01-28 DIAGNOSIS — Z01.812 PRE-PROCEDURAL LABORATORY EXAMINATIONS: Primary | ICD-10-CM

## 2021-01-28 LAB — SARS-COV-2, NAA: NOT DETECTED

## 2021-01-28 PROCEDURE — 99211 OFF/OP EST MAY X REQ PHY/QHP: CPT | Performed by: NURSE PRACTITIONER

## 2021-02-07 DIAGNOSIS — M51.36 DDD (DEGENERATIVE DISC DISEASE), LUMBAR: ICD-10-CM

## 2021-02-08 RX ORDER — PRAMIPEXOLE DIHYDROCHLORIDE 0.5 MG/1
TABLET ORAL
Qty: 180 TABLET | Refills: 1 | Status: SHIPPED | OUTPATIENT
Start: 2021-02-08 | End: 2021-08-20

## 2021-02-08 RX ORDER — DICLOFENAC SODIUM 75 MG/1
TABLET, DELAYED RELEASE ORAL
Qty: 180 TABLET | Refills: 1 | OUTPATIENT
Start: 2021-02-08

## 2021-03-08 ENCOUNTER — OFFICE VISIT (OUTPATIENT)
Dept: FAMILY MEDICINE CLINIC | Age: 66
End: 2021-03-08
Payer: COMMERCIAL

## 2021-03-08 VITALS
BODY MASS INDEX: 50.5 KG/M2 | SYSTOLIC BLOOD PRESSURE: 130 MMHG | HEIGHT: 63 IN | WEIGHT: 285 LBS | OXYGEN SATURATION: 98 % | TEMPERATURE: 97 F | HEART RATE: 76 BPM | DIASTOLIC BLOOD PRESSURE: 80 MMHG | RESPIRATION RATE: 18 BRPM

## 2021-03-08 DIAGNOSIS — D50.9 MICROCYTIC ANEMIA: Primary | ICD-10-CM

## 2021-03-08 DIAGNOSIS — D50.9 MICROCYTIC ANEMIA: ICD-10-CM

## 2021-03-08 LAB
BASOPHILS ABSOLUTE: 0 K/UL (ref 0–0.2)
BASOPHILS RELATIVE PERCENT: 0.4 %
EOSINOPHILS ABSOLUTE: 0.2 K/UL (ref 0–0.6)
EOSINOPHILS RELATIVE PERCENT: 2.3 %
HCT VFR BLD CALC: 35.3 % (ref 36–48)
HEMOGLOBIN: 11.3 G/DL (ref 12–16)
LYMPHOCYTES ABSOLUTE: 2.2 K/UL (ref 1–5.1)
LYMPHOCYTES RELATIVE PERCENT: 24.9 %
MCH RBC QN AUTO: 26 PG (ref 26–34)
MCHC RBC AUTO-ENTMCNC: 32 G/DL (ref 31–36)
MCV RBC AUTO: 81.1 FL (ref 80–100)
MONOCYTES ABSOLUTE: 0.5 K/UL (ref 0–1.3)
MONOCYTES RELATIVE PERCENT: 6.2 %
NEUTROPHILS ABSOLUTE: 5.7 K/UL (ref 1.7–7.7)
NEUTROPHILS RELATIVE PERCENT: 66.2 %
PDW BLD-RTO: 20.9 % (ref 12.4–15.4)
PLATELET # BLD: 266 K/UL (ref 135–450)
PMV BLD AUTO: 8.8 FL (ref 5–10.5)
RBC # BLD: 4.36 M/UL (ref 4–5.2)
WBC # BLD: 8.7 K/UL (ref 4–11)

## 2021-03-08 PROCEDURE — 99213 OFFICE O/P EST LOW 20 MIN: CPT | Performed by: FAMILY MEDICINE

## 2021-03-08 RX ORDER — FERROUS SULFATE 325(65) MG
325 TABLET ORAL NIGHTLY
Status: SHIPPED | COMMUNITY
Start: 2021-03-08 | End: 2021-06-16

## 2021-03-08 NOTE — PATIENT INSTRUCTIONS
INSTRUCTIONS  · PLEASE GET BLOODWORK DRAWN TODAY ON FIRST FLOOR in 170. Get COVID vaccine later this week as planned. · Get colonoscopy later this month as planned.

## 2021-03-08 NOTE — PROGRESS NOTES
FOLLOW-UP VISIT   Subjective:   HPI:   Chief Complaint   Patient presents with    Anemia     Patient is still feeling tired. She has been taking iron supplements    Patient here for follow-up of:  1. Microcytic anemia       Complaints: had colon but prep not adequate due to vomiting up prep fluids. Trying again 3/31. EGD was done and OK. Still on PPI. Review of Systems   General ROS: fever? No,    Night sweats? No  Ophthalmic ROS: change in vision? No  Endocrine ROS: fatigue? Yes   Unexpected weight changes? No  Respiratory ROS: cough? No   Wheezing? No  Cardiovascular ROS: chest pain? No   Shortness of breath? No    HISTORY:  Patient's medications, allergies, past medical, and social histories were reviewed and updated as appropriate. CHART REVIEW  Health Maintenance   Topic Date Due    COVID-19 Vaccine (1 of 2) Never done    Lipid screen  06/11/2021    TSH testing  06/11/2021    A1C test (Diabetic or Prediabetic)  12/07/2021    Breast cancer screen  01/20/2022    DTaP/Tdap/Td vaccine (3 - Td) 08/01/2028    Colon cancer screen colonoscopy  12/30/2030    Flu vaccine  Completed    Shingles Vaccine  Completed    Pneumococcal 0-64 years Vaccine  Completed    Hepatitis C screen  Completed    HIV screen  Completed    Hepatitis A vaccine  Aged Out    Hepatitis B vaccine  Aged Out    Hib vaccine  Aged Out    Meningococcal (ACWY) vaccine  Aged Out     The 10-year ASCVD risk score (Michael Benavidez., et al., 2013) is: 4.9%    Values used to calculate the score:      Age: 60 years      Sex: Female      Is Non- : No      Diabetic: No      Tobacco smoker: No      Systolic Blood Pressure: 803 mmHg      Is BP treated: No      HDL Cholesterol: 59 mg/dL      Total Cholesterol: 192 mg/dL  Prior to Visit Medications    Medication Sig Taking?  Authorizing Provider   ferrous sulfate (IRON 325) 325 (65 Fe) MG tablet Take 1 tablet by mouth nightly Yes Dave Ureña MD   pramipexole (MIRAPEX) 0.5 MG tablet TAKE 1 TABLET TWICE A DAY Yes Willem Mckeon MD   DULoxetine (CYMBALTA) 60 MG extended release capsule TAKE 1 CAPSULE DAILY Yes Willem Mckeon MD   Acetaminophen (TYLENOL ARTHRITIS PAIN PO) Take 1 tablet by mouth 3 times daily Yes Historical Provider, MD   amoxicillin-clavulanate (AUGMENTIN) 875-125 MG per tablet Patient to take 1 hour prior to procedure Yes Willem Mckeon MD   Fesoterodine Fumarate ER (TOVIAZ) 8 MG TB24 TAKE 1 TABLET DAILY Yes Willem Mckeon MD   atorvastatin (LIPITOR) 20 MG tablet TAKE 1 TABLET EVERY EVENING Yes Willem Mckeon MD   esomeprazole (NEXIUM) 40 MG delayed release capsule TAKE 1 CAPSULE EVERY       MORNING Yes Willem Mckeon MD   topiramate (TOPAMAX) 100 MG tablet TAKE 1 TABLET AT BEDTIME Yes Willem Mckeon MD   FLUoxetine (PROZAC) 10 MG tablet TAKE 1 TABLET DAILY Yes Willem Mckeon MD   metFORMIN (GLUCOPHAGE-XR) 500 MG extended release tablet TAKE 2 TABLETS DAILY WITH  BREAKFAST Yes Willem Mckeon MD   levothyroxine (SYNTHROID) 100 MCG tablet TAKE 1 TABLET DAILY Yes Willem Mckeon MD   fluticasone-vilanterol (BREO ELLIPTA) 100-25 MCG/INH AEPB inhaler Inhale 1 puff into the lungs daily Yes Willem Mckeon MD   albuterol sulfate  (90 Base) MCG/ACT inhaler Inhale 2 puffs into the lungs every 4 hours as needed for Wheezing May substitute for insurance preferred (Ventolin, Proventil, ProAir) Yes Willem Mckeon MD   vitamin D (ERGOCALCIFEROL) 1.25 MG (34178 UT) CAPS capsule TAKE 1 CAPSULE TWICE WEEKLY Yes Willem Mckeon MD   calcium carbonate (OSCAL) 500 MG TABS tablet Take 500 mg by mouth daily Yes Historical Provider, MD   clonazePAM (KLONOPIN) 0.5 MG tablet Take 0.5 mg by mouth daily as needed. Yes Historical Provider, MD   SUMAtriptan (IMITREX) 50 MG tablet Take 1 tablet by mouth once as needed .  May repeat in 2 hours, maximum 200mg per  day Yes Katherin Kemp, APRN - CNP   triamcinolone (KENALOG) 0.1 % cream Apply topically two times  daily  Patient taking differently: Apply 120/86   06/08/20 114/61     Wt Readings from Last 5 Encounters:   03/08/21 285 lb (129.3 kg)   12/30/20 280 lb 3.3 oz (127.1 kg)   12/14/20 293 lb (132.9 kg)   12/07/20 293 lb (132.9 kg)   07/02/20 285 lb (129.3 kg)      GENERAL:   · well-developed, well-nourished, alert, no distress. LUNGS:    · Breathing unlabored  · clear to auscultation bilaterally and good air movement  CARDIOVASC:   · Regular rate and rhythm, S1, S2 normal. No murmur, click, rub or gallop  SKIN: warm and dry  PSYCH:    · Alert and oriented  · Normal reasoning, insight good  · Facial expressions full, mood appropriate      Assessment and Plan:      Diagnosis Orders   1. Microcytic anemia  CBC Auto Differential    ferrous sulfate (IRON 325) 325 (65 Fe) MG tablet   Still need to check colon for source of blood loss. Plan as above and below. Check anemia on iron supplement. INSTRUCTIONS  · PLEASE GET BLOODWORK DRAWN TODAY ON FIRST FLOOR in 170. Get COVID vaccine later this week as planned. · Get colonoscopy later this month as planned.

## 2021-03-10 ENCOUNTER — IMMUNIZATION (OUTPATIENT)
Dept: PRIMARY CARE CLINIC | Age: 66
End: 2021-03-10
Payer: COMMERCIAL

## 2021-03-10 PROCEDURE — 0011A COVID-19, MODERNA VACCINE 100MCG/0.5ML DOSE: CPT

## 2021-03-10 PROCEDURE — 91301 COVID-19, MODERNA VACCINE 100MCG/0.5ML DOSE: CPT

## 2021-03-15 ENCOUNTER — OFFICE VISIT (OUTPATIENT)
Dept: ORTHOPEDIC SURGERY | Age: 66
End: 2021-03-15
Payer: COMMERCIAL

## 2021-03-15 VITALS — BODY MASS INDEX: 50.5 KG/M2 | TEMPERATURE: 97 F | WEIGHT: 285 LBS | HEIGHT: 63 IN

## 2021-03-15 DIAGNOSIS — M25.562 PAIN IN BOTH KNEES, UNSPECIFIED CHRONICITY: Primary | ICD-10-CM

## 2021-03-15 DIAGNOSIS — M25.561 PAIN IN BOTH KNEES, UNSPECIFIED CHRONICITY: Primary | ICD-10-CM

## 2021-03-15 PROCEDURE — 20610 DRAIN/INJ JOINT/BURSA W/O US: CPT | Performed by: PHYSICIAN ASSISTANT

## 2021-03-15 PROCEDURE — 99213 OFFICE O/P EST LOW 20 MIN: CPT | Performed by: PHYSICIAN ASSISTANT

## 2021-03-15 NOTE — PROGRESS NOTES
This dictation was done with [employer] dictation and may contain mechanical errors related to translation. Temperature 97 °F (36.1 °C), temperature source Infrared, height 5' 3\" (1.6 m), weight 285 lb (129.3 kg), currently breastfeeding. This is a pleasant 71-year-old female who has ongoing pain from osteoarthritis in both of her knees. She was helped with the last set of cortisone injections which were done on 12/14/2020 up until last 2 to 3 weeks. She has had some increasing pain and disability since then and states she has a 3 out of 10 pain score  This patient is here in follow-up for ongoing pain and dysfunction in their knees. There x-rays done previously showed joint space narrowing subchondral sclerosis with osteophyte formation. They currently have had relief with injections of cortisone, anti-inflammatories and a home exercise program. There are here with increased pain over the last few days with swelling and dysfunction. On examination this is a well-developed patient in no acute distress. They are alert and oriented ×3. They walk without antalgia or any significant varus or valgus deformity. They have crepitus during flexion and extension in the patellofemoral joint. They have a negative Lachman and a negative Radha. There are good distal pulses and good dorsiflexion and plantarflexion strength present    My impression is bilateral knee osteoarthritis    After a discussion of the multiple options, they consented to a cortisone shot. 1 ml of 40mg/ml Kenalog and 2 ml's of 0.25%Marcaine were injected into both the right and the left knee joint spaces. The leg was slightly flexed and injected just lateral to the patella tendon to under the patella. This was done with sterile technique and they tolerated it well. I went through a good stretch and strengthening exercise program. They understand that at some point, they will need a knee replacement.  And they will follow up with us on a when necessary basis over the next 3-6 months

## 2021-03-21 DIAGNOSIS — R73.03 PREDIABETES: ICD-10-CM

## 2021-03-21 DIAGNOSIS — F40.10 SOCIAL PHOBIA: ICD-10-CM

## 2021-03-21 DIAGNOSIS — F32.4 MAJOR DEPRESSIVE DISORDER WITH SINGLE EPISODE, IN PARTIAL REMISSION (HCC): ICD-10-CM

## 2021-03-22 RX ORDER — FLUOXETINE 10 MG/1
TABLET, FILM COATED ORAL
Qty: 90 TABLET | Refills: 1 | Status: SHIPPED | OUTPATIENT
Start: 2021-03-22 | End: 2021-09-07

## 2021-03-22 RX ORDER — METFORMIN HYDROCHLORIDE 500 MG/1
TABLET, EXTENDED RELEASE ORAL
Qty: 180 TABLET | Refills: 1 | Status: SHIPPED | OUTPATIENT
Start: 2021-03-22 | End: 2021-09-07

## 2021-03-26 NOTE — PROGRESS NOTES
[address] [facility] time____0630________        Surgery time____________    Take the following medications with a sip of water: Follow your Doctor's pre procedure instructions regarding medications    Do not eat or drink anything after 12:00 midnight prior to your surgery. This includes water chewing gum, mints and ice chips. You may brush your teeth and gargle the morning of your surgery, but do not swallow the water     Please see your family doctor/pediatrician for a history and physical and/or concerning medications. Bring any test results/reports from your physicians office. If you are under the care of a heart doctor or specialist doctor, please be aware that you may be asked to them for clearance    You may be asked to stop blood thinners such as Coumadin, Plavix, Fragmin, Lovenox, etc., or any anti-inflammatories such as:  Aspirin, Ibuprofen, Advil, Naproxen prior to your surgery. We also ask that you stop any OTC medications such as fish oil, vitamin E, glucosamine, garlic, Multivitamins, COQ 10, etc.    We ask that you do not smoke 24 hours prior to surgery  We ask that you do not  drink any alcoholic beverages 24 hours prior to surgery     You must make arrangements for a responsible adult to take you home after your surgery. For your safety you will not be allowed to leave alone or drive yourself home. Your surgery will be cancelled if you do not have a ride home. Also for your safety, it is strongly suggested that someone stay with you the first 24 hours after your surgery. A parent or legal guardian must accompany a child scheduled for surgery and plan to stay at the hospital until the child is discharged. Please do not bring other children with you. For your comfort, please wear simple loose fitting clothing to the hospital.  Please do not bring valuables.     Do not wear any make-up or nail polish on your fingers or toes      For your safety, please do not wear any jewelry or body piercing's on the day of surgery. All jewelry must be removed. If you have dentures, they will be removed before going to operating room. For your convenience, we will provide you with a container. If you wear contact lenses or glasses, they will be removed, please bring a case for them. If you have a living will and a durable power of  for healthcare, please bring in a copy. As part of our patient safety program to minimize surgical site infections, we ask you to do the following:    · Please notify your surgeon if you develop any illness between         now and the  day of your surgery. · This includes a cough, cold, fever, sore throat, nausea,         or vomiting, and diarrhea, etc.  ·  Please notify your surgeon if you experience dizziness, shortness         of breath or blurred vision between now and the time of your surgery. Do not shave your operative site 96 hours prior to surgery. For face and neck surgery, men may use an electric razor 48 hours   prior to surgery. You may shower the night before surgery or the morning of   your surgery with an antibacterial soap. You will need to bring a photo ID and insurance card    [facility] has an onsite pharmacy, would you like to utilize our pharmacy     If you will be staying overnight and use a C-pap machine, please bring   your C-pap to hospital     Our goal is to provide you with excellent care, therefore, visitors will be limited to two(2) in the room at a time so that we may focus on providing this care for you. Please contact pre-admission testing if you have any further questions. [facility] phone number:  [phone] PAT fax number:  [phone]  Please note these are generalized instructions for all surgical cases, you may be provided with more specific instructions according to your surgery. Remember. Calli Mark Safety First! Call before

## 2021-03-29 ENCOUNTER — ANESTHESIA EVENT (OUTPATIENT)
Dept: ENDOSCOPY | Age: 66
End: 2021-03-29
Payer: COMMERCIAL

## 2021-03-31 ENCOUNTER — HOSPITAL ENCOUNTER (OUTPATIENT)
Age: 66
Setting detail: OUTPATIENT SURGERY
Discharge: HOME | End: 2021-03-31
Attending: INTERNAL MEDICINE | Admitting: INTERNAL MEDICINE
Payer: COMMERCIAL

## 2021-03-31 ENCOUNTER — ANESTHESIA (OUTPATIENT)
Dept: ENDOSCOPY | Age: 66
End: 2021-03-31
Payer: COMMERCIAL

## 2021-03-31 VITALS
SYSTOLIC BLOOD PRESSURE: 120 MMHG | RESPIRATION RATE: 14 BRPM | BODY MASS INDEX: 49.61 KG/M2 | HEART RATE: 70 BPM | TEMPERATURE: 98 F | OXYGEN SATURATION: 98 % | DIASTOLIC BLOOD PRESSURE: 60 MMHG | WEIGHT: 279.98 LBS | HEIGHT: 63 IN

## 2021-03-31 VITALS
OXYGEN SATURATION: 96 % | RESPIRATION RATE: 1 BRPM | SYSTOLIC BLOOD PRESSURE: 114 MMHG | DIASTOLIC BLOOD PRESSURE: 69 MMHG

## 2021-03-31 LAB
GLUCOSE BLD-MCNC: 97 MG/DL (ref 70–99)
PERFORMED ON: NORMAL
SARS-COV-2, NAAT: NOT DETECTED

## 2021-03-31 PROCEDURE — 3700000000 HC ANESTHESIA ATTENDED CARE: Performed by: INTERNAL MEDICINE

## 2021-03-31 PROCEDURE — 7100000001 HC PACU RECOVERY - ADDTL 15 MIN: Performed by: INTERNAL MEDICINE

## 2021-03-31 PROCEDURE — 3700000001 HC ADD 15 MINUTES (ANESTHESIA): Performed by: INTERNAL MEDICINE

## 2021-03-31 PROCEDURE — 6360000002 HC RX W HCPCS

## 2021-03-31 PROCEDURE — 2580000003 HC RX 258: Performed by: ANESTHESIOLOGY

## 2021-03-31 PROCEDURE — 7100000000 HC PACU RECOVERY - FIRST 15 MIN: Performed by: INTERNAL MEDICINE

## 2021-03-31 PROCEDURE — 2709999900 HC NON-CHARGEABLE SUPPLY: Performed by: INTERNAL MEDICINE

## 2021-03-31 PROCEDURE — 3609027000 HC COLONOSCOPY: Performed by: INTERNAL MEDICINE

## 2021-03-31 PROCEDURE — 7100000010 HC PHASE II RECOVERY - FIRST 15 MIN: Performed by: INTERNAL MEDICINE

## 2021-03-31 PROCEDURE — 7100000011 HC PHASE II RECOVERY - ADDTL 15 MIN: Performed by: INTERNAL MEDICINE

## 2021-03-31 PROCEDURE — 87635 SARS-COV-2 COVID-19 AMP PRB: CPT

## 2021-03-31 PROCEDURE — 2500000003 HC RX 250 WO HCPCS

## 2021-03-31 RX ORDER — SODIUM CHLORIDE 0.9 % (FLUSH) 0.9 %
10 SYRINGE (ML) INJECTION PRN
Status: DISCONTINUED | OUTPATIENT
Start: 2021-03-31 | End: 2021-03-31 | Stop reason: HOSPADM

## 2021-03-31 RX ORDER — LIDOCAINE HYDROCHLORIDE 20 MG/ML
INJECTION, SOLUTION EPIDURAL; INFILTRATION; INTRACAUDAL; PERINEURAL PRN
Status: DISCONTINUED | OUTPATIENT
Start: 2021-03-31 | End: 2021-03-31 | Stop reason: SDUPTHER

## 2021-03-31 RX ORDER — SODIUM CHLORIDE 9 MG/ML
INJECTION, SOLUTION INTRAVENOUS CONTINUOUS
Status: DISCONTINUED | OUTPATIENT
Start: 2021-03-31 | End: 2021-03-31 | Stop reason: HOSPADM

## 2021-03-31 RX ORDER — SODIUM CHLORIDE 0.9 % (FLUSH) 0.9 %
10 SYRINGE (ML) INJECTION EVERY 12 HOURS SCHEDULED
Status: DISCONTINUED | OUTPATIENT
Start: 2021-03-31 | End: 2021-03-31 | Stop reason: HOSPADM

## 2021-03-31 RX ORDER — PROPOFOL 10 MG/ML
INJECTION, EMULSION INTRAVENOUS CONTINUOUS PRN
Status: DISCONTINUED | OUTPATIENT
Start: 2021-03-31 | End: 2021-03-31 | Stop reason: SDUPTHER

## 2021-03-31 RX ORDER — PROPOFOL 10 MG/ML
INJECTION, EMULSION INTRAVENOUS PRN
Status: DISCONTINUED | OUTPATIENT
Start: 2021-03-31 | End: 2021-03-31 | Stop reason: SDUPTHER

## 2021-03-31 RX ADMIN — LIDOCAINE HYDROCHLORIDE 50 MG: 20 INJECTION, SOLUTION EPIDURAL; INFILTRATION; INTRACAUDAL; PERINEURAL at 08:00

## 2021-03-31 RX ADMIN — SODIUM CHLORIDE: 9 INJECTION, SOLUTION INTRAVENOUS at 07:53

## 2021-03-31 RX ADMIN — PROPOFOL 100 MG: 10 INJECTION, EMULSION INTRAVENOUS at 08:00

## 2021-03-31 RX ADMIN — SODIUM CHLORIDE: 9 INJECTION, SOLUTION INTRAVENOUS at 07:54

## 2021-03-31 RX ADMIN — PROPOFOL 50 MG: 10 INJECTION, EMULSION INTRAVENOUS at 08:06

## 2021-03-31 RX ADMIN — PROPOFOL 180 MCG/KG/MIN: 10 INJECTION, EMULSION INTRAVENOUS at 08:00

## 2021-03-31 ASSESSMENT — PULMONARY FUNCTION TESTS
PIF_VALUE: 1
PIF_VALUE: 2
PIF_VALUE: 1

## 2021-03-31 ASSESSMENT — PAIN - FUNCTIONAL ASSESSMENT: PAIN_FUNCTIONAL_ASSESSMENT: 0-10

## 2021-03-31 ASSESSMENT — PAIN SCALES - GENERAL: PAINLEVEL_OUTOF10: 0

## 2021-03-31 ASSESSMENT — ENCOUNTER SYMPTOMS: SHORTNESS OF BREATH: 0

## 2021-03-31 NOTE — BRIEF OP NOTE
Brief Postoperative Note    Antonieta Frye  YOB: 1955  [id number]      Pre-operative Diagnosis: Iron-deficiency anemia    Previous Colonoscopy: Yes  When: 12/30/20  Greater than 3 years? No      Post-operative Diagnosis: Same    Procedure: Colonoscopy    The preparation was good.     Anesthesia: MAC    Surgeons/Assistants: Soheila Fierro MD    Estimated Blood Loss: None    Complications: None    Specimens: Was Not Obtained    Findings: See dictated report    Electronically signed by Soheila Fierro MD on 7/10/2017 at 7:45 AM

## 2021-03-31 NOTE — PROCEDURES
[address] [facility]                               COLONOSCOPY REPORT    PATIENT NAME: Madeline Elizondo                   :        1955  MED REC NO:   [id number]                          ROOM:  ACCOUNT NO:   [de-identified]                           ADMIT DATE: 2021  PROVIDER:     Janis Dave MD    DATE OF PROCEDURE:  2021    REFERRING PROVIDER:  Sade Mcnamara MD    PATIENT HISTORY:  A 71-year-old female, outpatient. INSTRUMENT USED:  Olympus PCF-H190L. MEDICATIONS OF PROCEDURE:  The patient was premedicated with Diprivan  intravenously as administered by the anesthesiology service. INDICATIONS:  The patient has presented with iron-deficiency anemia. A  recent upper endoscopy was unremarkable. A colonoscopy performed at  that time was complete to the cecum, but the prep was extremely  marginal, and I was concerned about missing a significant mucosal  lesion. The patient presents now for a re-attempt at colonoscopy after  following an extended preparation. PROCEDURE:  The digital and anal exams were normal.  The colonoscope was  inserted to the cecum. The prep today was good to excellent. No  mucosal lesions were identified. There was incidental sigmoid  diverticulosis. ESTIMATED BLOOD LOSS:  None. IMPRESSION:  Sigmoid diverticulosis only. PLAN:  This does complete the patient's GI evaluation. She is already  taking ferrous sulfate once daily. I would recommend increasing to  b.i.d. dosing. The patient should undergo a surveillance colonoscopy in  5 years. BENITA Long MD    D: 2021 8:41:54       T: 2021 8:49:26     [email or web address]  Job#: [id number]     Doc#: [id number]    CC:  Janis Rosenberg MD

## 2021-03-31 NOTE — ANESTHESIA POSTPROCEDURE EVALUATION
Department of Anesthesiology  Postprocedure Note    Patient: Neeru Alex  MRN: [id number]  YOB: 1955  Date of evaluation: 3/31/2021  Time:  9:31 AM     Procedure Summary     Date: 03/31/21 Room / Location: [address] [place] [address]    Anesthesia Start: 0987 Anesthesia Stop: 6442    Procedure: COLONOSCOPY (N/A ) Diagnosis:       Iron deficiency anemia, unspecified iron deficiency anemia type      (IRON DEFICIENCY ANEMIA)    Surgeons: Halima Bailon MD Responsible Provider: Rafael Duggan MD    Anesthesia Type: MAC ASA Status: 3          Anesthesia Type: MAC    Live Phase I: Live Score: 10    Live Phase II: Live Score: 10    Last vitals: Reviewed and per EMR flowsheets.        Anesthesia Post Evaluation    Patient location during evaluation: PACU  Level of consciousness: awake and alert  Airway patency: patent  Nausea & Vomiting: no nausea and no vomiting  Complications: no  Cardiovascular status: blood pressure returned to baseline  Respiratory status: acceptable  Hydration status: euvolemic  Comments: Postoperative Anesthesia Note    Name:    Neeru Alex  MRN:      [id number]    Patient Vitals in the past 12 hrs:  03/31/21 0853, BP:122/60, Temp:98 °F (36.7 °C), Temp src:Temporal, Pulse:72, Resp:12, SpO2:98 %  03/31/21 0841, Temp:98.7 °F (37.1 °C), Temp src:Temporal  03/31/21 0840, Pulse:75, Resp:17, SpO2:98 %  03/31/21 0835, BP:(!) 127/59, Pulse:78, Resp:19, SpO2:98 %  03/31/21 0830, BP:(!) 118/54, Pulse:76, Resp:20, SpO2:99 %  03/31/21 0829, Pulse:77, Resp:17, SpO2:98 %  03/31/21 0828, Pulse:75, Resp:20, SpO2:100 %  03/31/21 0827, Pulse:76, Resp:20, SpO2:98 %  03/31/21 0826, BP:104/60, Pulse:76, Resp:15, SpO2:98 %  03/31/21 0823, BP:104/60, Temp:98.3 °F (36.8 °C), Temp src:Temporal  03/31/21 0733, BP:(!) 142/71, Temp:97.1 °F (36.2 °C), Temp src:Temporal, Pulse:77, Resp:16, SpO2:96 %, Height:5' 3\" (1.6 m), Weight:279 lb 15.8 oz (127 kg)     LABS:    CBC Lab Results       Component                Value               Date/Time                  WBC                      8.7                 03/08/2021 02:51 PM        HGB                      11.3 (L)            03/08/2021 02:51 PM        HCT                      35.3 (L)            03/08/2021 02:51 PM        PLT                      266                 03/08/2021 02:51 PM   RENAL  Lab Results       Component                Value               Date/Time                  NA                       145                 06/11/2020 07:18 AM        K                        4.2                 06/11/2020 07:18 AM        CL                       106                 06/11/2020 07:18 AM        CO2                      19 (L)              06/11/2020 07:18 AM        BUN                      18                  06/11/2020 07:18 AM        CREATININE               1.1                 06/11/2020 07:18 AM        GLUCOSE                  106 (H)             06/11/2020 07:18 AM   COAGS  Lab Results       Component                Value               Date/Time                  PROTIME                  11.5                06/12/2019 11:51 AM        INR                      1.01                06/12/2019 11:51 AM     Intake & Output:  @63XCAT@    Nausea & Vomiting:  No    Level of Consciousness:  Awake    Pain Assessment:  Adequate analgesia    Anesthesia Complications:  No apparent anesthetic complications    SUMMARY      Vital signs stable  OK to discharge from Stage I post anesthesia care.   Care transferred from Anesthesiology department on discharge from perioperative area

## 2021-03-31 NOTE — ANESTHESIA PRE PROCEDURE
and vomiting)     Premature ventricular contractions     Pulmonary function testing- nl except decreased diffusing capacity      Relevant prior imaging: normal examination- neg CT chest      RLS (restless legs syndrome)     Sleep apnea     uses C-PAP     Past Surgical History:   Procedure Laterality Date    CHOLECYSTECTOMY      COLONOSCOPY      COLONOSCOPY N/A 2020    COLONOSCOPY performed by Brannon Merino MD at [facility], COLON, DIAGNOSTIC      ESOPHAGEAL DILATATION  2020    ESOPHAGEAL DILATION Mary Alice Leep performed by Brannon Merino MD at [address] [facility] [place] Bilateral     cataracts    FOOT SURGERY Left 2017    HYSTERECTOMY      REVISION OF TOTAL SHOULDER Right 2019    REVERSE SHOULDER ARTHROPLASTY- RIGHT SHOULDER WITH MINI C-ARM performed by Garry Simmons MD at [facility]  7.    UPPER GASTROINTESTINAL ENDOSCOPY N/A 2020    EGD BIOPSY performed by Brannon Merino MD at [facility]       Social History     Tobacco Use    Smoking status: Former Smoker     Packs/day: 2.00     Years: 15.00     Pack years: 30.00     Types: Cigarettes     Quit date: 1989     Years since quittin.8    Smokeless tobacco: Never Used    Tobacco comment: congrats on quitting   Substance Use Topics    Alcohol use: Yes     Alcohol/week: 0.0 standard drinks     Comment: occ.  Drug use: No     Medications  No current facility-administered medications on file prior to encounter.       Current Outpatient Medications on File Prior to Encounter   Medication Sig Dispense Refill    DULoxetine (CYMBALTA) 60 MG extended release capsule TAKE 1 CAPSULE DAILY 90 capsule 1    Acetaminophen (TYLENOL ARTHRITIS PAIN PO) Take 1 tablet by mouth 3 times daily      Fesoterodine Fumarate ER (TOVIAZ) 8 MG TB24 TAKE 1 TABLET DAILY 90 tablet 1    atorvastatin (LIPITOR) 20 MG tablet TAKE 1 TABLET EVERY EVENING 90 tablet 1    esomeprazole (NEXIUM) 40 MG delayed release capsule TAKE 1 CAPSULE EVERY       MORNING 90 capsule 1    topiramate (TOPAMAX) 100 MG tablet TAKE 1 TABLET AT BEDTIME 90 tablet 1    levothyroxine (SYNTHROID) 100 MCG tablet TAKE 1 TABLET DAILY 90 tablet 1    fluticasone-vilanterol (BREO ELLIPTA) 100-25 MCG/INH AEPB inhaler Inhale 1 puff into the lungs daily 3 each 3    calcium carbonate (OSCAL) 500 MG TABS tablet Take 500 mg by mouth daily      amoxicillin-clavulanate (AUGMENTIN) 875-125 MG per tablet Patient to take 1 hour prior to procedure 3 tablet 0    albuterol sulfate  (90 Base) MCG/ACT inhaler Inhale 2 puffs into the lungs every 4 hours as needed for Wheezing May substitute for insurance preferred (Ventolin, Proventil, ProAir) 2 Inhaler 3    vitamin D (ERGOCALCIFEROL) 1.25 MG (86610 UT) CAPS capsule TAKE 1 CAPSULE TWICE WEEKLY 24 capsule 1    clonazePAM (KLONOPIN) 0.5 MG tablet Take 0.5 mg by mouth daily as needed.  triamcinolone (KENALOG) 0.1 % cream Apply topically two times  daily (Patient taking differently: Apply topically two times  daily prn) 90 g 1    SUMAtriptan (IMITREX) 50 MG tablet Take 1 tablet by mouth once as needed .  May repeat in 2 hours, maximum 200mg per  day 27 tablet 0     Current Facility-Administered Medications   Medication Dose Route Frequency Provider Last Rate Last Admin    0.9 % sodium chloride infusion   Intravenous Continuous Johanny Amador MD        sodium chloride flush 0.9 % injection 10 mL  10 mL Intravenous 2 times per day Johanny Amador MD        sodium chloride flush 0.9 % injection 10 mL  10 mL Intravenous PRN Johanny Amador MD         Vital Signs (Current)   Vitals:    03/31/21 0733   BP: (!) 142/71   Pulse: 77   Resp: 16   Temp: 97.1 °F (36.2 °C)   TempSrc: Temporal   SpO2: 96%   Weight: 279 lb 15.8 oz (127 kg)   Height: 5' 3\" (1.6 m)                                          BP Readings from Last 3 Encounters:   03/31/21 (!) 142/71 21 130/80   20 103/61     Vital Signs Statistics (for past 48 hrs)     Temp  Av.1 °F (36.2 °C)  Min: 97.1 °F (36.2 °C)   Min taken time: 21  Max: 97.1 °F (36.2 °C)   Max taken time: 21  Pulse  Av  Min: 68   Min taken time: 21  Max: 68   Max taken time: 21  Resp  Av  Min: 12   Min taken time: 21  Max: 12   Max taken time: 21  BP  Min: 142/71   Min taken time: 21  Max: 142/71   Max taken time: 21  SpO2  Av %  Min: 96 %   Min taken time: 21  Max: 96 %   Max taken time: 21  BP Readings from Last 3 Encounters:   21 (!) 142/71   21 130/80   20 103/61       BMI  Body mass index is 49.6 kg/m². Estimated body mass index is 49.6 kg/m² as calculated from the following:    Height as of this encounter: 5' 3\" (1.6 m). Weight as of this encounter: 279 lb 15.8 oz (127 kg).     CBC   Lab Results   Component Value Date    WBC 8.7 2021    RBC 4.36 2021    HGB 11.3 2021    HCT 35.3 2021    MCV 81.1 2021    RDW 20.9 2021     2021     CMP    Lab Results   Component Value Date     2020    K 4.2 2020     2020    CO2 19 2020    BUN 18 2020    CREATININE 1.1 2020    GFRAA >60 2020    GFRAA >60 2013    AGRATIO 2.0 2020    LABGLOM 50 2020    GLUCOSE 106 2020    PROT 6.8 2020    PROT 6.5 2012    CALCIUM 9.4 2020    BILITOT 0.3 2020    ALKPHOS 126 2020    AST 13 2020    ALT 12 2020     BMP    Lab Results   Component Value Date     2020    K 4.2 2020     2020    CO2 19 2020    BUN 18 2020    CREATININE 1.1 2020    CALCIUM 9.4 2020    GFRAA >60 2020    GFRAA >60 2013    LABGLOM 50 2020    GLUCOSE 106 2020     POCGlucose  No results for input(s): GLUCOSE in the last 72 hours. [facility]    Lab Results   Component Value Date    PROTIME 11.5 06/12/2019    INR 1.01 46/43/4661     HCG (If Applicable) No results found for: PREGTESTUR, PREGSERUM, HCG, HCGQUANT   ABGs No results found for: PHART, PO2ART, APU2JLQ, FME9TUO, BEART, U0GEAIQD   Type & Screen (If Applicable)  No results found for: LABABO, LABRH                         BMI: Wt Readings from Last 3 Encounters:       NPO Status:   Date of last liquid consumption: 03/31/21   Time of last liquid consumption: 0030   Date of last solid food consumption: 03/29/21      Time of last solid consumption: 2100       Anesthesia Evaluation  Patient summary reviewed   history of anesthetic complications: PONV. Airway: Mallampati: III  TM distance: >3 FB   Neck ROM: full  Mouth opening: > = 3 FB Dental: normal exam         Pulmonary:   (+) sleep apnea:  asthma:     (-) COPD and shortness of breath                           Cardiovascular:    (+) hyperlipidemia    (-) hypertension, valvular problems/murmurs, past MI, CAD, CABG/stent, dysrhythmias and  angina                Neuro/Psych:   (+) headaches: migraine headaches, psychiatric history:   (-) seizures, TIA and CVA           GI/Hepatic/Renal:   (+) GERD:,      (-) PUD, liver disease and no renal disease       Endo/Other:    (+) hypothyroidism::., .    (-) diabetes mellitus               Abdominal:           Vascular: negative vascular ROS. Anesthesia Plan      MAC     ASA 3     (I discussed intravenous sedation to the patient's satisfaction including risks and alternatives. The patient agreed with the plan and has no further questions. TEJAS SPARKS )  Induction: intravenous. Anesthetic plan and risks discussed with patient. Plan discussed with CRNA.                   This pre-anesthesia assessment may be used as a history and physical.    DOS STAFF ADDENDUM:    Pt seen and examined, chart reviewed (including

## 2021-03-31 NOTE — OP NOTE
Operative Note      Patient: Swathi Williamson  YOB: 1955  MRN: [id number]    Date of Procedure: 3/31/2021    Pre-Op Diagnosis: IRON DEFICIENCY ANEMIA    Post-Op Diagnosis: Same       Procedure(s):  COLONOSCOPY    Surgeon(s):  Vishnu Toney MD    Assistant:   * No surgical staff found *    Anesthesia: Monitor Anesthesia Care    Estimated Blood Loss (mL): None    Complications: None    Specimens:   * No specimens in log *    Implants:  * No implants in log *      Drains: * No LDAs found *    Findings: See dictated report    Detailed Description of Procedure:   See dictated report    Electronically signed by Vishnu Toney MD on 3/31/2021 at 8:26 AM

## 2021-03-31 NOTE — H&P
TABLET DAILY 3/22/21  Yes Shahzad Pulliam MD   metFORMIN (GLUCOPHAGE-XR) 500 MG extended release tablet TAKE 2 TABLETS DAILY WITH  BREAKFAST 3/22/21  Yes Shahzad Pulliam MD   pramipexole (MIRAPEX) 0.5 MG tablet TAKE 1 TABLET TWICE A DAY 2/8/21  Yes Shahzad Pulliam MD   DULoxetine (CYMBALTA) 60 MG extended release capsule TAKE 1 CAPSULE DAILY 1/22/21  Yes Shahzad Pulliam MD   Acetaminophen (TYLENOL ARTHRITIS PAIN PO) Take 1 tablet by mouth 3 times daily   Yes Historical Provider, MD   Fesoterodine Fumarate ER (TOVIAZ) 8 MG TB24 TAKE 1 TABLET DAILY 11/23/20  Yes Shahzad Pulliam MD   atorvastatin (LIPITOR) 20 MG tablet TAKE 1 TABLET EVERY EVENING 11/6/20  Yes Shahzad Pulliam MD   esomeprazole ([address]) 40 MG delayed release capsule TAKE 1 CAPSULE EVERY       MORNING 10/20/20  Yes Shahzad Pulliam MD   topiramate (TOPAMAX) 100 MG tablet TAKE 1 TABLET AT BEDTIME 10/20/20  Yes Shahzad Pulliam MD   levothyroxine (SYNTHROID) 100 MCG tablet TAKE 1 TABLET DAILY 9/8/20  Yes Shahzad Pulliam MD   fluticasone-vilanterol (BREO ELLIPTA) 100-25 MCG/INH AEPB inhaler Inhale 1 puff into the lungs daily 6/8/20  Yes Shahzad Pulliam MD   calcium carbonate (OSCAL) 500 MG TABS tablet Take 500 mg by mouth daily   Yes Historical Provider, MD   ferrous sulfate (IRON 325) 325 (65 Fe) MG tablet Take 1 tablet by mouth nightly 3/8/21 3/8/22  Shahzad Pulliam MD   amoxicillin-clavulanate (AUGMENTIN) 875-125 MG per tablet Patient to take 1 hour prior to procedure 12/7/20   Shahzad Pulliam MD   albuterol sulfate  (90 Base) MCG/ACT inhaler Inhale 2 puffs into the lungs every 4 hours as needed for Wheezing May substitute for insurance preferred (Ventolin, Proventil, ProAir) 6/8/20 6/8/21  Shahzad Pulliam MD   vitamin D (ERGOCALCIFEROL) 1.25 MG (08472 UT) CAPS capsule TAKE 1 CAPSULE TWICE WEEKLY 4/28/20   Shahzad Pulliam MD   clonazePAM (KLONOPIN) 0.5 MG tablet Take 0.5 mg by mouth daily as needed.      Historical Provider, MD   triamcinolone (KENALOG) 0.1 % cream Apply Self-breast exams: yes. Exercise: walking and cardiovascular equipment sometimes. Seatbelt use: Always. Living will: yes,   copy requested           Family History:   Family History   Problem Relation Age of Onset    Lung Cancer Father     Heart Disease Maternal Grandmother     Tuberculosis Maternal Grandmother     Other Brother          PHYSICAL EXAM:      BP (!) 142/71   Pulse 77   Temp 97.1 °F (36.2 °C) (Temporal)   Resp 16   Ht 5' 3\" (1.6 m)   Wt 279 lb 15.8 oz (127 kg)   SpO2 96%   BMI 49.60 kg/m²  I        Heart: Normal    Lungs: Normal    Abdomen: Normal      ASA Grade: ASA 3 - Patient with moderate systemic disease with functional limitations    III (soft palate, base of uvula visible)  ASSESSMENT AND PLAN:    1. Patient is a 60 y.o. female here for Colonoscopy  2. Procedure options, risks and benefits reviewed with patient who expresses understanding.

## 2021-04-07 ENCOUNTER — IMMUNIZATION (OUTPATIENT)
Dept: PRIMARY CARE CLINIC | Age: 66
End: 2021-04-07
Payer: COMMERCIAL

## 2021-04-07 PROCEDURE — 91301 COVID-19, MODERNA VACCINE 100MCG/0.5ML DOSE: CPT | Performed by: FAMILY MEDICINE

## 2021-04-07 PROCEDURE — 0012A COVID-19, MODERNA VACCINE 100MCG/0.5ML DOSE: CPT | Performed by: FAMILY MEDICINE

## 2021-04-22 DIAGNOSIS — K21.9 GASTROESOPHAGEAL REFLUX DISEASE WITHOUT ESOPHAGITIS: ICD-10-CM

## 2021-04-22 RX ORDER — ESOMEPRAZOLE MAGNESIUM 40 MG/1
CAPSULE, DELAYED RELEASE ORAL
Qty: 90 CAPSULE | Refills: 1 | Status: SHIPPED | OUTPATIENT
Start: 2021-04-22 | End: 2021-10-13

## 2021-04-22 RX ORDER — FESOTERODINE FUMARATE 8 MG/1
TABLET, FILM COATED, EXTENDED RELEASE ORAL
Qty: 90 TABLET | Refills: 1 | Status: SHIPPED | OUTPATIENT
Start: 2021-04-22 | End: 2021-05-18 | Stop reason: CLARIF

## 2021-04-22 RX ORDER — TOPIRAMATE 100 MG/1
TABLET, FILM COATED ORAL
Qty: 90 TABLET | Refills: 1 | Status: SHIPPED | OUTPATIENT
Start: 2021-04-22 | End: 2021-10-13

## 2021-04-22 RX ORDER — ATORVASTATIN CALCIUM 20 MG/1
TABLET, FILM COATED ORAL
Qty: 90 TABLET | Refills: 1 | Status: SHIPPED | OUTPATIENT
Start: 2021-04-22 | End: 2021-10-26

## 2021-05-08 DIAGNOSIS — E55.9 VITAMIN D DEFICIENCY: ICD-10-CM

## 2021-05-10 RX ORDER — ERGOCALCIFEROL 1.25 MG/1
CAPSULE ORAL
Qty: 24 CAPSULE | Refills: 1 | Status: SHIPPED | OUTPATIENT
Start: 2021-05-10 | End: 2022-07-01

## 2021-05-17 ENCOUNTER — PATIENT MESSAGE (OUTPATIENT)
Dept: FAMILY MEDICINE CLINIC | Age: 66
End: 2021-05-17

## 2021-05-17 NOTE — TELEPHONE ENCOUNTER
From: Mukesh Reynoso  To: Dunia Henley MD  Sent: 5/17/2021 11:38 AM EDT  Subject: Prescription Question    I received the attached letter from my insurance company saying they would no longer cover the cost of my prescription for [employer]. Would you choose what you think will work best for me from their list of covered medicines and send a prescription into my mail order pharmacy ([facility]) please? Thank you.     Kirit Knight

## 2021-05-18 RX ORDER — TOLTERODINE 4 MG/1
4 CAPSULE, EXTENDED RELEASE ORAL DAILY
Qty: 90 CAPSULE | Refills: 1 | Status: SHIPPED | OUTPATIENT
Start: 2021-05-18 | End: 2021-10-26

## 2021-06-09 DIAGNOSIS — Z96.611 S/P REVERSE TOTAL SHOULDER ARTHROPLASTY, RIGHT: ICD-10-CM

## 2021-06-10 RX ORDER — LEVOTHYROXINE SODIUM 0.1 MG/1
TABLET ORAL
Qty: 90 TABLET | Refills: 1 | Status: SHIPPED | OUTPATIENT
Start: 2021-06-10 | End: 2021-06-21 | Stop reason: SDUPTHER

## 2021-06-10 RX ORDER — AMOXICILLIN AND CLAVULANATE POTASSIUM 875; 125 MG/1; MG/1
TABLET, FILM COATED ORAL
Qty: 3 TABLET | Refills: 0 | Status: SHIPPED | OUTPATIENT
Start: 2021-06-10 | End: 2022-03-02 | Stop reason: SDUPTHER

## 2021-06-16 ENCOUNTER — OFFICE VISIT (OUTPATIENT)
Dept: FAMILY MEDICINE CLINIC | Age: 66
End: 2021-06-16
Payer: COMMERCIAL

## 2021-06-16 VITALS
DIASTOLIC BLOOD PRESSURE: 78 MMHG | HEIGHT: 64 IN | HEART RATE: 75 BPM | BODY MASS INDEX: 48.32 KG/M2 | RESPIRATION RATE: 16 BRPM | SYSTOLIC BLOOD PRESSURE: 128 MMHG | OXYGEN SATURATION: 99 % | WEIGHT: 283 LBS

## 2021-06-16 DIAGNOSIS — R73.03 PREDIABETES: ICD-10-CM

## 2021-06-16 DIAGNOSIS — E78.5 HYPERLIPIDEMIA LDL GOAL <160: ICD-10-CM

## 2021-06-16 DIAGNOSIS — D50.9 MICROCYTIC ANEMIA: ICD-10-CM

## 2021-06-16 DIAGNOSIS — G43.109 MIGRAINE WITH AURA AND WITHOUT STATUS MIGRAINOSUS, NOT INTRACTABLE: ICD-10-CM

## 2021-06-16 DIAGNOSIS — E03.4 HYPOTHYROIDISM DUE TO ACQUIRED ATROPHY OF THYROID: Primary | ICD-10-CM

## 2021-06-16 DIAGNOSIS — E53.8 VITAMIN B12 DEFICIENCY: ICD-10-CM

## 2021-06-16 DIAGNOSIS — E03.4 HYPOTHYROIDISM DUE TO ACQUIRED ATROPHY OF THYROID: ICD-10-CM

## 2021-06-16 DIAGNOSIS — E55.9 VITAMIN D DEFICIENCY: ICD-10-CM

## 2021-06-16 LAB
A/G RATIO: 1.5 (ref 1.1–2.2)
ALBUMIN SERPL-MCNC: 4.1 G/DL (ref 3.4–5)
ALP BLD-CCNC: 147 U/L (ref 40–129)
ALT SERPL-CCNC: 18 U/L (ref 10–40)
ANION GAP SERPL CALCULATED.3IONS-SCNC: 13 MMOL/L (ref 3–16)
AST SERPL-CCNC: 14 U/L (ref 15–37)
BASOPHILS ABSOLUTE: 0 K/UL (ref 0–0.2)
BASOPHILS RELATIVE PERCENT: 0.6 %
BILIRUB SERPL-MCNC: 0.4 MG/DL (ref 0–1)
BUN BLDV-MCNC: 10 MG/DL (ref 7–20)
CALCIUM SERPL-MCNC: 9.3 MG/DL (ref 8.3–10.6)
CHLORIDE BLD-SCNC: 106 MMOL/L (ref 99–110)
CHOLESTEROL, TOTAL: 218 MG/DL (ref 0–199)
CO2: 22 MMOL/L (ref 21–32)
CREAT SERPL-MCNC: 1.1 MG/DL (ref 0.6–1.2)
EOSINOPHILS ABSOLUTE: 0.2 K/UL (ref 0–0.6)
EOSINOPHILS RELATIVE PERCENT: 2.6 %
FERRITIN: 21.8 NG/ML (ref 15–150)
GFR AFRICAN AMERICAN: >60
GFR NON-AFRICAN AMERICAN: 50
GLOBULIN: 2.7 G/DL
GLUCOSE BLD-MCNC: 96 MG/DL (ref 70–99)
HCT VFR BLD CALC: 37.1 % (ref 36–48)
HDLC SERPL-MCNC: 58 MG/DL (ref 40–60)
HEMOGLOBIN: 11.9 G/DL (ref 12–16)
IRON SATURATION: 15 % (ref 15–50)
IRON: 52 UG/DL (ref 37–145)
LDL CHOLESTEROL CALCULATED: 133 MG/DL
LYMPHOCYTES ABSOLUTE: 1.9 K/UL (ref 1–5.1)
LYMPHOCYTES RELATIVE PERCENT: 27.8 %
MCH RBC QN AUTO: 27.9 PG (ref 26–34)
MCHC RBC AUTO-ENTMCNC: 32.1 G/DL (ref 31–36)
MCV RBC AUTO: 86.9 FL (ref 80–100)
MONOCYTES ABSOLUTE: 0.4 K/UL (ref 0–1.3)
MONOCYTES RELATIVE PERCENT: 5.4 %
NEUTROPHILS ABSOLUTE: 4.3 K/UL (ref 1.7–7.7)
NEUTROPHILS RELATIVE PERCENT: 63.6 %
PDW BLD-RTO: 16.6 % (ref 12.4–15.4)
PLATELET # BLD: 224 K/UL (ref 135–450)
PMV BLD AUTO: 9.2 FL (ref 5–10.5)
POTASSIUM SERPL-SCNC: 4.2 MMOL/L (ref 3.5–5.1)
RBC # BLD: 4.27 M/UL (ref 4–5.2)
SODIUM BLD-SCNC: 141 MMOL/L (ref 136–145)
TOTAL IRON BINDING CAPACITY: 354 UG/DL (ref 260–445)
TOTAL PROTEIN: 6.8 G/DL (ref 6.4–8.2)
TRIGL SERPL-MCNC: 133 MG/DL (ref 0–150)
TSH SERPL DL<=0.05 MIU/L-ACNC: 4.21 UIU/ML (ref 0.27–4.2)
VITAMIN B-12: 284 PG/ML (ref 211–911)
VITAMIN D 25-HYDROXY: 46.6 NG/ML
VLDLC SERPL CALC-MCNC: 27 MG/DL
WBC # BLD: 6.7 K/UL (ref 4–11)

## 2021-06-16 PROCEDURE — 99214 OFFICE O/P EST MOD 30 MIN: CPT | Performed by: FAMILY MEDICINE

## 2021-06-16 RX ORDER — SUMATRIPTAN 50 MG/1
TABLET, FILM COATED ORAL
Qty: 27 TABLET | Refills: 0 | Status: SHIPPED | OUTPATIENT
Start: 2021-06-16

## 2021-06-16 NOTE — PATIENT INSTRUCTIONS
INSTRUCTIONS  NEXT APPOINTMENT: Please schedule annual complete physical (30 minutes) in 6 months. · PLEASE TAKE THIS FORM TO CHECK-OUT WINDOW TO SCHEDULE NEXT VISIT. · PLEASE GET BLOODWORK DRAWN TODAY ON FIRST FLOOR in 170. Take orders with you. RESULTS- most blood tests back in couple days. We will call you if any problems. If bloodwork good, you will get letter in mail or notified thru [address] (if signed up) within 2 weeks. If you do not, please call office. · Please get flu vaccine when available in fall. Can get either at this office or at stores such as [employer].

## 2021-06-16 NOTE — PROGRESS NOTES
CARDIOVASCULAR VISIT NOTE   Subjective:   HPI CHRONIC:   Chief Complaint   Patient presents with    Hypertension      Patient here for follow-up of multiple chronic conditions includin. Hypothyroidism due to acquired atrophy of thyroid    2. Hyperlipidemia LDL goal <160    3. Prediabetes    4. Vitamin B12 deficiency    5. Vitamin D deficiency    6. Microcytic anemia    7. Migraine with aura and without status migrainosus, not intractable- about once a yr and tryptan works     Complaints: pt is doing well no new issues   Feels getting shorter. · Following appropriate diet? Sometimes  · Exercise: walking rarely  · Taking medicines daily as directed? Yes  · Any side effects of medications? No    Review of Systems   General ROS: fever? No,    Night sweats? No  Endocrine ROS: fatigue? No   Unexpected weight changes? No  Hem/Lymph ROS: easy bruising? No   Swollen lymph nodes? No  Respiratory ROS: cough? No   Wheezing? No  Cardiovascular ROS: chest pain? No   Shortness of breath? No  Neurological ROS: TIA or stroke symptoms? No    Health Maintenance Due   Topic Date Due    TSH testing  2021    Lipid screen  2021     *Chief complaint, HPI, History and ROS provided by the medical assistant has been reviewed and verified by provider- Swathi Uribe MD    HISTORY:  Patient's medications, allergies, past medical, and social histories were reviewed and updated as appropriate.      CHART REVIEW  Health Maintenance   Topic Date Due    TSH testing  2021    Lipid screen  2021    Pneumococcal 0-64 years Vaccine (2 of 2) 10/23/2021    A1C test (Diabetic or Prediabetic)  2021    Breast cancer screen  2022    DTaP/Tdap/Td vaccine (3 - Td or Tdap) 2028    Colon cancer screen colonoscopy  2031    Flu vaccine  Completed    Shingles Vaccine  Completed    COVID-19 Vaccine  Completed    Hepatitis C screen  Completed    HIV screen  Completed    Hepatitis 03/08/2021    MCV 81.1 03/08/2021     03/08/2021     Lab Results   Component Value Date    ALT 12 06/11/2020    AST 13 (L) 06/11/2020    ALKPHOS 126 06/11/2020    BILITOT 0.3 06/11/2020     TSH (uIU/mL)   Date Value   06/11/2020 3.32     Lab Results   Component Value Date    LABA1C 5.8 12/07/2020      Objective:   PHYSICAL EXAM   /78 (Site: Right Upper Arm, Position: Sitting, Cuff Size: Large Adult)   Pulse 75   Resp 16   Ht 5' 4\" (1.626 m)   Wt 283 lb (128.4 kg)   SpO2 99%   BMI 48.58 kg/m²   BP Readings from Last 5 Encounters:   06/16/21 128/78   03/31/21 114/69   03/31/21 120/60   03/08/21 130/80   12/30/20 103/61     Wt Readings from Last 5 Encounters:   06/16/21 283 lb (128.4 kg)   03/31/21 279 lb 15.8 oz (127 kg)   03/15/21 285 lb (129.3 kg)   03/08/21 285 lb (129.3 kg)   12/30/20 280 lb 3.3 oz (127.1 kg)      GENERAL:   · well-developed, well-nourished, alert, no distress. EYES:   · External findings: lids and lashes normal and conjunctivae and sclerae normal  LUNGS:    · Breathing unlabored  · clear to auscultation bilaterally and good air movement  CARDIOVASC:   · regular rate and rhythm, S1, S2 normal. No murmur, click, rub or gallop  · LEGS:  Lower extremity edema: none    SKIN: warm and dry  PSYCH:    · Alert and oriented  · Normal reasoning, insight good  · Facial expressions full, mood appropriate  · No memory disturbance noted     Assessment and Plan:      Diagnosis Orders   1. Hypothyroidism due to acquired atrophy of thyroid  TSH without Reflex   2. Hyperlipidemia LDL goal <160  LIPID PANEL    Comprehensive Metabolic Panel   3. Prediabetes  Hemoglobin A1C   4. Vitamin B12 deficiency  Vitamin B12    CBC Auto Differential   5. Vitamin D deficiency  Vitamin D 25 Hydroxy   6. Microcytic anemia  Iron and TIBC    Ferritin    CBC Auto Differential   7. Migraine with aura and without status migrainosus, not intractable     Stable.  Continue current Tx plan except for changes marked below.    INSTRUCTIONS  NEXT APPOINTMENT: Please schedule annual complete physical (30 minutes) in 6 months. · PLEASE TAKE THIS FORM TO CHECK-OUT WINDOW TO SCHEDULE NEXT VISIT. · PLEASE GET BLOODWORK DRAWN TODAY ON FIRST FLOOR in 170. Take orders with you. RESULTS- most blood tests back in couple days. We will call you if any problems. If bloodwork good, you will get letter in mail or notified thru [address] (if signed up) within 2 weeks. If you do not, please call office. · Please get flu vaccine when available in fall. Can get either at this office or at stores such as [employer].

## 2021-06-17 LAB
ESTIMATED AVERAGE GLUCOSE: 108.3 MG/DL
HBA1C MFR BLD: 5.4 %

## 2021-06-21 RX ORDER — LEVOTHYROXINE SODIUM 0.12 MG/1
TABLET ORAL
Qty: 90 TABLET | Refills: 1 | Status: SHIPPED | OUTPATIENT
Start: 2021-06-21 | End: 2021-12-15 | Stop reason: SDUPTHER

## 2021-06-30 ENCOUNTER — TELEPHONE (OUTPATIENT)
Dept: FAMILY MEDICINE CLINIC | Age: 66
End: 2021-06-30

## 2021-06-30 NOTE — TELEPHONE ENCOUNTER
Pt calling stating she had an appt yesterday and was advised to call pcp to speak about water pills. Pt stated she would like to talk to someone about next steps.     Pt can be reached at [phone]

## 2021-07-09 ENCOUNTER — OFFICE VISIT (OUTPATIENT)
Dept: FAMILY MEDICINE CLINIC | Age: 66
End: 2021-07-09
Payer: COMMERCIAL

## 2021-07-09 ENCOUNTER — OFFICE VISIT (OUTPATIENT)
Dept: ORTHOPEDIC SURGERY | Age: 66
End: 2021-07-09
Payer: COMMERCIAL

## 2021-07-09 VITALS — HEIGHT: 64 IN | BODY MASS INDEX: 48.67 KG/M2 | RESPIRATION RATE: 14 BRPM | WEIGHT: 285.1 LBS

## 2021-07-09 VITALS
RESPIRATION RATE: 16 BRPM | BODY MASS INDEX: 48.65 KG/M2 | HEIGHT: 64 IN | WEIGHT: 285 LBS | HEART RATE: 78 BPM | DIASTOLIC BLOOD PRESSURE: 84 MMHG | OXYGEN SATURATION: 99 % | SYSTOLIC BLOOD PRESSURE: 122 MMHG

## 2021-07-09 DIAGNOSIS — M51.36 DDD (DEGENERATIVE DISC DISEASE), LUMBAR: ICD-10-CM

## 2021-07-09 DIAGNOSIS — E03.4 HYPOTHYROIDISM DUE TO ACQUIRED ATROPHY OF THYROID: Primary | ICD-10-CM

## 2021-07-09 DIAGNOSIS — R60.0 BILATERAL EDEMA OF LOWER EXTREMITY: ICD-10-CM

## 2021-07-09 DIAGNOSIS — M17.0 PRIMARY OSTEOARTHRITIS OF BOTH KNEES: Primary | ICD-10-CM

## 2021-07-09 PROCEDURE — 20610 DRAIN/INJ JOINT/BURSA W/O US: CPT | Performed by: PHYSICIAN ASSISTANT

## 2021-07-09 PROCEDURE — 99214 OFFICE O/P EST MOD 30 MIN: CPT | Performed by: FAMILY MEDICINE

## 2021-07-09 RX ORDER — DICLOFENAC SODIUM 75 MG/1
75 TABLET, DELAYED RELEASE ORAL 2 TIMES DAILY
Qty: 60 TABLET | Refills: 3 | COMMUNITY
Start: 2021-07-09 | End: 2021-10-11 | Stop reason: SDUPTHER

## 2021-07-09 RX ORDER — FUROSEMIDE 20 MG/1
20 TABLET ORAL DAILY PRN
Qty: 90 TABLET | Refills: 1 | Status: SHIPPED | OUTPATIENT
Start: 2021-07-09 | End: 2021-12-14

## 2021-07-09 NOTE — PATIENT INSTRUCTIONS
INSTRUCTIONS  · Take Lasix as needed  · PLEASE GET BLOODWORK DRAWN in 3-4 weeks ON FIRST FLOOR in 170. · Limit sodium intake. · Keep moving. · Keep cool. Patient Education       VENOUS INSUFFICIENCY/EDEMA    Venous insufficiency is a condition in which the veins have problems sending blood from the legs back to the heart. Causes  Venous insufficiency is caused by problems in one or more deeper leg veins. Normally, valves in your veins keep your blood flowing back towards the heart so it does not collect in one place. But the valves in varicose veins are either damaged or missing. This causes the veins to remain filled with blood, especially when you are standing. The condition may also be caused by a blockage in a vein from a clot (deep vein thrombosis). Chronic venous insufficiency is a long-term condition. It occurs because of partial vein blockage or blood leakage around the valves of the veins. Risk factors for venous insufficiency include:  History of deep vein thrombosis in the legs   Age   Being female (related to levels of the hormone progesterone)   Being tall   Genetic factors   Obesity   Pregnancy   Prolonged sitting or standing    Symptoms  Dull aching, heaviness, or cramping in legs   Itching and tingling   Pain that gets worse when standing   Pain that gets better when legs are raised   Swelling of the legs  People with chronic venous insufficiency may also have:  Redness of the legs and ankles   Skin color changes around the ankles   Varicose veins on the surface (superficial)   Thickening and hardening of the skin on the legs and ankles (lipodermatosclerosis)   Ulcers on the legs and ankles    Treatment  Take the following steps to help manage venous insufficiency:  Use compression stockings to decrease chronic swelling. Avoid long periods of sitting or standing. Even moving your legs slightly will help the blood in your veins return to your heart.    Care for wounds aggressively if any skin breakdown or infection occurs.     Surgery (varicose vein stripping) or noninvasive treatments for varicose veins may be recommended if you have:  Leg pain, often described as heavy or tired   Skin ulcers or sores caused by poor blood flow in the veins   Thickening and hardening of the skin on the legs and ankles (lipodermatosclerosis)

## 2021-07-09 NOTE — PROGRESS NOTES
PROBLEM VISIT NOTE     Subjective:     Chief Complaint   Patient presents with   Donato Araujo Medications     Rowan Barber is a 60 y.o. female   who presents pts legs have been swelling and her ankles swells she thinks she should maybe get on a water pill. X 3 months, goes to just below knee. Not go down overnight. Sometimes painful. Her podiatrist said he pressed on her leg and said she is holding water. Sometimes it's hard to get a shoe on. Her podiatrist says she should get back on diclofinac     Patient's medications, allergies, past medical, and social histories were reviewed and updated as appropriate (see below). Review of Systems   General ROS: fever? No,    Night sweats? No  Endocrine ROS:malaise/lethargy? No   Unexpected weight changes? No  Respiratory ROS: cough? No   Shortness of breath? No  Cardiovascular ROS:chest pain? No   Shortness of breath with exertion? No  Gastrointestinal ROS: abdominal pain? No   Change in stools? No  Genito-Urinary ROS: painful urination? No   Trouble voiding? No  Neurological ROS: TIA or stroke symptoms? No   Numbness/tingling in feet? No    There are no preventive care reminders to display for this patient. *Chief complaint, HPI and History provided by the medical assistant has been reviewed and verified by provider Miranda Ferreira MD    HISTORY:  Patient's medications, allergies, past medical, and social histories were reviewed and updated as appropriate.      CHART REVIEW  Health Maintenance   Topic Date Due    Flu vaccine (1) 09/01/2021    Pneumococcal 0-64 years Vaccine (2 of 2 - PPSV23) 10/23/2021    Breast cancer screen  01/20/2022    A1C test (Diabetic or Prediabetic)  06/16/2022    Lipid screen  06/16/2022    TSH testing  06/16/2022    DTaP/Tdap/Td vaccine (3 - Td or Tdap) 08/01/2028    Colon cancer screen colonoscopy  03/31/2031    Shingles Vaccine  Completed    COVID-19 Vaccine  Completed    Hepatitis C screen  Completed    HIV screen extended release capsule TAKE 1 CAPSULE DAILY Yes Guillermina Starr MD   Acetaminophen (TYLENOL ARTHRITIS PAIN PO) Take 1 tablet by mouth 3 times daily Yes Historical Provider, MD   fluticasone-vilanterol (BREO ELLIPTA) 100-25 MCG/INH AEPB inhaler Inhale 1 puff into the lungs daily Yes Guillermina Starr MD   albuterol sulfate  (90 Base) MCG/ACT inhaler Inhale 2 puffs into the lungs every 4 hours as needed for Wheezing May substitute for insurance preferred (Ventolin, Proventil, ProAir) Yes Guillermina Starr MD   calcium carbonate (OSCAL) 500 MG TABS tablet Take 500 mg by mouth daily Yes Historical Provider, MD   clonazePAM (KLONOPIN) 0.5 MG tablet Take 0.5 mg by mouth daily as needed. Yes Historical Provider, MD      Family History   Problem Relation Age of Onset    Lung Cancer Father     Heart Disease Maternal Grandmother     Tuberculosis Maternal Grandmother     Other Brother      Social History     Tobacco Use    Smoking status: Former Smoker     Packs/day: 2.00     Years: 15.00     Pack years: 30.00     Types: Cigarettes     Quit date: 1989     Years since quittin.0    Smokeless tobacco: Never Used    Tobacco comment: congrats on quitting   Vaping Use    Vaping Use: Never used   Substance Use Topics    Alcohol use: Yes     Alcohol/week: 0.0 standard drinks     Comment: occ.      Drug use: No      LAST LABS  Cholesterol, Total   Date Value Ref Range Status   2021 218 (H) 0 - 199 mg/dL Final     LDL Calculated   Date Value Ref Range Status   2021 133 (H) <100 mg/dL Final     HDL   Date Value Ref Range Status   2021 58 40 - 60 mg/dL Final   2012 49 40 - 60 mg/dl Final     Triglycerides   Date Value Ref Range Status   2021 133 0 - 150 mg/dL Final     Lab Results   Component Value Date    GLUCOSE 96 2021     Lab Results   Component Value Date     2021    K 4.2 2021    CREATININE 1.1 2021     Lab Results   Component Value Date    WBC 6.7 06/16/2021    HGB 11.9 (L) 06/16/2021    HCT 37.1 06/16/2021    MCV 86.9 06/16/2021     06/16/2021     Lab Results   Component Value Date    ALT 18 06/16/2021    AST 14 (L) 06/16/2021    ALKPHOS 147 (H) 06/16/2021    BILITOT 0.4 06/16/2021     TSH (uIU/mL)   Date Value   06/16/2021 4.21 (H)     Lab Results   Component Value Date    LABA1C 5.4 06/16/2021      Objective:    PHYSICAL EXAM   /84 (Site: Left Upper Arm, Position: Sitting, Cuff Size: Large Adult)   Pulse 78   Resp 16   Ht 5' 4\" (1.626 m)   Wt 285 lb (129.3 kg)   SpO2 99%   BMI 48.92 kg/m²   BP Readings from Last 5 Encounters:   07/09/21 122/84   06/16/21 128/78   03/31/21 114/69   03/31/21 120/60   03/08/21 130/80     Wt Readings from Last 5 Encounters:   07/09/21 285 lb (129.3 kg)   06/16/21 283 lb (128.4 kg)   03/31/21 279 lb 15.8 oz (127 kg)   03/15/21 285 lb (129.3 kg)   03/08/21 285 lb (129.3 kg)      GENERAL:   · well-developed, well-nourished, alert, no distress. EYES:   · External findings: lids and lashes normal and conjunctivae and sclerae normal  · Eyes: no periorbital cellulitis. LUNGS:    · Breathing unlabored  · clear to auscultation bilaterally and good air movement  CARDIOVASC:   · regular rate and rhythm  · LEGS:  Lower extremity edema: 1+ ankle     Assessment and Plan:      Diagnosis Orders   1. Hypothyroidism due to acquired atrophy of thyroid  TSH without Reflex   2. Bilateral edema of lower extremity  Basic Metabolic Panel    furosemide (LASIX) 20 MG tablet   3. DDD (degenerative disc disease), lumbar  diclofenac (VOLTAREN) 75 MG EC tablet   Plan below. INSTRUCTIONS  · Take Lasix as needed  · PLEASE GET BLOODWORK DRAWN in 3-4 weeks ON FIRST FLOOR in [facility]. · Limit sodium intake. · Keep moving. · Keep cool.

## 2021-07-10 NOTE — PROGRESS NOTES
This dictation was done with [employer] dictation and may contain mechanical errors related to translation. Resp. rate 14, height 5' 4\" (1.626 m), weight 285 lb 1.6 oz (129.3 kg), currently breastfeeding. This is a pleasant 52-year-old female has ongoing pain from osteoarthritis in both of her knees. She was last seen on 3/15/2021 and had a good result with a cortisone shot lasting up until the last 2 to 3 weeks. Now with the right hurting worse than the left she has some swelling soreness has pain going up and down steps or prolonged sitting. She does remember any new type injuries and is requesting an injection. This patient is here in follow-up for ongoing pain and dysfunction in their knees. There x-rays done previously showed joint space narrowing subchondral sclerosis with osteophyte formation. They currently have had relief with injections of cortisone, anti-inflammatories and a home exercise program. There are here with increased pain over the last few days with swelling and dysfunction. On examination this is a well-developed patient in no acute distress. They are alert and oriented ×3. They walk without antalgia or any significant varus or valgus deformity. They have crepitus during flexion and extension in the patellofemoral joint. They have a negative Lachman and a negative Radha. There are good distal pulses and good dorsiflexion and plantarflexion strength present    My impression is bilateral knee osteoarthritis    After a discussion of the multiple options, they consented to a cortisone shot. 1 ml of 40mg/ml Kenalog and 2 ml's of 0.25%Marcaine were injected into both the right and the left knee joint spaces. The leg was slightly flexed and injected just lateral to the patella tendon to under the patella. This was done with sterile technique and they tolerated it well.   During today's visit, there was approximately 20 minutes of face-to-face discussion in regards to the patient's current condition and treatment options. More than 50 % of the time was counseling and coordination of care. I went through a good stretch and strengthening exercise program. They understand that at some point, they will need a knee replacement.  And they will follow up with us on a when necessary basis over the next 3-6 months

## 2021-07-16 RX ORDER — DULOXETIN HYDROCHLORIDE 60 MG/1
CAPSULE, DELAYED RELEASE ORAL
Qty: 90 CAPSULE | Refills: 1 | Status: SHIPPED | OUTPATIENT
Start: 2021-07-16 | End: 2022-01-18 | Stop reason: SDUPTHER

## 2021-08-04 ENCOUNTER — PATIENT MESSAGE (OUTPATIENT)
Dept: FAMILY MEDICINE CLINIC | Age: 66
End: 2021-08-04

## 2021-08-04 DIAGNOSIS — U07.1 LAB TEST POSITIVE FOR DETECTION OF COVID-19 VIRUS: ICD-10-CM

## 2021-08-04 NOTE — TELEPHONE ENCOUNTER
From: Alverto Garcia  To: Toya Sheppard MD  Sent: 8/4/2021 11:38 AM EDT  Subject: Non-Urgent Medical Question    This isn't a question. There was no category for reporting information. I'm writing to let you know that I have tested positive for COVID-19 today (8/4/2021). I took the Rapid Diagnostic Test at [place] on the corner [facility] ([address]. [id number]). I took the test at 10:16 AM and received the results back at 10:41 AM.    I thought this should go into my records. Thank you.

## 2021-08-05 PROBLEM — U07.1 LAB TEST POSITIVE FOR DETECTION OF COVID-19 VIRUS: Status: ACTIVE | Noted: 2021-08-05

## 2021-08-20 RX ORDER — PRAMIPEXOLE DIHYDROCHLORIDE 0.5 MG/1
TABLET ORAL
Qty: 180 TABLET | Refills: 1 | Status: SHIPPED | OUTPATIENT
Start: 2021-08-20 | End: 2022-01-16 | Stop reason: SDUPTHER

## 2021-09-07 DIAGNOSIS — R73.03 PREDIABETES: ICD-10-CM

## 2021-09-07 DIAGNOSIS — F32.4 MAJOR DEPRESSIVE DISORDER WITH SINGLE EPISODE, IN PARTIAL REMISSION (HCC): ICD-10-CM

## 2021-09-07 DIAGNOSIS — F40.10 SOCIAL PHOBIA: ICD-10-CM

## 2021-09-07 RX ORDER — FLUOXETINE 10 MG/1
TABLET, FILM COATED ORAL
Qty: 90 TABLET | Refills: 1 | Status: SHIPPED | OUTPATIENT
Start: 2021-09-07 | End: 2022-08-22 | Stop reason: SDUPTHER

## 2021-09-07 RX ORDER — METFORMIN HYDROCHLORIDE 500 MG/1
TABLET, EXTENDED RELEASE ORAL
Qty: 180 TABLET | Refills: 1 | Status: SHIPPED | OUTPATIENT
Start: 2021-09-07 | End: 2022-07-01 | Stop reason: ALTCHOICE

## 2021-09-13 ENCOUNTER — NURSE ONLY (OUTPATIENT)
Dept: FAMILY MEDICINE CLINIC | Age: 66
End: 2021-09-13
Payer: COMMERCIAL

## 2021-09-13 DIAGNOSIS — Z23 NEEDS FLU SHOT: Primary | ICD-10-CM

## 2021-09-13 PROCEDURE — 90471 IMMUNIZATION ADMIN: CPT | Performed by: FAMILY MEDICINE

## 2021-09-13 PROCEDURE — 90688 IIV4 VACCINE SPLT 0.5 ML IM: CPT | Performed by: FAMILY MEDICINE

## 2021-10-07 ENCOUNTER — OFFICE VISIT (OUTPATIENT)
Dept: ORTHOPEDIC SURGERY | Age: 66
End: 2021-10-07
Payer: MEDICARE

## 2021-10-07 VITALS — BODY MASS INDEX: 48.65 KG/M2 | RESPIRATION RATE: 18 BRPM | HEIGHT: 64 IN | WEIGHT: 285 LBS

## 2021-10-07 DIAGNOSIS — M17.0 PRIMARY OSTEOARTHRITIS OF BOTH KNEES: Primary | ICD-10-CM

## 2021-10-07 PROCEDURE — 99213 OFFICE O/P EST LOW 20 MIN: CPT | Performed by: PHYSICIAN ASSISTANT

## 2021-10-07 PROCEDURE — 20610 DRAIN/INJ JOINT/BURSA W/O US: CPT | Performed by: PHYSICIAN ASSISTANT

## 2021-10-07 NOTE — PROGRESS NOTES
This dictation was done with [employer] dictation and may contain mechanical errors related to translation. Resp. rate 18, height 5' 4\" (1.626 m), weight 285 lb (129.3 kg), currently breastfeeding. This is a pleasant 67-year-old female has ongoing pain from osteoarthritis in both of her knees. She has had x-rays and recently had a cortisone injection on 7/9/2021. This provided pretty significant relief for 2-1/2 months. This patient is here in follow-up for ongoing pain and dysfunction in their knees. There x-rays done previously showed joint space narrowing subchondral sclerosis with osteophyte formation. They currently have had relief with injections of cortisone, anti-inflammatories and a home exercise program. There are here with increased pain over the last few days with swelling and dysfunction. On examination this is a well-developed patient in no acute distress. They are alert and oriented ×3. They walk without antalgia or any significant varus or valgus deformity. They have crepitus during flexion and extension in the patellofemoral joint. They have a negative Lachman and a negative Radha. There are good distal pulses and good dorsiflexion and plantarflexion strength present    My impression is bilateral knee osteoarthritis    After a discussion of the multiple options, they consented to a cortisone shot. 1 ml of 40mg/ml Kenalog and 2 ml's of 0.25%Marcaine were injected into both the right and the left knee joint spaces. The leg was slightly flexed and injected just lateral to the patella tendon to under the patella. This was done with sterile technique and they tolerated it well. During today's visit, there was approximately 20 minutes of face-to-face discussion in regards to the patient's current condition and treatment options. More than 50 % of the time was counseling and coordination of care.       I went through a good stretch and strengthening exercise program. They understand that at some point, they will need a knee replacement. And they will follow up with us on a when necessary basis over the next 3-6 months    This patient has a well documented history of osteoarthritis in their knee. They have trialed Nsaid medications, physical therapy exercises and steroid injections. They continue to have pain, swelling and dysfunction. At this junction, hyalgen injections are advisable.  I gave them literature and discussed the risks and benefits with them and would like to seek pre-authorization for

## 2021-10-08 ENCOUNTER — PATIENT MESSAGE (OUTPATIENT)
Dept: FAMILY MEDICINE CLINIC | Age: 66
End: 2021-10-08

## 2021-10-08 DIAGNOSIS — M51.36 DDD (DEGENERATIVE DISC DISEASE), LUMBAR: ICD-10-CM

## 2021-10-11 RX ORDER — DICLOFENAC SODIUM 75 MG/1
75 TABLET, DELAYED RELEASE ORAL 2 TIMES DAILY
Qty: 180 TABLET | Refills: 1 | Status: SHIPPED | OUTPATIENT
Start: 2021-10-11 | End: 2022-04-12 | Stop reason: SDUPTHER

## 2021-10-11 NOTE — TELEPHONE ENCOUNTER
From: Beny Jackson  To: Chante Kahn MD  Sent: 10/8/2021 6:24 PM EDT  Subject: Prescription Question    Could I please get a prescription for 3 months of DICLOFENAC SOD EC 75 MG TAB (which I take twice a day) sent to my mail-in pharmacy ([facility])? It was sent to a local [facility] pharmacy by Dr. Candace Muñoz and I have to keep refilling it every month. Thank you.

## 2021-10-12 DIAGNOSIS — K21.9 GASTROESOPHAGEAL REFLUX DISEASE WITHOUT ESOPHAGITIS: ICD-10-CM

## 2021-10-13 RX ORDER — TOPIRAMATE 100 MG/1
TABLET, FILM COATED ORAL
Qty: 90 TABLET | Refills: 1 | Status: SHIPPED | OUTPATIENT
Start: 2021-10-13 | End: 2022-09-25 | Stop reason: SDUPTHER

## 2021-10-13 RX ORDER — ESOMEPRAZOLE MAGNESIUM 40 MG/1
CAPSULE, DELAYED RELEASE ORAL
Qty: 90 CAPSULE | Refills: 1 | Status: SHIPPED | OUTPATIENT
Start: 2021-10-13 | End: 2022-02-23 | Stop reason: SDUPTHER

## 2021-10-26 RX ORDER — ATORVASTATIN CALCIUM 20 MG/1
TABLET, FILM COATED ORAL
Qty: 90 TABLET | Refills: 1 | Status: SHIPPED | OUTPATIENT
Start: 2021-10-26 | End: 2022-02-23 | Stop reason: SDUPTHER

## 2021-11-01 ENCOUNTER — OFFICE VISIT (OUTPATIENT)
Dept: ORTHOPEDIC SURGERY | Age: 66
End: 2021-11-01
Payer: MEDICARE

## 2021-11-01 VITALS — WEIGHT: 280 LBS | BODY MASS INDEX: 49.61 KG/M2 | HEIGHT: 63 IN

## 2021-11-01 DIAGNOSIS — M17.0 PRIMARY OSTEOARTHRITIS OF BOTH KNEES: Primary | ICD-10-CM

## 2021-11-01 PROCEDURE — 99213 OFFICE O/P EST LOW 20 MIN: CPT | Performed by: PHYSICIAN ASSISTANT

## 2021-11-01 PROCEDURE — 20610 DRAIN/INJ JOINT/BURSA W/O US: CPT | Performed by: PHYSICIAN ASSISTANT

## 2021-11-01 NOTE — PROGRESS NOTES
This dictation was done with Dragon dictation and may contain mechanical errors related to translation. Height 5' 3\" (1.6 m), weight 280 lb (127 kg), currently breastfeeding. This is a pleasant 60-year-old female who has ongoing pain from osteoarthritis in both of her knees. She says currently she has a 1210 pain her examination is consistent with a little bit of edema swelling and soreness mainly along the medial joint line  Subjective:  bilateral knee pain. She is here for the osteoarthritis in their knees. After examination and discussion we decided to proceed with the   1st Eufflexxa injection for the  bilateral knee(s). Objective:   Height 5' 3\" (1.6 m), weight 280 lb (127 kg), currently breastfeeding. There is a milld joint effusion noted of the bilateral knee(s). There is moderate pain with range of motion testing. There is no significant  instability noted. Neuro exam grossly intact both lower extremities. Intact sensation to light touch. Motor exam 4+ to 5/5 in all major motor groups. Negative Guerrero's sign. Skin is warm, dry and intact with out erythema or significant increased temperature around the knee joint(s). Assessment:  Degenerative Joint Disease of the bilateral Knee(s). Plan:  Discussed  injections including all  risks and benefits including increased pain, drug reaction, infection, bleeding, lack of improvement and  neurovascular injury. All the questions were answered. PROCEDURE NOTE:  PRE-PROCEDURE DIAGNOSIS: DJD knee  POST-PROCEDURE DIAGNOSIS: DJD knee    With the patient's permission, her bilaterally knee was prepped in standard sterile fashion with  Alcohol. The prefilled injection of the preferred Eufflexxa was injected into the bilaterally lateral joint space compartment without difficulty. The patient tolerated the procedure(s) well without difficulty. A band-aid was applied.      POST-PROCEDURE INSTRUCTIONS GIVEN TO PATIENT: The patient was advised to ice the knee for 15-20 minutes to relieve any injection site related pain. Decrease activity for the next 24 to 48 hours. May use prescription or OTC pain relievers as needed      FOLLOW-UP:   As directed or call or return to clinic if these symptoms worsen or fail to improve as anticipated. If at any time you are concerned you may contact the office for further instructions or care.

## 2021-11-08 ENCOUNTER — OFFICE VISIT (OUTPATIENT)
Dept: ORTHOPEDIC SURGERY | Age: 66
End: 2021-11-08
Payer: MEDICARE

## 2021-11-08 VITALS — BODY MASS INDEX: 49.61 KG/M2 | WEIGHT: 280 LBS | HEIGHT: 63 IN

## 2021-11-08 DIAGNOSIS — M17.0 PRIMARY OSTEOARTHRITIS OF BOTH KNEES: Primary | ICD-10-CM

## 2021-11-08 PROCEDURE — 20610 DRAIN/INJ JOINT/BURSA W/O US: CPT | Performed by: PHYSICIAN ASSISTANT

## 2021-11-08 NOTE — PROGRESS NOTES
This dictation was done with [employer] dictation and may contain mechanical errors related to translation. Height 5' 3\" (1.6 m), weight 280 lb (127 kg), currently breastfeeding. Subjective:  bilateral knee pain. She is here for the osteoarthritis in their knees. After examination and discussion we decided to proceed with the   2nd there are Eufflexxa injection for the  bilateral knee(s). Objective:   Height 5' 3\" (1.6 m), weight 280 lb (127 kg), currently breastfeeding. There is a milld joint effusion noted of the bilateral knee(s). There is moderate pain with range of motion testing. There is no significant  instability noted. Neuro exam grossly intact both lower extremities. Intact sensation to light touch. Motor exam 4+ to 5/5 in all major motor groups. Negative Guerrero's sign. Skin is warm, dry and intact with out erythema or significant increased temperature around the knee joint(s). Assessment:  Degenerative Joint Disease of the bilateral Knee(s). Plan:  Discussed  injections including all  risks and benefits including increased pain, drug reaction, infection, bleeding, lack of improvement and  neurovascular injury. All the questions were answered. PROCEDURE NOTE:  PRE-PROCEDURE DIAGNOSIS: DJD knee  POST-PROCEDURE DIAGNOSIS: DJD knee    With the patient's permission, her bilaterally knee was prepped in standard sterile fashion with  Alcohol. The prefilled injection of the preferred Eufflexxa was injected into the bilaterally lateral joint space compartment without difficulty. The patient tolerated the procedure(s) well without difficulty. A band-aid was applied. POST-PROCEDURE INSTRUCTIONS GIVEN TO PATIENT: The patient was advised to ice the knee for 15-20 minutes to relieve any injection site related pain. Decrease activity for the next 24 to 48 hours.  May use prescription or OTC pain relievers as needed      FOLLOW-UP:   As directed or call or return to clinic if these symptoms worsen or fail to improve as anticipated. If at any time you are concerned you may contact the office for further instructions or care.

## 2021-11-15 ENCOUNTER — OFFICE VISIT (OUTPATIENT)
Dept: ORTHOPEDIC SURGERY | Age: 66
End: 2021-11-15
Payer: MEDICARE

## 2021-11-15 DIAGNOSIS — M17.0 PRIMARY OSTEOARTHRITIS OF BOTH KNEES: Primary | ICD-10-CM

## 2021-11-15 PROCEDURE — 20610 DRAIN/INJ JOINT/BURSA W/O US: CPT | Performed by: PHYSICIAN ASSISTANT

## 2021-11-19 NOTE — PROGRESS NOTES
This dictation was done with [employer] dictation and may contain mechanical errors related to translation. This is a pleasant 60-year-old female has ongoing pain from osteoarthritis in both knees. She states that her pain stays around 0 out of 10 since she has had the Euflexxa injections. She is here for the third and series of 3 Euflexxa injections for both knees    Subjective:  bilateral knee pain. She is here for the osteoarthritis in their knees. After examination and discussion we decided to proceed with the   3rd Eufflexxa injection for the  bilateral knee(s). Objective:   currently breastfeeding. There is a milld joint effusion noted of the bilateral knee(s). There is moderate pain with range of motion testing. There is no significant  instability noted. Neuro exam grossly intact both lower extremities. Intact sensation to light touch. Motor exam 4+ to 5/5 in all major motor groups. Negative Guerrero's sign. Skin is warm, dry and intact with out erythema or significant increased temperature around the knee joint(s). Assessment:  Degenerative Joint Disease of the bilateral Knee(s). Plan:  Discussed  injections including all  risks and benefits including increased pain, drug reaction, infection, bleeding, lack of improvement and  neurovascular injury. All the questions were answered. PROCEDURE NOTE:  PRE-PROCEDURE DIAGNOSIS: DJD knee  POST-PROCEDURE DIAGNOSIS: DJD knee    With the patient's permission, her bilaterally knee was prepped in standard sterile fashion with  Alcohol. The prefilled injection of the preferred Eufflexxa was injected into the bilaterally lateral joint space compartment without difficulty. The patient tolerated the procedure(s) well without difficulty. A band-aid was applied. POST-PROCEDURE INSTRUCTIONS GIVEN TO PATIENT: The patient was advised to ice the knee for 15-20 minutes to relieve any injection site related pain.  Decrease activity for the next 24 to 48 hours. May use prescription or OTC pain relievers as needed      FOLLOW-UP:   As directed or call or return to clinic if these symptoms worsen or fail to improve as anticipated. If at any time you are concerned you may contact the office for further instructions or care.

## 2021-12-14 ENCOUNTER — OFFICE VISIT (OUTPATIENT)
Dept: FAMILY MEDICINE CLINIC | Age: 66
End: 2021-12-14
Payer: MEDICARE

## 2021-12-14 VITALS
BODY MASS INDEX: 50.5 KG/M2 | RESPIRATION RATE: 16 BRPM | SYSTOLIC BLOOD PRESSURE: 124 MMHG | DIASTOLIC BLOOD PRESSURE: 82 MMHG | OXYGEN SATURATION: 98 % | HEIGHT: 63 IN | HEART RATE: 76 BPM | WEIGHT: 285 LBS

## 2021-12-14 DIAGNOSIS — G25.81 RESTLESS LEGS SYNDROME: ICD-10-CM

## 2021-12-14 DIAGNOSIS — E66.01 OBESITY, MORBID, BMI 50 OR HIGHER (HCC): ICD-10-CM

## 2021-12-14 DIAGNOSIS — E78.5 HYPERLIPIDEMIA LDL GOAL <130: ICD-10-CM

## 2021-12-14 DIAGNOSIS — F32.5 MAJOR DEPRESSIVE DISORDER WITH SINGLE EPISODE, IN REMISSION (HCC): ICD-10-CM

## 2021-12-14 DIAGNOSIS — Z00.00 ROUTINE GENERAL MEDICAL EXAMINATION AT A HEALTH CARE FACILITY: Primary | ICD-10-CM

## 2021-12-14 DIAGNOSIS — R73.03 PREDIABETES: ICD-10-CM

## 2021-12-14 DIAGNOSIS — N39.46 MIXED INCONTINENCE: ICD-10-CM

## 2021-12-14 DIAGNOSIS — E03.9 HYPOTHYROIDISM (ACQUIRED): ICD-10-CM

## 2021-12-14 DIAGNOSIS — Z23 NEED FOR PNEUMOCOCCAL VACCINATION: ICD-10-CM

## 2021-12-14 DIAGNOSIS — J45.40 MODERATE PERSISTENT ASTHMA WITHOUT COMPLICATION: ICD-10-CM

## 2021-12-14 PROBLEM — F32.4 MAJOR DEPRESSIVE DISORDER WITH SINGLE EPISODE, IN PARTIAL REMISSION (HCC): Status: ACTIVE | Noted: 2021-12-14

## 2021-12-14 PROBLEM — U07.1 LAB TEST POSITIVE FOR DETECTION OF COVID-19 VIRUS: Status: RESOLVED | Noted: 2021-08-05 | Resolved: 2021-12-14

## 2021-12-14 PROCEDURE — 4040F PNEUMOC VAC/ADMIN/RCVD: CPT | Performed by: FAMILY MEDICINE

## 2021-12-14 PROCEDURE — G8427 DOCREV CUR MEDS BY ELIG CLIN: HCPCS | Performed by: FAMILY MEDICINE

## 2021-12-14 PROCEDURE — 99214 OFFICE O/P EST MOD 30 MIN: CPT | Performed by: FAMILY MEDICINE

## 2021-12-14 PROCEDURE — 1036F TOBACCO NON-USER: CPT | Performed by: FAMILY MEDICINE

## 2021-12-14 PROCEDURE — G8417 CALC BMI ABV UP PARAM F/U: HCPCS | Performed by: FAMILY MEDICINE

## 2021-12-14 PROCEDURE — 0509F URINE INCON PLAN DOCD: CPT | Performed by: FAMILY MEDICINE

## 2021-12-14 PROCEDURE — 1090F PRES/ABSN URINE INCON ASSESS: CPT | Performed by: FAMILY MEDICINE

## 2021-12-14 PROCEDURE — G0009 ADMIN PNEUMOCOCCAL VACCINE: HCPCS | Performed by: FAMILY MEDICINE

## 2021-12-14 PROCEDURE — G0438 PPPS, INITIAL VISIT: HCPCS | Performed by: FAMILY MEDICINE

## 2021-12-14 PROCEDURE — 90732 PPSV23 VACC 2 YRS+ SUBQ/IM: CPT | Performed by: FAMILY MEDICINE

## 2021-12-14 PROCEDURE — 1123F ACP DISCUSS/DSCN MKR DOCD: CPT | Performed by: FAMILY MEDICINE

## 2021-12-14 PROCEDURE — G8482 FLU IMMUNIZE ORDER/ADMIN: HCPCS | Performed by: FAMILY MEDICINE

## 2021-12-14 PROCEDURE — 3017F COLORECTAL CA SCREEN DOC REV: CPT | Performed by: FAMILY MEDICINE

## 2021-12-14 PROCEDURE — G8399 PT W/DXA RESULTS DOCUMENT: HCPCS | Performed by: FAMILY MEDICINE

## 2021-12-14 ASSESSMENT — PATIENT HEALTH QUESTIONNAIRE - PHQ9
SUM OF ALL RESPONSES TO PHQ QUESTIONS 1-9: 2
SUM OF ALL RESPONSES TO PHQ QUESTIONS 1-9: 2
SUM OF ALL RESPONSES TO PHQ9 QUESTIONS 1 & 2: 2
2. FEELING DOWN, DEPRESSED OR HOPELESS: 1
SUM OF ALL RESPONSES TO PHQ QUESTIONS 1-9: 2
1. LITTLE INTEREST OR PLEASURE IN DOING THINGS: 1

## 2021-12-14 ASSESSMENT — LIFESTYLE VARIABLES: HOW OFTEN DO YOU HAVE A DRINK CONTAINING ALCOHOL: 0

## 2021-12-14 NOTE — PROGRESS NOTES
Medicare Annual Wellness Visit  Name: Lucy Mckeon  YOB: 1955  Age: 73 y.o. Sex: female  MRN: <[id number]>     Date of Service:  12/14/2021    Chief Complaint:   Lucy Mckeon is a 73 y.o. female who presents for Medicare Annual Wellness Visit and check-up for:  1. Routine general medical examination at a health care facility    2. Body mass index (BMI) 50.0-59.9, adult ([facility]    3. Moderate persistent asthma without complication    4. Major depressive disorder with single episode, in remission ([facility]    5. Prediabetes    6. Hyperlipidemia LDL goal <130    7. Hypothyroidism (acquired)    8. Obesity, morbid, BMI 50 or higher ([facility]    9. Restless legs syndrome    10. Mixed incontinence- failed tropesium 2017, failed detrol 2016, failed ditropan 2013    11. Need for pneumococcal vaccination      HPI    Chief Complaint   Patient presents with    Annual Exam   Complaints: pt is just a little stressed and depressed with buying a house and going through stuff with her mom but not overwhelmed. Sleep interruption. · Frequent urinary incontinence. · Rare hydrocodone, benzo and rescue inhaler. Review of Systems - unremarable except as noted above    HISTORY:  Patient's medications, allergies, past medical, and social histories were reviewed and updated as appropriate.      CHART REVIEW  Health Maintenance   Topic Date Due    Pneumococcal 65+ years Vaccine (1 of 1 - PPSV23) 10/23/2021    Annual Wellness Visit (AWV)  Never done    Breast cancer screen  01/20/2022    A1C test (Diabetic or Prediabetic)  06/16/2022    Lipid screen  06/16/2022    TSH testing  06/16/2022    DTaP/Tdap/Td vaccine (3 - Td or Tdap) 08/01/2028    Colon cancer screen colonoscopy  03/31/2031    DEXA (modify frequency per FRAX score)  Completed    Flu vaccine  Completed    Shingles Vaccine  Completed    COVID-19 Vaccine  Completed    Hepatitis C screen  Completed    HIV screen  Completed    Hepatitis A vaccine  Aged Out    Hepatitis B vaccine  Aged Out    Hib vaccine  Aged Out    Meningococcal (ACWY) vaccine  Aged Out     The 10-year ASCVD risk score (Henrry Rincon., et al., 2013) is: 5.4%    Values used to calculate the score:      Age: 73 years      Sex: Female      Is Non- : No      Diabetic: No      Tobacco smoker: No      Systolic Blood Pressure: 331 mmHg      Is BP treated: No      HDL Cholesterol: 58 mg/dL      Total Cholesterol: 218 mg/dL  Current Outpatient Medications   Medication Instructions    Acetaminophen (TYLENOL ARTHRITIS PAIN PO) 1 tablet, Oral, 3 TIMES DAILY    albuterol sulfate  (90 Base) MCG/ACT inhaler 2 puffs, Inhalation, EVERY 4 HOURS PRN, May substitute for insurance preferred (Ventolin, Proventil, ProAir)    amoxicillin-clavulanate (AUGMENTIN) 875-125 MG per tablet PATIENT TO TAKE 1 HOUR PRIOR TO PROCEDURE    atorvastatin (LIPITOR) 20 MG tablet TAKE 1 TABLET EVERY EVENING    calcium carbonate (OSCAL) 500 mg, Oral, DAILY    clonazePAM (KLONOPIN) 0.5 mg, Oral, DAILY PRN    diclofenac (VOLTAREN) 75 mg, Oral, 2 TIMES DAILY    DULoxetine (CYMBALTA) 60 MG extended release capsule TAKE 1 CAPSULE DAILY    esomeprazole (NEXIUM) 40 MG delayed release capsule TAKE 1 CAPSULE EVERY       MORNING    FLUoxetine (PROZAC) 10 MG tablet TAKE 1 TABLET DAILY    fluticasone-vilanterol (BREO ELLIPTA) 100-25 MCG/INH AEPB inhaler 1 puff, Inhalation, DAILY    levothyroxine (SYNTHROID) 125 MCG tablet TAKE 1 TABLET DAILY    metFORMIN (GLUCOPHAGE-XR) 500 MG extended release tablet TAKE 2 TABLETS DAILY WITH  BREAKFAST    pramipexole (MIRAPEX) 0.5 MG tablet TAKE 1 TABLET TWICE A DAY    SUMAtriptan (IMITREX) 50 MG tablet Take 1 tablet by mouth once as needed .  May repeat in 2 hours, maximum 200mg per  day    tolterodine (DETROL LA) 4 MG extended release capsule TAKE 1 CAPSULE DAILY    topiramate (TOPAMAX) 100 MG tablet TAKE 1 TABLET AT BEDTIME    vitamin D (ERGOCALCIFEROL) 1.25 MG (48113 UT) CAPS capsule TAKE 1 CAPSULE TWICE WEEKLY      Family History   Problem Relation Age of Onset    Lung Cancer Father     Heart Disease Maternal Grandmother     Tuberculosis Maternal Grandmother     Other Brother      Social History     Tobacco Use    Smoking status: Former Smoker     Packs/day: 2.00     Years: 15.00     Pack years: 30.00     Types: Cigarettes     Quit date: 1989     Years since quittin.5    Smokeless tobacco: Never Used    Tobacco comment: congrats on quitting   Vaping Use    Vaping Use: Never used   Substance Use Topics    Alcohol use: Yes     Alcohol/week: 0.0 standard drinks     Comment: occ.      Drug use: No      Immunization History   Administered Date(s) Administered    COVID-19, Moderna, Primary or Immunocompromised, PF, 100mcg/0.5mL 03/10/2021, 2021    COVID-19, Pfizer, PF, 30mcg/0.3mL 2021    Influenza 2012    Influenza Vaccine, unspecified formulation 2012, 2018    Influenza Virus Vaccine 2018, 10/01/2020    Influenza, Intradermal, Preservative free 2012, 10/24/2013, 10/02/2014, 2015    Influenza, Intradermal, Quadrivalent, Preservative Free 10/26/2016    Influenza, MDCK Quadv, IM, PF (Flucelvax 2 yrs and older) 2019    Influenza, Quadv, IM, (6 mo and older Fluzone, Flulaval, Fluarix and 3 yrs and older Afluria) 2021    Influenza, Quadv, Recombinant, IM PF (Flublok 18 yrs and older) 2018    Pneumococcal Polysaccharide (Zsmucxtgm30) 2008    Tdap (Boostrix, Adacel) 2008, 2018    Zoster Live (Zostavax) 10/26/2016    Zoster Recombinant (Shingrix) 2019, 2020     LAST LABS  Cholesterol, Total   Date Value Ref Range Status   2021 218 (H) 0 - 199 mg/dL Final     LDL Calculated   Date Value Ref Range Status   2021 133 (H) <100 mg/dL Final     HDL   Date Value Ref Range Status   2021 58 40 - 60 mg/dL Final   2012 49 40 - 60 mg/dl Final Triglycerides   Date Value Ref Range Status   06/16/2021 133 0 - 150 mg/dL Final     Lab Results   Component Value Date    GLUCOSE 96 06/16/2021     Lab Results   Component Value Date     06/16/2021    K 4.2 06/16/2021    CREATININE 1.1 06/16/2021     Lab Results   Component Value Date    WBC 6.7 06/16/2021    HGB 11.9 (L) 06/16/2021    HCT 37.1 06/16/2021    MCV 86.9 06/16/2021     06/16/2021     Lab Results   Component Value Date    ALT 18 06/16/2021    AST 14 (L) 06/16/2021    ALKPHOS 147 (H) 06/16/2021    BILITOT 0.4 06/16/2021     TSH (uIU/mL)   Date Value   06/16/2021 4.21 (H)     Lab Results   Component Value Date    LABA1C 5.4 06/16/2021        CareTeam (Including outside providers/suppliers regularly involved in providing care):   Patient Care Team:  Donivan Kehr, MD as PCP - General (Family Medicine)  Donivan Kehr, MD as PCP - [address] [place] [address] [facility]  Samuel Sanders MD as Consulting Physician (Sleep Medicine)    The following problems were reviewed today and where indicated follow up appointments were made and/or referrals ordered.     Positive Risk Factor Screenings with Interventions:      Health Habits/Nutrition:   Health Habits/Nutrition  Do you exercise for at least 20 minutes 2-3 times per week?: Yes, recument bike 3x/wk, house cleaning  Have you lost any weight without trying in the past 3 months?: No  Do you eat only one meal per day?: No  Have you seen the dentist within the past year?: Yes  Body mass index: (!) 50.48  Health Habits/Nutrition Interventions:  · Inadequate physical activity:  encouraged to bike 5x per week  · Nutritional issues:  discussed portion sizes    Hearing/Vision:  No exam data present  Hearing/Vision  Do you or your family notice any trouble with your hearing that hasn't been managed with hearing aids?: (!) Yes  Do you have difficulty driving, watching TV, or doing any of your daily activities because of your eyesight?: (!) Yes  Have you had an eye exam within the past year?: Yes  Hearing/Vision Interventions:  · Hearing concerns:  already has aides  · Vision concerns:  patient encouraged to make appointment with his/her eye specialist            Current Health Maintenance Status  Recommendations for Preventive Services Due: see orders. Recommended screening schedule for the next 5-10 years is provided to the patient in written form: see Patient Instructions/AVS.    PHYSICAL EXAM:  VITALS:  /82 (Site: Right Upper Arm, Position: Sitting, Cuff Size: Large Adult)   Pulse 76   Resp 16   Ht 5' 3\" (1.6 m)   Wt 285 lb (129.3 kg)   SpO2 98%   BMI 50.49 kg/m²   BP Readings from Last 5 Encounters:   12/14/21 124/82   07/09/21 122/84   06/16/21 128/78   03/31/21 114/69   03/31/21 120/60     Wt Readings from Last 5 Encounters:   12/14/21 285 lb (129.3 kg)   11/08/21 280 lb (127 kg)   11/01/21 280 lb (127 kg)   10/07/21 285 lb (129.3 kg)   07/09/21 285 lb 1.6 oz (129.3 kg)   Body mass index is 50.49 kg/m². GENERAL: well-developed, well-nourished, alert, no distress, calm   EYES: negative findings: lids and lashes normal and conjunctivae and sclerae normal  ENT: normal TM's and external ear canals both ears  · External nose and ears appear normal  · Pharynx: normal. Exudates: None  · Lips, mucosa, and tongue normal  · Hearing grossly normal.    NECK: No adenopathy, supple, symmetrical, trachea midline  · Thyroid not enlarged, symmetric, no tenderness/mass/nodules  · no cervical nodes, no supraclavicular nodes  LUNGS:  Breathing unlabored  · clear to auscultation bilaterally and good air movement  CARDIOVASC: regular rate and rhythm, S1, S2 normal   LEGS:  Lower extremity edema: none     No carotid bruits  ABDOMEN: Soft, non-tender, no masses  · No hepatosplenomegaly  · No hernias noted.   Exam limited by body habitus  SKIN: warm and dry  · No rashes or suspicious lesions  PSYCH:  Alert and oriented  · Normal reasoning, insight good  · Facial expressions full, mood for further evaluation. · Mammogram due after 1/20/22. To schedule at a [facility], please call [phone]. · Eat less, move more! Just a little to tip the scale. You can do it! · Consider weight loss counselling after the move. · Consider second opinion on eyes.  [employer] Phone [phone]

## 2021-12-14 NOTE — PATIENT INSTRUCTIONS
preventive dental visit is recommended every 6 months. · Try to get at least 150 minutes of exercise per week or 10,000 steps per day on a pedometer . · Order or download the FREE \"Exercise & Physical Activity: Your Everyday Guide\" from [employer]. Call 8-629.691.2846 or search [employer] online. · You need 7071-6269 mg of calcium and 0072-9265 IU of vitamin D per day. It is possible to meet your calcium requirement with diet alone, but a vitamin D supplement is usually necessary to meet this goal.  · When exposed to the sun, use a sunscreen that protects against both UVA and UVB radiation with an SPF of 30 or greater. Reapply every 2 to 3 hours or after sweating, drying off with a towel, or swimming. · Always wear a seat belt when traveling in a car. Always wear a helmet when riding a bicycle or motorcycle.

## 2021-12-15 RX ORDER — LEVOTHYROXINE SODIUM 0.12 MG/1
TABLET ORAL
Qty: 90 TABLET | Refills: 1 | Status: SHIPPED | OUTPATIENT
Start: 2021-12-15 | End: 2022-05-31

## 2021-12-16 DIAGNOSIS — R73.03 PREDIABETES: ICD-10-CM

## 2021-12-16 DIAGNOSIS — E78.5 HYPERLIPIDEMIA LDL GOAL <130: ICD-10-CM

## 2021-12-16 DIAGNOSIS — E03.9 HYPOTHYROIDISM (ACQUIRED): ICD-10-CM

## 2021-12-16 LAB
CHOLESTEROL, TOTAL: 187 MG/DL (ref 0–199)
ESTIMATED AVERAGE GLUCOSE: 119.8 MG/DL
HBA1C MFR BLD: 5.8 %
HDLC SERPL-MCNC: 65 MG/DL (ref 40–60)
LDL CHOLESTEROL CALCULATED: 102 MG/DL
TRIGL SERPL-MCNC: 98 MG/DL (ref 0–150)
TSH SERPL DL<=0.05 MIU/L-ACNC: 3.55 UIU/ML (ref 0.27–4.2)
VLDLC SERPL CALC-MCNC: 20 MG/DL

## 2022-01-17 RX ORDER — PRAMIPEXOLE DIHYDROCHLORIDE 0.5 MG/1
0.5 TABLET ORAL 2 TIMES DAILY
Qty: 180 TABLET | Refills: 1 | Status: SHIPPED | OUTPATIENT
Start: 2022-01-17 | End: 2022-08-22

## 2022-01-18 ENCOUNTER — PATIENT MESSAGE (OUTPATIENT)
Dept: FAMILY MEDICINE CLINIC | Age: 67
End: 2022-01-18

## 2022-01-18 RX ORDER — DULOXETIN HYDROCHLORIDE 60 MG/1
CAPSULE, DELAYED RELEASE ORAL
Qty: 90 CAPSULE | Refills: 1 | Status: SHIPPED | OUTPATIENT
Start: 2022-01-18 | End: 2022-07-05

## 2022-01-18 NOTE — TELEPHONE ENCOUNTER
From: Conor Sánchez  To: Dr. Pedro Phillips  Sent: 1/18/2022 1:47 PM EST  Subject: Non response from [facility]    I received the below email from [facility] today:    Akash Ledbetter,    We're doing our best to fill your prescription, but we're having trouble reaching your doctor. We cannot process your order without some information from your doctor's office. We tried to contact your doctor but were unsuccessful. We know how important your prescription is to your health and well-being. Please contact your doctor and ask them to call our mail order pharmacy about the below medication. If your doctor has sent in a new prescription, it will take 24-48 hours to process the new prescription.   Rx on hold    View/edit or cancel Rxs    Rx beginning with DU 60MG DR PÉREZ  Rx ####*[id number]  Prescriber: [email or web address]    From the heart,  Your [facility] Team

## 2022-02-02 ENCOUNTER — PATIENT MESSAGE (OUTPATIENT)
Dept: FAMILY MEDICINE CLINIC | Age: 67
End: 2022-02-02

## 2022-02-02 ENCOUNTER — HOSPITAL ENCOUNTER (OUTPATIENT)
Dept: WOMENS IMAGING | Age: 67
Discharge: HOME | End: 2022-02-02
Payer: MEDICARE

## 2022-02-02 DIAGNOSIS — S42.91XK CLOSED FRACTURE OF RIGHT SHOULDER WITH NONUNION, SUBSEQUENT ENCOUNTER: ICD-10-CM

## 2022-02-02 DIAGNOSIS — Z12.31 BREAST CANCER SCREENING BY MAMMOGRAM: ICD-10-CM

## 2022-02-02 PROCEDURE — 77067 SCR MAMMO BI INCL CAD: CPT

## 2022-02-02 RX ORDER — HYDROCODONE BITARTRATE AND ACETAMINOPHEN 5; 325 MG/1; MG/1
1-2 TABLET ORAL EVERY 6 HOURS PRN
Qty: 90 TABLET | Refills: 0 | Status: SHIPPED | OUTPATIENT
Start: 2022-02-02 | End: 2022-06-29 | Stop reason: SDUPTHER

## 2022-02-10 ENCOUNTER — OFFICE VISIT (OUTPATIENT)
Dept: ORTHOPEDIC SURGERY | Age: 67
End: 2022-02-10
Payer: MEDICARE

## 2022-02-10 VITALS — RESPIRATION RATE: 16 BRPM | BODY MASS INDEX: 50.5 KG/M2 | HEIGHT: 63 IN | WEIGHT: 285 LBS

## 2022-02-10 DIAGNOSIS — M17.0 PRIMARY OSTEOARTHRITIS OF BOTH KNEES: Primary | ICD-10-CM

## 2022-02-10 PROCEDURE — 20610 DRAIN/INJ JOINT/BURSA W/O US: CPT | Performed by: PHYSICIAN ASSISTANT

## 2022-02-10 RX ORDER — BUPIVACAINE HYDROCHLORIDE 2.5 MG/ML
2 INJECTION, SOLUTION INFILTRATION; PERINEURAL ONCE
Status: COMPLETED | OUTPATIENT
Start: 2022-02-10 | End: 2022-02-10

## 2022-02-10 RX ORDER — TRIAMCINOLONE ACETONIDE 40 MG/ML
40 INJECTION, SUSPENSION INTRA-ARTICULAR; INTRAMUSCULAR ONCE
Status: COMPLETED | OUTPATIENT
Start: 2022-02-10 | End: 2022-02-10

## 2022-02-10 RX ADMIN — TRIAMCINOLONE ACETONIDE 40 MG: 40 INJECTION, SUSPENSION INTRA-ARTICULAR; INTRAMUSCULAR at 13:30

## 2022-02-10 RX ADMIN — BUPIVACAINE HYDROCHLORIDE 5 MG: 2.5 INJECTION, SOLUTION INFILTRATION; PERINEURAL at 13:29

## 2022-02-10 RX ADMIN — BUPIVACAINE HYDROCHLORIDE 5 MG: 2.5 INJECTION, SOLUTION INFILTRATION; PERINEURAL at 13:28

## 2022-02-12 NOTE — PROGRESS NOTES
This dictation was done with [employer] dictation and may contain mechanical errors related to translation. Resp. rate 16, height 5' 3\" (1.6 m), weight 285 lb (129.3 kg), currently breastfeeding. This patient is here in follow-up for ongoing pain and dysfunction in their knees. They were sent for a standing AP and lateral lateral and a sunrise view of both knees. These x-rays show almost bone-on-bone osteoarthritis in the right medial compartment and moderate osteoarthritis in the left medial compartment. There is osteophyte formation especially through the patellofemoral joint but overall good tracking. No obvious fractures were seen and this was shown to the patient. They currently have had relief with injections of cortisone, anti-inflammatories and a home exercise program. There are here with increased pain over the last few days with swelling and dysfunction. On examination this is a well-developed patient in no acute distress. They are alert and oriented ×3. They walk without antalgia or any significant varus or valgus deformity. They have crepitus during flexion and extension in the patellofemoral joint. They have a negative Lachman and a negative Radha. There are good distal pulses and good dorsiflexion and plantarflexion strength present    My impression is bilateral knee osteoarthritis    After a discussion of the multiple options, they consented to a cortisone shot. 1 ml of 40mg/ml Kenalog and 2 ml's of 0.25%Marcaine were injected into both the right and the left knee joint spaces. The leg was slightly flexed and injected just lateral to the patella tendon to under the patella. This was done with sterile technique and they tolerated it well. During today's visit, there was approximately 20 minutes of face-to-face discussion in regards to the patient's current condition and treatment options. More than 50 % of the time was counseling and coordination of care.       I went through a good stretch and strengthening exercise program. They understand that at some point, they will need a knee replacement.  And they will follow up with us on a when necessary basis over the next 3-6 months

## 2022-02-22 DIAGNOSIS — K21.9 GASTROESOPHAGEAL REFLUX DISEASE WITHOUT ESOPHAGITIS: ICD-10-CM

## 2022-02-23 RX ORDER — ESOMEPRAZOLE MAGNESIUM 40 MG/1
CAPSULE, DELAYED RELEASE ORAL
Qty: 90 CAPSULE | Refills: 1 | Status: SHIPPED | OUTPATIENT
Start: 2022-02-23 | End: 2022-09-12 | Stop reason: SDUPTHER

## 2022-02-23 RX ORDER — TOLTERODINE 4 MG/1
CAPSULE, EXTENDED RELEASE ORAL
Qty: 90 CAPSULE | Refills: 1 | Status: SHIPPED | OUTPATIENT
Start: 2022-02-23 | End: 2022-10-03

## 2022-02-23 RX ORDER — ATORVASTATIN CALCIUM 20 MG/1
TABLET, FILM COATED ORAL
Qty: 90 TABLET | Refills: 1 | Status: SHIPPED | OUTPATIENT
Start: 2022-02-23

## 2022-03-02 DIAGNOSIS — Z96.611 S/P REVERSE TOTAL SHOULDER ARTHROPLASTY, RIGHT: ICD-10-CM

## 2022-03-02 RX ORDER — AMOXICILLIN AND CLAVULANATE POTASSIUM 875; 125 MG/1; MG/1
TABLET, FILM COATED ORAL
Qty: 3 TABLET | Refills: 0 | Status: SHIPPED | OUTPATIENT
Start: 2022-03-02 | End: 2022-03-03 | Stop reason: SDUPTHER

## 2022-03-03 DIAGNOSIS — Z96.611 S/P REVERSE TOTAL SHOULDER ARTHROPLASTY, RIGHT: ICD-10-CM

## 2022-03-03 RX ORDER — AMOXICILLIN AND CLAVULANATE POTASSIUM 875; 125 MG/1; MG/1
TABLET, FILM COATED ORAL
Qty: 3 TABLET | Refills: 0 | Status: SHIPPED | OUTPATIENT
Start: 2022-03-03 | End: 2022-06-16

## 2022-03-08 ENCOUNTER — NURSE TRIAGE (OUTPATIENT)
Dept: OTHER | Facility: CLINIC | Age: 67
End: 2022-03-08

## 2022-03-09 ENCOUNTER — OFFICE VISIT (OUTPATIENT)
Dept: FAMILY MEDICINE CLINIC | Age: 67
End: 2022-03-09
Payer: MEDICARE

## 2022-03-09 ENCOUNTER — HOSPITAL ENCOUNTER (OUTPATIENT)
Age: 67
Discharge: HOME | End: 2022-03-09
Payer: MEDICARE

## 2022-03-09 ENCOUNTER — HOSPITAL ENCOUNTER (OUTPATIENT)
Dept: GENERAL RADIOLOGY | Age: 67
Discharge: HOME | End: 2022-03-09
Payer: MEDICARE

## 2022-03-09 VITALS
DIASTOLIC BLOOD PRESSURE: 70 MMHG | HEIGHT: 63 IN | OXYGEN SATURATION: 99 % | SYSTOLIC BLOOD PRESSURE: 128 MMHG | HEART RATE: 77 BPM | BODY MASS INDEX: 51.91 KG/M2 | RESPIRATION RATE: 18 BRPM | WEIGHT: 293 LBS

## 2022-03-09 DIAGNOSIS — R07.81 RIB PAIN ON RIGHT SIDE: ICD-10-CM

## 2022-03-09 DIAGNOSIS — R07.81 RIB PAIN ON RIGHT SIDE: Primary | ICD-10-CM

## 2022-03-09 PROCEDURE — 99213 OFFICE O/P EST LOW 20 MIN: CPT | Performed by: FAMILY MEDICINE

## 2022-03-09 PROCEDURE — 71100 X-RAY EXAM RIBS UNI 2 VIEWS: CPT

## 2022-03-09 RX ORDER — PREDNISONE 10 MG/1
TABLET ORAL
Qty: 39 TABLET | Refills: 0 | Status: SHIPPED | OUTPATIENT
Start: 2022-03-09 | End: 2022-03-21

## 2022-03-09 NOTE — PATIENT INSTRUCTIONS
INSTRUCTIONS  · Diclofenac OR ibuprofen. · Take prednisone taper (unless fracture is seen)  · If fracture, will change hydrocodone to oxycodone. · Take deep breaths every hour to prevent pneumonia. Patient Education         Broken ribs      Definition    CLICK TO ENLARGE   Broken ribs      A broken rib, or fractured rib, is a common injury that occurs when one of the bones in your rib cage breaks or cracks. The most common cause of broken ribs is trauma to the chest, such as from a fall, motor vehicle accident or impact during contact sports. Many broken ribs are merely cracked. While still painful, cracked ribs aren't as potentially dangerous as ribs that have been broken. In these situations, a jagged piece of bone could damage major blood vessels or internal organs, such as the lungs. In most cases, broken ribs heal on their own in one or two months. Adequate pain control is important, so you can continue to breathe deeply and avoid lung complications, such as pneumonia. Symptoms  Symptoms of a broken rib may include:   Pain when you take a deep breath   Pain that gets worse when you press on the injured area, or when you bend or twist your body   When to see a doctor  See your doctor if you have a very tender spot in your rib area that occurs after trauma, is present with deep breaths or hinders your breathing. If you experience pressure, fullness or a squeezing pain in the center of your chest that lasts for more than a few minutes, pain that extends beyond your chest to your shoulder or arm, and increasing episodes of chest pain, get medical attention immediately. These symptoms may indicate a heart attack. Causes  Broken ribs can be caused by direct impact or repetitive trauma.    Direct impact   Motor vehicle accidents   Falls   Child abuse   Contact sports   Repetitive trauma   Sports such as golf or rowing   Severe and prolonged coughing spells   Risk factors  The following factors can increase your risk of breaking a rib:   Osteoporosis. Having osteoporosis, a disease in which your bones lose their density, makes you more susceptible to a bone fracture. Sports participation. Participating in contact sports, such as hockey or football, increases your risk of trauma to your chest, and trauma increases the risk of rib fractures. Cancerous lesion in a rib. A cancerous lesion can weaken the bone, making it more susceptible to breaks. Complications      Unlike a cracked rib, a completely broken rib can injure blood vessels and internal organs. The risk increases with the number of broken ribs. Complications vary depending on which ribs have been broken. Possible complications include: Torn or punctured aorta. After a complete break in one of the first three ribs at the top of your ribcage, the sharp end of a broken rib could rupture your aorta or another major blood vessel. Punctured lung. The sharp end of a broken middle rib can puncture a lung and cause it to collapse. Lacerated speen, liver or kidneys. The bottom two ribs rarely fracture because they have more flexibility than do the upper and middle ribs, which are anchored to the breastbone. But if you break a lower rib, the broken ends can cause serious damage to your spleen, liver or kidneys. Preparing for your appointment  Because many broken ribs are caused by motor vehicle accidents, you may find out you have a broken rib in a hospital's emergency department. If your broken rib was caused by repetitive stress over time, you may seek advice from your family physician.    What you can do  You may want to write a list that includes:   Detailed descriptions of your symptoms   Information about medical problems you've had   Information about the medical problems of your parents or siblings   All the medications and dietary supplements you take   Questions you want to ask the doctor   Your time with your doctor may be limited, so preparing a list of questions can help you make the most of your time together. For broken ribs, some basic questions to ask your doctor include:   What kinds of tests do I need? Do these tests require any special preparation? How long will I be in pain? What treatments are available and which do you recommend? I have other health conditions. How can I best manage these conditions together? Are there any activity restrictions that I need to follow? Are there any brochures or other printed material that I can take home with me? What websites do you recommend visiting? In addition to the questions that you've prepared to ask your doctor, don't hesitate to ask any additional questions you may think of during your appointment. What to expect from your doctor  Your doctor may ask:   Where exactly does it hurt? When did the pain start? What happened that may explain the pain? ?   Can you do anything to make the pain better or worse? During the physical exam, your doctor will press gently on your ribs. He or she may also listen to your lungs and watch your rib cage move as you breathe. Tests and diagnosis  Your doctor may order one or more of the following imaging tests:   X-ray. Using low levels of radiation, X-rays visualize bone. But X-rays often have problems revealing fresh rib fractures, especially if the bone is merely cracked. X-rays are also useful in diagnosing a collapsed lung. Computerized tomography (CT). CT scans can often uncover rib fractures that X-rays might miss. Injuries to soft tissues and blood vessels are also easier to see on CT scans. This technology takes X-rays from a variety of angles and combines them to depict cross-sectional slices of your body's internal structures. Magnetic reasonance imaging (MRI). MRI scans can be used to look at the soft tissues and organs around the ribs to determine if there is any damage to these structures.  It can also help in the detection of more subtle rib fractures. Instead of X-rays, MRI uses a powerful magnet and radio waves to produce cross-sectional images. Most MRI machines are large, tube-shaped magnets. During the test, you lie on a movable table inside the MRI machine. Bone scan. This technique is good for viewing stress fractures, where a bone is cracked after repetitive trauma -- such as long bouts of coughing. During a bone scan, a small amount of radioactive material is injected into your bloodstream. It collects in the bones, particularly in places where a bone is healing, and is detected by a scanner. Treatments and drugs  Most broken ribs heal on their own within six weeks. Medications  It's important to obtain adequate pain relief because if it hurts too much to breathe deeply, you may develop pneumonia. Over-the-counter drugs. Acetaminophen (Tylenol, others) and nonsteroidal anti-inflammatory drugs (NSAIDs) -- such as ibuprofen (Advil, Motrin, others) and naproxen (Aleve) -- may help relieve discomfort as you wait for the fracture to heal.   Other pain medications. If NSAIDs or acetaminophen don't work well enough, your doctor may prescribe stronger pain medications. Nerve blocks. If the pain is severe, your doctor may suggest injections of long-lasting anesthesia around the nerves that supply the ribs. Therapy  In the past, doctors would use compression wraps -- elastic bandages that you can wrap around your chest -- to help splint and immobilize the area. Compression wraps aren't recommended for broken ribs anymore because they can keep you from taking deep breaths, which can increase the risk of pneumonia. Prevention  The following measures may help you prevent a broken rib:   Protect yourself from athletic injuries. Wear protective equipment when playing contact sports. Take steps to decrease your risk of household falls.  Remove clutter from your floors and clean spills promptly, use a rubber mat in the shower, keep your home well lit, and put skid-proof backing on carpets and area rugs. Decrease your chance of getting osteoporosis. Getting enough calcium in your diet is important for maintaining strong bones. Aim for about 1,000 milligrams of calcium daily from food and supplements.

## 2022-03-09 NOTE — PROGRESS NOTES
PROBLEM VISIT NOTE     Subjective:     Chief Complaint   Patient presents with    Other     Pt said that she pulled and or spined what she thinks may be her rib. Pt said she is having shoulder pain along with it. Derik Soto is a 73 y.o. female who presents   Duration 1 month after moving house, got better, about 1.5 wk ago swinging for golf ball and sudden pain again, pain below right breast  Denies shortness of breath   Worse with cough, deep breath, move  Tried heat, ice, ibu, hydrocodone, nothing helps    There are no preventive care reminders to display for this patient. CHART REVIEW   reports that she quit smoking about 32 years ago. Her smoking use included cigarettes. She has a 30.00 pack-year smoking history. She has never used smokeless tobacco.  There are no preventive care reminders to display for this patient.   Current Outpatient Medications   Medication Instructions    Acetaminophen (TYLENOL ARTHRITIS PAIN PO) 1 tablet, Oral, 3 TIMES DAILY    albuterol sulfate  (90 Base) MCG/ACT inhaler 2 puffs, Inhalation, EVERY 4 HOURS PRN, May substitute for insurance preferred (Ventolin, Proventil, ProAir)    amoxicillin-clavulanate (AUGMENTIN) 875-125 MG per tablet PATIENT TO TAKE 1 HOUR PRIOR TO PROCEDURE    atorvastatin (LIPITOR) 20 MG tablet TAKE 1 TABLET EVERY EVENING    calcium carbonate (OSCAL) 500 mg, Oral, DAILY    clonazePAM (KLONOPIN) 0.5 mg, Oral, DAILY PRN    diclofenac (VOLTAREN) 75 mg, Oral, 2 TIMES DAILY    DULoxetine (CYMBALTA) 60 MG extended release capsule TAKE 1 CAPSULE DAILY    esomeprazole (NEXIUM) 40 MG delayed release capsule TAKE 1 CAPSULE EVERY       MORNING    FLUoxetine (PROZAC) 10 MG tablet TAKE 1 TABLET DAILY    fluticasone-vilanterol (BREO ELLIPTA) 100-25 MCG/INH AEPB inhaler 1 puff, Inhalation, DAILY    levothyroxine (SYNTHROID) 125 MCG tablet TAKE 1 TABLET DAILY    metFORMIN (GLUCOPHAGE-XR) 500 MG extended release tablet TAKE 2 TABLETS DAILY WITH BREAKFAST    pramipexole (MIRAPEX) 0.5 mg, Oral, 2 TIMES DAILY    predniSONE (DELTASONE) 10 MG tablet Take 3 twice a day for 3 days, then 2 twice a day for 3 days, the 1 twice a day for 3 days, then 1 daily for 3 days, then STOP.  SUMAtriptan (IMITREX) 50 MG tablet Take 1 tablet by mouth once as needed . May repeat in 2 hours, maximum 200mg per  day    tolterodine (DETROL LA) 4 MG extended release capsule TAKE 1 CAPSULE DAILY    topiramate (TOPAMAX) 100 MG tablet TAKE 1 TABLET AT BEDTIME    vitamin D (ERGOCALCIFEROL) 1.25 MG (21501 UT) CAPS capsule TAKE 1 CAPSULE TWICE WEEKLY     LAST LABS  LDL Calculated   Date Value Ref Range Status   12/16/2021 102 (H) <100 mg/dL Final     Lab Results   Component Value Date    HDL 65 (H) 12/16/2021     Lab Results   Component Value Date    TRIG 98 12/16/2021     Lab Results   Component Value Date     06/16/2021    K 4.2 06/16/2021    CREATININE 1.1 06/16/2021     Lab Results   Component Value Date    WBC 6.7 06/16/2021    HGB 11.9 (L) 06/16/2021     06/16/2021     Lab Results   Component Value Date    ALT 18 06/16/2021    AST 14 (L) 06/16/2021    ALKPHOS 147 (H) 06/16/2021    BILITOT 0.4 06/16/2021     TSH (uIU/mL)   Date Value   12/16/2021 3.55     Lab Results   Component Value Date    LABA1C 5.8 12/16/2021    LABA1C 5.4 06/16/2021    LABA1C 5.8 12/07/2020     Objective:   PHYSICAL EXAM   /70 (Site: Right Upper Arm, Position: Sitting, Cuff Size: Large Adult)   Pulse 77   Resp 18   Ht 5' 3\" (1.6 m)   Wt 294 lb (133.4 kg)   SpO2 99%   BMI 52.08 kg/m²   BP Readings from Last 5 Encounters:   03/09/22 128/70   12/14/21 124/82   07/09/21 122/84   06/16/21 128/78   03/31/21 114/69     Wt Readings from Last 5 Encounters:   03/09/22 294 lb (133.4 kg)   02/10/22 285 lb (129.3 kg)   12/14/21 285 lb (129.3 kg)   11/08/21 280 lb (127 kg)   11/01/21 280 lb (127 kg)      GENERAL:   · well-developed, well-nourished, alert, no distress.      LUNGS:    · Breathing

## 2022-04-12 DIAGNOSIS — M51.36 DDD (DEGENERATIVE DISC DISEASE), LUMBAR: ICD-10-CM

## 2022-04-13 RX ORDER — DICLOFENAC SODIUM 75 MG/1
75 TABLET, DELAYED RELEASE ORAL 2 TIMES DAILY
Qty: 180 TABLET | Refills: 3 | Status: SHIPPED | OUTPATIENT
Start: 2022-04-13 | End: 2022-07-11

## 2022-04-25 ENCOUNTER — OFFICE VISIT (OUTPATIENT)
Dept: ORTHOPEDIC SURGERY | Age: 67
End: 2022-04-25
Payer: MEDICARE

## 2022-04-25 VITALS — BODY MASS INDEX: 51.91 KG/M2 | HEIGHT: 63 IN | WEIGHT: 293 LBS | RESPIRATION RATE: 16 BRPM

## 2022-04-25 DIAGNOSIS — M70.52 PES ANSERINUS BURSITIS OF BOTH KNEES: Primary | ICD-10-CM

## 2022-04-25 DIAGNOSIS — M70.51 PES ANSERINUS BURSITIS OF BOTH KNEES: Primary | ICD-10-CM

## 2022-04-25 PROCEDURE — 20610 DRAIN/INJ JOINT/BURSA W/O US: CPT | Performed by: PHYSICIAN ASSISTANT

## 2022-04-25 RX ORDER — TRIAMCINOLONE ACETONIDE 40 MG/ML
40 INJECTION, SUSPENSION INTRA-ARTICULAR; INTRAMUSCULAR ONCE
Status: COMPLETED | OUTPATIENT
Start: 2022-04-25 | End: 2022-04-25

## 2022-04-25 RX ORDER — BUPIVACAINE HYDROCHLORIDE 2.5 MG/ML
2 INJECTION, SOLUTION INFILTRATION; PERINEURAL ONCE
Status: COMPLETED | OUTPATIENT
Start: 2022-04-25 | End: 2022-04-25

## 2022-04-25 RX ADMIN — TRIAMCINOLONE ACETONIDE 40 MG: 40 INJECTION, SUSPENSION INTRA-ARTICULAR; INTRAMUSCULAR at 11:11

## 2022-04-25 RX ADMIN — BUPIVACAINE HYDROCHLORIDE 5 MG: 2.5 INJECTION, SOLUTION INFILTRATION; PERINEURAL at 11:11

## 2022-04-25 RX ADMIN — TRIAMCINOLONE ACETONIDE 40 MG: 40 INJECTION, SUSPENSION INTRA-ARTICULAR; INTRAMUSCULAR at 11:12

## 2022-04-27 NOTE — PROGRESS NOTES
This dictation was done with [employer] dictation and may contain mechanical errors related to translation. Resp. rate 16, height 5' 3\" (1.6 m), weight 294 lb (133.4 kg), currently breastfeeding. This is a very pleasant 71-year-old female who has had progressively worsening symptoms with osteoarthritis in both of her knees. She currently sits at a 46 BMI has more pain in her right knee than her left. Recent cortisone shots have been effective for only about 4 to 6 weeks. Today's pain is mostly in the medial side of the knees but is below the joint line and and is related to activities. She says it can be 5 out of 10 pain. The last cortisone shot was done on 2/10/2022 into the joint for the osteoarthritis    On examination this pleasant 71-year-old female who has morbid obesity she is 0 to calf on thigh range of motion with no significant varus or valgus laxity she has diminished quad tone and crepitus during flexion extension through patellofemoral joint she has medial joint line tenderness and below the joint line over the Pez anserine insertion she has a lot of soreness consistent with bursitis. She has intact distal pedis pulse and good dorsiflexion plantarflexion strength. My impression is bilateral knee stable osteoarthritis with acute pes bursitis    With her consent she was injected with 1 cc Kenalog 2 cc of Marcaine into the medial part of the knee at the Pez anserine insertion site just below the joint line.   She tolerated this well we did Ace wrap talked about using pressure when she does activities and we will look to see her in follow-up in about 2 months

## 2022-05-31 RX ORDER — LEVOTHYROXINE SODIUM 0.12 MG/1
TABLET ORAL
Qty: 90 TABLET | Refills: 1 | Status: SHIPPED | OUTPATIENT
Start: 2022-05-31

## 2022-06-13 ENCOUNTER — TELEPHONE (OUTPATIENT)
Dept: FAMILY MEDICINE CLINIC | Age: 67
End: 2022-06-13

## 2022-06-13 NOTE — TELEPHONE ENCOUNTER
Pt transferred from Triage line. Pt is having swelling in legs and feet for the last few weeks. Stated that she was given water pills to take but this time it is not helping. Her right ankle is sweating and moisture around her ankle for a few days now. Offered appt with Bernice López on Wednesday    Stated that she will not be back in town until Thursday and would not be able to be seen until then. Scheduled with Bernice López on Thrusday and advised pt to call back if it gets worse before then.

## 2022-06-16 ENCOUNTER — OFFICE VISIT (OUTPATIENT)
Dept: FAMILY MEDICINE CLINIC | Age: 67
End: 2022-06-16
Payer: MEDICARE

## 2022-06-16 VITALS
DIASTOLIC BLOOD PRESSURE: 88 MMHG | RESPIRATION RATE: 18 BRPM | SYSTOLIC BLOOD PRESSURE: 130 MMHG | HEIGHT: 63 IN | OXYGEN SATURATION: 99 % | WEIGHT: 293 LBS | BODY MASS INDEX: 51.91 KG/M2 | HEART RATE: 82 BPM

## 2022-06-16 DIAGNOSIS — R06.02 SOB (SHORTNESS OF BREATH): ICD-10-CM

## 2022-06-16 DIAGNOSIS — G47.33 OBSTRUCTIVE SLEEP APNEA SYNDROME: ICD-10-CM

## 2022-06-16 DIAGNOSIS — R60.0 BILATERAL EDEMA OF LOWER EXTREMITY: Primary | ICD-10-CM

## 2022-06-16 DIAGNOSIS — R60.0 BILATERAL EDEMA OF LOWER EXTREMITY: ICD-10-CM

## 2022-06-16 LAB
A/G RATIO: 1.9 (ref 1.1–2.2)
ALBUMIN SERPL-MCNC: 4.3 G/DL (ref 3.4–5)
ALP BLD-CCNC: 117 U/L (ref 40–129)
ALT SERPL-CCNC: 12 U/L (ref 10–40)
ANION GAP SERPL CALCULATED.3IONS-SCNC: 15 MMOL/L (ref 3–16)
AST SERPL-CCNC: 12 U/L (ref 15–37)
BASOPHILS ABSOLUTE: 0 K/UL (ref 0–0.2)
BASOPHILS RELATIVE PERCENT: 0.6 %
BILIRUB SERPL-MCNC: 0.4 MG/DL (ref 0–1)
BUN BLDV-MCNC: 21 MG/DL (ref 7–20)
CALCIUM SERPL-MCNC: 9.3 MG/DL (ref 8.3–10.6)
CHLORIDE BLD-SCNC: 109 MMOL/L (ref 99–110)
CO2: 21 MMOL/L (ref 21–32)
CREAT SERPL-MCNC: 1.2 MG/DL (ref 0.6–1.2)
CREATININE URINE: 436.1 MG/DL (ref 28–259)
D DIMER: 0.3 UG/ML FEU (ref 0–0.6)
EOSINOPHILS ABSOLUTE: 0.2 K/UL (ref 0–0.6)
EOSINOPHILS RELATIVE PERCENT: 2.6 %
GFR AFRICAN AMERICAN: 54
GFR NON-AFRICAN AMERICAN: 45
GLUCOSE BLD-MCNC: 86 MG/DL (ref 70–99)
HCT VFR BLD CALC: 34.2 % (ref 36–48)
HEMOGLOBIN: 11.1 G/DL (ref 12–16)
LYMPHOCYTES ABSOLUTE: 1.9 K/UL (ref 1–5.1)
LYMPHOCYTES RELATIVE PERCENT: 22.4 %
MCH RBC QN AUTO: 26.8 PG (ref 26–34)
MCHC RBC AUTO-ENTMCNC: 32.3 G/DL (ref 31–36)
MCV RBC AUTO: 82.9 FL (ref 80–100)
MICROALBUMIN UR-MCNC: 6.4 MG/DL
MICROALBUMIN/CREAT UR-RTO: 14.7 MG/G (ref 0–30)
MONOCYTES ABSOLUTE: 0.4 K/UL (ref 0–1.3)
MONOCYTES RELATIVE PERCENT: 4.9 %
NEUTROPHILS ABSOLUTE: 5.8 K/UL (ref 1.7–7.7)
NEUTROPHILS RELATIVE PERCENT: 69.5 %
PDW BLD-RTO: 17.5 % (ref 12.4–15.4)
PLATELET # BLD: 243 K/UL (ref 135–450)
PMV BLD AUTO: 9.2 FL (ref 5–10.5)
POTASSIUM SERPL-SCNC: 3.6 MMOL/L (ref 3.5–5.1)
PRO-BNP: 67 PG/ML (ref 0–124)
RBC # BLD: 4.13 M/UL (ref 4–5.2)
SODIUM BLD-SCNC: 145 MMOL/L (ref 136–145)
TOTAL PROTEIN: 6.6 G/DL (ref 6.4–8.2)
WBC # BLD: 8.3 K/UL (ref 4–11)

## 2022-06-16 PROCEDURE — 93000 ELECTROCARDIOGRAM COMPLETE: CPT

## 2022-06-16 PROCEDURE — 99214 OFFICE O/P EST MOD 30 MIN: CPT

## 2022-06-16 PROCEDURE — 1123F ACP DISCUSS/DSCN MKR DOCD: CPT

## 2022-06-16 RX ORDER — HYDROCHLOROTHIAZIDE 12.5 MG/1
12.5 CAPSULE, GELATIN COATED ORAL 2 TIMES DAILY
Qty: 90 CAPSULE | Refills: 1 | Status: SHIPPED | OUTPATIENT
Start: 2022-06-16 | End: 2022-07-25

## 2022-06-16 ASSESSMENT — PATIENT HEALTH QUESTIONNAIRE - PHQ9
9. THOUGHTS THAT YOU WOULD BE BETTER OFF DEAD, OR OF HURTING YOURSELF: 0
2. FEELING DOWN, DEPRESSED OR HOPELESS: 1
SUM OF ALL RESPONSES TO PHQ QUESTIONS 1-9: 1
4. FEELING TIRED OR HAVING LITTLE ENERGY: 0
6. FEELING BAD ABOUT YOURSELF - OR THAT YOU ARE A FAILURE OR HAVE LET YOURSELF OR YOUR FAMILY DOWN: 0
8. MOVING OR SPEAKING SO SLOWLY THAT OTHER PEOPLE COULD HAVE NOTICED. OR THE OPPOSITE, BEING SO FIGETY OR RESTLESS THAT YOU HAVE BEEN MOVING AROUND A LOT MORE THAN USUAL: 0
1. LITTLE INTEREST OR PLEASURE IN DOING THINGS: 0
SUM OF ALL RESPONSES TO PHQ9 QUESTIONS 1 & 2: 1
5. POOR APPETITE OR OVEREATING: 0
10. IF YOU CHECKED OFF ANY PROBLEMS, HOW DIFFICULT HAVE THESE PROBLEMS MADE IT FOR YOU TO DO YOUR WORK, TAKE CARE OF THINGS AT HOME, OR GET ALONG WITH OTHER PEOPLE: 0
SUM OF ALL RESPONSES TO PHQ QUESTIONS 1-9: 1
SUM OF ALL RESPONSES TO PHQ QUESTIONS 1-9: 1
3. TROUBLE FALLING OR STAYING ASLEEP: 0
7. TROUBLE CONCENTRATING ON THINGS, SUCH AS READING THE NEWSPAPER OR WATCHING TELEVISION: 0
SUM OF ALL RESPONSES TO PHQ QUESTIONS 1-9: 1

## 2022-06-16 ASSESSMENT — ENCOUNTER SYMPTOMS
WHEEZING: 0
ABDOMINAL PAIN: 0
SHORTNESS OF BREATH: 1
CHEST TIGHTNESS: 0
COUGH: 1

## 2022-06-16 NOTE — PROGRESS NOTES
6/16/2022    This is a 73 y.o. female   Chief Complaint   Patient presents with    Edema     Pt thinks that she is retaining water. Pt said both of her legs and ankles and feet are swelling. Pt is on a water pill that she takes PRN but is not working anymore. Barrie Medina KELTON   Juan Abraham is seen today for increased BLE edema/leaking. She was previously prescribed PRN lasix- she has been taking daily with no relief. She used to wear OTC compression stockings. Legs have become so edematous that she is unable to wear them. She also reports increasing SOB with exertion and now with rest.  New onset cough over the past month. No recent respiratory illness. Has SHALA- does not use CPAP.     Has never seen cardiology or pulmonology     Patient Active Problem List   Diagnosis    Social phobia    Eczema    Hypothyroidism (acquired)    Hyperlipidemia LDL goal <130    GERD (gastroesophageal reflux disease)    IBS (irritable bowel syndrome)    Premature ventricular contractions    Restless legs syndrome    Sleep apnea    Migraine headache    Depression    Vitamin B12 deficiency    Vitamin D deficiency    DDD (degenerative disc disease), lumbar    Allergic Conjunctivitis    Mixed incontinence- failed tropesium 2017, failed detrol 2016, failed ditropan 2013    Moderate persistent asthma without complication    Needs flu shot    Osteopenia DXA -1.7 (10/13), -2.1 (11/16), -1.3 (9/19)    Intertrigo labialis    Obesity, morbid, BMI 50 or higher ([facility])    Chronic thoracic spine pain    Chronic neck pain    Insomnia    Prediabetes    History of sleeve gastrectomy    Status post reverse arthroplasty of right shoulder    Sensorineural hearing loss, bilateral    Tinnitus of both ears    Microcytic anemia    Major depressive disorder with single episode, in remission ([address]          Current Outpatient Medications   Medication Sig Dispense Refill    hydroCHLOROthiazide (MICROZIDE) 12.5 MG capsule Take 1 capsule by mouth 2 times daily 90 capsule 1    levothyroxine (SYNTHROID) 125 MCG tablet TAKE 1 TABLET DAILY 90 tablet 1    diclofenac (VOLTAREN) 75 MG EC tablet Take 1 tablet by mouth 2 times daily 180 tablet 3    esomeprazole (NEXIUM) 40 MG delayed release capsule TAKE 1 CAPSULE EVERY       MORNING 90 capsule 1    tolterodine (DETROL LA) 4 MG extended release capsule TAKE 1 CAPSULE DAILY 90 capsule 1    atorvastatin (LIPITOR) 20 MG tablet TAKE 1 TABLET EVERY EVENING 90 tablet 1    DULoxetine (CYMBALTA) 60 MG extended release capsule TAKE 1 CAPSULE DAILY 90 capsule 1    pramipexole (MIRAPEX) 0.5 MG tablet Take 1 tablet by mouth 2 times daily 180 tablet 1    topiramate (TOPAMAX) 100 MG tablet TAKE 1 TABLET AT BEDTIME 90 tablet 1    metFORMIN (GLUCOPHAGE-XR) 500 MG extended release tablet TAKE 2 TABLETS DAILY WITH  BREAKFAST 180 tablet 1    FLUoxetine (PROZAC) 10 MG tablet TAKE 1 TABLET DAILY 90 tablet 1    SUMAtriptan (IMITREX) 50 MG tablet Take 1 tablet by mouth once as needed . May repeat in 2 hours, maximum 200mg per  day 27 tablet 0    vitamin D (ERGOCALCIFEROL) 1.25 MG (08245 UT) CAPS capsule TAKE 1 CAPSULE TWICE WEEKLY 24 capsule 1    Acetaminophen (TYLENOL ARTHRITIS PAIN PO) Take 1 tablet by mouth 3 times daily      fluticasone-vilanterol (BREO ELLIPTA) 100-25 MCG/INH AEPB inhaler Inhale 1 puff into the lungs daily 3 each 3    albuterol sulfate  (90 Base) MCG/ACT inhaler Inhale 2 puffs into the lungs every 4 hours as needed for Wheezing May substitute for insurance preferred (Ventolin, Proventil, ProAir) 2 Inhaler 3    calcium carbonate (OSCAL) 500 MG TABS tablet Take 500 mg by mouth daily      clonazePAM (KLONOPIN) 0.5 MG tablet Take 0.5 mg by mouth daily as needed. No current facility-administered medications for this visit.        Allergies   Allergen Reactions    Iodine Hives    Contrast [Iodides]      Hives         Review of Systems   Constitutional: Positive for activity change. Negative for fever. Respiratory: Positive for cough and shortness of breath. Negative for chest tightness and wheezing. Cardiovascular: Positive for leg swelling. Negative for chest pain and palpitations. Gastrointestinal: Negative for abdominal pain. Neurological: Negative for dizziness and headaches. Vitals:    22 1349   BP: 130/88   Site: Right Upper Arm   Position: Sitting   Cuff Size: Large Adult   Pulse: 82   Resp: 18   SpO2: 99%   Weight: 293 lb (132.9 kg)   Height: 5' 3\" (1.6 m)       Body mass index is 51.9 kg/m². Wt Readings from Last 3 Encounters:   22 293 lb (132.9 kg)   22 294 lb (133.4 kg)   22 294 lb (133.4 kg)       BP Readings from Last 3 Encounters:   22 130/88   22 128/70   21 124/82       Physical Exam  Vitals reviewed. Constitutional:       General: She is not in acute distress. Appearance: Normal appearance. She is obese. HENT:      Head: Normocephalic and atraumatic. Cardiovascular:      Rate and Rhythm: Normal rate and regular rhythm. Heart sounds: Normal heart sounds. No murmur heard. No friction rub. No gallop. Pulmonary:      Effort: Pulmonary effort is normal. No respiratory distress. Breath sounds: Normal breath sounds. Musculoskeletal:         General: Normal range of motion. Right lower le+ Pitting Edema present. Left lower le+ Pitting Edema present. Skin:     General: Skin is warm and dry. Neurological:      Mental Status: She is oriented to person, place, and time. Psychiatric:         Behavior: Behavior normal.         Thought Content: Thought content normal.         Judgment: Judgment normal.         Assessmentand Plan  Marck Dhillon was seen today for edema.     Diagnoses and all orders for this visit:    Bilateral edema of lower extremity  HCTZ 12.5 mg BID for refractory BLE edema  Labs, EKG and ECHO to r/o CHF and pulmonary HTN  Compression and elevation at rest  - hydroCHLOROthiazide (MICROZIDE) 12.5 MG capsule; Take 1 capsule by mouth 2 times daily  -     Comprehensive Metabolic Panel; Future  -     Echocardiogram complete; Future  -     Microalbumin / Creatinine Urine Ratio  -     CBC with Auto Differential; Future    SOB (shortness of breath)  Concerning for CHF, pulmonary HTN, or possible PE  EKG significant for decreasing R-wave progression -may be secondary to pulmonary disease. Labs ordered for additional work up  -     Echocardiogram complete; Future  -     D-Dimer, Quantitative; Future  -     Brain Natriuretic Peptide; Future  -     EKG 12 Lead    Obstructive sleep apnea syndrome  Lack of compliance with CPAP could be contributing to possible pulmonary HTN and increased BLE edema. Advised to use CPAP. Return in about 2 weeks (around 6/30/2022).

## 2022-06-17 DIAGNOSIS — N18.31 STAGE 3A CHRONIC KIDNEY DISEASE (HCC): ICD-10-CM

## 2022-06-17 DIAGNOSIS — R80.9 PROTEINURIA, UNSPECIFIED TYPE: Primary | ICD-10-CM

## 2022-06-17 RX ORDER — LISINOPRIL 5 MG/1
5 TABLET ORAL DAILY
Qty: 90 TABLET | Refills: 0 | Status: SHIPPED | OUTPATIENT
Start: 2022-06-17 | End: 2022-06-20 | Stop reason: SDUPTHER

## 2022-06-20 ENCOUNTER — TELEPHONE (OUTPATIENT)
Dept: FAMILY MEDICINE CLINIC | Age: 67
End: 2022-06-20

## 2022-06-20 ENCOUNTER — PATIENT MESSAGE (OUTPATIENT)
Dept: FAMILY MEDICINE CLINIC | Age: 67
End: 2022-06-20

## 2022-06-20 DIAGNOSIS — R80.9 PROTEINURIA, UNSPECIFIED TYPE: ICD-10-CM

## 2022-06-20 DIAGNOSIS — N18.31 STAGE 3A CHRONIC KIDNEY DISEASE (HCC): ICD-10-CM

## 2022-06-20 RX ORDER — LISINOPRIL 5 MG/1
5 TABLET ORAL DAILY
Qty: 90 TABLET | Refills: 0 | Status: CANCELLED | OUTPATIENT
Start: 2022-06-20

## 2022-06-20 RX ORDER — LISINOPRIL 5 MG/1
5 TABLET ORAL DAILY
Qty: 90 TABLET | Refills: 0 | Status: SHIPPED | OUTPATIENT
Start: 2022-06-20 | End: 2022-07-27

## 2022-06-20 NOTE — TELEPHONE ENCOUNTER
Patient calling stating [facility] did not receive refill for lisinopril  Called [facility]   Did not receive this   please resend refill request     Please advise

## 2022-07-01 ENCOUNTER — OFFICE VISIT (OUTPATIENT)
Dept: FAMILY MEDICINE CLINIC | Age: 67
End: 2022-07-01
Payer: MEDICARE

## 2022-07-01 VITALS
OXYGEN SATURATION: 98 % | HEIGHT: 63 IN | SYSTOLIC BLOOD PRESSURE: 118 MMHG | DIASTOLIC BLOOD PRESSURE: 60 MMHG | HEART RATE: 81 BPM | BODY MASS INDEX: 51.38 KG/M2 | WEIGHT: 290 LBS

## 2022-07-01 DIAGNOSIS — N18.31 STAGE 3A CHRONIC KIDNEY DISEASE (HCC): ICD-10-CM

## 2022-07-01 DIAGNOSIS — N18.31 STAGE 3A CHRONIC KIDNEY DISEASE (HCC): Primary | ICD-10-CM

## 2022-07-01 DIAGNOSIS — R06.02 SOB (SHORTNESS OF BREATH): ICD-10-CM

## 2022-07-01 DIAGNOSIS — R60.0 BILATERAL EDEMA OF LOWER EXTREMITY: ICD-10-CM

## 2022-07-01 DIAGNOSIS — R73.03 PREDIABETES: ICD-10-CM

## 2022-07-01 LAB
ALBUMIN SERPL-MCNC: 4.4 G/DL (ref 3.4–5)
ANION GAP SERPL CALCULATED.3IONS-SCNC: 17 MMOL/L (ref 3–16)
BUN BLDV-MCNC: 29 MG/DL (ref 7–20)
CALCIUM SERPL-MCNC: 9.5 MG/DL (ref 8.3–10.6)
CHLORIDE BLD-SCNC: 103 MMOL/L (ref 99–110)
CO2: 23 MMOL/L (ref 21–32)
CREAT SERPL-MCNC: 1.6 MG/DL (ref 0.6–1.2)
GFR AFRICAN AMERICAN: 39
GFR NON-AFRICAN AMERICAN: 32
GLUCOSE BLD-MCNC: 84 MG/DL (ref 70–99)
PHOSPHORUS: 3.8 MG/DL (ref 2.5–4.9)
POTASSIUM SERPL-SCNC: 4 MMOL/L (ref 3.5–5.1)
SODIUM BLD-SCNC: 143 MMOL/L (ref 136–145)

## 2022-07-01 PROCEDURE — 99214 OFFICE O/P EST MOD 30 MIN: CPT

## 2022-07-01 PROCEDURE — 1123F ACP DISCUSS/DSCN MKR DOCD: CPT

## 2022-07-01 ASSESSMENT — ENCOUNTER SYMPTOMS
SHORTNESS OF BREATH: 0
ABDOMINAL PAIN: 0
COUGH: 1

## 2022-07-01 NOTE — PROGRESS NOTES
7/1/2022    This is a 73 y.o. female   Chief Complaint   Patient presents with    Follow-up     f/u to go over test results and address kidney function   . HPI   Erinn Guerrero is here today for follow up on BLE edema and decreased kidney function. She was started on HCTZ 12.5mg BID about 2 weeks ago. Edema has greatly improved- she feels that her legs are back to baseline. She also reports improvement of SOB since HCTZ started and extra fluid has been removed. She still has a dry cough with exertion that improves with albuterol. She would like to stop her metformin. Overall, she feels like she is much improved. Patient Active Problem List   Diagnosis    Social phobia    Eczema    Hypothyroidism (acquired)    Hyperlipidemia LDL goal <130    GERD (gastroesophageal reflux disease)    IBS (irritable bowel syndrome)    Premature ventricular contractions    Restless legs syndrome    Sleep apnea    Migraine headache    Depression    Vitamin B12 deficiency    Vitamin D deficiency    DDD (degenerative disc disease), lumbar    Allergic Conjunctivitis    Mixed incontinence- failed tropesium 2017, failed detrol 2016, failed ditropan 2013    Moderate persistent asthma without complication    Needs flu shot    Osteopenia DXA -1.7 (10/13), -2.1 (11/16), -1.3 (9/19)    Intertrigo labialis    Obesity, morbid, BMI 50 or higher ([facility])    Chronic thoracic spine pain    Chronic neck pain    Insomnia    Prediabetes    History of sleeve gastrectomy    Status post reverse arthroplasty of right shoulder    Sensorineural hearing loss, bilateral    Tinnitus of both ears    Microcytic anemia    Major depressive disorder with single episode, in remission ([facility] [address]          Current Outpatient Medications   Medication Sig Dispense Refill    HYDROcodone-acetaminophen (NORCO) 5-325 MG per tablet Take 1-2 tablets by mouth every 6 hours as needed for Pain for up to 30 days.  90 tablet 0    lisinopril (PRINIVIL;ZESTRIL) 5 MG tablet Take 1 tablet by mouth daily 90 tablet 0    hydroCHLOROthiazide (MICROZIDE) 12.5 MG capsule Take 1 capsule by mouth 2 times daily 90 capsule 1    levothyroxine (SYNTHROID) 125 MCG tablet TAKE 1 TABLET DAILY 90 tablet 1    diclofenac (VOLTAREN) 75 MG EC tablet Take 1 tablet by mouth 2 times daily 180 tablet 3    esomeprazole (NEXIUM) 40 MG delayed release capsule TAKE 1 CAPSULE EVERY       MORNING 90 capsule 1    tolterodine (DETROL LA) 4 MG extended release capsule TAKE 1 CAPSULE DAILY 90 capsule 1    atorvastatin (LIPITOR) 20 MG tablet TAKE 1 TABLET EVERY EVENING 90 tablet 1    DULoxetine (CYMBALTA) 60 MG extended release capsule TAKE 1 CAPSULE DAILY 90 capsule 1    pramipexole (MIRAPEX) 0.5 MG tablet Take 1 tablet by mouth 2 times daily 180 tablet 1    topiramate (TOPAMAX) 100 MG tablet TAKE 1 TABLET AT BEDTIME 90 tablet 1    FLUoxetine (PROZAC) 10 MG tablet TAKE 1 TABLET DAILY 90 tablet 1    Acetaminophen (TYLENOL ARTHRITIS PAIN PO) Take 1 tablet by mouth 3 times daily      fluticasone-vilanterol (BREO ELLIPTA) 100-25 MCG/INH AEPB inhaler Inhale 1 puff into the lungs daily 3 each 3    albuterol sulfate  (90 Base) MCG/ACT inhaler Inhale 2 puffs into the lungs every 4 hours as needed for Wheezing May substitute for insurance preferred (Ventolin, Proventil, ProAir) 2 Inhaler 3    calcium carbonate (OSCAL) 500 MG TABS tablet Take 500 mg by mouth daily      clonazePAM (KLONOPIN) 0.5 MG tablet Take 0.5 mg by mouth daily as needed.  SUMAtriptan (IMITREX) 50 MG tablet Take 1 tablet by mouth once as needed . May repeat in 2 hours, maximum 200mg per  day (Patient not taking: Reported on 7/1/2022) 27 tablet 0     No current facility-administered medications for this visit. Allergies   Allergen Reactions    Iodine Hives    Contrast [Iodides]      Hives         Review of Systems   Constitutional: Negative for activity change and fever.    Respiratory: Positive for cough. Negative for shortness of breath. Cardiovascular: Positive for leg swelling (improving). Negative for chest pain and palpitations. Gastrointestinal: Negative for abdominal pain. Neurological: Negative for dizziness and headaches. Vitals:    07/01/22 1109   BP: 118/60   Site: Left Upper Arm   Position: Sitting   Cuff Size: Large Adult   Pulse: 81   SpO2: 98%   Weight: 290 lb (131.5 kg)   Height: 5' 3\" (1.6 m)       Body mass index is 51.37 kg/m². Wt Readings from Last 3 Encounters:   07/01/22 290 lb (131.5 kg)   06/16/22 293 lb (132.9 kg)   04/25/22 294 lb (133.4 kg)       BP Readings from Last 3 Encounters:   07/01/22 118/60   06/16/22 130/88   03/09/22 128/70       Physical Exam  Vitals reviewed. Constitutional:       General: She is not in acute distress. Appearance: Normal appearance. HENT:      Head: Normocephalic and atraumatic. Cardiovascular:      Rate and Rhythm: Normal rate and regular rhythm. Heart sounds: Normal heart sounds. No murmur heard. No friction rub. No gallop. Pulmonary:      Effort: Pulmonary effort is normal. No respiratory distress. Breath sounds: Normal breath sounds. Musculoskeletal:         General: Normal range of motion. Right lower leg: No tenderness. 2+ Edema present. Left lower leg: No tenderness. 2+ Edema present. Right ankle: No tenderness. Normal pulse. Left ankle: No tenderness. Normal pulse. Right foot: Normal capillary refill. Normal pulse. Left foot: Normal capillary refill. Normal pulse. Skin:     General: Skin is warm and dry. Neurological:      Mental Status: She is oriented to person, place, and time. Psychiatric:         Behavior: Behavior normal.         Thought Content: Thought content normal.         Judgment: Judgment normal.         Assessmentand Plan  Eddie River was seen today for follow-up.     Diagnoses and all orders for this visit:    Stage 3a chronic kidney disease ([facility] [address]  - Renal Function Panel; Future  Will re-check labs today. Continue lisinopril  Discontinue metformin    Prediabetes  Well controlled- HgbA1C has consistently been <6% for past few years. OK to stop metformin and work on diet, exercise and weight loss    Bilateral edema of lower extremity  Much improved  Continue HCTZ  Continue with ECHO as scheduled    SOB (Shortness of Breath)  Continue with scheduled ECHO    Return in about 3 months (around 10/1/2022) for kidney function, review ECHO.

## 2022-07-05 DIAGNOSIS — N18.32 STAGE 3B CHRONIC KIDNEY DISEASE (HCC): Primary | ICD-10-CM

## 2022-07-05 RX ORDER — DULOXETIN HYDROCHLORIDE 60 MG/1
CAPSULE, DELAYED RELEASE ORAL
Qty: 90 CAPSULE | Refills: 1 | Status: SHIPPED | OUTPATIENT
Start: 2022-07-05

## 2022-07-15 ENCOUNTER — PATIENT MESSAGE (OUTPATIENT)
Dept: FAMILY MEDICINE CLINIC | Age: 67
End: 2022-07-15

## 2022-07-15 DIAGNOSIS — R80.9 PROTEINURIA, UNSPECIFIED TYPE: ICD-10-CM

## 2022-07-15 DIAGNOSIS — N18.32 STAGE 3B CHRONIC KIDNEY DISEASE (HCC): Primary | ICD-10-CM

## 2022-07-15 RX ORDER — LOSARTAN POTASSIUM 25 MG/1
25 TABLET ORAL DAILY
Qty: 30 TABLET | Refills: 5 | Status: SHIPPED | OUTPATIENT
Start: 2022-07-15 | End: 2022-07-27

## 2022-07-23 DIAGNOSIS — R60.0 BILATERAL EDEMA OF LOWER EXTREMITY: ICD-10-CM

## 2022-07-25 RX ORDER — HYDROCHLOROTHIAZIDE 12.5 MG/1
CAPSULE, GELATIN COATED ORAL
Qty: 180 CAPSULE | Refills: 0 | Status: SHIPPED | OUTPATIENT
Start: 2022-07-25 | End: 2022-07-28

## 2022-07-27 ENCOUNTER — OFFICE VISIT (OUTPATIENT)
Dept: FAMILY MEDICINE CLINIC | Age: 67
End: 2022-07-27
Payer: MEDICARE

## 2022-07-27 VITALS
TEMPERATURE: 98.2 F | SYSTOLIC BLOOD PRESSURE: 104 MMHG | WEIGHT: 287 LBS | RESPIRATION RATE: 16 BRPM | DIASTOLIC BLOOD PRESSURE: 62 MMHG | BODY MASS INDEX: 50.85 KG/M2 | HEIGHT: 63 IN | OXYGEN SATURATION: 97 % | HEART RATE: 83 BPM

## 2022-07-27 DIAGNOSIS — N18.32 STAGE 3B CHRONIC KIDNEY DISEASE (HCC): ICD-10-CM

## 2022-07-27 DIAGNOSIS — E03.4 HYPOTHYROIDISM DUE TO ACQUIRED ATROPHY OF THYROID: ICD-10-CM

## 2022-07-27 DIAGNOSIS — R80.9 PROTEINURIA, UNSPECIFIED TYPE: ICD-10-CM

## 2022-07-27 DIAGNOSIS — R23.3 PETECHIAE: ICD-10-CM

## 2022-07-27 DIAGNOSIS — D64.9 ANEMIA, UNSPECIFIED TYPE: ICD-10-CM

## 2022-07-27 DIAGNOSIS — R23.3 PETECHIAE: Primary | ICD-10-CM

## 2022-07-27 DIAGNOSIS — R23.3 EASY BRUISING: ICD-10-CM

## 2022-07-27 DIAGNOSIS — R05.8 ALLERGIC COUGH: ICD-10-CM

## 2022-07-27 DIAGNOSIS — E87.6 HYPOKALEMIA: Primary | ICD-10-CM

## 2022-07-27 LAB
BASOPHILS ABSOLUTE: 0.1 K/UL (ref 0–0.2)
BASOPHILS RELATIVE PERCENT: 1.2 %
EOSINOPHILS ABSOLUTE: 0.2 K/UL (ref 0–0.6)
EOSINOPHILS RELATIVE PERCENT: 2.3 %
HCT VFR BLD CALC: 32.5 % (ref 36–48)
HEMOGLOBIN: 10.9 G/DL (ref 12–16)
LYMPHOCYTES ABSOLUTE: 1.8 K/UL (ref 1–5.1)
LYMPHOCYTES RELATIVE PERCENT: 21.2 %
MCH RBC QN AUTO: 28.1 PG (ref 26–34)
MCHC RBC AUTO-ENTMCNC: 33.7 G/DL (ref 31–36)
MCV RBC AUTO: 83.5 FL (ref 80–100)
MONOCYTES ABSOLUTE: 0.5 K/UL (ref 0–1.3)
MONOCYTES RELATIVE PERCENT: 5.4 %
NEUTROPHILS ABSOLUTE: 5.8 K/UL (ref 1.7–7.7)
NEUTROPHILS RELATIVE PERCENT: 69.9 %
PDW BLD-RTO: 16.9 % (ref 12.4–15.4)
PLATELET # BLD: 233 K/UL (ref 135–450)
PMV BLD AUTO: 9.5 FL (ref 5–10.5)
RBC # BLD: 3.89 M/UL (ref 4–5.2)
WBC # BLD: 8.3 K/UL (ref 4–11)

## 2022-07-27 PROCEDURE — 1123F ACP DISCUSS/DSCN MKR DOCD: CPT

## 2022-07-27 PROCEDURE — 99214 OFFICE O/P EST MOD 30 MIN: CPT

## 2022-07-27 RX ORDER — LOSARTAN POTASSIUM 25 MG/1
12.5 TABLET ORAL DAILY
Qty: 30 TABLET | Refills: 5 | Status: SHIPPED | OUTPATIENT
Start: 2022-07-27

## 2022-07-27 RX ORDER — CETIRIZINE HYDROCHLORIDE 10 MG/1
10 TABLET ORAL DAILY
Qty: 30 TABLET | Refills: 0 | COMMUNITY
Start: 2022-07-27 | End: 2022-08-26

## 2022-07-27 RX ORDER — FLUTICASONE PROPIONATE 50 MCG
2 SPRAY, SUSPENSION (ML) NASAL DAILY
Qty: 16 G | Refills: 11 | COMMUNITY
Start: 2022-07-27

## 2022-07-27 ASSESSMENT — ENCOUNTER SYMPTOMS
COLOR CHANGE: 1
SHORTNESS OF BREATH: 0
ABDOMINAL PAIN: 0
COUGH: 1

## 2022-07-27 NOTE — PROGRESS NOTES
7/27/2022    This is a 73 y.o. female   Chief Complaint   Patient presents with    Rash     Red spots on both arms. X3 weeks. No known injury. Large spot on lt arm started on Sunday. Claribel MELARA  Has noticed little red spots over the past few months. Has one larger spot that she developed a few weeks ago  No pain  No injury  Does not itch  Bruises easily, especially on her legs- been going on for a couple years    Has been wearing her CPAP  Cough subsided with switching from ACE to ARB, cough has returned. Has some PND. Not taking any OTC allergy medications or nasal spray    BP has been running lower 99/47 has been the lowest.    Feeling fatigued lately     Patient Active Problem List   Diagnosis    Social phobia    Eczema    Hypothyroidism (acquired)    Hyperlipidemia LDL goal <130    GERD (gastroesophageal reflux disease)    IBS (irritable bowel syndrome)    Premature ventricular contractions    Restless legs syndrome    Sleep apnea    Migraine headache    Depression    Vitamin B12 deficiency    Vitamin D deficiency    DDD (degenerative disc disease), lumbar    Allergic Conjunctivitis    Mixed incontinence- failed tropesium 2017, failed detrol 2016, failed ditropan 2013    Moderate persistent asthma without complication    Needs flu shot    Osteopenia DXA -1.7 (10/13), -2.1 (11/16), -1.3 (9/19)    Intertrigo labialis    Obesity, morbid, BMI 50 or higher ([facility])    Chronic thoracic spine pain    Chronic neck pain    Insomnia    Prediabetes    History of sleeve gastrectomy    Status post reverse arthroplasty of right shoulder    Sensorineural hearing loss, bilateral    Tinnitus of both ears    Microcytic anemia    Major depressive disorder with single episode, in remission ([facility] [address]          Current Outpatient Medications   Medication Sig Dispense Refill    cetirizine (ZYRTEC) 10 MG tablet Take 1 tablet by mouth in the morning.  30 tablet 0    fluticasone (FLONASE) 50 MCG/ACT nasal spray 2 sprays by Nasal route in the morning. For nasal allergy symptoms. . 16 g 11    losartan (COZAAR) 25 MG tablet Take 0.5 tablets by mouth in the morning. 30 tablet 5    hydroCHLOROthiazide (MICROZIDE) 12.5 MG capsule TAKE 1 CAPSULE BY MOUTH TWICE A  capsule 0    DULoxetine (CYMBALTA) 60 MG extended release capsule TAKE 1 CAPSULE DAILY 90 capsule 1    HYDROcodone-acetaminophen (NORCO) 5-325 MG per tablet Take 1-2 tablets by mouth every 6 hours as needed for Pain for up to 30 days. 90 tablet 0    levothyroxine (SYNTHROID) 125 MCG tablet TAKE 1 TABLET DAILY 90 tablet 1    esomeprazole (NEXIUM) 40 MG delayed release capsule TAKE 1 CAPSULE EVERY       MORNING 90 capsule 1    tolterodine (DETROL LA) 4 MG extended release capsule TAKE 1 CAPSULE DAILY 90 capsule 1    atorvastatin (LIPITOR) 20 MG tablet TAKE 1 TABLET EVERY EVENING 90 tablet 1    pramipexole (MIRAPEX) 0.5 MG tablet Take 1 tablet by mouth 2 times daily 180 tablet 1    topiramate (TOPAMAX) 100 MG tablet TAKE 1 TABLET AT BEDTIME 90 tablet 1    FLUoxetine (PROZAC) 10 MG tablet TAKE 1 TABLET DAILY 90 tablet 1    SUMAtriptan (IMITREX) 50 MG tablet Take 1 tablet by mouth once as needed . May repeat in 2 hours, maximum 200mg per  day (Patient taking differently: Take 1 tablet by mouth once as needed . May repeat in 2 hours, maximum 200mg per  day) 27 tablet 0    Acetaminophen (TYLENOL ARTHRITIS PAIN PO) Take 1 tablet by mouth 3 times daily      fluticasone-vilanterol (BREO ELLIPTA) 100-25 MCG/INH AEPB inhaler Inhale 1 puff into the lungs daily 3 each 3    calcium carbonate (OSCAL) 500 MG TABS tablet Take 500 mg by mouth daily      clonazePAM (KLONOPIN) 0.5 MG tablet Take 0.5 mg by mouth daily as needed. albuterol sulfate  (90 Base) MCG/ACT inhaler Inhale 2 puffs into the lungs every 4 hours as needed for Wheezing May substitute for insurance preferred (Ventolin, Proventil, ProAir) 2 Inhaler 3     No current facility-administered medications for this visit.        Allergies Allergen Reactions    Iodine Hives    Contrast [Iodides]      Hives         Review of Systems   Constitutional:  Positive for fatigue. Negative for activity change and fever. HENT:  Positive for postnasal drip. Respiratory:  Positive for cough. Negative for shortness of breath. Cardiovascular:  Negative for chest pain, palpitations and leg swelling. Gastrointestinal:  Negative for abdominal pain. Skin:  Positive for color change. Neurological:  Negative for dizziness and headaches. Hematological:  Bruises/bleeds easily. Vitals:    07/27/22 1459   BP: 104/62   Site: Left Lower Arm   Position: Sitting   Cuff Size: Large Adult   Pulse: 83   Resp: 16   Temp: 98.2 °F (36.8 °C)   TempSrc: Oral   SpO2: 97%   Weight: 287 lb (130.2 kg)   Height: 5' 3\" (1.6 m)       Body mass index is 50.84 kg/m². Wt Readings from Last 3 Encounters:   07/27/22 287 lb (130.2 kg)   07/01/22 290 lb (131.5 kg)   06/16/22 293 lb (132.9 kg)       BP Readings from Last 3 Encounters:   07/27/22 104/62   07/01/22 118/60   06/16/22 130/88       Physical Exam  Vitals reviewed. Constitutional:       General: She is not in acute distress. Appearance: Normal appearance. She is obese. HENT:      Head: Normocephalic and atraumatic. Cardiovascular:      Rate and Rhythm: Normal rate and regular rhythm. Heart sounds: Normal heart sounds. No murmur heard. No friction rub. No gallop. Pulmonary:      Effort: Pulmonary effort is normal. No respiratory distress. Breath sounds: Normal breath sounds. Musculoskeletal:         General: Normal range of motion. Right lower leg: No edema. Left lower leg: No edema. Skin:     General: Skin is warm and dry. Findings: Petechiae present. Comments: Petechiae noted to bilateral forearms   Neurological:      Mental Status: She is oriented to person, place, and time. Psychiatric:         Behavior: Behavior normal.         Thought Content:  Thought content normal.         Judgment: Judgment normal.       Assessmentand Plan  Linda Cat was seen today for rash. Diagnoses and all orders for this visit:    Petechiae  -     APTT; Future  -     CBC with Auto Differential; Future  -     Protime-INR; Future    Easy bruising  -     APTT; Future  -     CBC with Auto Differential; Future  -     Protime-INR; Future  Will get blood counts and clotting studies  Further plan pending lab work    Allergic cough  -     cetirizine (ZYRTEC) 10 MG tablet; Take 1 tablet by mouth in the morning.  -     fluticasone (FLONASE) 50 MCG/ACT nasal spray; 2 sprays by Nasal route in the morning. For nasal allergy symptoms. .  Trial cetirizine and flonase    Stage 3b chronic kidney disease (HCC)  -     losartan (COZAAR) 25 MG tablet; Take 0.5 tablets by mouth in the morning.  -     Basic Metabolic Panel; Future  Will check kidney function today  Decrease losartan to 1/2 tab daily due to low BP    Proteinuria, unspecified type  -     losartan (COZAAR) 25 MG tablet; Take 0.5 tablets by mouth in the morning.  -     Basic Metabolic Panel; Future  Will check kidney function today  Decrease losartan to 1/2 tab daily due to low BP    Hypothyroidism due to acquired atrophy of thyroid  -     TSH; Future  Fatigue could be due to thyroid. Will get thyroid levels drawn todau    Return in 2 months (on 10/3/2022).

## 2022-07-28 LAB
ANION GAP SERPL CALCULATED.3IONS-SCNC: 20 MMOL/L (ref 3–16)
APTT: 30.6 SEC (ref 23–34.3)
BUN BLDV-MCNC: 28 MG/DL (ref 7–20)
CALCIUM SERPL-MCNC: 9.5 MG/DL (ref 8.3–10.6)
CHLORIDE BLD-SCNC: 101 MMOL/L (ref 99–110)
CO2: 23 MMOL/L (ref 21–32)
CREAT SERPL-MCNC: 1.4 MG/DL (ref 0.6–1.2)
GFR AFRICAN AMERICAN: 46
GFR NON-AFRICAN AMERICAN: 38
GLUCOSE BLD-MCNC: 112 MG/DL (ref 70–99)
INR BLD: 1.07 (ref 0.87–1.14)
POTASSIUM SERPL-SCNC: 3 MMOL/L (ref 3.5–5.1)
PROTHROMBIN TIME: 13.8 SEC (ref 11.7–14.5)
SODIUM BLD-SCNC: 144 MMOL/L (ref 136–145)
TSH SERPL DL<=0.05 MIU/L-ACNC: 0.68 UIU/ML (ref 0.27–4.2)

## 2022-07-28 RX ORDER — POTASSIUM CHLORIDE 20 MEQ/1
TABLET, EXTENDED RELEASE ORAL
Qty: 12 TABLET | Refills: 0 | Status: SHIPPED | OUTPATIENT
Start: 2022-07-28 | End: 2022-08-22 | Stop reason: ALTCHOICE

## 2022-08-01 DIAGNOSIS — E87.6 HYPOKALEMIA: ICD-10-CM

## 2022-08-01 DIAGNOSIS — D64.9 ANEMIA, UNSPECIFIED TYPE: ICD-10-CM

## 2022-08-01 LAB
ANION GAP SERPL CALCULATED.3IONS-SCNC: 16 MMOL/L (ref 3–16)
BASOPHILS ABSOLUTE: 0.1 K/UL (ref 0–0.2)
BASOPHILS RELATIVE PERCENT: 0.8 %
BUN BLDV-MCNC: 25 MG/DL (ref 7–20)
CALCIUM SERPL-MCNC: 9.6 MG/DL (ref 8.3–10.6)
CHLORIDE BLD-SCNC: 100 MMOL/L (ref 99–110)
CO2: 25 MMOL/L (ref 21–32)
CREAT SERPL-MCNC: 1.3 MG/DL (ref 0.6–1.2)
EOSINOPHILS ABSOLUTE: 0.2 K/UL (ref 0–0.6)
EOSINOPHILS RELATIVE PERCENT: 2.3 %
FERRITIN: 31 NG/ML (ref 15–150)
FOLATE: 9.02 NG/ML (ref 4.78–24.2)
GFR AFRICAN AMERICAN: 50
GFR NON-AFRICAN AMERICAN: 41
GLUCOSE BLD-MCNC: 88 MG/DL (ref 70–99)
HCT VFR BLD CALC: 33 % (ref 36–48)
HEMOGLOBIN: 10.8 G/DL (ref 12–16)
IRON SATURATION: 17 % (ref 15–50)
IRON: 66 UG/DL (ref 37–145)
LYMPHOCYTES ABSOLUTE: 1.7 K/UL (ref 1–5.1)
LYMPHOCYTES RELATIVE PERCENT: 21.6 %
MCH RBC QN AUTO: 27.7 PG (ref 26–34)
MCHC RBC AUTO-ENTMCNC: 32.6 G/DL (ref 31–36)
MCV RBC AUTO: 84.8 FL (ref 80–100)
MONOCYTES ABSOLUTE: 0.4 K/UL (ref 0–1.3)
MONOCYTES RELATIVE PERCENT: 5.3 %
NEUTROPHILS ABSOLUTE: 5.4 K/UL (ref 1.7–7.7)
NEUTROPHILS RELATIVE PERCENT: 70 %
PDW BLD-RTO: 16.9 % (ref 12.4–15.4)
PLATELET # BLD: 242 K/UL (ref 135–450)
PMV BLD AUTO: 9 FL (ref 5–10.5)
POTASSIUM SERPL-SCNC: 3.6 MMOL/L (ref 3.5–5.1)
RBC # BLD: 3.9 M/UL (ref 4–5.2)
SODIUM BLD-SCNC: 141 MMOL/L (ref 136–145)
TOTAL IRON BINDING CAPACITY: 378 UG/DL (ref 260–445)
VITAMIN B-12: 254 PG/ML (ref 211–911)
WBC # BLD: 7.6 K/UL (ref 4–11)

## 2022-08-03 PROBLEM — E87.6 HYPOKALEMIA: Status: ACTIVE | Noted: 2022-08-03

## 2022-08-04 ENCOUNTER — OFFICE VISIT (OUTPATIENT)
Dept: ORTHOPEDIC SURGERY | Age: 67
End: 2022-08-04
Payer: MEDICARE

## 2022-08-04 DIAGNOSIS — M70.52 PES ANSERINUS BURSITIS OF BOTH KNEES: ICD-10-CM

## 2022-08-04 DIAGNOSIS — M17.0 PRIMARY OSTEOARTHRITIS OF BOTH KNEES: Primary | ICD-10-CM

## 2022-08-04 DIAGNOSIS — M70.51 PES ANSERINUS BURSITIS OF BOTH KNEES: ICD-10-CM

## 2022-08-04 PROCEDURE — 20610 DRAIN/INJ JOINT/BURSA W/O US: CPT | Performed by: PHYSICIAN ASSISTANT

## 2022-08-04 RX ORDER — BUPIVACAINE HYDROCHLORIDE 2.5 MG/ML
2 INJECTION, SOLUTION INFILTRATION; PERINEURAL ONCE
Status: COMPLETED | OUTPATIENT
Start: 2022-08-04 | End: 2022-08-04

## 2022-08-04 RX ORDER — TRIAMCINOLONE ACETONIDE 40 MG/ML
40 INJECTION, SUSPENSION INTRA-ARTICULAR; INTRAMUSCULAR ONCE
Status: COMPLETED | OUTPATIENT
Start: 2022-08-04 | End: 2022-08-04

## 2022-08-04 RX ADMIN — BUPIVACAINE HYDROCHLORIDE 5 MG: 2.5 INJECTION, SOLUTION INFILTRATION; PERINEURAL at 10:05

## 2022-08-04 RX ADMIN — BUPIVACAINE HYDROCHLORIDE 5 MG: 2.5 INJECTION, SOLUTION INFILTRATION; PERINEURAL at 10:04

## 2022-08-04 RX ADMIN — TRIAMCINOLONE ACETONIDE 40 MG: 40 INJECTION, SUSPENSION INTRA-ARTICULAR; INTRAMUSCULAR at 10:06

## 2022-08-04 RX ADMIN — TRIAMCINOLONE ACETONIDE 40 MG: 40 INJECTION, SUSPENSION INTRA-ARTICULAR; INTRAMUSCULAR at 10:07

## 2022-08-04 RX ADMIN — TRIAMCINOLONE ACETONIDE 40 MG: 40 INJECTION, SUSPENSION INTRA-ARTICULAR; INTRAMUSCULAR at 10:05

## 2022-08-04 NOTE — PROGRESS NOTES
This dictation was done with Dragon dictation and may contain mechanical errors related to translation. This is a pleasant 44-year-old female who is alert and oriented x3 she walks without antalgia. She says her weight has been fairly steady and her activity level has increased. She has pain in the anterior medial aspect of her knees. She has had relief with injections for the bursitis and she has had relief with injections for the osteoarthritis in both of her knees. Most recently she was given a cortisone on 4/25/2022. She states over the last 3 to 4 weeks her pain has increased with her activities. She has no recollection of injury. Previous x-rays show some joint space loss in all 3 compartments close to bone-on-bone in the medial aspect with satisfactory patellar tracking and no obvious fractures. She walks well without antalgia has 0 to calf on thigh range of motion no varus or valgus laxity she has mild quad tone deficiency distally. He has a medial joint line tenderness to palpation but not a true Radha. My impression is bilateral knee osteoarthritis and pes bursitis    After a lengthy discussion with the patient she has consented to injections for both the osteoarthritis and the pes bursitis in both the left and the right knee. After a discussion of the multiple options, they consented to a cortisone shot. 1 ml of 40mg/ml Kenalog and 2 ml's of 0.25%Marcaine were injected into both the right and the left knee joint spaces. The leg was slightly flexed and injected just lateral to the patella tendon to under the patella. This was done with sterile technique and they tolerated it well. Next in the appropriate sterile fashion she was injected with 1 cc Kenalog and 2 cc of Marcaine in the medial aspect of both the left and the right knee just below the joint line and the Pez anserine area for the pes bursitis.     There is discussion of post injection exercises and activities and she will follow-up with us on a as needed basis

## 2022-08-06 DIAGNOSIS — R80.9 PROTEINURIA, UNSPECIFIED TYPE: ICD-10-CM

## 2022-08-06 DIAGNOSIS — N18.32 STAGE 3B CHRONIC KIDNEY DISEASE (HCC): ICD-10-CM

## 2022-08-08 RX ORDER — LOSARTAN POTASSIUM 25 MG/1
TABLET ORAL
Qty: 30 TABLET | Refills: 5 | OUTPATIENT
Start: 2022-08-08

## 2022-08-22 ENCOUNTER — TELEPHONE (OUTPATIENT)
Dept: FAMILY MEDICINE CLINIC | Age: 67
End: 2022-08-22

## 2022-08-22 ENCOUNTER — TELEMEDICINE (OUTPATIENT)
Dept: FAMILY MEDICINE CLINIC | Age: 67
End: 2022-08-22
Payer: MEDICARE

## 2022-08-22 DIAGNOSIS — F40.10 SOCIAL PHOBIA: ICD-10-CM

## 2022-08-22 DIAGNOSIS — G47.33 OBSTRUCTIVE SLEEP APNEA SYNDROME: ICD-10-CM

## 2022-08-22 DIAGNOSIS — F32.4 MAJOR DEPRESSIVE DISORDER WITH SINGLE EPISODE, IN PARTIAL REMISSION (HCC): ICD-10-CM

## 2022-08-22 DIAGNOSIS — G25.81 RESTLESS LEGS SYNDROME: Primary | ICD-10-CM

## 2022-08-22 DIAGNOSIS — Z72.820 SLEEP DEPRIVATION: ICD-10-CM

## 2022-08-22 PROCEDURE — 1123F ACP DISCUSS/DSCN MKR DOCD: CPT | Performed by: FAMILY MEDICINE

## 2022-08-22 PROCEDURE — 99214 OFFICE O/P EST MOD 30 MIN: CPT | Performed by: FAMILY MEDICINE

## 2022-08-22 RX ORDER — FLUOXETINE 20 MG/1
20 TABLET, FILM COATED ORAL DAILY
Qty: 90 TABLET | Refills: 1 | Status: SHIPPED | OUTPATIENT
Start: 2022-08-22 | End: 2022-10-03 | Stop reason: SDUPTHER

## 2022-08-22 RX ORDER — PRAMIPEXOLE DIHYDROCHLORIDE 0.5 MG/1
0.5 TABLET ORAL 2 TIMES DAILY
Qty: 180 TABLET | Refills: 1 | Status: SHIPPED | OUTPATIENT
Start: 2022-08-22 | End: 2022-09-15 | Stop reason: SDUPTHER

## 2022-08-22 SDOH — ECONOMIC STABILITY: FOOD INSECURITY: WITHIN THE PAST 12 MONTHS, THE FOOD YOU BOUGHT JUST DIDN'T LAST AND YOU DIDN'T HAVE MONEY TO GET MORE.: NEVER TRUE

## 2022-08-22 SDOH — ECONOMIC STABILITY: FOOD INSECURITY: WITHIN THE PAST 12 MONTHS, YOU WORRIED THAT YOUR FOOD WOULD RUN OUT BEFORE YOU GOT MONEY TO BUY MORE.: NEVER TRUE

## 2022-08-22 ASSESSMENT — SOCIAL DETERMINANTS OF HEALTH (SDOH): HOW HARD IS IT FOR YOU TO PAY FOR THE VERY BASICS LIKE FOOD, HOUSING, MEDICAL CARE, AND HEATING?: NOT VERY HARD

## 2022-08-22 NOTE — PROGRESS NOTES
8/22/2022    TELEHEALTH EVALUATION -- Audio/Visual (During IJQMM-00 public health emergency)    Chief Complaint   Patient presents with    Anxiety     Discuss anxiety     1. Restless legs syndrome    2. Obstructive sleep apnea syndrome    3. Sleep deprivation    4. Social phobia    5. Major depressive disorder with single episode, in partial remission ([address]      Feels shaky and jittery in evening when sits down, urge to move, x few days. Had in just R arm couple years ago and given clonazepam by neuro. Sleep interrupted, total 4 hours. Nods off during day. No latency. Mind racing with all she has going on. Trying melatonin x 1 week. Wearing CPAP. HISTORY:  Patient's medications, allergies, past medical, and social histories were reviewed and updated as appropriate. CHART REVIEW  Health Maintenance   Topic Date Due    Flu vaccine (1) 09/01/2022    Pneumococcal 65+ years Vaccine (2 - PCV) 12/14/2022    Annual Wellness Visit (AWV)  12/15/2022    A1C test (Diabetic or Prediabetic)  12/16/2022    Lipids  12/16/2022    Breast cancer screen  02/02/2023    Depression Monitoring  06/16/2023    DTaP/Tdap/Td vaccine (3 - Td or Tdap) 08/01/2028    Colorectal Cancer Screen  03/31/2031    DEXA (modify frequency per FRAX score)  Completed    Shingles vaccine  Completed    COVID-19 Vaccine  Completed    Hepatitis C screen  Completed    HIV screen  Completed    Hepatitis A vaccine  Aged Out    Hepatitis B vaccine  Aged Out    Hib vaccine  Aged Out    Meningococcal (ACWY) vaccine  Aged Out     The 10-year ASCVD risk score (Tia Benavidez., et al., 2013) is: 3.4%    Values used to calculate the score:      Age: 73 years      Sex: Female      Is Non- : No      Diabetic: No      Tobacco smoker: No      Systolic Blood Pressure: 602 mmHg      Is BP treated: No      HDL Cholesterol: 65 mg/dL      Total Cholesterol: 187 mg/dL  Prior to Visit Medications    Medication Sig Taking?  Authorizing Provider   pramipexole (MIRAPEX) 0.5 MG tablet Take 1 tablet by mouth 2 times daily Take 1 in AM and 2 in PM Yes Joshua Boyd MD   FLUoxetine (PROZAC) 20 MG tablet Take 1 tablet by mouth daily Yes Joshua Boyd MD   cetirizine (ZYRTEC) 10 MG tablet Take 1 tablet by mouth in the morning. Yes DEA Arugeta CNP   fluticasone (FLONASE) 50 MCG/ACT nasal spray 2 sprays by Nasal route in the morning. For nasal allergy symptoms. DEA Breen CNP   losartan (COZAAR) 25 MG tablet Take 0.5 tablets by mouth in the morning. Yes DEA Argueta CNP   DULoxetine (CYMBALTA) 60 MG extended release capsule TAKE 1 CAPSULE DAILY Yes DEA Argueta CNP   levothyroxine (SYNTHROID) 125 MCG tablet TAKE 1 TABLET DAILY Yes DEA Argueta CNP   esomeprazole (NEXIUM) 40 MG delayed release capsule TAKE 1 CAPSULE EVERY       MORNING Yes Joshua Boyd MD   tolterodine (DETROL LA) 4 MG extended release capsule TAKE 1 CAPSULE DAILY Yes Joshua Boyd MD   atorvastatin (LIPITOR) 20 MG tablet TAKE 1 TABLET EVERY EVENING Yes Joshua Boyd MD   topiramate (TOPAMAX) 100 MG tablet TAKE 1 TABLET AT BEDTIME Yes Joshua Boyd MD   SUMAtriptan (IMITREX) 50 MG tablet Take 1 tablet by mouth once as needed . May repeat in 2 hours, maximum 200mg per  day  Patient taking differently: Take 1 tablet by mouth once as needed . May repeat in 2 hours, maximum 200mg per  day Yes Joshua Boyd MD   Acetaminophen (TYLENOL ARTHRITIS PAIN PO) Take 1 tablet by mouth 3 times daily Yes Historical Provider, MD   fluticasone-vilanterol (BREO ELLIPTA) 100-25 MCG/INH AEPB inhaler Inhale 1 puff into the lungs daily Yes Joshua Boyd MD   calcium carbonate (OSCAL) 500 MG TABS tablet Take 500 mg by mouth daily Yes Historical Provider, MD   clonazePAM (KLONOPIN) 0.5 MG tablet Take 0.5 mg by mouth daily as needed.   Yes Historical Provider, MD   albuterol sulfate  (90 Base) MCG/ACT inhaler Inhale 2 puffs into the lungs every 4 hours as needed for Wheezing May substitute for insurance preferred (Ventolin, Proventil, ProAir)  Johnny Lyn MD      Family History   Problem Relation Age of Onset    Lung Cancer Father     Heart Disease Maternal Grandmother     Tuberculosis Maternal Grandmother     Other Brother      Social History     Tobacco Use    Smoking status: Former     Packs/day: 2.00     Years: 15.00     Pack years: 30.00     Types: Cigarettes     Quit date: 1989     Years since quittin.2    Smokeless tobacco: Never    Tobacco comments:     congrats on quitting   Vaping Use    Vaping Use: Never used   Substance Use Topics    Alcohol use: Yes     Alcohol/week: 0.0 standard drinks     Comment: occ.      Drug use: No      LAST LABS  Cholesterol, Total   Date Value Ref Range Status   2021 187 0 - 199 mg/dL Final     LDL Calculated   Date Value Ref Range Status   2021 102 (H) <100 mg/dL Final     HDL   Date Value Ref Range Status   2021 65 (H) 40 - 60 mg/dL Final   2012 49 40 - 60 mg/dl Final     Triglycerides   Date Value Ref Range Status   2021 98 0 - 150 mg/dL Final     Lab Results   Component Value Date    GLUCOSE 88 2022     Lab Results   Component Value Date     2022    K 3.6 2022    CREATININE 1.3 (H) 2022     Lab Results   Component Value Date    WBC 7.6 2022    HGB 10.8 (L) 2022    HCT 33.0 (L) 2022    MCV 84.8 2022     2022     Lab Results   Component Value Date    ALT 12 2022    AST 12 (L) 2022    ALKPHOS 117 2022    BILITOT 0.4 2022     TSH (uIU/mL)   Date Value   2022 0.68     Lab Results   Component Value Date    LABA1C 5.8 2021      Objective:   PHYSICAL EXAM:  Vitals (if available)    BP Readings from Last 3 Encounters:   22 104/62   22 118/60   22 130/88     Wt Readings from Last 3 Encounters:   22 287 lb (130.2 kg)   22 290 lb (131.5 kg)   22 293 lb (132.9 kg) GENERAL:   well-developed, well-nourished, alert, no distress. EYES:   External findings: lids and lashes normal and conjunctivae and sclerae normal  Eyes: no periorbital cellulitis. HENT:   Normocephalic, atraumatic  External nose and ears appear normal  Mucous membranes are moist  Hearing grossly normal.     NECK: No visible masses  LUNGS:    Respiratory effort normal.  No visualized signs of difficulty breathing or respiratory distress  SKIN: warm and dry  No significant exanthematous lesions or discoloration noted on facial skin  PSYCH:    Alert and oriented, able to follow commands  Normal reasoning, insight good  Normal affect  No memory disturbance noted  NEURO:  No Facial Asymmetry (Cranial nerve 7 motor function) (limited exam to video visit)     No gaze palsy      Assessment and Plan:      Diagnosis Orders   1. Restless legs syndrome  pramipexole (MIRAPEX) 0.5 MG tablet      2. Obstructive sleep apnea syndrome        3. Sleep deprivation        4. Social phobia  FLUoxetine (PROZAC) 20 MG tablet      5. Major depressive disorder with single episode, in partial remission (HCC)  FLUoxetine (PROZAC) 20 MG tablet      Poorly controlled RLS and mood. INSTRUCTIONS  NEXT APPOINTMENT: Please schedule check-up in 3 weeks VV  INCREASE mirapex to 1 in AM and 2 in PM.  Melatonin 10 mg 1 h before bedtime. INCREASE fluoxetine to 20 mg. Continue clonazepam in evening. Rocky Dawson, was evaluated through a synchronous (real-time) audio-video encounter. The patient (or guardian if applicable) is aware that this is a billable service, which includes applicable co-pays. This Virtual Visit was conducted with patient's (and/or legal guardian's) consent. The visit was conducted pursuant to the emergency declaration under the [facility], [address] [place] [address] authority and the [employer] and [employer] General Act.   Patient identification was verified, and a caregiver was present when appropriate. The patient was located at Home: [address] [place] [address]. Provider was located at [facility] (Appt Dept): [address] [place] [address]. Total time spent on this encounter: Not billed by time    --Roldan Armstrong MD on 8/22/2022 at 8:26 AM    An electronic signature was used to authenticate this note.

## 2022-08-24 ENCOUNTER — HOSPITAL ENCOUNTER (OUTPATIENT)
Dept: NON INVASIVE DIAGNOSTICS | Age: 67
Discharge: HOME | End: 2022-08-24
Payer: MEDICARE

## 2022-08-24 LAB
LV EF: 60 %
LVEF MODALITY: NORMAL

## 2022-08-24 PROCEDURE — 93306 TTE W/DOPPLER COMPLETE: CPT

## 2022-09-07 DIAGNOSIS — R80.9 PROTEINURIA, UNSPECIFIED TYPE: ICD-10-CM

## 2022-09-07 DIAGNOSIS — N18.32 STAGE 3B CHRONIC KIDNEY DISEASE (HCC): ICD-10-CM

## 2022-09-07 RX ORDER — LOSARTAN POTASSIUM 25 MG/1
12.5 TABLET ORAL DAILY
Qty: 15 TABLET | Refills: 11 | OUTPATIENT
Start: 2022-09-07

## 2022-09-12 ENCOUNTER — TELEMEDICINE (OUTPATIENT)
Dept: FAMILY MEDICINE CLINIC | Age: 67
End: 2022-09-12
Payer: MEDICARE

## 2022-09-12 DIAGNOSIS — G25.81 RLS (RESTLESS LEGS SYNDROME): ICD-10-CM

## 2022-09-12 DIAGNOSIS — F32.5 MAJOR DEPRESSIVE DISORDER WITH SINGLE EPISODE, IN REMISSION (HCC): Primary | ICD-10-CM

## 2022-09-12 DIAGNOSIS — K21.9 GASTROESOPHAGEAL REFLUX DISEASE WITHOUT ESOPHAGITIS: ICD-10-CM

## 2022-09-12 PROCEDURE — 99213 OFFICE O/P EST LOW 20 MIN: CPT | Performed by: FAMILY MEDICINE

## 2022-09-12 PROCEDURE — 1123F ACP DISCUSS/DSCN MKR DOCD: CPT | Performed by: FAMILY MEDICINE

## 2022-09-12 RX ORDER — ESOMEPRAZOLE MAGNESIUM 40 MG/1
40 CAPSULE, DELAYED RELEASE ORAL 2 TIMES DAILY
Qty: 180 CAPSULE | Refills: 1 | Status: SHIPPED | OUTPATIENT
Start: 2022-09-12 | End: 2022-10-30 | Stop reason: SDUPTHER

## 2022-09-12 NOTE — PROGRESS NOTES
9/12/2022    TELEHEALTH EVALUATION -- Audio/Visual (During UPIAV-82 public health emergency)    Chief Complaint   Patient presents with    Follow-up     3 week f/u from RLS     1. Major depressive disorder with single episode, in remission ([address]    2. Gastroesophageal reflux disease without esophagitis    3. RLS (restless legs syndrome)      Sleep a bit better, legs more settled. Mind no longer racing. Having heartburn after eating and intermittently, x 1 mo, non-exertional. On PPI. HISTORY:  Patient's medications, allergies, past medical, and social histories were reviewed and updated as appropriate. CHART REVIEW  Health Maintenance   Topic Date Due    Flu vaccine (1) 09/01/2022    Pneumococcal 65+ years Vaccine (2 - PCV) 12/14/2022    Annual Wellness Visit (AWV)  12/15/2022    A1C test (Diabetic or Prediabetic)  12/16/2022    Lipids  12/16/2022    Breast cancer screen  02/02/2023    Depression Monitoring  06/16/2023    DTaP/Tdap/Td vaccine (3 - Td or Tdap) 08/01/2028    Colorectal Cancer Screen  03/31/2031    DEXA (modify frequency per FRAX score)  Completed    Shingles vaccine  Completed    COVID-19 Vaccine  Completed    Hepatitis C screen  Completed    HIV screen  Completed    Hepatitis A vaccine  Aged Out    Hepatitis B vaccine  Aged Out    Hib vaccine  Aged Out    Meningococcal (ACWY) vaccine  Aged Out     The 10-year ASCVD risk score (Moody Morales, et al., 2013) is: 3.4%    Values used to calculate the score:      Age: 73 years      Sex: Female      Is Non- : No      Diabetic: No      Tobacco smoker: No      Systolic Blood Pressure: 723 mmHg      Is BP treated: No      HDL Cholesterol: 65 mg/dL      Total Cholesterol: 187 mg/dL  Prior to Visit Medications    Medication Sig Taking?  Authorizing Provider   esomeprazole (NEXIUM) 40 MG delayed release capsule Take 1 capsule by mouth in the morning and at bedtime NEW DIRECTIONS Yes Keven Peña MD   pramipexole (MIRAPEX) 0.5 MG tablet Take 1 tablet by mouth 2 times daily Take 1 in AM and 2 in PM Yes Gay Duvall MD   FLUoxetine (PROZAC) 20 MG tablet Take 1 tablet by mouth daily  Patient taking differently: Take 20 mg by mouth daily Takes bid - per your request Yes Gay Duvall MD   fluticasone (FLONASE) 50 MCG/ACT nasal spray 2 sprays by Nasal route in the morning. For nasal allergy symptoms. Dominique Cos, APRN - CNP   losartan (COZAAR) 25 MG tablet Take 0.5 tablets by mouth in the morning. Yes Rolo No, APRN - CNP   DULoxetine (CYMBALTA) 60 MG extended release capsule TAKE 1 CAPSULE DAILY Yes Rolo No, APRN - CNP   levothyroxine (SYNTHROID) 125 MCG tablet TAKE 1 TABLET DAILY Yes DEA Magaña - CNP   tolterodine (DETROL LA) 4 MG extended release capsule TAKE 1 CAPSULE DAILY Yes Gay Duvall MD   atorvastatin (LIPITOR) 20 MG tablet TAKE 1 TABLET EVERY EVENING Yes Gay Duvall MD   topiramate (TOPAMAX) 100 MG tablet TAKE 1 TABLET AT BEDTIME Yes Gay Duvall MD   Acetaminophen (TYLENOL ARTHRITIS PAIN PO) Take 1 tablet by mouth 3 times daily Yes Historical Provider, MD   fluticasone-vilanterol (BREO ELLIPTA) 100-25 MCG/INH AEPB inhaler Inhale 1 puff into the lungs daily Yes Gay Duvall MD   calcium carbonate (OSCAL) 500 MG TABS tablet Take 500 mg by mouth daily Yes Historical Provider, MD   clonazePAM (KLONOPIN) 0.5 MG tablet Take 0.5 mg by mouth daily as needed. Yes Historical Provider, MD   SUMAtriptan (IMITREX) 50 MG tablet Take 1 tablet by mouth once as needed .  May repeat in 2 hours, maximum 200mg per  day  Patient not taking: Reported on 9/12/2022  Gay Duvall MD   albuterol sulfate  (90 Base) MCG/ACT inhaler Inhale 2 puffs into the lungs every 4 hours as needed for Wheezing May substitute for insurance preferred (Ventolin, Proventil, ProAir)  Gay Duvall MD      Family History   Problem Relation Age of Onset    Lung Cancer Father     Heart Disease Maternal Grandmother     Tuberculosis Maternal Grandmother     Other Brother      Social History     Tobacco Use    Smoking status: Former     Packs/day: 2.00     Years: 15.00     Pack years: 30.00     Types: Cigarettes     Quit date: 1989     Years since quittin.2    Smokeless tobacco: Never    Tobacco comments:     congrats on quitting   Vaping Use    Vaping Use: Never used   Substance Use Topics    Alcohol use: Yes     Alcohol/week: 0.0 standard drinks     Comment: occ. Drug use: No      LAST LABS  Cholesterol, Total   Date Value Ref Range Status   2021 187 0 - 199 mg/dL Final     LDL Calculated   Date Value Ref Range Status   2021 102 (H) <100 mg/dL Final     HDL   Date Value Ref Range Status   2021 65 (H) 40 - 60 mg/dL Final   2012 49 40 - 60 mg/dl Final     Triglycerides   Date Value Ref Range Status   2021 98 0 - 150 mg/dL Final     Lab Results   Component Value Date    GLUCOSE 88 2022     Lab Results   Component Value Date     2022    K 3.6 2022    CREATININE 1.3 (H) 2022     Lab Results   Component Value Date    WBC 7.6 2022    HGB 10.8 (L) 2022    HCT 33.0 (L) 2022    MCV 84.8 2022     2022     Lab Results   Component Value Date    ALT 12 2022    AST 12 (L) 2022    ALKPHOS 117 2022    BILITOT 0.4 2022     TSH (uIU/mL)   Date Value   2022 0.68     Lab Results   Component Value Date    LABA1C 5.8 2021      Objective:   PHYSICAL EXAM:  Vitals (if available)    BP Readings from Last 3 Encounters:   22 104/62   22 118/60   22 130/88     Wt Readings from Last 3 Encounters:   22 287 lb (130.2 kg)   22 290 lb (131.5 kg)   22 293 lb (132.9 kg)     GENERAL:   well-developed, well-nourished, alert, no distress. EYES:   External findings: lids and lashes normal and conjunctivae and sclerae normal  Eyes: no periorbital cellulitis.   HENT:   Normocephalic, atraumatic  External nose and ears appear normal  Mucous membranes are moist  Hearing grossly normal.     NECK: No visible masses  LUNGS:    Respiratory effort normal.  No visualized signs of difficulty breathing or respiratory distress  SKIN: warm and dry  No significant exanthematous lesions or discoloration noted on facial skin  PSYCH:    Alert and oriented, able to follow commands  Normal reasoning, insight good  Normal affect  No memory disturbance noted  NEURO:  No Facial Asymmetry (Cranial nerve 7 motor function) (limited exam to video visit)     No gaze palsy      Assessment and Plan:      Diagnosis Orders   1. Major depressive disorder with single episode, in remission ([address]  IMPROVED with SSRI      2. Gastroesophageal reflux disease without esophagitis  WORSE- will double esomeprazole (NEXIUM) 40 MG. May need endoscopy in a month if not better. 3. RLS (restless legs syndrome)  Improved on mirapex      Plan below. INSTRUCTIONS  INCREASE PPI to twice a day. mAY NEED TO SEE gi IF THIS DOES NOT WORK. Rocky Dawson, was evaluated through a synchronous (real-time) audio-video encounter. The patient (or guardian if applicable) is aware that this is a billable service, which includes applicable co-pays. This Virtual Visit was conducted with patient's (and/or legal guardian's) consent. The visit was conducted pursuant to the emergency declaration under the [address] [place] [address], [address] [place] [address] authority and the ERUCES and Action General Act. Patient identification was verified, and a caregiver was present when appropriate. The patient was located at Home: [address] [place] [address]. Provider was located at [facility] (Appt Dept): [address] [place] [address].        Total time spent on this encounter: Not billed by time    --Luis Frey MD on 9/12/2022 at 12:12 PM    An electronic signature was used to authenticate this note.

## 2022-09-15 ENCOUNTER — TELEPHONE (OUTPATIENT)
Dept: FAMILY MEDICINE CLINIC | Age: 67
End: 2022-09-15

## 2022-09-15 DIAGNOSIS — G25.81 RESTLESS LEGS SYNDROME: ICD-10-CM

## 2022-09-15 RX ORDER — PRAMIPEXOLE DIHYDROCHLORIDE 0.5 MG/1
0.5 TABLET ORAL 2 TIMES DAILY
Qty: 180 TABLET | Refills: 1 | Status: SHIPPED | OUTPATIENT
Start: 2022-09-15

## 2022-09-20 NOTE — TELEPHONE ENCOUNTER
Pharm calling to get clarification on directions because there are two sets of directions on pramipexole.      Pharm can be reached at [phone]  Reference Number [id number]

## 2022-09-21 ENCOUNTER — HOSPITAL ENCOUNTER (OUTPATIENT)
Dept: ULTRASOUND IMAGING | Age: 67
Discharge: HOME | End: 2022-09-21
Payer: MEDICARE

## 2022-09-21 DIAGNOSIS — I10 ESSENTIAL HYPERTENSION, MALIGNANT: ICD-10-CM

## 2022-09-21 PROCEDURE — 76770 US EXAM ABDO BACK WALL COMP: CPT

## 2022-09-26 RX ORDER — TOPIRAMATE 100 MG/1
TABLET, FILM COATED ORAL
Qty: 90 TABLET | Refills: 1 | Status: SHIPPED | OUTPATIENT
Start: 2022-09-26

## 2022-10-03 ENCOUNTER — OFFICE VISIT (OUTPATIENT)
Dept: FAMILY MEDICINE CLINIC | Age: 67
End: 2022-10-03
Payer: MEDICARE

## 2022-10-03 VITALS
DIASTOLIC BLOOD PRESSURE: 80 MMHG | OXYGEN SATURATION: 98 % | WEIGHT: 285 LBS | HEIGHT: 63 IN | BODY MASS INDEX: 50.5 KG/M2 | HEART RATE: 80 BPM | SYSTOLIC BLOOD PRESSURE: 118 MMHG

## 2022-10-03 DIAGNOSIS — F40.10 SOCIAL PHOBIA: ICD-10-CM

## 2022-10-03 DIAGNOSIS — R73.03 PREDIABETES: ICD-10-CM

## 2022-10-03 DIAGNOSIS — G47.33 OBSTRUCTIVE SLEEP APNEA SYNDROME: ICD-10-CM

## 2022-10-03 DIAGNOSIS — G25.81 RLS (RESTLESS LEGS SYNDROME): ICD-10-CM

## 2022-10-03 DIAGNOSIS — N39.46 MIXED INCONTINENCE: ICD-10-CM

## 2022-10-03 DIAGNOSIS — Z72.820 SLEEP DEPRIVATION: Primary | ICD-10-CM

## 2022-10-03 DIAGNOSIS — F32.4 MAJOR DEPRESSIVE DISORDER WITH SINGLE EPISODE, IN PARTIAL REMISSION (HCC): ICD-10-CM

## 2022-10-03 DIAGNOSIS — B35.1 ONYCHOMYCOSIS: ICD-10-CM

## 2022-10-03 LAB — HBA1C MFR BLD: 5.9 %

## 2022-10-03 PROCEDURE — 83036 HEMOGLOBIN GLYCOSYLATED A1C: CPT | Performed by: FAMILY MEDICINE

## 2022-10-03 PROCEDURE — 1123F ACP DISCUSS/DSCN MKR DOCD: CPT | Performed by: FAMILY MEDICINE

## 2022-10-03 PROCEDURE — 99214 OFFICE O/P EST MOD 30 MIN: CPT | Performed by: FAMILY MEDICINE

## 2022-10-03 RX ORDER — FLUOXETINE 20 MG/1
20 TABLET, FILM COATED ORAL DAILY
Qty: 90 TABLET | Refills: 1 | Status: SHIPPED | OUTPATIENT
Start: 2022-10-03 | End: 2022-10-30 | Stop reason: SDUPTHER

## 2022-10-03 RX ORDER — FESOTERODINE FUMARATE 8 MG/1
TABLET, EXTENDED RELEASE ORAL
Qty: 90 TABLET | Refills: 3 | Status: SHIPPED | OUTPATIENT
Start: 2022-10-03

## 2022-10-03 RX ORDER — METFORMIN HYDROCHLORIDE 500 MG/1
1000 TABLET, EXTENDED RELEASE ORAL
Qty: 180 TABLET | Refills: 1 | Status: SHIPPED | OUTPATIENT
Start: 2022-10-03

## 2022-10-03 RX ORDER — TERBINAFINE HYDROCHLORIDE 250 MG/1
250 TABLET ORAL DAILY
Qty: 42 TABLET | Refills: 0 | Status: SHIPPED | OUTPATIENT
Start: 2022-10-03 | End: 2022-11-14

## 2022-10-03 NOTE — PROGRESS NOTES
CHRONIC CONDITION FOLLOW-UP       Assessment and Plan:      Diagnosis Orders   1. Sleep deprivation Worse Ambulatory referral to Sleep Medicine      2. Prediabetes GOOD POCT glycosylated hemoglobin (Hb A1C)      3. RLS (restless legs syndrome)  Ambulatory referral to Sleep Medicine      4. Obstructive sleep apnea syndrome  Ambulatory referral to Sleep Medicine      5. Mixed incontinence- failed tropesium 2017, failed detrol 2016, failed ditropan 2013 WORSE Fesoterodine Fumarate ER (TOVIAZ) 8 MG TB24      6. Onychomycosis NEW terbinafine (LAMISIL) 250 MG tablet         Continue current Tx plan. Any changes marked below. INSTRUCTIONS  NEXT APPOINTMENT: Please schedule annual complete physical (30 minutes) in 6 months with Dr. Harriet Lepe or her NP, Gracie Ibanez. Change Detrol to Veria Birgit since that is the only med that worked. If not covered will refer to urogyn. See sleep specialist.  Continue nexium twice a day for a few months, then can try once a day again. Continue cymbalta 60 mg once a day. Take antifungal for 6 weeks for fingernail and wait for it to grow out. Subjective:      Chief Complaint   Patient presents with    Thyroid Problem     Go over kidney function and ECHO      Reinier Carballo is an 73 y.o. female who presents for follow up    Complaints:   Awakens after few hours to go to restroom. Then naps some during day. Sleeps total 4 h a day. Has CPAP. Nocturia hourly. Saw nephro. GFR now 41, potassium OK, Renal US OK. Water pill stopped. GERD stopped with BID PPI. CHART REVIEW   reports that she quit smoking about 33 years ago. Her smoking use included cigarettes. She has a 30.00 pack-year smoking history. She has never used smokeless tobacco.  There are no preventive care reminders to display for this patient.     Current Outpatient Medications   Medication Instructions    Acetaminophen (TYLENOL ARTHRITIS PAIN PO) 1 tablet, Oral, 3 TIMES DAILY    albuterol sulfate  (90 Base) MCG/ACT inhaler 2 puffs, Inhalation, EVERY 4 HOURS PRN, May substitute for insurance preferred (Ventolin, Proventil, ProAir)    atorvastatin (LIPITOR) 20 MG tablet TAKE 1 TABLET EVERY EVENING    calcium carbonate (OSCAL) 500 mg, Oral, DAILY    clonazePAM (KLONOPIN) 0.5 mg, Oral, DAILY PRN    DULoxetine (CYMBALTA) 60 MG extended release capsule TAKE 1 CAPSULE DAILY    esomeprazole (NEXIUM) 40 mg, Oral, 2 times daily, NEW DIRECTIONS    Fesoterodine Fumarate ER (TOVIAZ) 8 MG TB24 TAKE 1 TABLET DAILY    FLUoxetine (PROZAC) 20 mg, Oral, DAILY    fluticasone (FLONASE) 50 MCG/ACT nasal spray 2 sprays, Nasal, DAILY, For nasal allergy symptoms. fluticasone-vilanterol (BREO ELLIPTA) 100-25 MCG/INH AEPB inhaler 1 puff, Inhalation, DAILY    levothyroxine (SYNTHROID) 125 MCG tablet TAKE 1 TABLET DAILY    losartan (COZAAR) 12.5 mg, Oral, DAILY    pramipexole (MIRAPEX) 0.5 mg, Oral, 2 TIMES DAILY, Take 1 in AM and 2 in PM    SUMAtriptan (IMITREX) 50 MG tablet Take 1 tablet by mouth once as needed .  May repeat in 2 hours, maximum 200mg per  day    terbinafine (LAMISIL) 250 mg, Oral, DAILY    topiramate (TOPAMAX) 100 MG tablet TAKE 1 TABLET AT BEDTIME       LAST LABS  Lab Results   Component Value Date    LDLCALC 102 (H) 12/16/2021     Lab Results   Component Value Date    HDL 65 (H) 12/16/2021     Lab Results   Component Value Date    TRIG 98 12/16/2021     Lab Results   Component Value Date     08/01/2022    K 3.6 08/01/2022    CREATININE 1.3 (H) 08/01/2022     Lab Results   Component Value Date    WBC 7.6 08/01/2022    HGB 10.8 (L) 08/01/2022     08/01/2022     Lab Results   Component Value Date    ALT 12 06/16/2022    AST 12 (L) 06/16/2022    ALKPHOS 117 06/16/2022    BILITOT 0.4 06/16/2022     TSH (uIU/mL)   Date Value   07/27/2022 0.68     Lab Results   Component Value Date    GLUCOSE 88 08/01/2022     Lab Results   Component Value Date    LABA1C 5.8 12/16/2021    LABA1C 5.4 06/16/2021    LABA1C 5.8 12/07/2020 Objective:   PHYSICAL EXAM   /80 (Site: Left Upper Arm, Position: Sitting, Cuff Size: Large Adult)   Pulse 80   Ht 5' 3\" (1.6 m)   Wt 285 lb (129.3 kg)   SpO2 98%   BMI 50.49 kg/m²   BP Readings from Last 5 Encounters:   10/03/22 118/80   07/27/22 104/62   07/01/22 118/60   06/16/22 130/88   03/09/22 128/70     Wt Readings from Last 5 Encounters:   10/03/22 285 lb (129.3 kg)   07/27/22 287 lb (130.2 kg)   07/01/22 290 lb (131.5 kg)   06/16/22 293 lb (132.9 kg)   04/25/22 294 lb (133.4 kg)      GENERAL:   well-developed, well-nourished, alert, no distress.      LUNGS:    Breathing unlabored  clear to auscultation bilaterally and good air movement  CARDIOVASC:   regular rate and rhythm  SKIN: warm and dry

## 2022-10-03 NOTE — PATIENT INSTRUCTIONS
INSTRUCTIONS  NEXT APPOINTMENT: Please schedule annual complete physical (30 minutes) in 6 months with Dr. David Rutledge or her NP, Dariel Dubin. Change Detrol to Laurie Pages since that is the only med that worked. If not covered will refer to urogyn. See sleep specialist.  Continue nexium twice a day for a few months, then can try once a day again. Continue cymbalta 60 mg once a day. Take antifungal for 6 weeks for fingernail and wait for it to grow out. Patient Education      What Are Sleep Deprivation and Deficiency? Sleep deprivation ([id number]) is a condition that occurs if you don't get enough sleep. Sleep deficiency is a broader concept. It occurs if you have one or more of the following: You don't get enough sleep (sleep deprivation)   You sleep at the wrong time of day (that is, you're out of sync with your body's natural clock)   You don't sleep well or get all of the different types of sleep that your body needs   You have a sleep disorder that prevents you from getting enough sleep or causes poor quality sleep  This article focuses on sleep deficiency, unless otherwise noted. Sleeping is a basic human need, like eating, drinking, and breathing. Like these other needs, sleeping is a vital part of the foundation for good health and well-being throughout your lifetime. Sleep deficiency can lead to physical and mental health problems, injuries, loss of productivity, and even a greater risk of death. Overview  To understand sleep deficiency, it helps to understand how sleep works and why it's important. The two basic types of sleep are rapid eye movement (REM) and non-REM. Non-REM sleep includes what is commonly known as deep sleep or slow wave sleep. Dreaming typically occurs during REM sleep. Generally, non-REM and REM sleep occur in a regular pattern of 3-5 cycles each night.   Your ability to function and feel well while you're awake depends on whether you're getting enough total sleep and enough of each type of sleep. It also depends on whether you're sleeping at a time when your body is prepared and ready to sleep. You have an internal \"body clock\" that controls when you're awake and when your body is ready for sleep. This clock typically follows a 24-hour repeating rhythm (called the circadian rhythm). The rhythm affects every cell, tissue, and organ in your body and how they work. If you aren't getting enough sleep, are sleeping at the wrong times, or have poor quality sleep, you'll likely feel very tired during the day. You may not feel refreshed and alert when you wake up. Sleep deficiency can interfere with work, school, driving, and social functioning. You might have trouble learning, focusing, and reacting. Also, you might find it hard to  other people's emotions and reactions. Sleep deficiency also can make you feel frustrated, cranky, or worried in social situations. The signs and symptoms of sleep deficiency may differ between children and adults. Children who are sleep deficient might be overly active and have problems paying attention. They also might misbehave, and their school performance can suffer. [place]  Sleep deficiency is a common public health problem in the [facility]. People in all age groups report not getting enough sleep. As part of a health survey for the Centers for Disease Control and Prevention, about 7-19 percent of adults in the [facility] reported not getting enough rest or sleep every day. Nearly 40 percent of adults report falling asleep during the day without meaning to at least once a month. Also, an estimated 50 to 70 million Americans have chronic (ongoing) sleep disorders. Sleep deficiency is linked to many chronic health problems, including heart disease, kidney disease, high blood pressure, diabetes, stroke, obesity, and depression. Sleep deficiency also is associated with an increased risk of injury in adults, teens, and children. For example,  sleepiness (not related to alcohol) is responsible for serious car crash injuries and death. In the elderly, sleep deficiency might be linked to an increased risk of falls and broken bones. In addition, sleep deficiency has played a role in human errors linked to tragic accidents, such as nuclear reactor meltdowns, grounding of large ships, and aviation accidents. A common myth is that people can learn to get by on little sleep with no negative effects. However, research shows that getting enough quality sleep at the right times is vital for mental health, physical health, quality of life, and safety. What Makes You Sleep? Many factors play a role in preparing your body to fall asleep and wake up. You have an internal \"body clock\" that controls when you're awake and when your body is ready for sleep. The body clock typically has a 24-hour repeating rhythm (called the circadian rhythm). Two processes interact to control this rhythm. The first is a pressure to sleep that builds with every hour that you're awake. This drive for sleep reaches a peak in the evening, when most people fall asleep. A compound called adenosine ([id number]-j-seen) seems to be one factor linked to this drive for sleep. While you're awake, the level of adenosine in your brain continues to rise. The increasing level of this compound signals a shift toward sleep. While you sleep, your body breaks down adenosine. A second process involves your internal body clock. This clock is in sync with certain cues in the environment. Light, darkness, and other cues help determine when you feel awake and when you feel drowsy. For example, light signals received through your eyes tell a special area in your brain that it is daytime. This area of your brain helps align your body clock with periods of the day and night. Your body releases chemicals in a daily rhythm, which your body clock controls.  When it gets dark, your body releases a hormone called melatonin (lester-ah-TONE-in). Melatonin signals your body that it's time to prepare for sleep, and it helps you feel drowsy. The amount of melatonin in your bloodstream peaks as the evening wears on. Researchers believe this peak is an important part of preparing your body for sleep. Exposure to bright artificial light in the late evening can disrupt this process, making it hard to fall asleep. Examples of bright artificial light include the light from a TV screen, computer screen, or a very bright alarm clock. As the sun rises, your body releases cortisol (KOR-tih-sol). This hormone naturally prepares your body to wake up. The rhythm and timing of the body clock change with age. Teens fall asleep later at night than younger children and adults. One reason for this is because melatonin is released and peaks later in the 24-hour cycle for teens. As a result, it's natural for many teens to prefer later bedtimes at night and sleep later in the morning than adults. People also need more sleep early in life, when they're growing and developing. For example, newborns may sleep more than 16 hours a day, and -aged children need to take naps. [place] children tend to sleep more in the early evening. Teens tend to sleep more in the morning. Also, older adults tend to go to bed earlier and wake up earlier. The patterns and types of sleep also change as people mature. For example,  infants spend more time in REM sleep. The amount of slow-wave sleep (a stage of non-REM sleep) peaks in early childhood and then drops sharply after puberty. It continues to decline as people age. Why Is Sleep Important? Sleep plays a vital role in good health and well-being throughout your life. Getting enough quality sleep at the right times can help protect your mental health, physical health, quality of life, and safety.   The way you feel while you're awake depends in part on what happens while you're sleeping. During sleep, your body is working to support healthy brain function and maintain your physical health. In children and teens, sleep also helps support growth and development. The damage from sleep deficiency can occur in an instant (such as a car crash), or it can harm you over time. For example, ongoing sleep deficiency can raise your risk for some chronic health problems. It also can affect how well you think, react, work, learn, and get along with others. Healthy Brain Function and Emotional Well-Being  Sleep helps your brain work properly. While you're sleeping, your brain is preparing for the next day. It's forming new pathways to help you learn and remember information. Studies show that a good night's sleep improves learning. Whether you're learning math, how to play the piano, how to perfect your golf swing, or how to drive a car, sleep helps enhance your learning and problem-solving skills. Sleep also helps you pay attention, make decisions, and be creative. Studies also show that sleep deficiency alters activity in some parts of the brain. If you're sleep deficient, you may have trouble making decisions, solving problems, controlling your emotions and behavior, and coping with change. Sleep deficiency also has been linked to depression, suicide, and risk-taking behavior. Children and teens who are sleep deficient may have problems getting along with others. They may feel angry and impulsive, have mood swings, feel sad or depressed, or lack motivation. They also may have problems paying attention, and they may get lower grades and feel stressed. Physical Health  Sleep plays an important role in your physical health. For example, sleep is involved in healing and repair of your heart and blood vessels. Ongoing sleep deficiency is linked to an increased risk of heart disease, kidney disease, high blood pressure, diabetes, and stroke. Sleep deficiency also increases the risk of obesity. For example, one study of teenagers showed that with each hour of sleep lost, the odds of becoming obese went up. Sleep deficiency increases the risk of obesity in other age groups as well. Sleep helps maintain a healthy balance of the hormones that make you feel hungry (ghrelin) or full (leptin). When you don't get enough sleep, your level of ghrelin goes up and your level of leptin goes down. This makes you feel hungrier than when you're well-rested. Sleep also affects how your body reacts to insulin, the hormone that controls your blood glucose (sugar) level. Sleep deficiency results in a higher than normal blood sugar level, which may increase your risk for diabetes. Sleep also supports healthy growth and development. Deep sleep triggers the body to release the hormone that promotes normal growth in children and teens. This hormone also boosts muscle mass and helps repair cells and tissues in children, teens, and adults. Sleep also plays a role in puberty and fertility. Your immune system relies on sleep to stay healthy. This system defends your body against foreign or harmful substances. Ongoing sleep deficiency can change the way in which your immune system responds. For example, if you're sleep deficient, you may have trouble fighting common infections. Daytime Performance and Safety  Getting enough quality sleep at the right times helps you function well throughout the day. People who are sleep deficient are less productive at work and school. They take longer to finish tasks, have a slower reaction time, and make more mistakes. After several nights of losing sleep--even a loss of just 1-2 hours per night--your ability to function suffers as if you haven't slept at all for a day or two. Lack of sleep also may lead to microsleep. Microsleep refers to brief moments of sleep that occur when you're normally awake. You can't control microsleep, and you might not be aware of it.  For example, have you ever driven somewhere and then not remembered part of the trip? If so, you may have experienced microsleep. Even if you're not driving, microsleep can affect how you function. If you're listening to a lecture, for example, you might miss some of the information or feel like you don't understand the point. In reality, though, you may have slept through part of the lecture and not been aware of it. Some people aren't aware of the risks of sleep deficiency. In fact, they may not even realize that they're sleep deficient. Even with limited or poor-quality sleep, they may still think that they can function well. For example, drowsy drivers may feel capable of driving. Yet, studies show that sleep deficiency harms your driving ability as much as, or more than, being drunk. It's estimated that  sleepiness is a factor in about 100,000 car accidents each year, resulting in about 1,500 deaths. Drivers aren't the only ones affected by sleep deficiency. It can affect people in all lines of work, including health care workers, pilots, students, , mechanics, and assembly line workers. As a result, sleep deficiency is not only harmful on a personal level, but it also can cause large-scale damage. For example, sleep deficiency has played a role in human errors linked to tragic accidents, such as nuclear reactor meltdowns, grounding of large ships, and aviation accidents. How Much Sleep Is Enough? The amount of sleep you need each day will change over the course of your life. Although sleep needs vary from person to person, the chart below shows general recommendations for different age groups. Age Recommended Amount of Sleep   Newborns 16-18 hours a day   -aged children 11-12 hours a day   School-aged children At least 10 hours a day   Teens 9-10 hours a day   Adults (including the elderly) 7-8 hours a day   If you routinely lose sleep or choose to sleep less than needed, the sleep loss adds up. The total sleep lost is called your sleep debt. For example, if you lose 2 hours of sleep each night, you'll have a sleep debt of 14 hours after a week. Some people nap as a way to deal with sleepiness. Naps may provide a short-term boost in alertness and performance. However, napping doesn't provide all of the other benefits of night-time sleep. Thus, you can't really make up for lost sleep. Some people sleep more on their days off than on work days. They also may go to bed later and get up later on days off. Sleeping more on days off might be a sign that you aren't getting enough sleep. Although extra sleep on days off might help you feel better, it can upset your body's sleep-wake rhythm. Bad sleep habits and long-term sleep loss will affect your health. If you're worried about whether you're getting enough sleep, try using a sleep diary for a couple of weeks. Write down how much you sleep each night, how alert and rested you feel in the morning, and how sleepy you feel during the day. Show the results to your doctor and talk about how you can improve your sleep. Sleeping when your body is ready to sleep also is very important. Sleep deficiency can affect people even when they sleep the total number of hours recommended for their age group. For example, people whose sleep is out of sync with their body clocks (such as shift workers) or routinely interrupted (such as caregivers or emergency responders) might need to pay special attention to their sleep needs. If your job or daily routine limits your ability to get enough sleep or sleep at the right times, talk with your doctor. You also should talk with your doctor if you sleep more than 8 hours a night, but don't feel well rested. You may have a sleep disorder or other health problem. Who Is at Risk for Sleep Deprivation and Deficiency? Sleep deficiency, which includes sleep deprivation, affects people of all ages, races, and ethnicities.  Certain groups of people may be more likely to be sleep deficient. Examples include people who:  Have limited time available for sleep, such as caregivers or people working long hours or more than one job   Have schedules that conflict with their internal body clocks, such as shift workers, first responders, teens who have early school schedules, or people who must travel for work   Make lifestyle choices that prevent them from getting enough sleep, such as taking medicine to stay awake, abusing alcohol or drugs, or not leaving enough time for sleep   Have undiagnosed or untreated medical problems, such as stress, anxiety, or sleep disorders   Have medical conditions or take medicines that interfere with sleep  Certain medical conditions have been linked to sleep disorders. These conditions include heart failure, heart disease, obesity, diabetes, high blood pressure, stroke or transient ischemic attack (mini-stroke), depression, and attention-deficit hyperactivity disorder (ADHD). If you have or have had one of these conditions, ask your doctor whether you might benefit from a sleep study. A sleep study allows your doctor to measure how much and how well you sleep. It also helps show whether you have sleep problems and how severe they are. For more information, go to the Health Topics Sleep Studies article. If you have a child who is overweight, talk with the doctor about your child's sleep habits. What Are the Signs and Symptoms of Problem Sleepiness? Sleep deficiency can cause you to feel very tired during the day. You may not feel refreshed and alert when you wake up. Sleep deficiency also can interfere with work, school, driving, and social functioning. How sleepy you feel during the day can help you figure out whether you're having symptoms of problem sleepiness.  You might be sleep deficient if you often feel like you could doze off while:  Sitting and reading or watching TV   Sitting still in a public place, such as a movie theater, meeting, or classroom   Riding in a car for an hour without stopping   Sitting and talking to someone   Sitting quietly after lunch   Sitting in traffic for a few minutes  Sleep deficiency can cause problems with learning, focusing, and reacting. You may have trouble making decisions, solving problems, remembering things, controlling your emotions and behavior, and coping with change. You may take longer to finish tasks, have a slower reaction time, and make more mistakes. The signs and symptoms of sleep deficiency may differ between children and adults. Children who are sleep deficient might be overly active and have problems paying attention. They also might misbehave, and their school performance can suffer. Sleep-deficient children may feel angry and impulsive, have mood swings, feel sad or depressed, or lack motivation. You may not notice how sleep deficiency affects your daily routine. A common myth is that people can learn to get by on little sleep with no negative effects. However, research shows that getting enough quality sleep at the right times is vital for mental health, physical health, quality of life, and safety. To find out whether you're sleep deficient, try keeping a sleep diary for a couple of weeks. Write down how much you sleep each night, how alert and rested you feel in the morning, and how sleepy you feel during the day. Strategies for Getting Enough Sleep  You can take steps to improve your sleep habits. First, make sure that you allow yourself enough time to sleep. With enough sleep each night, you may find that you're happier and more productive during the day. Sleep often is the first thing that busy people squeeze out of their schedules. Making time to sleep will help you protect your health and well-being now and in the future. To improve your sleep habits, it also may help to:  Go to bed and wake up at the same time every day.  For children, have a set bedtime and a bedtime routine. Don't use the child's bedroom for timeouts or punishment. Try to keep the same sleep schedule on weeknights and weekends. Limit the difference to no more than about an hour. Staying up late and sleeping in late on weekends can disrupt your body clock's sleep-wake rhythm. Use the hour before bed for quiet time. Avoid strenuous exercise and bright artificial light, such as from a TV or computer screen. The light may signal the brain that it's time to be awake. Avoid heavy and/or large meals within a couple hours of bedtime. (Having a light snack is okay.) Also, avoid alcoholic drinks before bed. Avoid nicotine (for example, cigarettes) and caffeine (including caffeinated soda, coffee, tea, and chocolate). Nicotine and caffeine are stimulants, and both substances can interfere with sleep. The effects of caffeine can last as long as 8 hours. So, a cup of coffee in the late afternoon can make it hard for you to fall asleep at night. Spend time outside every day (when possible) and be physically active. Keep your bedroom quiet, cool, and dark (a dim night light is fine, if needed). Take a hot bath or use relaxation techniques before bed. Napping during the day may provide a boost in alertness and performance. However, if you have trouble falling asleep at night, limit naps or take them earlier in the afternoon. Adults should nap for no more than 20 minutes. Napping in -aged children is normal and promotes healthy growth and development. Strategies for Special Groups  Some people have schedules that conflict with their internal body clocks. For example, shift workers and teens who have early school schedules may have trouble getting enough sleep. This can affect how they feel mentally, physically, and emotionally. If you're a shift worker, you may find it helpful to:   Take naps and increase the amount of time available for sleep   Keep the lights bright at work Limit shift changes so your body clock can adjust   Limit caffeine use to the first part of your shift   Remove sound and light distractions in your bedroom during daytime sleep (for example, use light-blocking curtains)  If you're still not able to fall asleep during the day or have problems adapting to a shift-work schedule, talk with your doctor about other options to help you sleep. When possible, employers and schools might find it helpful to consider options to address issues related to sleep deficiency. FUNGAL NAIL INFECTIONS    Overview   What is a fungal infection? A fingernail or toenail infection that is caused by a fungus is called onychomycosis (say: \"on-ee-koh-my-ko-sis\"). Toenails are more likely to become infected than fingernails. Symptoms   What are the symptoms of a fungal nail infection? Signs of a fungal nail infection include nails that are:  Discolored (usually white or yellow)   Brittle   Crumbly, or have rough, jagged edges   Thick    from the nail bed   Curled up or down, or are distorted in shape   You may also have pain or discomfort in the affected toes or fingers. Causes & Risk Factors   Who gets fungal nail infections? Anyone can get a fungal nail infection, but these infections are more common in adults older than 61years of age. They are especially common in people who have diabetes or circulation problems. Men are more likely than women to get fungal nail infections. Why did I get a fungal nail infection? It may be hard to know where or how you got a fungal nail infection. A warm, wet place is a good place for a fungus to grow. If you often wear heavy work boots that make your feet warm and sweaty, a fungus can grow around your toenails. If you often walk barefoot in locker rooms, you can  a fungus from the warm, wet floors.   People whose hands are often wet (for example, dishwashers in restaurants and professional house ) are more likely to get fungal fingernail infections. Sometimes several people in a family will get fungal infections in their nails at the same time. This can happen because their immune systems aren't able to fight off the infection very well or because the infection is being passed when they use the same towels. Diagnosis & Tests   How do I find out if I have a fungal nail infection? If you think you have a fungal infection in your fingernails or toenails, see your doctor. By looking carefully at your nails, your doctor might be able to tell if you have an infection. To be sure of what kind of infection you have, your doctor might scrape a little bit of tissue from your nail and send it to a lab. The test can tell if you have a fungal infection or another kind of infection. Treatment   How is a fungal nail infection treated? Several medicines can treat a fungal nail infection. Oral antifungal medicines help a new nail grow to replace the infected nail. You might need to take the antifungal medicine for 6 to 12 weeks. It depends on how severe the infection is. Some of these oral antifungal medicines are not safe for people who have liver problems or a history of congestive heart failure. Be sure to let your doctor know if you have one of these conditions. Your doctor will decide which medicine is right for you. Topical treatments (creams and polish that you apply to the top of your nail) are also available. However, topical medicines alone usually do not cure fungal nail infections. What can I do to take care of my nails? Here are some things you can do to take care of your nails if you have a fungal infection:  Keep your nails cut short and file down any thick areas. Don't use the same nail celso or file on healthy nails and infected nails. If you have your nails professionally manicured, you should bring your own nail files and trimmers from home.    Wear waterproof gloves for wet work (such as washing dishes or floors). To protect your fingers, wear 100% cotton gloves for dry work. Wear 100% cotton socks. Change your socks when they are damp from sweat or if your feet get wet. Put on clean, dry socks every day. You can put over-the-counter antifungal foot powder inside your socks to help keep your feet dry. Wear shoes with good support and a wide toe area. Don't wear pointed shoes that press your toes together. Avoid walking barefoot in public areas, such as locker rooms.

## 2022-10-10 DIAGNOSIS — J45.41 MODERATE PERSISTENT ASTHMA WITH ACUTE EXACERBATION: ICD-10-CM

## 2022-10-10 RX ORDER — FLUTICASONE FUROATE AND VILANTEROL 100; 25 UG/1; UG/1
1 POWDER RESPIRATORY (INHALATION) DAILY
Qty: 3 EACH | Refills: 5 | Status: SHIPPED | OUTPATIENT
Start: 2022-10-10

## 2022-10-23 DIAGNOSIS — R60.0 BILATERAL EDEMA OF LOWER EXTREMITY: ICD-10-CM

## 2022-10-24 RX ORDER — HYDROCHLOROTHIAZIDE 12.5 MG/1
CAPSULE, GELATIN COATED ORAL
Qty: 180 CAPSULE | Refills: 0 | Status: SHIPPED | OUTPATIENT
Start: 2022-10-24 | End: 2022-11-28

## 2022-10-30 DIAGNOSIS — K21.9 GASTROESOPHAGEAL REFLUX DISEASE WITHOUT ESOPHAGITIS: ICD-10-CM

## 2022-10-30 DIAGNOSIS — F40.10 SOCIAL PHOBIA: ICD-10-CM

## 2022-10-30 DIAGNOSIS — F32.4 MAJOR DEPRESSIVE DISORDER WITH SINGLE EPISODE, IN PARTIAL REMISSION (HCC): ICD-10-CM

## 2022-10-31 RX ORDER — ESOMEPRAZOLE MAGNESIUM 40 MG/1
40 CAPSULE, DELAYED RELEASE ORAL 2 TIMES DAILY
Qty: 180 CAPSULE | Refills: 1 | Status: SHIPPED | OUTPATIENT
Start: 2022-10-31 | End: 2022-11-27 | Stop reason: SDUPTHER

## 2022-10-31 RX ORDER — FLUOXETINE 20 MG/1
20 TABLET, FILM COATED ORAL DAILY
Qty: 90 TABLET | Refills: 1 | Status: SHIPPED | OUTPATIENT
Start: 2022-10-31 | End: 2022-11-27 | Stop reason: SDUPTHER

## 2022-11-07 ENCOUNTER — OFFICE VISIT (OUTPATIENT)
Dept: ORTHOPEDIC SURGERY | Age: 67
End: 2022-11-07
Payer: MEDICARE

## 2022-11-07 VITALS — BODY MASS INDEX: 50.5 KG/M2 | RESPIRATION RATE: 16 BRPM | WEIGHT: 285 LBS | HEIGHT: 63 IN

## 2022-11-07 DIAGNOSIS — M17.0 PRIMARY OSTEOARTHRITIS OF BOTH KNEES: Primary | ICD-10-CM

## 2022-11-07 DIAGNOSIS — M70.52 PES ANSERINUS BURSITIS OF BOTH KNEES: ICD-10-CM

## 2022-11-07 DIAGNOSIS — M70.51 PES ANSERINUS BURSITIS OF BOTH KNEES: ICD-10-CM

## 2022-11-07 PROCEDURE — 1123F ACP DISCUSS/DSCN MKR DOCD: CPT | Performed by: PHYSICIAN ASSISTANT

## 2022-11-07 PROCEDURE — 99213 OFFICE O/P EST LOW 20 MIN: CPT | Performed by: PHYSICIAN ASSISTANT

## 2022-11-07 PROCEDURE — 20610 DRAIN/INJ JOINT/BURSA W/O US: CPT | Performed by: PHYSICIAN ASSISTANT

## 2022-11-07 RX ORDER — BUPIVACAINE HYDROCHLORIDE 2.5 MG/ML
2 INJECTION, SOLUTION INFILTRATION; PERINEURAL ONCE
Status: COMPLETED | OUTPATIENT
Start: 2022-11-07 | End: 2022-11-07

## 2022-11-07 RX ORDER — TRIAMCINOLONE ACETONIDE 40 MG/ML
40 INJECTION, SUSPENSION INTRA-ARTICULAR; INTRAMUSCULAR ONCE
Status: COMPLETED | OUTPATIENT
Start: 2022-11-07 | End: 2022-11-07

## 2022-11-07 RX ADMIN — BUPIVACAINE HYDROCHLORIDE 5 MG: 2.5 INJECTION, SOLUTION INFILTRATION; PERINEURAL at 10:07

## 2022-11-07 RX ADMIN — TRIAMCINOLONE ACETONIDE 40 MG: 40 INJECTION, SUSPENSION INTRA-ARTICULAR; INTRAMUSCULAR at 10:08

## 2022-11-07 RX ADMIN — BUPIVACAINE HYDROCHLORIDE 5 MG: 2.5 INJECTION, SOLUTION INFILTRATION; PERINEURAL at 10:08

## 2022-11-07 RX ADMIN — TRIAMCINOLONE ACETONIDE 40 MG: 40 INJECTION, SUSPENSION INTRA-ARTICULAR; INTRAMUSCULAR at 10:09

## 2022-11-07 RX ADMIN — BUPIVACAINE HYDROCHLORIDE 5 MG: 2.5 INJECTION, SOLUTION INFILTRATION; PERINEURAL at 10:06

## 2022-11-07 NOTE — PROGRESS NOTES
This dictation was done with [employer] dictation and may contain mechanical errors related to translation. Resp. rate 16, height 5' 3\" (1.6 m), weight 285 lb (129.3 kg), currently breastfeeding. This is a very pleasant 81-year-old female who has ongoing pain from osteoarthritis and pes bursitis in both of her knees. She is currently not a surgical candidate with over 50 BMI. She was recently seen and had a cortisone shot and pes bursitis injections back and over 3 months ago. Currently she said over the last 2 to 3 weeks her pain score has returned to a 7 out of 10. This patient is here in follow-up for ongoing pain and dysfunction in their knees. There x-rays done previously showed joint space narrowing subchondral sclerosis with osteophyte formation. They currently have had relief with injections of cortisone, anti-inflammatories and a home exercise program. There are here with increased pain over the last few days with swelling and dysfunction. On examination this is a well-developed patient in no acute distress. They are alert and oriented ×3. They walk without antalgia or any significant varus or valgus deformity. They have crepitus during flexion and extension in the patellofemoral joint. They have a negative Lachman and a negative Radha. There are good distal pulses and good dorsiflexion and plantarflexion strength present. She has palpable tenderness in the medial aspect just below the joint line consistent with pes bursitis    My impression is bilateral knee osteoarthritis and pes bursitis bilateral    After a discussion of the multiple options, they consented to a cortisone shot. 1 ml of 40mg/ml Kenalog and 2 ml's of 0.25%Marcaine were injected into both the right and the left knee joint spaces. The leg was slightly flexed and injected just lateral to the patella tendon to under the patella. This was done with sterile technique and they tolerated it well.     Next with her consent she was injected in the medial aspect of both knees just below the joint line consistent with pes bursitis. 1 cc Kenalog and 2 cc of Marcaine is the amount that was used. During today's visit, there was approximately 20 minutes of face-to-face discussion in regards to the patient's current condition and treatment options. More than 50 % of the time was counseling and coordination of care. I went through a good stretch and strengthening exercise program. They understand that at some point, they will need a knee replacement.  And they will follow up with us on a when necessary basis over the next 3-6 months

## 2022-11-27 DIAGNOSIS — F32.4 MAJOR DEPRESSIVE DISORDER WITH SINGLE EPISODE, IN PARTIAL REMISSION (HCC): ICD-10-CM

## 2022-11-27 DIAGNOSIS — F40.10 SOCIAL PHOBIA: ICD-10-CM

## 2022-11-27 DIAGNOSIS — K21.9 GASTROESOPHAGEAL REFLUX DISEASE WITHOUT ESOPHAGITIS: ICD-10-CM

## 2022-11-28 ENCOUNTER — OFFICE VISIT (OUTPATIENT)
Dept: SLEEP MEDICINE | Age: 67
End: 2022-11-28
Payer: MEDICARE

## 2022-11-28 VITALS
DIASTOLIC BLOOD PRESSURE: 80 MMHG | BODY MASS INDEX: 51.49 KG/M2 | SYSTOLIC BLOOD PRESSURE: 130 MMHG | TEMPERATURE: 97.1 F | WEIGHT: 290.6 LBS | OXYGEN SATURATION: 99 % | RESPIRATION RATE: 18 BRPM | HEIGHT: 63 IN | HEART RATE: 85 BPM

## 2022-11-28 DIAGNOSIS — G47.9 RESTLESS SLEEPER: ICD-10-CM

## 2022-11-28 DIAGNOSIS — I10 PRIMARY HYPERTENSION: ICD-10-CM

## 2022-11-28 DIAGNOSIS — J45.40 MODERATE PERSISTENT ASTHMA WITHOUT COMPLICATION: ICD-10-CM

## 2022-11-28 DIAGNOSIS — G25.81 RLS (RESTLESS LEGS SYNDROME): ICD-10-CM

## 2022-11-28 DIAGNOSIS — Z99.89 OSA ON CPAP: Primary | ICD-10-CM

## 2022-11-28 DIAGNOSIS — R35.1 NOCTURIA: ICD-10-CM

## 2022-11-28 DIAGNOSIS — Z99.89 DEPENDENCE ON OTHER ENABLING MACHINES AND DEVICES: ICD-10-CM

## 2022-11-28 DIAGNOSIS — G47.33 OSA ON CPAP: Primary | ICD-10-CM

## 2022-11-28 PROCEDURE — 3074F SYST BP LT 130 MM HG: CPT | Performed by: PSYCHIATRY & NEUROLOGY

## 2022-11-28 PROCEDURE — 99204 OFFICE O/P NEW MOD 45 MIN: CPT | Performed by: PSYCHIATRY & NEUROLOGY

## 2022-11-28 PROCEDURE — 3078F DIAST BP <80 MM HG: CPT | Performed by: PSYCHIATRY & NEUROLOGY

## 2022-11-28 PROCEDURE — 1123F ACP DISCUSS/DSCN MKR DOCD: CPT | Performed by: PSYCHIATRY & NEUROLOGY

## 2022-11-28 RX ORDER — FLUOXETINE 20 MG/1
20 TABLET, FILM COATED ORAL DAILY
Qty: 90 TABLET | Refills: 1 | Status: SHIPPED | OUTPATIENT
Start: 2022-11-28

## 2022-11-28 RX ORDER — GABAPENTIN 300 MG/1
CAPSULE ORAL
Qty: 60 CAPSULE | Refills: 5 | Status: SHIPPED | OUTPATIENT
Start: 2022-11-28 | End: 2024-11-14

## 2022-11-28 RX ORDER — ESOMEPRAZOLE MAGNESIUM 40 MG/1
40 CAPSULE, DELAYED RELEASE ORAL 2 TIMES DAILY
Qty: 180 CAPSULE | Refills: 1 | Status: SHIPPED | OUTPATIENT
Start: 2022-11-28

## 2022-11-28 RX ORDER — ATORVASTATIN CALCIUM 20 MG/1
20 TABLET, FILM COATED ORAL NIGHTLY
Qty: 90 TABLET | Refills: 1 | Status: SHIPPED | OUTPATIENT
Start: 2022-11-28

## 2022-11-28 ASSESSMENT — ENCOUNTER SYMPTOMS
COUGH: 1
EYES NEGATIVE: 1
GASTROINTESTINAL NEGATIVE: 1
ALLERGIC/IMMUNOLOGIC NEGATIVE: 1
SORE THROAT: 1

## 2022-11-28 ASSESSMENT — SLEEP AND FATIGUE QUESTIONNAIRES
HOW LIKELY ARE YOU TO NOD OFF OR FALL ASLEEP WHEN YOU ARE A PASSENGER IN A CAR FOR AN HOUR WITHOUT A BREAK: 3
ESS TOTAL SCORE: 20
HOW LIKELY ARE YOU TO NOD OFF OR FALL ASLEEP WHILE SITTING AND READING: 3
HOW LIKELY ARE YOU TO NOD OFF OR FALL ASLEEP IN A CAR, WHILE STOPPED FOR A FEW MINUTES IN TRAFFIC: 1
HOW LIKELY ARE YOU TO NOD OFF OR FALL ASLEEP WHILE SITTING INACTIVE IN A PUBLIC PLACE: 3
HOW LIKELY ARE YOU TO NOD OFF OR FALL ASLEEP WHILE WATCHING TV: 3
HOW LIKELY ARE YOU TO NOD OFF OR FALL ASLEEP WHILE LYING DOWN TO REST IN THE AFTERNOON WHEN CIRCUMSTANCES PERMIT: 2
HOW LIKELY ARE YOU TO NOD OFF OR FALL ASLEEP WHILE SITTING AND TALKING TO SOMEONE: 2
NECK CIRCUMFERENCE (INCHES): 17
HOW LIKELY ARE YOU TO NOD OFF OR FALL ASLEEP WHILE SITTING QUIETLY AFTER LUNCH WITHOUT ALCOHOL: 3

## 2022-11-28 NOTE — PROGRESS NOTES
MD KATALINA Vital Board Certified in Sleep Medicine  Certified [facility] Sleep Medicine  Board Certified in Neurology [address]  [address] [facility] [address], [address], [place] ([address] [facility]  [address], [address] [facility]           [address] [address] [phone]    Subjective:     Patient ID: Francois Carballo is a 78 y.o. female. Chief Complaint   Patient presents with    [facility]    Sleep Apnea       HPI:        Francois Carballo is a 78 y.o. female referred by Dr. Blayne Wells for SHALA management. Patient  was diagnosed with severe obstructive sleep apnea about 15 years ago, currently of PAP machine at 11.3 (10-17) CMWP, last sleep study was 15 years ago, last PAP titration about 10 years ago. Patient is using the PAP machine  in total average of 3:37 hours a night, more than 4 hours a night about 20% in the last 90 days data, the AHI is 1.0/hr. This patient has not gained  since last PAP titration  Could not use it much because she goes to bathroom very often. SLEEP SCHEDULE: Goes to bed around 1 AM in the weekdays and 2 AM in the weekends. It usually takes the patient 10 minutes to fall asleep. The patient gets up 3-4 per night to go to the bathroom. The Patient finally gets up at 4-5 AM during the weekdays and 5-6 AM in the weekends. patient wakes up with dry mouth. The patient has restless sleep with frequent arousals in addition to the Patient has significant daytime sleepiness. The Patient scored [place] Sleepiness Score: 20 on [place] Sleepiness Scale ( more than 10 is indicative of daytime sleepiness)and 53 in fatigue scale ( more than 36 is indicative of daytime fatigue). The patient dozes off several time on her chair.     Previous evaluation and treatment has included- PSG and PSG with C PAP titration. Restless leg syndrome symptoms; patient has overwhelming urge to move the legs, usually associatedwith unpleasant sensations. The urge to move the legs is worse at rest and at night and relieved by movement. Patient has 7 episodes a week, sometime the patient has trouble falling asleep due to this feeling, mostly atthe bed time associated with sleep disturbance and with involuntary, jerking movements of the legs during sleep, but at this time under good controlled with Mirapex 0.5 AM and 1 mg QHS. Takes Klonopin 0.5 mg every 2 weeks. Had study done on 2007 which showed an AHI - 91.1/hr with Low SaO2 - 83%. This is consistent with severe SHALA (327.23), CPAP titration at 12 cm on 2012    DOT/CDL - N/A  FAA/'dimase - N/A  The patient HTN, and asthma are stable. Previous Report(s) Reviewed: historical medical records       Social History     Socioeconomic History    Marital status:      Spouse name: Not on file    Number of children: Not on file    Years of education: Not on file    Highest education level: Not on file   Occupational History    Not on file   Tobacco Use    Smoking status: Former     Packs/day: 2.00     Years: 15.00     Pack years: 30.00     Types: Cigarettes     Quit date: 1989     Years since quittin.4     Passive exposure: Past    Smokeless tobacco: Never    Tobacco comments:     congrats on quitting   Vaping Use    Vaping Use: Never used   Substance and Sexual Activity    Alcohol use: Yes     Alcohol/week: 0.0 standard drinks     Comment: occ. Drug use: No    Sexual activity: Yes     Partners: Male     Comment:    Other Topics Concern    Not on file   Social History Narrative    Self-breast exams: yes. Exercise: walking and cardiovascular equipment sometimes. Seatbelt use: Always.   Living will: yes,   copy requested     Social Determinants of Health     Financial Resource Strain: Low Risk     Difficulty of Paying Living Expenses: Not very hard   Food Insecurity: No Food Insecurity    Worried About Running Out of Food in the Last Year: Never true    Ran Out of Food in the Last Year: Never true   Transportation Needs: Not on file   Physical Activity: Not on file   Stress: Not on file   Social Connections: Not on file   Intimate Partner Violence: Not on file   Housing Stability: Not on file       Prior to Admission medications    Medication Sig Start Date End Date Taking? Authorizing Provider   gabapentin (NEURONTIN) 300 MG capsule 1-2 caps 2 hours before the bedtime 11/28/22 11/14/24 Yes Sly Diop MD   esomeprazole (NEXIUM) 40 MG delayed release capsule Take 1 capsule by mouth in the morning and at bedtime NEW DIRECTIONS 10/31/22  Yes Sacha Recinos MD   FLUoxetine (PROZAC) 20 MG tablet Take 1 tablet by mouth daily 10/31/22  Yes Sacha Recinos MD   fluticasone-vilanterol (BREO ELLIPTA) 100-25 MCG/INH AEPB inhaler Inhale 1 puff into the lungs daily 10/10/22  Yes Sacha Recinos MD   Fesoterodine Fumarate ER (TOVIAZ) 8 MG TB24 TAKE 1 TABLET DAILY 10/3/22  Yes Sacha Recinos MD   topiramate (TOPAMAX) 100 MG tablet TAKE 1 TABLET AT BEDTIME 9/26/22  Yes Sacha Recinos MD   pramipexole (MIRAPEX) 0.5 MG tablet Take 1 tablet by mouth 2 times daily Take 1 in AM and 2 in PM 9/15/22  Yes Sacha Recinos MD   fluticasone (FLONASE) 50 MCG/ACT nasal spray 2 sprays by Nasal route in the morning. For nasal allergy symptoms. . 7/27/22  Yes DEA Chauhan CNP   losartan (COZAAR) 25 MG tablet Take 0.5 tablets by mouth in the morning. 7/27/22  Yes DEA Chauhan CNP   DULoxetine (CYMBALTA) 60 MG extended release capsule TAKE 1 CAPSULE DAILY 7/5/22  Yes DEA Chauhan CNP   levothyroxine (SYNTHROID) 125 MCG tablet TAKE 1 TABLET DAILY 5/31/22  Yes DEA Chauhan CNP   atorvastatin (LIPITOR) 20 MG tablet TAKE 1 TABLET EVERY EVENING 2/23/22  Yes Sacha Recinos MD   SUMAtriptan (IMITREX) 50 MG tablet Take 1 tablet by mouth once as needed . May repeat in 2 hours, maximum 200mg per  day  Patient taking differently: Take 1 tablet by mouth once as needed . May repeat in 2 hours, maximum 200mg per  day 6/16/21  Yes Samaria Esteban MD   Acetaminophen (TYLENOL ARTHRITIS PAIN PO) Take 1 tablet by mouth 3 times daily   Yes Historical Provider, MD   calcium carbonate (OSCAL) 500 MG TABS tablet Take 500 mg by mouth daily   Yes Historical Provider, MD   clonazePAM (KLONOPIN) 0.5 MG tablet Take 0.5 mg by mouth daily as needed.     Yes Historical Provider, MD   albuterol sulfate  (90 Base) MCG/ACT inhaler Inhale 2 puffs into the lungs every 4 hours as needed for Wheezing May substitute for insurance preferred (Ventolin, Proventil, ProAir) 6/8/20 7/1/22  Samaria Esteban MD       Allergies as of 11/28/2022 - Fully Reviewed 11/28/2022   Allergen Reaction Noted    Iodine Hives 10/24/2013    Contrast [iodides]  02/17/2016       Patient Active Problem List   Diagnosis    Social phobia    Eczema    Hypothyroidism (acquired)    Hyperlipidemia LDL goal <130    GERD (gastroesophageal reflux disease)    IBS (irritable bowel syndrome)    RLS (restless legs syndrome)    Sleep apnea    Migraine headache    Vitamin B12 deficiency    DDD (degenerative disc disease), lumbar    Allergic Conjunctivitis    Mixed incontinence- failed tropesium 2017, failed detrol 2016, failed ditropan 2013    Moderate persistent asthma without complication    Osteopenia DXA -1.7 (10/13), -2.1 (11/16), -1.3 (9/19)    Intertrigo labialis    Obesity, morbid, BMI 50 or higher (HCC)    Chronic thoracic spine pain    Chronic neck pain    Insomnia    Prediabetes    History of sleeve gastrectomy    Status post reverse arthroplasty of right shoulder    Sensorineural hearing loss, bilateral    Tinnitus of both ears    Microcytic anemia    Major depressive disorder with single episode, in remission ([facility] [address]    Hypokalemia- recheck next visit off potassium (10/22)    Sleep deprivation    HTN (hypertension)       Past Medical History:   Diagnosis Date    Abnormal finding on pulmonary function testing- nl except decreased diffusing capacity 4/11 7/11/2011    Activities treadmill- neg GXT arron, EF 82% 3/11     Allergic rhinitis     Asthma     exerise induced    Depression     GERD (gastroesophageal reflux disease)     Hearing loss,Tinnitus     Uses hearing aids    Hyperlipidemia     Hypothyroidism     IBS (irritable bowel syndrome)     Lab test positive for detection of COVID-19 virus 8/5/2021    Migraine headache     Nocturia     PONV (postoperative nausea and vomiting)     Premature ventricular contractions     Pulmonary function testing- nl except decreased diffusing capacity 4/11     Relevant prior imaging: normal examination- neg CT chest 4/11     RLS (restless legs syndrome)     Sleep apnea     uses C-PAP       Past Surgical History:   Procedure Laterality Date    CHOLECYSTECTOMY      COLONOSCOPY      COLONOSCOPY N/A 12/30/2020    COLONOSCOPY performed by Pamela Whitehead MD at [address] [facility] N/A 03/31/2021    COLONOSCOPY performed by Pamela Whitehead MD at [address] [facility], DIAGNOSTIC      ESOPHAGEAL DILATATION  12/30/2020    ESOPHAGEAL DILATION Darrall Susi performed by Pamela Whitehead MD at [facility] Bilateral     cataracts    FOOT SURGERY Left 03/17/2017    HYSTERECTOMY, VAGINAL      REVISION OF TOTAL SHOULDER Right 06/17/2019    REVERSE SHOULDER ARTHROPLASTY- RIGHT SHOULDER WITH MINI C-ARM performed by Esdras Lundy MD at [address]  07/2012    UPPER GASTROINTESTINAL ENDOSCOPY N/A 12/30/2020    EGD BIOPSY performed by Pamela Whitehead MD at [place] [address]         Family History   Problem Relation Age of Onset    Lung Cancer Father     Heart Disease Maternal Grandmother     Tuberculosis Maternal Grandmother     Other Brother        Review of Systems   Constitutional:  Positive for fatigue.    HENT: Positive for sore throat. Eyes: Negative. Respiratory:  Positive for cough. Cardiovascular: Negative. Gastrointestinal: Negative. Endocrine: Positive for heat intolerance. Genitourinary:  Positive for frequency. Musculoskeletal:  Positive for arthralgias and myalgias. Skin: Negative. Allergic/Immunologic: Negative. Neurological: Negative. Hematological: Negative. Psychiatric/Behavioral:  Positive for dysphoric mood and sleep disturbance. Objective:     Vitals:  Weight BMI Neck circumference    Wt Readings from Last 3 Encounters:   11/28/22 290 lb 9.6 oz (131.8 kg)   11/07/22 285 lb (129.3 kg)   10/03/22 285 lb (129.3 kg)    Body mass index is 51.48 kg/m². Neck circumference (Inches): 17     BP HR SaO2   BP Readings from Last 3 Encounters:   11/28/22 130/80   10/03/22 118/80   07/27/22 104/62    Pulse Readings from Last 3 Encounters:   11/28/22 85   10/03/22 80   07/27/22 83    SpO2 Readings from Last 3 Encounters:   11/28/22 99%   10/03/22 98%   07/27/22 97%        The mandibular molar Class :   [x]1 []2 []3      Mallampati I Airway Classification:   []1 []2 [x]3 []4        Physical Exam  Vitals and nursing note reviewed. Constitutional:       Appearance: She is obese. HENT:      Head: Atraumatic. Nose: Nose normal.      Mouth/Throat:      Comments: Mallampati class 3, no retrognathia or hypognathia , normal airflow in bilateral nostrils, no septum deviation , crowded oropharynx,no tonsils enlargement. Eyes:      Extraocular Movements: Extraocular movements intact. Cardiovascular:      Rate and Rhythm: Normal rate and regular rhythm. Pulmonary:      Effort: Pulmonary effort is normal.      Breath sounds: Normal breath sounds. Musculoskeletal:      Right lower leg: Edema present. Left lower leg: Edema present. Skin:     General: Skin is warm. Neurological:      Mental Status: Mental status is at baseline.    Psychiatric:         Mood and Affect: Mood normal.       Assessment:   Severe Obstructive Sleep Apnea/Hypopnea Syndrome, could not use the machine much due to insomnia and nocturia, her RLS under reasonable control with Mirapex 0.5 mg Am and 1 mg PM.   Diagnosis Orders   1. SHALA on CPAP        2. Dependence on other enabling machines and devices        3. Nocturia        4. Moderate persistent asthma without complication        5. Primary hypertension        6. RLS (restless legs syndrome)  gabapentin (NEURONTIN) 300 MG capsule      7. Restless sleeper          Plan:     I will add Gabapentin 300-600 mg to the night dose of Mirapex and  the dose of Mirapex to 0.5 mg to help her to stay sleep. Will order new mask and supplies for the APAP 10 and 17 cm. Will try to quit taking naps. Most likely treating the SHALA will have positive impact on HTN, and asthma control. Weight loss: The proportionality between weight and AHI. With 10% weight change, the AHI has a 27% proportionate change. With 20% weight change, the AHI has a 45-50% proportionate change. No orders of the defined types were placed in this encounter. Return in about 3 months (around 2023) for Reveiwing CPAP usage and compliance report and tro and RLS.     Jossue Villa MD  Medical Director - [facility]

## 2022-11-28 NOTE — PROGRESS NOTES
Lennox Green         : 1955        PHONE: [phone] (home)   [x] [address]      [] [address]      [] Irma     [] Minna    [] Moisés Dubose  [] [facility]   [] [address] [place] [address]  [] Retail Medical services [] Other:     Diagnosis: [x] SHALA (G47.33) [] CSA (G47.31) [] Apnea (G47.30)   Length of Need: [] 12 Months [x] 99 Months [] Other:    Machine (JANEL!): [] Respironics Dream Station      Auto [] ResMed AirSense     Auto [] Other:     []  CPAP () [] Bilevel ()   Mode: [] Auto [] Spontaneous    Mode: [] Auto [] Spontaneous                            Comfort Settings:   - Ramp Pressure: 5 cmH2O                                        - Ramp time: 15 min                                     -  Flex/EPR - 3 full time                                    - For ResMed Bilevel (TiMax-4 sec   TiMin- 0.2 sec)     Humidifier: [] Heated ()        [x] Water chamber replacement ()/ 1 per 6 months        Mask:   [x] Nasal () /1 per 3 months [] Full Face () /1 per 3 months   [x] Patient choice -Size and fit mask [] Patient Choice - Size and fit mask   [] Dispense:  [] Dispense:    [x] Headgear () / 1 per 3 months [] Headgear () / 1 per 3 months   [x] Replacement Nasal Cushion ()/2 per month [] Interface Replacement ()/1 per month   [x] Replacement Nasal Pillows ()/2 per month         Tubing: [x] Heated ()/1 per 3 months    [] Standard ()/1 per 3 months [] Other:           Filters: [x] Non-disposable ()/1 per 6 months     [x] Ultra-Fine, Disposable ()/2 per month        Miscellaneous: [x] Chin Strap ()/ 1 per 6 months [] O2 bleed-in:       LPM   [] Oximetry on CPAP/Bilevel []  Other:          Start Order Date: 22    MEDICAL JUSTIFICATION:  I, the undersigned, certify that the above prescribed supplies are medically necessary for this patients wellbeing.   In my opinion, the supplies are both reasonable and necessary in reference to accepted standards of medicalpractice in treatment of this patients condition.     Sly Diop MD      NPI: [id number]       Order Signed Date: 11/28/22    Electronically signed by Sly Diop MD on 11/28/2022 at 10:09 AM

## 2022-11-30 ENCOUNTER — TELEPHONE (OUTPATIENT)
Dept: FAMILY MEDICINE CLINIC | Age: 67
End: 2022-11-30

## 2022-11-30 NOTE — TELEPHONE ENCOUNTER
Pt needs a PA on esomeprazole magnesium 40 mg   Pharmacy said they are getting a rejection   [facility]     Call back [phone]  Option 2   Fax is [phone]

## 2022-11-30 NOTE — TELEPHONE ENCOUNTER
[facility] calling in regards to the PA  Step therapy  Has she tried the alternatives    Call [phone]

## 2022-12-01 ENCOUNTER — PATIENT MESSAGE (OUTPATIENT)
Dept: FAMILY MEDICINE CLINIC | Age: 67
End: 2022-12-01

## 2022-12-01 DIAGNOSIS — S42.91XK CLOSED FRACTURE OF RIGHT SHOULDER WITH NONUNION, SUBSEQUENT ENCOUNTER: ICD-10-CM

## 2022-12-01 RX ORDER — HYDROCODONE BITARTRATE AND ACETAMINOPHEN 5; 325 MG/1; MG/1
1-2 TABLET ORAL EVERY 6 HOURS PRN
Qty: 90 TABLET | Refills: 0 | Status: SHIPPED | OUTPATIENT
Start: 2022-12-01 | End: 2022-12-31

## 2022-12-01 NOTE — TELEPHONE ENCOUNTER
Submitted PA for ESOMEPRAZOLE MAGNESIUM  Via Replaced by [facility]  Key: [id number] STATUS: APPROVED. LETTER ATTACHED. If this requires a response please respond to the pool. [address]). Please advise patient thank you.

## 2022-12-01 NOTE — TELEPHONE ENCOUNTER
From: Seamus Amezquita  To: Dr. Marti Burch  Sent: 12/1/2022 2:54 PM EST  Subject: Hydrocodone    Would you please send in my prescription for Hydrocodone to [facility] in [address]? Thank you.

## 2022-12-14 RX ORDER — LEVOTHYROXINE SODIUM 0.12 MG/1
TABLET ORAL
Qty: 90 TABLET | Refills: 1 | Status: SHIPPED | OUTPATIENT
Start: 2022-12-14

## 2022-12-21 ENCOUNTER — OFFICE VISIT (OUTPATIENT)
Dept: FAMILY MEDICINE CLINIC | Age: 67
End: 2022-12-21
Payer: MEDICARE

## 2022-12-21 ENCOUNTER — HOSPITAL ENCOUNTER (OUTPATIENT)
Age: 67
Discharge: HOME | End: 2022-12-21
Payer: MEDICARE

## 2022-12-21 ENCOUNTER — HOSPITAL ENCOUNTER (OUTPATIENT)
Dept: GENERAL RADIOLOGY | Age: 67
Discharge: HOME | End: 2022-12-21
Payer: MEDICARE

## 2022-12-21 VITALS
OXYGEN SATURATION: 98 % | HEART RATE: 80 BPM | DIASTOLIC BLOOD PRESSURE: 80 MMHG | SYSTOLIC BLOOD PRESSURE: 120 MMHG | WEIGHT: 290 LBS | HEIGHT: 63 IN | BODY MASS INDEX: 51.38 KG/M2

## 2022-12-21 DIAGNOSIS — F32.4 MAJOR DEPRESSIVE DISORDER WITH SINGLE EPISODE, IN PARTIAL REMISSION (HCC): ICD-10-CM

## 2022-12-21 DIAGNOSIS — R07.81 RIB PAIN ON RIGHT SIDE: Primary | ICD-10-CM

## 2022-12-21 DIAGNOSIS — M94.0 COSTOCHONDRITIS: ICD-10-CM

## 2022-12-21 DIAGNOSIS — F40.10 SOCIAL PHOBIA: ICD-10-CM

## 2022-12-21 DIAGNOSIS — R07.81 RIB PAIN ON RIGHT SIDE: ICD-10-CM

## 2022-12-21 PROCEDURE — 71101 X-RAY EXAM UNILAT RIBS/CHEST: CPT

## 2022-12-21 PROCEDURE — 99214 OFFICE O/P EST MOD 30 MIN: CPT | Performed by: FAMILY MEDICINE

## 2022-12-21 PROCEDURE — 1123F ACP DISCUSS/DSCN MKR DOCD: CPT | Performed by: FAMILY MEDICINE

## 2022-12-21 PROCEDURE — 3074F SYST BP LT 130 MM HG: CPT | Performed by: FAMILY MEDICINE

## 2022-12-21 PROCEDURE — 3078F DIAST BP <80 MM HG: CPT | Performed by: FAMILY MEDICINE

## 2022-12-21 RX ORDER — PREDNISONE 20 MG/1
20 TABLET ORAL 2 TIMES DAILY
Qty: 10 TABLET | Refills: 0 | Status: SHIPPED | OUTPATIENT
Start: 2022-12-21 | End: 2022-12-26

## 2022-12-21 RX ORDER — PREDNISONE 20 MG/1
20 TABLET ORAL 2 TIMES DAILY
Qty: 10 TABLET | Refills: 0 | Status: SHIPPED | OUTPATIENT
Start: 2022-12-21 | End: 2022-12-21

## 2022-12-21 RX ORDER — FLUOXETINE 20 MG/1
20 TABLET, FILM COATED ORAL DAILY
Qty: 90 TABLET | Refills: 1 | Status: SHIPPED | OUTPATIENT
Start: 2022-12-21

## 2022-12-21 NOTE — PROGRESS NOTES
PROBLEM VISIT NOTE     Subjective:     Chief Complaint   Patient presents with    Breast Pain     Right breast pain - 3 weeks     Emeka Huerta is a 78 y.o. female who presents lower breast pain  Duration 3 weeks  Past week now sharp pain bilat thoracic at level of lower breast  Denies shortness of breath, lumps, discharge from nipple  Worse with movement, cough  Better with sit still  Tried tylenol no help    Health Maintenance Due   Topic Date Due    Pneumococcal 65+ years Vaccine (2 - PCV) 12/14/2022    Annual Wellness Visit (AWV)  12/15/2022    Lipids  12/16/2022     CHART REVIEW   reports that she quit smoking about 33 years ago. Her smoking use included cigarettes. She has a 30.00 pack-year smoking history. She has been exposed to tobacco smoke. She has never used smokeless tobacco.  Health Maintenance Due   Topic Date Due    Pneumococcal 65+ years Vaccine (2 - PCV) 12/14/2022    Annual Wellness Visit (AWV)  12/15/2022    Lipids  12/16/2022     Current Outpatient Medications   Medication Instructions    Acetaminophen (TYLENOL ARTHRITIS PAIN PO) 1 tablet, Oral, 3 TIMES DAILY    albuterol sulfate  (90 Base) MCG/ACT inhaler 2 puffs, Inhalation, EVERY 4 HOURS PRN, May substitute for insurance preferred (Ventolin, Proventil, ProAir)    atorvastatin (LIPITOR) 20 mg, Oral, NIGHTLY    calcium carbonate (OSCAL) 500 mg, Oral, DAILY    clonazePAM (KLONOPIN) 0.5 mg, Oral, DAILY PRN    DULoxetine (CYMBALTA) 60 MG extended release capsule TAKE 1 CAPSULE DAILY    esomeprazole (NEXIUM) 40 mg, Oral, 2 times daily, NEW DIRECTIONS    Fesoterodine Fumarate ER (TOVIAZ) 8 MG TB24 TAKE 1 TABLET DAILY    FLUoxetine (PROZAC) 20 mg, Oral, DAILY    fluticasone (FLONASE) 50 MCG/ACT nasal spray 2 sprays, Nasal, DAILY, For nasal allergy symptoms.     fluticasone-vilanterol (BREO ELLIPTA) 100-25 MCG/INH AEPB inhaler 1 puff, Inhalation, DAILY    gabapentin (NEURONTIN) 300 MG capsule 1-2 caps 2 hours before the bedtime HYDROcodone-acetaminophen (NORCO) 5-325 MG per tablet 1-2 tablets, Oral, EVERY 6 HOURS PRN    levothyroxine (SYNTHROID) 125 MCG tablet TAKE 1 TABLET DAILY    losartan (COZAAR) 12.5 mg, Oral, DAILY    pramipexole (MIRAPEX) 0.5 mg, Oral, 2 TIMES DAILY, Take 1 in AM and 2 in PM    predniSONE (DELTASONE) 20 mg, Oral, 2 TIMES DAILY, Take    SUMAtriptan (IMITREX) 50 MG tablet Take 1 tablet by mouth once as needed . May repeat in 2 hours, maximum 200mg per  day    topiramate (TOPAMAX) 100 MG tablet TAKE 1 TABLET AT BEDTIME     LAST LABS  LDL Calculated   Date Value Ref Range Status   12/16/2021 102 (H) <100 mg/dL Final     Lab Results   Component Value Date    HDL 65 (H) 12/16/2021     Lab Results   Component Value Date    TRIG 98 12/16/2021     Lab Results   Component Value Date     10/26/2022    K 3.7 10/26/2022    CREATININE 1.2 10/26/2022     Lab Results   Component Value Date    WBC 8.0 10/26/2022    HGB 10.8 (L) 10/26/2022     10/26/2022     Lab Results   Component Value Date    ALT 12 06/16/2022    AST 12 (L) 06/16/2022    ALKPHOS 117 06/16/2022    BILITOT 0.4 06/16/2022     TSH (uIU/mL)   Date Value   07/27/2022 0.68     Lab Results   Component Value Date    LABA1C 5.9 10/03/2022    LABA1C 5.8 12/16/2021    LABA1C 5.4 06/16/2021     Objective:   PHYSICAL EXAM   /80 (Site: Left Upper Arm, Position: Sitting, Cuff Size: Large Adult)   Pulse 80   Ht 5' 3\" (1.6 m)   Wt 290 lb (131.5 kg)   SpO2 98%   BMI 51.37 kg/m²   BP Readings from Last 5 Encounters:   12/21/22 120/80   11/28/22 130/80   10/03/22 118/80   07/27/22 104/62   07/01/22 118/60     Wt Readings from Last 5 Encounters:   12/21/22 290 lb (131.5 kg)   11/28/22 290 lb 9.6 oz (131.8 kg)   11/07/22 285 lb (129.3 kg)   10/03/22 285 lb (129.3 kg)   07/27/22 287 lb (130.2 kg)      GENERAL:   well-developed, well-nourished, alert, no distress.      LUNGS:    Breathing unlabored  clear to auscultation bilaterally and good air movement  CARDIOVASC:   regular rate and rhythm  SKIN: warm and dry, no rashes on torso  BREAST:    No skin changes, normal symmetry  Palpation negative for masses or nodules. No axillary nodes  MUSC- tender right anterior mid ribs with some tenderness all the way around to back. Assessment and Plan:      Diagnosis Orders   1. Rib pain on right side  XR RIBS RIGHT INCLUDE CHEST (MIN 3 VIEWS)      2. Social phobia  FLUoxetine (PROZAC) 20 MG tablet      3. Major depressive disorder with single episode, in partial remission (HCC)  FLUoxetine (PROZAC) 20 MG tablet      4. Costochondritis  predniSONE (DELTASONE) 20 MG tablet         INSTRUCTIONS  Get x-ray. Can walk in to [facility] to get the x-ray. No appointment needed. Breast exam is fine. Plan routine mammogram in February. Take prednisone for 5 days. Call if it comes back. May need longer tapering dose. If comes back again, will need bone scan.

## 2022-12-21 NOTE — PATIENT INSTRUCTIONS
INSTRUCTIONS  Get x-ray. Can walk in to [facility] to get the x-ray. No appointment needed. Breast exam is fine. Plan routine mammogram in February. Take prednisone for 5 days. Call if it comes back. May need longer tapering dose. If comes back again, will need bone scan.

## 2022-12-26 ENCOUNTER — PATIENT MESSAGE (OUTPATIENT)
Dept: FAMILY MEDICINE CLINIC | Age: 67
End: 2022-12-26

## 2022-12-26 DIAGNOSIS — N64.4 MASTALGIA: Primary | ICD-10-CM

## 2022-12-26 DIAGNOSIS — R07.81 RIB PAIN ON RIGHT SIDE: ICD-10-CM

## 2022-12-27 RX ORDER — PREDNISONE 10 MG/1
TABLET ORAL
Qty: 29 TABLET | Refills: 0 | Status: SHIPPED | OUTPATIENT
Start: 2022-12-27 | End: 2023-01-08

## 2022-12-27 NOTE — TELEPHONE ENCOUNTER
From: Gretta Joyce  To: Dr. Naun Irwin  Sent: 12/26/2022 12:39 PM EST  Subject: Pain not gone    Hi Dr. Mary Li told me to follow up with you if my pain wasn't gone when I finished my prednisone. I took my last dose this morning. The pain that went around my chest is gone, but the pain in the breast area is not gone. It has moved to the upper portion of my right breast, isn't as painful as when I came into your office, but still hurts.

## 2023-01-06 ENCOUNTER — OFFICE VISIT (OUTPATIENT)
Dept: URGENT CARE | Age: 68
End: 2023-01-06

## 2023-01-06 VITALS
RESPIRATION RATE: 17 BRPM | WEIGHT: 290 LBS | SYSTOLIC BLOOD PRESSURE: 132 MMHG | OXYGEN SATURATION: 98 % | HEART RATE: 94 BPM | HEIGHT: 63 IN | TEMPERATURE: 98.7 F | BODY MASS INDEX: 51.38 KG/M2 | DIASTOLIC BLOOD PRESSURE: 88 MMHG

## 2023-01-06 DIAGNOSIS — I10 ESSENTIAL HYPERTENSION: ICD-10-CM

## 2023-01-06 DIAGNOSIS — J02.9 VIRAL PHARYNGITIS: Primary | ICD-10-CM

## 2023-01-06 LAB — STREPTOCOCCUS A RNA: ABNORMAL

## 2023-01-06 RX ORDER — GUAIFENESIN 600 MG/1
600 TABLET, EXTENDED RELEASE ORAL 2 TIMES DAILY
Qty: 14 TABLET | Refills: 0 | COMMUNITY
Start: 2023-01-06 | End: 2023-01-13

## 2023-01-06 ASSESSMENT — ENCOUNTER SYMPTOMS
SORE THROAT: 1
SHORTNESS OF BREATH: 0
SINUS PRESSURE: 0
NAUSEA: 0
DIARRHEA: 0
COUGH: 1
RHINORRHEA: 0
VOMITING: 0
TROUBLE SWALLOWING: 0
SINUS PAIN: 0

## 2023-01-06 NOTE — PATIENT INSTRUCTIONS
Rapid strep A negative in clinic today   Rest, hydrate, OTC symptom relief  Monitor BP at home and call PCP if persistently elevated. Follow up in 5 days if symptoms persist or if symptoms worsen.   New Prescriptions    GUAIFENESIN (MUCINEX) 600 MG EXTENDED RELEASE TABLET    Take 1 tablet by mouth 2 times daily for 7 days

## 2023-01-06 NOTE — PROGRESS NOTES
Sharon Lewis (:  1955) is a 78 y.o. female,New patient, here for evaluation of the following chief complaint(s):  Sinusitis and Hypertension (Home high BP )      ASSESSMENT/PLAN:    ICD-10-CM    1. Viral pharyngitis  J02.9 POCT Rapid Strep A DNA (Alere i)      2. Essential hypertension  I10           Rapid strep A negative in clinic today   Rest, hydrate, OTC symptom relief  Monitor BP at home and call PCP if persistently elevated. Follow up in 5 days if symptoms persist or if symptoms worsen. SUBJECTIVE/OBJECTIVE:    History provided by:  Patient   used: No    HPI:   78 y.o. female presents with symptoms of sore throat and feeling off balance ongoing since 2023. Denies fever or known strep exposure. Has used throat spray for symptoms with mild relief. Vitals:    23 1055   BP: 132/88   Pulse: 94   Resp: 17   Temp: 98.7 °F (37.1 °C)   SpO2: 98%   Weight: 290 lb (131.5 kg)   Height: 5' 3\" (1.6 m)       Review of Systems   Constitutional:  Positive for fatigue. Negative for chills, diaphoresis and fever. HENT:  Positive for ear pain (bilateral with swallowing) and sore throat. Negative for congestion, rhinorrhea, sinus pressure, sinus pain and trouble swallowing. Respiratory:  Positive for cough (dry). Negative for shortness of breath. Gastrointestinal:  Negative for diarrhea, nausea and vomiting. Neurological:  Positive for dizziness. Negative for headaches. Physical Exam  Vitals reviewed. Constitutional:       Appearance: Normal appearance. HENT:      Head: Normocephalic. Right Ear: Tympanic membrane, ear canal and external ear normal.      Left Ear: Tympanic membrane, ear canal and external ear normal.      Nose: Nose normal.      Mouth/Throat:      Mouth: Mucous membranes are moist.      Pharynx: Posterior oropharyngeal erythema present. No oropharyngeal exudate. Eyes:      Pupils: Pupils are equal, round, and reactive to light. Cardiovascular:      Rate and Rhythm: Normal rate and regular rhythm. Heart sounds: Normal heart sounds. Pulmonary:      Effort: Pulmonary effort is normal.      Breath sounds: Normal breath sounds. Musculoskeletal:      Cervical back: Normal range of motion. Neurological:      Mental Status: She is alert. Psychiatric:         Mood and Affect: Mood normal.         Behavior: Behavior normal.         An electronic signature was used to authenticate this note.     --DEA Astudillo - CNP

## 2023-01-12 ENCOUNTER — HOSPITAL ENCOUNTER (OUTPATIENT)
Dept: ULTRASOUND IMAGING | Age: 68
Discharge: HOME | End: 2023-01-12
Payer: MEDICARE

## 2023-01-12 ENCOUNTER — HOSPITAL ENCOUNTER (OUTPATIENT)
Dept: WOMENS IMAGING | Age: 68
Discharge: HOME | End: 2023-01-12
Payer: MEDICARE

## 2023-01-12 VITALS — HEIGHT: 63 IN | WEIGHT: 285 LBS | BODY MASS INDEX: 50.5 KG/M2

## 2023-01-12 DIAGNOSIS — N64.4 MASTALGIA: ICD-10-CM

## 2023-01-12 DIAGNOSIS — N64.4 BREAST PAIN: ICD-10-CM

## 2023-01-12 PROCEDURE — G0279 TOMOSYNTHESIS, MAMMO: HCPCS

## 2023-01-12 PROCEDURE — 76642 ULTRASOUND BREAST LIMITED: CPT

## 2023-01-23 ENCOUNTER — PATIENT MESSAGE (OUTPATIENT)
Dept: FAMILY MEDICINE CLINIC | Age: 68
End: 2023-01-23

## 2023-01-23 DIAGNOSIS — Z96.611 S/P REVERSE TOTAL SHOULDER ARTHROPLASTY, RIGHT: ICD-10-CM

## 2023-01-23 RX ORDER — TOPIRAMATE 100 MG/1
TABLET, FILM COATED ORAL
Qty: 90 TABLET | Refills: 1 | Status: SHIPPED | OUTPATIENT
Start: 2023-01-23

## 2023-01-23 RX ORDER — AMOXICILLIN AND CLAVULANATE POTASSIUM 875; 125 MG/1; MG/1
TABLET, FILM COATED ORAL
Qty: 3 TABLET | Refills: 0 | Status: SHIPPED | OUTPATIENT
Start: 2023-01-23

## 2023-01-23 NOTE — TELEPHONE ENCOUNTER
From: Mikhail Wiggins  To: Dr. Tia Lee  Sent: 1/23/2023 8:11 AM EST  Subject: Need a refill on a med I can't find in my med list    I need a refill on the medicine I have to take 1 hour before I go to the dentist. I took my last one last week for my 6 month cleaning and I have some cavities I need filled. The medicine is IC AMOXICILLIN-CLAV 875-125MG TAB. The last time I had it filled was at [facility] and the RX # was [id number] on 3/3/22. It was filled at [place] [address] , but I'd like this one filled at the [facility] in [place] on [address] please.

## 2023-01-24 DIAGNOSIS — R60.0 BILATERAL EDEMA OF LOWER EXTREMITY: ICD-10-CM

## 2023-01-25 RX ORDER — HYDROCHLOROTHIAZIDE 12.5 MG/1
CAPSULE, GELATIN COATED ORAL
Qty: 180 CAPSULE | Refills: 0 | Status: SHIPPED | OUTPATIENT
Start: 2023-01-25

## 2023-02-13 ENCOUNTER — OFFICE VISIT (OUTPATIENT)
Dept: ORTHOPEDIC SURGERY | Age: 68
End: 2023-02-13
Payer: MEDICARE

## 2023-02-13 VITALS — RESPIRATION RATE: 16 BRPM | WEIGHT: 290 LBS | HEIGHT: 63 IN | BODY MASS INDEX: 51.38 KG/M2

## 2023-02-13 DIAGNOSIS — M70.51 PES ANSERINUS BURSITIS OF BOTH KNEES: ICD-10-CM

## 2023-02-13 DIAGNOSIS — M17.0 PRIMARY OSTEOARTHRITIS OF BOTH KNEES: Primary | ICD-10-CM

## 2023-02-13 DIAGNOSIS — M70.52 PES ANSERINUS BURSITIS OF BOTH KNEES: ICD-10-CM

## 2023-02-13 PROCEDURE — 20610 DRAIN/INJ JOINT/BURSA W/O US: CPT | Performed by: PHYSICIAN ASSISTANT

## 2023-02-13 PROCEDURE — 1123F ACP DISCUSS/DSCN MKR DOCD: CPT | Performed by: PHYSICIAN ASSISTANT

## 2023-02-13 PROCEDURE — 99213 OFFICE O/P EST LOW 20 MIN: CPT | Performed by: PHYSICIAN ASSISTANT

## 2023-02-13 RX ORDER — BUPIVACAINE HYDROCHLORIDE 2.5 MG/ML
2 INJECTION, SOLUTION INFILTRATION; PERINEURAL ONCE
Status: COMPLETED | OUTPATIENT
Start: 2023-02-13 | End: 2023-02-13

## 2023-02-13 RX ORDER — TRIAMCINOLONE ACETONIDE 40 MG/ML
40 INJECTION, SUSPENSION INTRA-ARTICULAR; INTRAMUSCULAR ONCE
Status: COMPLETED | OUTPATIENT
Start: 2023-02-13 | End: 2023-02-13

## 2023-02-13 RX ADMIN — TRIAMCINOLONE ACETONIDE 40 MG: 40 INJECTION, SUSPENSION INTRA-ARTICULAR; INTRAMUSCULAR at 09:46

## 2023-02-13 RX ADMIN — TRIAMCINOLONE ACETONIDE 40 MG: 40 INJECTION, SUSPENSION INTRA-ARTICULAR; INTRAMUSCULAR at 09:58

## 2023-02-13 RX ADMIN — BUPIVACAINE HYDROCHLORIDE 5 MG: 2.5 INJECTION, SOLUTION INFILTRATION; PERINEURAL at 09:46

## 2023-02-13 RX ADMIN — TRIAMCINOLONE ACETONIDE 40 MG: 40 INJECTION, SUSPENSION INTRA-ARTICULAR; INTRAMUSCULAR at 09:47

## 2023-02-13 RX ADMIN — BUPIVACAINE HYDROCHLORIDE 5 MG: 2.5 INJECTION, SOLUTION INFILTRATION; PERINEURAL at 09:57

## 2023-02-13 RX ADMIN — BUPIVACAINE HYDROCHLORIDE 5 MG: 2.5 INJECTION, SOLUTION INFILTRATION; PERINEURAL at 09:45

## 2023-02-13 NOTE — PROGRESS NOTES
This dictation was done with Dragon dictation and may contain mechanical errors related to translation. I have today reviewed with Pnada Rodriguez the clinically relevant, past medical history, medications, allergies, family history, social history, and Review Of Systems form the patients most recent history form & I have documented any details relevant to today's presenting complaints in my history below. Ms. Kera Schulte's self-reported past medical history, medications, allergies, family history, social history, and Review Of Systems form has been scanned into the chart under the \"Media\" tab. Subjective:  Panda Rodriguez is a 78 y.o. who is here complaining of continued pain in both of her knees. She is been dealing with this for years and has been trying to lose weight currently she is on a 51 BMI. She had a cortisone shot more than 3 months ago but at that visit she had a cortisone shot for that arthritis in the joint as well as for the pes bursitis in both the left and right knee. This gave her more relief than in past and she has been able to be somewhat more active and its only been the last few weeks that the pain started to return.   She is also not been as active during the wintertime so I sent her for up-to-date x-rays including standing AP lateral and a sunrise view of both knees      Patient Active Problem List   Diagnosis    Social phobia    Eczema    Hypothyroidism (acquired)    Hyperlipidemia LDL goal <130    GERD (gastroesophageal reflux disease)    IBS (irritable bowel syndrome)    RLS (restless legs syndrome)    Sleep apnea    Migraine headache    Vitamin B12 deficiency    DDD (degenerative disc disease), lumbar    Allergic Conjunctivitis    Mixed incontinence- failed tropesium 2017, failed detrol 2016, failed ditropan 2013    Moderate persistent asthma without complication    Osteopenia DXA -1.7 (10/13), -2.1 (11/16), -1.3 (9/19)    Intertrigo labialis    Obesity, morbid, BMI 50 or higher ([facility] [address]    Chronic thoracic spine pain    Chronic neck pain    Insomnia    Prediabetes    History of sleeve gastrectomy    Status post reverse arthroplasty of right shoulder    Sensorineural hearing loss, bilateral    Tinnitus of both ears    Microcytic anemia    Major depressive disorder with single episode, in remission ([facility] [address]    Hypokalemia- recheck next visit off potassium (10/22)    Sleep deprivation    HTN (hypertension)           Current Outpatient Medications on File Prior to Visit   Medication Sig Dispense Refill    hydroCHLOROthiazide (MICROZIDE) 12.5 MG capsule TAKE 1 CAPSULE BY MOUTH TWICE A  capsule 0    topiramate (TOPAMAX) 100 MG tablet TAKE 1 TABLET AT BEDTIME 90 tablet 1    amoxicillin-clavulanate (AUGMENTIN) 875-125 MG per tablet PATIENT TO TAKE 1 HOUR PRIOR TO PROCEDURE 3 tablet 0    FLUoxetine (PROZAC) 20 MG tablet Take 1 tablet by mouth daily 90 tablet 1    levothyroxine (SYNTHROID) 125 MCG tablet TAKE 1 TABLET DAILY 90 tablet 1    atorvastatin (LIPITOR) 20 MG tablet Take 1 tablet by mouth nightly 90 tablet 1    esomeprazole (NEXIUM) 40 MG delayed release capsule Take 1 capsule by mouth in the morning and at bedtime NEW DIRECTIONS 180 capsule 1    gabapentin (NEURONTIN) 300 MG capsule 1-2 caps 2 hours before the bedtime 60 capsule 5    fluticasone-vilanterol (BREO ELLIPTA) 100-25 MCG/INH AEPB inhaler Inhale 1 puff into the lungs daily 3 each 5    Fesoterodine Fumarate ER (TOVIAZ) 8 MG TB24 TAKE 1 TABLET DAILY 90 tablet 3    pramipexole (MIRAPEX) 0.5 MG tablet Take 1 tablet by mouth 2 times daily Take 1 in AM and 2 in  tablet 1    fluticasone (FLONASE) 50 MCG/ACT nasal spray 2 sprays by Nasal route in the morning. For nasal allergy symptoms. . 16 g 11    losartan (COZAAR) 25 MG tablet Take 0.5 tablets by mouth in the morning.  30 tablet 5    DULoxetine (CYMBALTA) 60 MG extended release capsule TAKE 1 CAPSULE DAILY 90 capsule 1    SUMAtriptan (IMITREX) 50 MG tablet Take 1 tablet by mouth once as needed . May repeat in 2 hours, maximum 200mg per  day (Patient taking differently: Take 1 tablet by mouth once as needed . May repeat in 2 hours, maximum 200mg per  day) 27 tablet 0    Acetaminophen (TYLENOL ARTHRITIS PAIN PO) Take 1 tablet by mouth 3 times daily      albuterol sulfate  (90 Base) MCG/ACT inhaler Inhale 2 puffs into the lungs every 4 hours as needed for Wheezing May substitute for insurance preferred (Ventolin, Proventil, ProAir) 2 Inhaler 3    calcium carbonate (OSCAL) 500 MG TABS tablet Take 500 mg by mouth daily      clonazePAM (KLONOPIN) 0.5 MG tablet Take 0.5 mg by mouth daily as needed. No current facility-administered medications on file prior to visit. Objective:   Resp. rate 16, height 5' 3\" (1.6 m), weight 290 lb (131.5 kg), currently breastfeeding. On examination is a very pleasant 42-year-old female who is alert and oriented x3. She has morbid obesity at 46 BMI goes from 0 to calf on thigh but has quad tone diminished and tightness her hamstring and heel cord. She has a lot of crepitus during flexion extension through the patellofemoral joint medial joint line tenderness consistent with degenerative osteoarthritis through all 3 compartments. Neuro exam grossly intact both lower extremities. Intact sensation to light touch. Motor exam 4+ to 5/5 in all major motor groups. Negative Guerrero's sign. Skin is warm, dry and intact with out erythema or significant increased temperature around the knee joint(s). There are no cutaneous lesions or lymphadenopathy present.     X-RAYS:  X-rays taken the office today show almost complete joint loss in the medial compartment of both knees with subchondral sclerosis no fractures or other significant bony normalities were noted and this showed the patient      Assessment:  Advanced osteoarthritis of both knees and morbid obesity    Plan:  During today's visit, there was approximately 20 minutes of face-to-face discussion in regards to the patient's current condition and treatment options. More than 50 % of the time was counseling and coordination of care as indicated above. At this point we need to have her be more productive and rehabbing her legs after she gets the injections I will look to order some aqua therapy. And with her consent we will repeat the injections for the knee joint as well as for the pes bursitis in both the knees. PROCEDURE NOTE:  After a discussion of the multiple options, they consented to a cortisone shot. 1 ml of 40mg/ml Kenalog and 2 ml's of 0.25%Marcaine were injected into both the right and the left knee joint spaces. The leg was slightly flexed and injected just lateral to the patella tendon to under the patella. This was done with sterile technique and they tolerated it well. Next with her consent she was injected with 1 cc Kenalog and 2 cc of Marcaine into the Pez anserine area of both knees just below the joint line on the medial side. She tolerated well and we talked about some specific exercises for her to work on including an exercise bike or elliptical and she will follow-up with us in 3 months or as needed basis      They will schedule a follow up in 3-4 months prn.

## 2023-02-16 ENCOUNTER — TELEPHONE (OUTPATIENT)
Dept: ORTHOPEDIC SURGERY | Age: 68
End: 2023-02-16

## 2023-02-16 NOTE — TELEPHONE ENCOUNTER
General Question     Subject: PATIENT STATES INSURANCE SAYS YOU WOULD TELL HER A LOCATION FOR PT. PLEASE ADVISE.   Patient: Florinda Gloria  Contact Number: [phone]

## 2023-02-22 DIAGNOSIS — Z96.611 S/P REVERSE TOTAL SHOULDER ARTHROPLASTY, RIGHT: ICD-10-CM

## 2023-02-24 RX ORDER — AMOXICILLIN AND CLAVULANATE POTASSIUM 875; 125 MG/1; MG/1
TABLET, FILM COATED ORAL
Qty: 10 TABLET | Refills: 0 | Status: SHIPPED | OUTPATIENT
Start: 2023-02-24 | End: 2023-03-06 | Stop reason: CLARIF

## 2023-02-28 ENCOUNTER — TELEPHONE (OUTPATIENT)
Dept: FAMILY MEDICINE CLINIC | Age: 68
End: 2023-02-28

## 2023-02-28 NOTE — TELEPHONE ENCOUNTER
Pt calling in. Stated that the pharmacy will not fill her amoxicillin due to the directions being vague. Pharm needs to clarify how many pt is taking 1 hour before procedure. Pharm can be reached at [phone] to clarify.     Pt is needing someone to call pharm today to fill because she is having a dental procedure tomorrow    Please Advise  Pt can be reached at [phone]

## 2023-03-06 ENCOUNTER — OFFICE VISIT (OUTPATIENT)
Dept: URGENT CARE | Age: 68
End: 2023-03-06

## 2023-03-06 ENCOUNTER — TELEPHONE (OUTPATIENT)
Dept: URGENT CARE | Age: 68
End: 2023-03-06

## 2023-03-06 VITALS
RESPIRATION RATE: 16 BRPM | WEIGHT: 290 LBS | DIASTOLIC BLOOD PRESSURE: 74 MMHG | OXYGEN SATURATION: 99 % | HEART RATE: 75 BPM | HEIGHT: 63 IN | SYSTOLIC BLOOD PRESSURE: 122 MMHG | BODY MASS INDEX: 51.38 KG/M2 | TEMPERATURE: 98 F

## 2023-03-06 DIAGNOSIS — I10 ESSENTIAL HYPERTENSION: ICD-10-CM

## 2023-03-06 DIAGNOSIS — R05.9 COUGH IN ADULT: ICD-10-CM

## 2023-03-06 DIAGNOSIS — J18.9 PNEUMONIA OF LOWER LOBE DUE TO INFECTIOUS ORGANISM, UNSPECIFIED LATERALITY: Primary | ICD-10-CM

## 2023-03-06 RX ORDER — ALBUTEROL SULFATE 90 UG/1
2 AEROSOL, METERED RESPIRATORY (INHALATION) 4 TIMES DAILY PRN
Qty: 18 G | Refills: 0 | Status: CANCELLED | OUTPATIENT
Start: 2023-03-06

## 2023-03-06 RX ORDER — FLUOXETINE HYDROCHLORIDE 20 MG/1
CAPSULE ORAL
COMMUNITY
Start: 2023-03-05 | End: 2023-03-06 | Stop reason: CLARIF

## 2023-03-06 RX ORDER — LEVOFLOXACIN 500 MG/1
500 TABLET, FILM COATED ORAL DAILY
Qty: 7 TABLET | Refills: 0 | Status: SHIPPED | OUTPATIENT
Start: 2023-03-06 | End: 2023-03-13

## 2023-03-06 RX ORDER — BENZONATATE 200 MG/1
200 CAPSULE ORAL 3 TIMES DAILY PRN
Qty: 90 CAPSULE | Refills: 0 | Status: SHIPPED | OUTPATIENT
Start: 2023-03-06 | End: 2023-04-05

## 2023-03-06 RX ORDER — DEXTROMETHORPHAN HYDROBROMIDE AND PROMETHAZINE HYDROCHLORIDE 15; 6.25 MG/5ML; MG/5ML
5 SYRUP ORAL NIGHTLY
Qty: 100 ML | Refills: 0 | Status: SHIPPED | OUTPATIENT
Start: 2023-03-06 | End: 2023-03-26

## 2023-03-06 RX ORDER — PREDNISONE 20 MG/1
20 TABLET ORAL 2 TIMES DAILY
Qty: 10 TABLET | Refills: 0 | Status: SHIPPED | OUTPATIENT
Start: 2023-03-06 | End: 2023-03-11

## 2023-03-06 NOTE — PROGRESS NOTES
Uma Schulte (:  1955) is a 67 y.o. female,New patient, here for evaluation of the following chief complaint(s):  Cough (Since last Thursday, was seen in her RSV vaccination study today, was told her lungs sounded like pneumonia, COVID at home negative on Saturday ), Headache, and Congestion      ASSESSMENT/PLAN:    ICD-10-CM    1. Cough in adult  R05.9 XR CHEST STANDARD (2 VW)     predniSONE (DELTASONE) 20 MG tablet     benzonatate (TESSALON) 200 MG capsule     promethazine-dextromethorphan (PROMETHAZINE-DM) 6.25-15 MG/5ML syrup     levoFLOXacin (LEVAQUIN) 500 MG tablet      2. Essential hypertension  I10       3. Pneumonia of lower lobe due to infectious organism, unspecified laterality  J18.9 levoFLOXacin (LEVAQUIN) 500 MG tablet          Patient is taking medication for the hypertension and discussion was made about continuing to monitor her blood pressure and taking her medication as prescribed  Use your inhaler at home as discussed, take prednisone, cough medications as prescribed. I discussed not to fill the antibiotic if the results were negative per the radiology reading. Xray results not in- initial read possible pneumonia.   Follow up with PCP in 7 days if symptoms persist or if symptoms worsen. Go to ER for chest pain with shortness of breath.    SUBJECTIVE/OBJECTIVE:    History provided by:  Patient   used: No    HPI:   67 y.o. female presents with symptoms of cough, congestion, headaches ongoing since 5 days. Denies chest pain, fever, nausea/vomiting. Has taken no medication  for symptoms Pt went for a RSV vaccine study today and was told to go to an Urgent care for assessment of possible pneumonia.     Vitals:    23 1410   BP: 122/74   Site: Right Upper Arm   Position: Sitting   Cuff Size: Large Adult   Pulse: 75   Resp: 16   Temp: 98 °F (36.7 °C)   SpO2: 99%   Weight: 290 lb (131.5 kg)   Height: 5' 3\" (1.6 m)       Review of Systems   All other systems  reviewed and are negative. Physical Exam  Vitals and nursing note reviewed. Constitutional:       Appearance: Normal appearance. She is obese. HENT:      Mouth/Throat:      Mouth: Mucous membranes are moist.   Cardiovascular:      Rate and Rhythm: Normal rate and regular rhythm. Heart sounds: Normal heart sounds. Pulmonary:      Effort: Pulmonary effort is normal. No tachypnea. Breath sounds: Examination of the right-upper field reveals wheezing. Examination of the right-middle field reveals rales. Examination of the left-middle field reveals wheezing. Examination of the right-lower field reveals rhonchi and rales. Examination of the left-lower field reveals rhonchi and rales. Wheezing, rhonchi and rales present. Neurological:      Mental Status: She is alert and oriented to person, place, and time. Psychiatric:         Mood and Affect: Mood normal.         Behavior: Behavior normal.         An electronic signature was used to authenticate this note.     --DEA Huertas Asa - CNP

## 2023-03-06 NOTE — PATIENT INSTRUCTIONS
Patient is taking medication for the hypertension and discussion was made about continuing to monitor her blood pressure and taking her medication as prescribed  Use your inhaler at home as discussed, take prednisone, cough medications as prescribed. Follow up with PCP in 7 days if symptoms persist or if symptoms worsen. Go to ER for chest pain with shortness of breath.   New Prescriptions    BENZONATATE (TESSALON) 200 MG CAPSULE    Take 1 capsule by mouth 3 times daily as needed for Cough    LEVOFLOXACIN (LEVAQUIN) 500 MG TABLET    Take 1 tablet by mouth daily for 7 days    PREDNISONE (DELTASONE) 20 MG TABLET    Take 1 tablet by mouth 2 times daily for 5 days    PROMETHAZINE-DEXTROMETHORPHAN (PROMETHAZINE-DM) 6.25-15 MG/5ML SYRUP    Take 5 mLs by mouth at bedtime for 20 days

## 2023-03-07 NOTE — TELEPHONE ENCOUNTER
Called and spoke with patient about xray results and to not take the antibiotic as the Xray showed no pneumonia. Patient will not take the antibiotic and will take the other medications as discussed.

## 2023-03-15 DIAGNOSIS — G25.81 RESTLESS LEGS SYNDROME: ICD-10-CM

## 2023-03-15 RX ORDER — PRAMIPEXOLE DIHYDROCHLORIDE 0.5 MG/1
TABLET ORAL
Qty: 270 TABLET | Refills: 1 | Status: SHIPPED | OUTPATIENT
Start: 2023-03-15

## 2023-03-22 SDOH — HEALTH STABILITY: PHYSICAL HEALTH: ON AVERAGE, HOW MANY DAYS PER WEEK DO YOU ENGAGE IN MODERATE TO STRENUOUS EXERCISE (LIKE A BRISK WALK)?: 6 DAYS

## 2023-03-22 SDOH — HEALTH STABILITY: PHYSICAL HEALTH: ON AVERAGE, HOW MANY MINUTES DO YOU ENGAGE IN EXERCISE AT THIS LEVEL?: 20 MIN

## 2023-03-22 ASSESSMENT — LIFESTYLE VARIABLES
HOW MANY STANDARD DRINKS CONTAINING ALCOHOL DO YOU HAVE ON A TYPICAL DAY: 1 OR 2
HOW MANY STANDARD DRINKS CONTAINING ALCOHOL DO YOU HAVE ON A TYPICAL DAY: 1
HOW OFTEN DO YOU HAVE A DRINK CONTAINING ALCOHOL: 2
HOW OFTEN DO YOU HAVE A DRINK CONTAINING ALCOHOL: MONTHLY OR LESS
HOW OFTEN DO YOU HAVE SIX OR MORE DRINKS ON ONE OCCASION: 1

## 2023-03-22 ASSESSMENT — PATIENT HEALTH QUESTIONNAIRE - PHQ9
3. TROUBLE FALLING OR STAYING ASLEEP: 0
SUM OF ALL RESPONSES TO PHQ QUESTIONS 1-9: 8
SUM OF ALL RESPONSES TO PHQ QUESTIONS 1-9: 8
7. TROUBLE CONCENTRATING ON THINGS, SUCH AS READING THE NEWSPAPER OR WATCHING TELEVISION: 3
8. MOVING OR SPEAKING SO SLOWLY THAT OTHER PEOPLE COULD HAVE NOTICED. OR THE OPPOSITE, BEING SO FIGETY OR RESTLESS THAT YOU HAVE BEEN MOVING AROUND A LOT MORE THAN USUAL: 0
2. FEELING DOWN, DEPRESSED OR HOPELESS: 0
4. FEELING TIRED OR HAVING LITTLE ENERGY: 3
5. POOR APPETITE OR OVEREATING: 0
6. FEELING BAD ABOUT YOURSELF - OR THAT YOU ARE A FAILURE OR HAVE LET YOURSELF OR YOUR FAMILY DOWN: 2
SUM OF ALL RESPONSES TO PHQ QUESTIONS 1-9: 8
9. THOUGHTS THAT YOU WOULD BE BETTER OFF DEAD, OR OF HURTING YOURSELF: 0
1. LITTLE INTEREST OR PLEASURE IN DOING THINGS: 0
10. IF YOU CHECKED OFF ANY PROBLEMS, HOW DIFFICULT HAVE THESE PROBLEMS MADE IT FOR YOU TO DO YOUR WORK, TAKE CARE OF THINGS AT HOME, OR GET ALONG WITH OTHER PEOPLE: 0
SUM OF ALL RESPONSES TO PHQ9 QUESTIONS 1 & 2: 0
SUM OF ALL RESPONSES TO PHQ QUESTIONS 1-9: 8

## 2023-03-31 SDOH — ECONOMIC STABILITY: HOUSING INSECURITY
IN THE LAST 12 MONTHS, WAS THERE A TIME WHEN YOU DID NOT HAVE A STEADY PLACE TO SLEEP OR SLEPT IN A SHELTER (INCLUDING NOW)?: NO

## 2023-03-31 SDOH — ECONOMIC STABILITY: FOOD INSECURITY: WITHIN THE PAST 12 MONTHS, THE FOOD YOU BOUGHT JUST DIDN'T LAST AND YOU DIDN'T HAVE MONEY TO GET MORE.: NEVER TRUE

## 2023-03-31 SDOH — ECONOMIC STABILITY: TRANSPORTATION INSECURITY
IN THE PAST 12 MONTHS, HAS LACK OF TRANSPORTATION KEPT YOU FROM MEETINGS, WORK, OR FROM GETTING THINGS NEEDED FOR DAILY LIVING?: NO

## 2023-03-31 SDOH — ECONOMIC STABILITY: INCOME INSECURITY: HOW HARD IS IT FOR YOU TO PAY FOR THE VERY BASICS LIKE FOOD, HOUSING, MEDICAL CARE, AND HEATING?: NOT HARD AT ALL

## 2023-03-31 SDOH — ECONOMIC STABILITY: FOOD INSECURITY: WITHIN THE PAST 12 MONTHS, YOU WORRIED THAT YOUR FOOD WOULD RUN OUT BEFORE YOU GOT MONEY TO BUY MORE.: NEVER TRUE

## 2023-04-03 ENCOUNTER — APPOINTMENT (OUTPATIENT)
Dept: CT IMAGING | Age: 68
End: 2023-04-03
Payer: MEDICARE

## 2023-04-03 ENCOUNTER — HOSPITAL ENCOUNTER (OUTPATIENT)
Dept: GENERAL RADIOLOGY | Age: 68
Discharge: HOME | End: 2023-04-03
Payer: MEDICARE

## 2023-04-03 ENCOUNTER — HOSPITAL ENCOUNTER (OUTPATIENT)
Age: 68
Discharge: HOME | End: 2023-04-03
Payer: MEDICARE

## 2023-04-03 ENCOUNTER — HOSPITAL ENCOUNTER (INPATIENT)
Age: 68
LOS: 3 days | Discharge: HOME | End: 2023-04-06
Attending: STUDENT IN AN ORGANIZED HEALTH CARE EDUCATION/TRAINING PROGRAM | Admitting: STUDENT IN AN ORGANIZED HEALTH CARE EDUCATION/TRAINING PROGRAM
Payer: MEDICARE

## 2023-04-03 ENCOUNTER — OFFICE VISIT (OUTPATIENT)
Dept: FAMILY MEDICINE CLINIC | Age: 68
End: 2023-04-03
Payer: MEDICARE

## 2023-04-03 VITALS
SYSTOLIC BLOOD PRESSURE: 130 MMHG | HEIGHT: 63 IN | BODY MASS INDEX: 48.37 KG/M2 | DIASTOLIC BLOOD PRESSURE: 80 MMHG | OXYGEN SATURATION: 99 % | HEART RATE: 77 BPM | WEIGHT: 273 LBS

## 2023-04-03 DIAGNOSIS — E53.8 VITAMIN B12 DEFICIENCY: ICD-10-CM

## 2023-04-03 DIAGNOSIS — R73.03 PREDIABETES: ICD-10-CM

## 2023-04-03 DIAGNOSIS — R55 SYNCOPE, UNSPECIFIED SYNCOPE TYPE: ICD-10-CM

## 2023-04-03 DIAGNOSIS — I26.94 MULTIPLE SUBSEGMENTAL PULMONARY EMBOLI WITHOUT ACUTE COR PULMONALE (HCC): Primary | ICD-10-CM

## 2023-04-03 DIAGNOSIS — I10 PRIMARY HYPERTENSION: ICD-10-CM

## 2023-04-03 DIAGNOSIS — R63.4 WEIGHT LOSS: ICD-10-CM

## 2023-04-03 DIAGNOSIS — J45.40 MODERATE PERSISTENT ASTHMA WITHOUT COMPLICATION: ICD-10-CM

## 2023-04-03 DIAGNOSIS — D50.9 MICROCYTIC ANEMIA: ICD-10-CM

## 2023-04-03 DIAGNOSIS — Z23 NEED FOR PNEUMOCOCCAL VACCINATION: ICD-10-CM

## 2023-04-03 DIAGNOSIS — E03.9 HYPOTHYROIDISM (ACQUIRED): ICD-10-CM

## 2023-04-03 DIAGNOSIS — R74.8 ELEVATED LIPASE: ICD-10-CM

## 2023-04-03 DIAGNOSIS — R07.9 CHEST PAIN, UNSPECIFIED TYPE: ICD-10-CM

## 2023-04-03 DIAGNOSIS — E78.5 HYPERLIPIDEMIA LDL GOAL <130: ICD-10-CM

## 2023-04-03 DIAGNOSIS — R06.09 DOE (DYSPNEA ON EXERTION): ICD-10-CM

## 2023-04-03 DIAGNOSIS — Z00.00 MEDICARE ANNUAL WELLNESS VISIT, SUBSEQUENT: Primary | ICD-10-CM

## 2023-04-03 DIAGNOSIS — E66.01 OBESITY, MORBID, BMI 40.0-49.9 (HCC): ICD-10-CM

## 2023-04-03 DIAGNOSIS — R10.13 EPIGASTRIC ABDOMINAL PAIN: ICD-10-CM

## 2023-04-03 DIAGNOSIS — F32.5 MAJOR DEPRESSIVE DISORDER WITH SINGLE EPISODE, IN REMISSION (HCC): ICD-10-CM

## 2023-04-03 PROBLEM — I26.09 PULMONARY EMBOLISM WITH ACUTE COR PULMONALE, UNSPECIFIED CHRONICITY, UNSPECIFIED PULMONARY EMBOLISM TYPE (HCC): Status: ACTIVE | Noted: 2023-04-03

## 2023-04-03 PROBLEM — N39.0 UTI (URINARY TRACT INFECTION): Status: ACTIVE | Noted: 2023-04-03

## 2023-04-03 PROBLEM — K85.90 PANCREATITIS: Status: ACTIVE | Noted: 2023-04-03

## 2023-04-03 PROBLEM — I26.99 ACUTE PULMONARY EMBOLISM (HCC): Status: ACTIVE | Noted: 2023-04-03

## 2023-04-03 LAB
ALBUMIN SERPL-MCNC: 3.7 G/DL (ref 3.4–5)
ALBUMIN SERPL-MCNC: 3.9 G/DL (ref 3.4–5)
ALBUMIN/GLOB SERPL: 1.1 {RATIO} (ref 1.1–2.2)
ALBUMIN/GLOB SERPL: 1.5 {RATIO} (ref 1.1–2.2)
ALP SERPL-CCNC: 152 U/L (ref 40–129)
ALP SERPL-CCNC: 154 U/L (ref 40–129)
ALT SERPL-CCNC: 13 U/L (ref 10–40)
ALT SERPL-CCNC: 15 U/L (ref 10–40)
ANION GAP SERPL CALCULATED.3IONS-SCNC: 14 MMOL/L (ref 3–16)
ANION GAP SERPL CALCULATED.3IONS-SCNC: 16 MMOL/L (ref 3–16)
APTT BLD: 28.7 SEC (ref 22.7–35.9)
AST SERPL-CCNC: 11 U/L (ref 15–37)
AST SERPL-CCNC: 16 U/L (ref 15–37)
BACTERIA URNS QL MICRO: ABNORMAL /HPF
BACTERIA URNS QL MICRO: ABNORMAL /HPF
BASOPHILS # BLD: 0.1 K/UL (ref 0–0.2)
BASOPHILS # BLD: 0.1 K/UL (ref 0–0.2)
BASOPHILS NFR BLD: 0.5 %
BASOPHILS NFR BLD: 0.8 %
BILIRUB SERPL-MCNC: 0.3 MG/DL (ref 0–1)
BILIRUB SERPL-MCNC: 0.4 MG/DL (ref 0–1)
BILIRUB UR QL STRIP.AUTO: NEGATIVE
BILIRUB UR QL STRIP.AUTO: NEGATIVE
BUN SERPL-MCNC: 13 MG/DL (ref 7–20)
BUN SERPL-MCNC: 18 MG/DL (ref 7–20)
CALCIUM SERPL-MCNC: 9.4 MG/DL (ref 8.3–10.6)
CALCIUM SERPL-MCNC: 9.4 MG/DL (ref 8.3–10.6)
CHLORIDE SERPL-SCNC: 105 MMOL/L (ref 99–110)
CHLORIDE SERPL-SCNC: 106 MMOL/L (ref 99–110)
CHOLEST SERPL-MCNC: 209 MG/DL (ref 0–199)
CLARITY UR: ABNORMAL
CLARITY UR: CLEAR
CO2 SERPL-SCNC: 21 MMOL/L (ref 21–32)
CO2 SERPL-SCNC: 22 MMOL/L (ref 21–32)
COLOR UR: YELLOW
COLOR UR: YELLOW
CREAT SERPL-MCNC: 1 MG/DL (ref 0.6–1.2)
CREAT SERPL-MCNC: 1.1 MG/DL (ref 0.6–1.2)
D DIMER: 8.49 UG/ML FEU (ref 0–0.6)
DEPRECATED RDW RBC AUTO: 17.5 % (ref 12.4–15.4)
DEPRECATED RDW RBC AUTO: 18.1 % (ref 12.4–15.4)
EOSINOPHIL # BLD: 0.3 K/UL (ref 0–0.6)
EOSINOPHIL # BLD: 0.3 K/UL (ref 0–0.6)
EOSINOPHIL NFR BLD: 2.3 %
EOSINOPHIL NFR BLD: 2.5 %
EPI CELLS #/AREA URNS AUTO: 2 /HPF (ref 0–5)
EPI CELLS #/AREA URNS AUTO: 9 /HPF (ref 0–5)
GFR SERPLBLD CREATININE-BSD FMLA CKD-EPI: 55 ML/MIN/{1.73_M2}
GFR SERPLBLD CREATININE-BSD FMLA CKD-EPI: >60 ML/MIN/{1.73_M2}
GLUCOSE SERPL-MCNC: 120 MG/DL (ref 70–99)
GLUCOSE SERPL-MCNC: 91 MG/DL (ref 70–99)
GLUCOSE UR STRIP.AUTO-MCNC: NEGATIVE MG/DL
GLUCOSE UR STRIP.AUTO-MCNC: NEGATIVE MG/DL
HCT VFR BLD AUTO: 34.7 % (ref 36–48)
HCT VFR BLD AUTO: 35.7 % (ref 36–48)
HDLC SERPL-MCNC: 62 MG/DL (ref 40–60)
HGB BLD-MCNC: 11.2 G/DL (ref 12–16)
HGB BLD-MCNC: 11.2 G/DL (ref 12–16)
HGB UR QL STRIP.AUTO: NEGATIVE
HGB UR QL STRIP.AUTO: NEGATIVE
HYALINE CASTS #/AREA URNS AUTO: 0 /LPF (ref 0–8)
HYALINE CASTS #/AREA URNS AUTO: 0 /LPF (ref 0–8)
INR PPP: 1.06 (ref 0.84–1.16)
KETONES UR STRIP.AUTO-MCNC: ABNORMAL MG/DL
KETONES UR STRIP.AUTO-MCNC: NEGATIVE MG/DL
LDLC SERPL CALC-MCNC: 110 MG/DL
LEUKOCYTE ESTERASE UR QL STRIP.AUTO: ABNORMAL
LEUKOCYTE ESTERASE UR QL STRIP.AUTO: ABNORMAL
LIPASE SERPL-CCNC: 764 U/L (ref 13–60)
LYMPHOCYTES # BLD: 1.9 K/UL (ref 1–5.1)
LYMPHOCYTES # BLD: 2 K/UL (ref 1–5.1)
LYMPHOCYTES NFR BLD: 15 %
LYMPHOCYTES NFR BLD: 17.9 %
MCH RBC QN AUTO: 27.5 PG (ref 26–34)
MCH RBC QN AUTO: 27.8 PG (ref 26–34)
MCHC RBC AUTO-ENTMCNC: 31.4 G/DL (ref 31–36)
MCHC RBC AUTO-ENTMCNC: 32.3 G/DL (ref 31–36)
MCV RBC AUTO: 86 FL (ref 80–100)
MCV RBC AUTO: 87.7 FL (ref 80–100)
MONOCYTES # BLD: 0.4 K/UL (ref 0–1.3)
MONOCYTES # BLD: 0.5 K/UL (ref 0–1.3)
MONOCYTES NFR BLD: 3.2 %
MONOCYTES NFR BLD: 4.2 %
NEUTROPHILS # BLD: 8.3 K/UL (ref 1.7–7.7)
NEUTROPHILS # BLD: 9.8 K/UL (ref 1.7–7.7)
NEUTROPHILS NFR BLD: 75.9 %
NEUTROPHILS NFR BLD: 77.7 %
NITRITE UR QL STRIP.AUTO: POSITIVE
NITRITE UR QL STRIP.AUTO: POSITIVE
NT-PROBNP SERPL-MCNC: 393 PG/ML (ref 0–124)
NT-PROBNP SERPL-MCNC: 412 PG/ML (ref 0–124)
PH UR STRIP.AUTO: 5.5 [PH] (ref 5–8)
PH UR STRIP.AUTO: 7.5 [PH] (ref 5–8)
PLATELET # BLD AUTO: 251 K/UL (ref 135–450)
PLATELET # BLD AUTO: 290 K/UL (ref 135–450)
PMV BLD AUTO: 8 FL (ref 5–10.5)
PMV BLD AUTO: 8.8 FL (ref 5–10.5)
POTASSIUM SERPL-SCNC: 3.3 MMOL/L (ref 3.5–5.1)
POTASSIUM SERPL-SCNC: 3.5 MMOL/L (ref 3.5–5.1)
PROT SERPL-MCNC: 6.5 G/DL (ref 6.4–8.2)
PROT SERPL-MCNC: 7 G/DL (ref 6.4–8.2)
PROT UR STRIP.AUTO-MCNC: 30 MG/DL
PROT UR STRIP.AUTO-MCNC: ABNORMAL MG/DL
PROTHROMBIN TIME: 13.8 SEC (ref 11.5–14.8)
RBC # BLD AUTO: 4.03 M/UL (ref 4–5.2)
RBC # BLD AUTO: 4.07 M/UL (ref 4–5.2)
RBC CLUMPS #/AREA URNS AUTO: 1 /HPF (ref 0–4)
RBC CLUMPS #/AREA URNS AUTO: 2 /HPF (ref 0–4)
SODIUM SERPL-SCNC: 141 MMOL/L (ref 136–145)
SODIUM SERPL-SCNC: 143 MMOL/L (ref 136–145)
SP GR UR STRIP.AUTO: 1.02 (ref 1–1.03)
SP GR UR STRIP.AUTO: >=1.03 (ref 1–1.03)
TRIGL SERPL-MCNC: 186 MG/DL (ref 0–150)
TROPONIN T SERPL-MCNC: <0.01 NG/ML
TROPONIN T SERPL-MCNC: <0.01 NG/ML
TSH SERPL DL<=0.005 MIU/L-ACNC: 3.57 UIU/ML (ref 0.27–4.2)
UA COMPLETE W REFLEX CULTURE PNL UR: YES
UA COMPLETE W REFLEX CULTURE PNL UR: YES
UA DIPSTICK W REFLEX MICRO PNL UR: YES
UA DIPSTICK W REFLEX MICRO PNL UR: YES
URN SPEC COLLECT METH UR: ABNORMAL
URN SPEC COLLECT METH UR: ABNORMAL
UROBILINOGEN UR STRIP-ACNC: 1 E.U./DL
UROBILINOGEN UR STRIP-ACNC: 1 E.U./DL
VIT B12 SERPL-MCNC: 170 PG/ML (ref 211–911)
VLDLC SERPL CALC-MCNC: 37 MG/DL
WBC # BLD AUTO: 10.9 K/UL (ref 4–11)
WBC # BLD AUTO: 12.6 K/UL (ref 4–11)
WBC #/AREA URNS AUTO: 15 /HPF (ref 0–5)
WBC #/AREA URNS AUTO: 18 /HPF (ref 0–5)

## 2023-04-03 PROCEDURE — 2100000000 HC CCU R&B

## 2023-04-03 PROCEDURE — 87086 URINE CULTURE/COLONY COUNT: CPT

## 2023-04-03 PROCEDURE — 6360000004 HC RX CONTRAST MEDICATION: Performed by: PHYSICIAN ASSISTANT

## 2023-04-03 PROCEDURE — 85025 COMPLETE CBC W/AUTO DIFF WBC: CPT

## 2023-04-03 PROCEDURE — 85610 PROTHROMBIN TIME: CPT

## 2023-04-03 PROCEDURE — 2140000000 HC CCU INTERMEDIATE R&B

## 2023-04-03 PROCEDURE — 80053 COMPREHEN METABOLIC PANEL: CPT

## 2023-04-03 PROCEDURE — 96375 TX/PRO/DX INJ NEW DRUG ADDON: CPT

## 2023-04-03 PROCEDURE — 2580000003 HC RX 258: Performed by: PHYSICIAN ASSISTANT

## 2023-04-03 PROCEDURE — 93000 ELECTROCARDIOGRAM COMPLETE: CPT | Performed by: FAMILY MEDICINE

## 2023-04-03 PROCEDURE — 87186 SC STD MICRODIL/AGAR DIL: CPT

## 2023-04-03 PROCEDURE — 71260 CT THORAX DX C+: CPT | Performed by: PHYSICIAN ASSISTANT

## 2023-04-03 PROCEDURE — 99285 EMERGENCY DEPT VISIT HI MDM: CPT

## 2023-04-03 PROCEDURE — 6360000002 HC RX W HCPCS: Performed by: PHYSICIAN ASSISTANT

## 2023-04-03 PROCEDURE — 85730 THROMBOPLASTIN TIME PARTIAL: CPT

## 2023-04-03 PROCEDURE — 6370000000 HC RX 637 (ALT 250 FOR IP): Performed by: STUDENT IN AN ORGANIZED HEALTH CARE EDUCATION/TRAINING PROGRAM

## 2023-04-03 PROCEDURE — 83690 ASSAY OF LIPASE: CPT

## 2023-04-03 PROCEDURE — 74177 CT ABD & PELVIS W/CONTRAST: CPT

## 2023-04-03 PROCEDURE — 1200000000 HC SEMI PRIVATE

## 2023-04-03 PROCEDURE — 84484 ASSAY OF TROPONIN QUANT: CPT

## 2023-04-03 PROCEDURE — 81001 URINALYSIS AUTO W/SCOPE: CPT

## 2023-04-03 PROCEDURE — 99214 OFFICE O/P EST MOD 30 MIN: CPT | Performed by: FAMILY MEDICINE

## 2023-04-03 PROCEDURE — 71046 X-RAY EXAM CHEST 2 VIEWS: CPT

## 2023-04-03 PROCEDURE — 2580000003 HC RX 258: Performed by: STUDENT IN AN ORGANIZED HEALTH CARE EDUCATION/TRAINING PROGRAM

## 2023-04-03 PROCEDURE — 93005 ELECTROCARDIOGRAM TRACING: CPT | Performed by: PHYSICIAN ASSISTANT

## 2023-04-03 PROCEDURE — 3075F SYST BP GE 130 - 139MM HG: CPT | Performed by: FAMILY MEDICINE

## 2023-04-03 PROCEDURE — 87088 URINE BACTERIA CULTURE: CPT

## 2023-04-03 PROCEDURE — 96374 THER/PROPH/DIAG INJ IV PUSH: CPT

## 2023-04-03 PROCEDURE — G0439 PPPS, SUBSEQ VISIT: HCPCS | Performed by: FAMILY MEDICINE

## 2023-04-03 PROCEDURE — 36415 COLL VENOUS BLD VENIPUNCTURE: CPT

## 2023-04-03 PROCEDURE — 3079F DIAST BP 80-89 MM HG: CPT | Performed by: FAMILY MEDICINE

## 2023-04-03 PROCEDURE — 6360000002 HC RX W HCPCS: Performed by: STUDENT IN AN ORGANIZED HEALTH CARE EDUCATION/TRAINING PROGRAM

## 2023-04-03 PROCEDURE — 1123F ACP DISCUSS/DSCN MKR DOCD: CPT | Performed by: FAMILY MEDICINE

## 2023-04-03 PROCEDURE — 83880 ASSAY OF NATRIURETIC PEPTIDE: CPT

## 2023-04-03 RX ORDER — SUMATRIPTAN 25 MG/1
50 TABLET, FILM COATED ORAL EVERY 8 HOURS PRN
Status: DISCONTINUED | OUTPATIENT
Start: 2023-04-03 | End: 2023-04-03

## 2023-04-03 RX ORDER — HEPARIN SODIUM 1000 [USP'U]/ML
80 INJECTION, SOLUTION INTRAVENOUS; SUBCUTANEOUS ONCE
Status: COMPLETED | OUTPATIENT
Start: 2023-04-03 | End: 2023-04-03

## 2023-04-03 RX ORDER — ACETAMINOPHEN 325 MG/1
650 TABLET ORAL EVERY 6 HOURS PRN
Status: DISCONTINUED | OUTPATIENT
Start: 2023-04-03 | End: 2023-04-06 | Stop reason: HOSPADM

## 2023-04-03 RX ORDER — POTASSIUM CHLORIDE 20 MEQ/1
40 TABLET, EXTENDED RELEASE ORAL PRN
Status: DISCONTINUED | OUTPATIENT
Start: 2023-04-03 | End: 2023-04-06 | Stop reason: HOSPADM

## 2023-04-03 RX ORDER — FESOTERODINE FUMARATE 8 MG/1
1 TABLET, EXTENDED RELEASE ORAL DAILY
Status: DISCONTINUED | OUTPATIENT
Start: 2023-04-04 | End: 2023-04-03 | Stop reason: CLARIF

## 2023-04-03 RX ORDER — PRAMIPEXOLE DIHYDROCHLORIDE 0.25 MG/1
0.5 TABLET ORAL 2 TIMES DAILY
Status: DISCONTINUED | OUTPATIENT
Start: 2023-04-03 | End: 2023-04-06 | Stop reason: HOSPADM

## 2023-04-03 RX ORDER — POTASSIUM CHLORIDE 7.45 MG/ML
10 INJECTION INTRAVENOUS PRN
Status: DISCONTINUED | OUTPATIENT
Start: 2023-04-03 | End: 2023-04-06 | Stop reason: HOSPADM

## 2023-04-03 RX ORDER — POLYETHYLENE GLYCOL 3350 17 G/17G
17 POWDER, FOR SOLUTION ORAL DAILY PRN
Status: DISCONTINUED | OUTPATIENT
Start: 2023-04-03 | End: 2023-04-06 | Stop reason: HOSPADM

## 2023-04-03 RX ORDER — CLONAZEPAM 0.5 MG/1
0.5 TABLET ORAL DAILY PRN
Status: DISCONTINUED | OUTPATIENT
Start: 2023-04-03 | End: 2023-04-03 | Stop reason: ALTCHOICE

## 2023-04-03 RX ORDER — DULOXETIN HYDROCHLORIDE 60 MG/1
60 CAPSULE, DELAYED RELEASE ORAL DAILY
Status: DISCONTINUED | OUTPATIENT
Start: 2023-04-04 | End: 2023-04-06 | Stop reason: HOSPADM

## 2023-04-03 RX ORDER — ATORVASTATIN CALCIUM 40 MG/1
40 TABLET, FILM COATED ORAL NIGHTLY
Status: DISCONTINUED | OUTPATIENT
Start: 2023-04-03 | End: 2023-04-06 | Stop reason: HOSPADM

## 2023-04-03 RX ORDER — ALBUTEROL SULFATE 90 UG/1
2 AEROSOL, METERED RESPIRATORY (INHALATION) EVERY 4 HOURS PRN
Status: DISCONTINUED | OUTPATIENT
Start: 2023-04-03 | End: 2023-04-06 | Stop reason: HOSPADM

## 2023-04-03 RX ORDER — GABAPENTIN 300 MG/1
300 CAPSULE ORAL NIGHTLY
Status: DISCONTINUED | OUTPATIENT
Start: 2023-04-03 | End: 2023-04-06 | Stop reason: HOSPADM

## 2023-04-03 RX ORDER — ACETAMINOPHEN 650 MG/1
650 SUPPOSITORY RECTAL EVERY 6 HOURS PRN
Status: DISCONTINUED | OUTPATIENT
Start: 2023-04-03 | End: 2023-04-06 | Stop reason: HOSPADM

## 2023-04-03 RX ORDER — FLUOXETINE HYDROCHLORIDE 20 MG/1
20 CAPSULE ORAL DAILY
Status: DISCONTINUED | OUTPATIENT
Start: 2023-04-04 | End: 2023-04-06 | Stop reason: HOSPADM

## 2023-04-03 RX ORDER — TOPIRAMATE 100 MG/1
100 TABLET, FILM COATED ORAL NIGHTLY
Status: DISCONTINUED | OUTPATIENT
Start: 2023-04-03 | End: 2023-04-06 | Stop reason: HOSPADM

## 2023-04-03 RX ORDER — SODIUM CHLORIDE, SODIUM LACTATE, POTASSIUM CHLORIDE, CALCIUM CHLORIDE 600; 310; 30; 20 MG/100ML; MG/100ML; MG/100ML; MG/100ML
INJECTION, SOLUTION INTRAVENOUS CONTINUOUS
Status: DISCONTINUED | OUTPATIENT
Start: 2023-04-03 | End: 2023-04-05

## 2023-04-03 RX ORDER — TROSPIUM CHLORIDE 20 MG/1
20 TABLET, FILM COATED ORAL
Status: DISCONTINUED | OUTPATIENT
Start: 2023-04-04 | End: 2023-04-06 | Stop reason: HOSPADM

## 2023-04-03 RX ORDER — SODIUM CHLORIDE 0.9 % (FLUSH) 0.9 %
5-40 SYRINGE (ML) INJECTION EVERY 12 HOURS SCHEDULED
Status: DISCONTINUED | OUTPATIENT
Start: 2023-04-03 | End: 2023-04-06 | Stop reason: HOSPADM

## 2023-04-03 RX ORDER — PANTOPRAZOLE SODIUM 40 MG/1
40 TABLET, DELAYED RELEASE ORAL
Status: DISCONTINUED | OUTPATIENT
Start: 2023-04-04 | End: 2023-04-06 | Stop reason: HOSPADM

## 2023-04-03 RX ORDER — HEPARIN SODIUM 1000 [USP'U]/ML
80 INJECTION, SOLUTION INTRAVENOUS; SUBCUTANEOUS PRN
Status: DISCONTINUED | OUTPATIENT
Start: 2023-04-03 | End: 2023-04-06

## 2023-04-03 RX ORDER — PRAMIPEXOLE DIHYDROCHLORIDE 0.25 MG/1
0.25 TABLET ORAL ONCE
Status: DISCONTINUED | OUTPATIENT
Start: 2023-04-03 | End: 2023-04-03

## 2023-04-03 RX ORDER — DIPHENHYDRAMINE HYDROCHLORIDE 50 MG/ML
50 INJECTION INTRAMUSCULAR; INTRAVENOUS ONCE
Status: COMPLETED | OUTPATIENT
Start: 2023-04-03 | End: 2023-04-03

## 2023-04-03 RX ORDER — METHYLPREDNISOLONE SODIUM SUCCINATE 125 MG/2ML
80 INJECTION, POWDER, LYOPHILIZED, FOR SOLUTION INTRAMUSCULAR; INTRAVENOUS ONCE
Status: COMPLETED | OUTPATIENT
Start: 2023-04-03 | End: 2023-04-03

## 2023-04-03 RX ORDER — LOSARTAN POTASSIUM 25 MG/1
12.5 TABLET ORAL DAILY
Status: DISCONTINUED | OUTPATIENT
Start: 2023-04-04 | End: 2023-04-06 | Stop reason: HOSPADM

## 2023-04-03 RX ORDER — MAGNESIUM SULFATE IN WATER 40 MG/ML
2000 INJECTION, SOLUTION INTRAVENOUS PRN
Status: DISCONTINUED | OUTPATIENT
Start: 2023-04-03 | End: 2023-04-06 | Stop reason: HOSPADM

## 2023-04-03 RX ORDER — ASCORBIC ACID 500 MG
1000 TABLET ORAL DAILY
COMMUNITY

## 2023-04-03 RX ORDER — LEVOTHYROXINE SODIUM 0.12 MG/1
125 TABLET ORAL DAILY
Status: DISCONTINUED | OUTPATIENT
Start: 2023-04-04 | End: 2023-04-06 | Stop reason: HOSPADM

## 2023-04-03 RX ORDER — ONDANSETRON 2 MG/ML
4 INJECTION INTRAMUSCULAR; INTRAVENOUS ONCE
Status: COMPLETED | OUTPATIENT
Start: 2023-04-03 | End: 2023-04-03

## 2023-04-03 RX ORDER — SODIUM CHLORIDE 0.9 % (FLUSH) 0.9 %
5-40 SYRINGE (ML) INJECTION PRN
Status: DISCONTINUED | OUTPATIENT
Start: 2023-04-03 | End: 2023-04-06 | Stop reason: HOSPADM

## 2023-04-03 RX ORDER — HEPARIN SODIUM 1000 [USP'U]/ML
40 INJECTION, SOLUTION INTRAVENOUS; SUBCUTANEOUS PRN
Status: DISCONTINUED | OUTPATIENT
Start: 2023-04-03 | End: 2023-04-06

## 2023-04-03 RX ORDER — ONDANSETRON 2 MG/ML
4 INJECTION INTRAMUSCULAR; INTRAVENOUS EVERY 6 HOURS PRN
Status: DISCONTINUED | OUTPATIENT
Start: 2023-04-03 | End: 2023-04-06 | Stop reason: HOSPADM

## 2023-04-03 RX ORDER — FLUTICASONE PROPIONATE 50 MCG
2 SPRAY, SUSPENSION (ML) NASAL DAILY
Status: DISCONTINUED | OUTPATIENT
Start: 2023-04-04 | End: 2023-04-06 | Stop reason: HOSPADM

## 2023-04-03 RX ORDER — ONDANSETRON 4 MG/1
4 TABLET, ORALLY DISINTEGRATING ORAL EVERY 8 HOURS PRN
Status: DISCONTINUED | OUTPATIENT
Start: 2023-04-03 | End: 2023-04-06 | Stop reason: HOSPADM

## 2023-04-03 RX ORDER — SODIUM CHLORIDE 9 MG/ML
INJECTION, SOLUTION INTRAVENOUS PRN
Status: DISCONTINUED | OUTPATIENT
Start: 2023-04-03 | End: 2023-04-06 | Stop reason: HOSPADM

## 2023-04-03 RX ADMIN — ONDANSETRON 4 MG: 2 INJECTION INTRAMUSCULAR; INTRAVENOUS at 19:18

## 2023-04-03 RX ADMIN — Medication 16 UNITS/KG/HR: at 21:34

## 2023-04-03 RX ADMIN — PRAMIPEXOLE DIHYDROCHLORIDE 0.5 MG: 0.25 TABLET ORAL at 23:24

## 2023-04-03 RX ADMIN — IOPAMIDOL 75 ML: 755 INJECTION, SOLUTION INTRAVENOUS at 19:34

## 2023-04-03 RX ADMIN — SODIUM CHLORIDE, POTASSIUM CHLORIDE, SODIUM LACTATE AND CALCIUM CHLORIDE: 600; 310; 30; 20 INJECTION, SOLUTION INTRAVENOUS at 23:26

## 2023-04-03 RX ADMIN — TOPIRAMATE 100 MG: 100 TABLET, FILM COATED ORAL at 23:24

## 2023-04-03 RX ADMIN — METHYLPREDNISOLONE SODIUM SUCCINATE 80 MG: 125 INJECTION, POWDER, FOR SOLUTION INTRAMUSCULAR; INTRAVENOUS at 19:19

## 2023-04-03 RX ADMIN — GABAPENTIN 300 MG: 300 CAPSULE ORAL at 23:24

## 2023-04-03 RX ADMIN — CEFTRIAXONE SODIUM 1000 MG: 1 INJECTION, POWDER, FOR SOLUTION INTRAMUSCULAR; INTRAVENOUS at 23:29

## 2023-04-03 RX ADMIN — ATORVASTATIN CALCIUM 40 MG: 40 TABLET, FILM COATED ORAL at 23:24

## 2023-04-03 RX ADMIN — HEPARIN SODIUM 9900 UNITS: 1000 INJECTION INTRAVENOUS; SUBCUTANEOUS at 21:30

## 2023-04-03 RX ADMIN — DIPHENHYDRAMINE HYDROCHLORIDE 50 MG: 50 INJECTION, SOLUTION INTRAMUSCULAR; INTRAVENOUS at 19:19

## 2023-04-03 ASSESSMENT — ENCOUNTER SYMPTOMS
NAUSEA: 1
ABDOMINAL PAIN: 1
SHORTNESS OF BREATH: 1
VOMITING: 0
STRIDOR: 0
WHEEZING: 0
COUGH: 0
DIARRHEA: 0
COLOR CHANGE: 0
CONSTIPATION: 0
BACK PAIN: 1

## 2023-04-03 NOTE — PATIENT INSTRUCTIONS
avoid objects and could lose your balance. Know the side effects of the medicines you take. Ask your doctor or pharmacist whether the medicines you take can affect your balance. Sleeping pills or sedatives can affect your balance. Limit the amount of alcohol you drink. Alcohol can impair your balance and other senses. Ask your doctor whether calluses or corns on your feet need to be removed. If you wear loose-fitting shoes because of calluses or corns, you can lose your balance and fall. Talk to your doctor if you have numbness in your feet. You may get dizzy if you do not drink enough water. To prevent dehydration, drink plenty of fluids. Choose water and other clear liquids. If you have kidney, heart, or liver disease and have to limit fluids, talk with your doctor before you increase the amount of fluids you drink. Preventing falls at home  Remove raised doorway thresholds, throw rugs, and clutter. Repair loose carpet or raised areas in the floor. Move furniture and electrical cords to keep them out of walking paths. Use nonskid floor wax, and wipe up spills right away, especially on ceramic tile floors. If you use a walker or cane, put rubber tips on it. If you use crutches, clean the bottoms of them regularly with an abrasive pad, such as steel wool. Keep your house well lit, especially stairways, porches, and outside walkways. Use night-lights in areas such as hallways and bathrooms. Add extra light switches or use remote switches (such as switches that go on or off when you clap your hands) to make it easier to turn lights on if you have to get up during the night. Install sturdy handrails on stairways. Move items in your cabinets so that the things you use a lot are on the lower shelves (about waist level). Keep a cordless phone and a flashlight with new batteries by your bed.  If possible, put a phone in each of the main rooms of your house, or carry a cell phone in case you fall and cannot

## 2023-04-03 NOTE — PROGRESS NOTES
tablet TAKE 1 TABLET AT BEDTIME      Family History   Problem Relation Age of Onset    Lung Cancer Father     Heart Disease Maternal Grandmother     Tuberculosis Maternal Grandmother     Other Brother      Social History     Tobacco Use    Smoking status: Former     Packs/day: 2.00     Years: 15.00     Pack years: 30.00     Types: Cigarettes     Quit date: 1989     Years since quittin.8     Passive exposure: Past    Smokeless tobacco: Never    Tobacco comments:     congrats on quitting   Vaping Use    Vaping Use: Never used   Substance Use Topics    Alcohol use: Yes     Alcohol/week: 0.0 standard drinks     Comment: occ.      Drug use: No      Immunization History   Administered Date(s) Administered    COVID-19, MODERNA BLUE border, Primary or Immunocompromised, (age 12y+), IM, 100 mcg/0.5mL 03/10/2021, 2021    COVID-19, MODERNA Bivalent BOOSTER, (age 12y+), IM, 50 mcg/0.5 mL 2022    COVID-19, PFIZER GRAY top, DO NOT Dilute, (age 15 y+), IM, 30 mcg/0.3 mL 2022    COVID-19, PFIZER PURPLE top, DILUTE for use, (age 15 y+), 30mcg/0.3mL 2021    INFLUENZA, INTRADERMAL, QUADRIVALENT, PRESERVATIVE FREE 10/26/2016    Influenza 2012    Influenza Vaccine, unspecified formulation 2012, 2018    Influenza Virus Vaccine 2018, 10/01/2020    Influenza, AFLURIA (age 1 yrs+), FLUZONE, (age 10 mo+), MDV, 0.5mL 2021    Influenza, FLUBLOK, (age 25 y+), PF, 0.5mL 2018    Influenza, FLUCELVAX, (age 10 mo+), MDCK, PF, 0.5mL 2019, 10/01/2020    Influenza, FLUZONE (age 72 y+), High Dose, 0.7mL 2022    Influenza, Intradermal, Preservative free 2012, 10/24/2013, 10/02/2014, 2015    Pneumococcal, PPSV23, PNEUMOVAX 23, (age 2y+), SC/IM, 0.5mL 1986, 2008, 2021    TDaP, ADACEL (age 10y-63y), BOOSTRIX (age 10y+), IM, 0.5mL 2008, 2018    Zoster Live (Zostavax) 10/26/2016    Zoster Recombinant (Shingrix) 2019, 2020

## 2023-04-03 NOTE — ED PROVIDER NOTES
abdominal pain and nausea. Negative for constipation, diarrhea and vomiting. Genitourinary: Negative. Musculoskeletal:  Positive for back pain. Negative for neck pain and neck stiffness. Skin:  Negative for color change, pallor, rash and wound. Neurological: Negative. Psychiatric/Behavioral:  Negative for confusion. All other systems reviewed and are negative. Positives and Pertinent negatives as per HPI. SURGICAL HISTORY     Past Surgical History:   Procedure Laterality Date    CHOLECYSTECTOMY      COLONOSCOPY      COLONOSCOPY N/A 12/30/2020    COLONOSCOPY performed by Chikis Swanson MD at [address] [facility] N/A 03/31/2021    COLONOSCOPY performed by Chikis Swanson MD at [address] [facility], DIAGNOSTIC      ESOPHAGEAL DILATATION  12/30/2020    ESOPHAGEAL DILATION Glenis Mano performed by Chikis Swanson MD at [address] [facility] Bilateral     cataracts    FOOT SURGERY Left 03/17/2017    HYSTERECTOMY (CERVIX STATUS UNKNOWN)      HYSTERECTOMY, VAGINAL      REVISION OF TOTAL SHOULDER Right 06/17/2019    REVERSE SHOULDER ARTHROPLASTY- RIGHT SHOULDER WITH MINI C-ARM performed by Vida Gibson MD at [address] [place] [address]  07/2012    UPPER GASTROINTESTINAL ENDOSCOPY N/A 12/30/2020    EGD BIOPSY performed by Chikis Swanson MD at [address] [place] [address]       Previous Medications    ACETAMINOPHEN (TYLENOL ARTHRITIS PAIN PO)    Take 1 tablet by mouth 3 times daily    ALBUTEROL SULFATE  (90 BASE) MCG/ACT INHALER    Inhale 2 puffs into the lungs every 4 hours as needed for Wheezing May substitute for insurance preferred (Ventolin, Proventil, ProAir)    ATORVASTATIN (LIPITOR) 20 MG TABLET    Take 1 tablet by mouth nightly    CLONAZEPAM (KLONOPIN) 0.5 MG TABLET    Take 1 tablet by mouth daily as needed.     DULOXETINE (CYMBALTA) 60 MG EXTENDED RELEASE CAPSULE    TAKE 1 CAPSULE DAILY    ESOMEPRAZOLE (NEXIUM) 40

## 2023-04-04 ENCOUNTER — APPOINTMENT (OUTPATIENT)
Dept: ULTRASOUND IMAGING | Age: 68
End: 2023-04-04
Payer: MEDICARE

## 2023-04-04 PROBLEM — I82.411 ACUTE DEEP VEIN THROMBOSIS (DVT) OF FEMORAL VEIN OF RIGHT LOWER EXTREMITY (HCC): Status: ACTIVE | Noted: 2023-04-04

## 2023-04-04 LAB
ALBUMIN SERPL-MCNC: 3.2 G/DL (ref 3.4–5)
ANION GAP SERPL CALCULATED.3IONS-SCNC: 9 MMOL/L (ref 3–16)
ANTI-XA UNFRAC HEPARIN: 0.45 IU/ML (ref 0.3–0.7)
ANTI-XA UNFRAC HEPARIN: 0.69 IU/ML (ref 0.3–0.7)
ANTI-XA UNFRAC HEPARIN: 0.72 IU/ML (ref 0.3–0.7)
BASOPHILS # BLD: 0.1 K/UL (ref 0–0.2)
BASOPHILS NFR BLD: 0.6 %
BUN SERPL-MCNC: 18 MG/DL (ref 7–20)
CALCIUM SERPL-MCNC: 9 MG/DL (ref 8.3–10.6)
CHLORIDE SERPL-SCNC: 110 MMOL/L (ref 99–110)
CO2 SERPL-SCNC: 22 MMOL/L (ref 21–32)
CREAT SERPL-MCNC: 1 MG/DL (ref 0.6–1.2)
DEPRECATED RDW RBC AUTO: 17.9 % (ref 12.4–15.4)
EKG ATRIAL RATE: 81 BPM
EKG DIAGNOSIS: NORMAL
EKG P AXIS: 25 DEGREES
EKG P-R INTERVAL: 140 MS
EKG Q-T INTERVAL: 390 MS
EKG QRS DURATION: 80 MS
EKG QTC CALCULATION (BAZETT): 453 MS
EKG R AXIS: -18 DEGREES
EKG T AXIS: 24 DEGREES
EKG VENTRICULAR RATE: 81 BPM
EOSINOPHIL # BLD: 0 K/UL (ref 0–0.6)
EOSINOPHIL NFR BLD: 0 %
EST. AVERAGE GLUCOSE BLD GHB EST-MCNC: 119.8 MG/DL
FERRITIN SERPL IA-MCNC: 50.9 NG/ML (ref 15–150)
GFR SERPLBLD CREATININE-BSD FMLA CKD-EPI: >60 ML/MIN/{1.73_M2}
GLUCOSE SERPL-MCNC: 168 MG/DL (ref 70–99)
HBA1C MFR BLD: 5.8 %
HCT VFR BLD AUTO: 30.3 % (ref 36–48)
HGB BLD-MCNC: 9.9 G/DL (ref 12–16)
IRON SATN MFR SERPL: 15 % (ref 15–50)
IRON SERPL-MCNC: 44 UG/DL (ref 37–145)
LIPASE SERPL-CCNC: 57 U/L (ref 13–60)
LV EF: 58 %
LVEF MODALITY: NORMAL
LYMPHOCYTES # BLD: 1 K/UL (ref 1–5.1)
LYMPHOCYTES NFR BLD: 9.7 %
MAGNESIUM SERPL-MCNC: 2.1 MG/DL (ref 1.8–2.4)
MCH RBC QN AUTO: 28 PG (ref 26–34)
MCHC RBC AUTO-ENTMCNC: 32.7 G/DL (ref 31–36)
MCV RBC AUTO: 85.6 FL (ref 80–100)
MONOCYTES # BLD: 0.1 K/UL (ref 0–1.3)
MONOCYTES NFR BLD: 1.1 %
NEUTROPHILS # BLD: 8.9 K/UL (ref 1.7–7.7)
NEUTROPHILS NFR BLD: 88.6 %
PHOSPHATE SERPL-MCNC: 3 MG/DL (ref 2.5–4.9)
PLATELET # BLD AUTO: 212 K/UL (ref 135–450)
PMV BLD AUTO: 8.1 FL (ref 5–10.5)
POTASSIUM SERPL-SCNC: 4 MMOL/L (ref 3.5–5.1)
RBC # BLD AUTO: 3.54 M/UL (ref 4–5.2)
SODIUM SERPL-SCNC: 141 MMOL/L (ref 136–145)
TIBC SERPL-MCNC: 294 UG/DL (ref 260–445)
TSH SERPL DL<=0.005 MIU/L-ACNC: 1.24 UIU/ML (ref 0.27–4.2)
WBC # BLD AUTO: 10.1 K/UL (ref 4–11)

## 2023-04-04 PROCEDURE — 85301 ANTITHROMBIN III ANTIGEN: CPT

## 2023-04-04 PROCEDURE — 93970 EXTREMITY STUDY: CPT

## 2023-04-04 PROCEDURE — 36415 COLL VENOUS BLD VENIPUNCTURE: CPT

## 2023-04-04 PROCEDURE — 85520 HEPARIN ASSAY: CPT

## 2023-04-04 PROCEDURE — 81240 F2 GENE: CPT

## 2023-04-04 PROCEDURE — 6360000002 HC RX W HCPCS: Performed by: STUDENT IN AN ORGANIZED HEALTH CARE EDUCATION/TRAINING PROGRAM

## 2023-04-04 PROCEDURE — 85610 PROTHROMBIN TIME: CPT

## 2023-04-04 PROCEDURE — 83735 ASSAY OF MAGNESIUM: CPT

## 2023-04-04 PROCEDURE — 83540 ASSAY OF IRON: CPT

## 2023-04-04 PROCEDURE — 2580000003 HC RX 258: Performed by: STUDENT IN AN ORGANIZED HEALTH CARE EDUCATION/TRAINING PROGRAM

## 2023-04-04 PROCEDURE — 85613 RUSSELL VIPER VENOM DILUTED: CPT

## 2023-04-04 PROCEDURE — 6360000002 HC RX W HCPCS: Performed by: NURSE PRACTITIONER

## 2023-04-04 PROCEDURE — 94660 CPAP INITIATION&MGMT: CPT

## 2023-04-04 PROCEDURE — 86146 BETA-2 GLYCOPROTEIN ANTIBODY: CPT

## 2023-04-04 PROCEDURE — 84443 ASSAY THYROID STIM HORMONE: CPT

## 2023-04-04 PROCEDURE — 82728 ASSAY OF FERRITIN: CPT

## 2023-04-04 PROCEDURE — 1200000000 HC SEMI PRIVATE

## 2023-04-04 PROCEDURE — 83550 IRON BINDING TEST: CPT

## 2023-04-04 PROCEDURE — 86340 INTRINSIC FACTOR ANTIBODY: CPT

## 2023-04-04 PROCEDURE — 93306 TTE W/DOPPLER COMPLETE: CPT

## 2023-04-04 PROCEDURE — 76705 ECHO EXAM OF ABDOMEN: CPT

## 2023-04-04 PROCEDURE — 6370000000 HC RX 637 (ALT 250 FOR IP): Performed by: STUDENT IN AN ORGANIZED HEALTH CARE EDUCATION/TRAINING PROGRAM

## 2023-04-04 PROCEDURE — 94640 AIRWAY INHALATION TREATMENT: CPT

## 2023-04-04 PROCEDURE — 85025 COMPLETE CBC W/AUTO DIFF WBC: CPT

## 2023-04-04 PROCEDURE — 81241 F5 GENE: CPT

## 2023-04-04 PROCEDURE — APPNB30 APP NON BILLABLE TIME 0-30 MINS: Performed by: NURSE PRACTITIONER

## 2023-04-04 PROCEDURE — 93010 ELECTROCARDIOGRAM REPORT: CPT | Performed by: INTERNAL MEDICINE

## 2023-04-04 PROCEDURE — 85730 THROMBOPLASTIN TIME PARTIAL: CPT

## 2023-04-04 PROCEDURE — 83690 ASSAY OF LIPASE: CPT

## 2023-04-04 PROCEDURE — APPSS60 APP SPLIT SHARED TIME 46-60 MINUTES: Performed by: NURSE PRACTITIONER

## 2023-04-04 PROCEDURE — 94761 N-INVAS EAR/PLS OXIMETRY MLT: CPT

## 2023-04-04 PROCEDURE — 80069 RENAL FUNCTION PANEL: CPT

## 2023-04-04 PROCEDURE — 85300 ANTITHROMBIN III ACTIVITY: CPT

## 2023-04-04 PROCEDURE — 83516 IMMUNOASSAY NONANTIBODY: CPT

## 2023-04-04 PROCEDURE — 99253 IP/OBS CNSLTJ NEW/EST LOW 45: CPT | Performed by: SURGERY

## 2023-04-04 PROCEDURE — 6360000002 HC RX W HCPCS: Performed by: PHYSICIAN ASSISTANT

## 2023-04-04 PROCEDURE — 86147 CARDIOLIPIN ANTIBODY EA IG: CPT

## 2023-04-04 PROCEDURE — 2580000003 HC RX 258: Performed by: PHYSICIAN ASSISTANT

## 2023-04-04 RX ORDER — LANOLIN ALCOHOL/MO/W.PET/CERES
1000 CREAM (GRAM) TOPICAL DAILY
Status: DISCONTINUED | OUTPATIENT
Start: 2023-04-04 | End: 2023-04-04

## 2023-04-04 RX ORDER — CYANOCOBALAMIN 1000 UG/ML
1000 INJECTION, SOLUTION INTRAMUSCULAR; SUBCUTANEOUS DAILY
Status: DISCONTINUED | OUTPATIENT
Start: 2023-04-04 | End: 2023-04-06 | Stop reason: HOSPADM

## 2023-04-04 RX ADMIN — Medication 10 ML: at 00:22

## 2023-04-04 RX ADMIN — FLUOXETINE 20 MG: 20 CAPSULE ORAL at 12:30

## 2023-04-04 RX ADMIN — CEFTRIAXONE SODIUM 1000 MG: 1 INJECTION, POWDER, FOR SOLUTION INTRAMUSCULAR; INTRAVENOUS at 20:48

## 2023-04-04 RX ADMIN — TROSPIUM CHLORIDE 20 MG: 20 TABLET, FILM COATED ORAL at 16:32

## 2023-04-04 RX ADMIN — ATORVASTATIN CALCIUM 40 MG: 40 TABLET, FILM COATED ORAL at 19:57

## 2023-04-04 RX ADMIN — PRAMIPEXOLE DIHYDROCHLORIDE 0.5 MG: 0.25 TABLET ORAL at 19:58

## 2023-04-04 RX ADMIN — CYANOCOBALAMIN 1000 MCG: 1000 INJECTION, SOLUTION INTRAMUSCULAR; SUBCUTANEOUS at 12:32

## 2023-04-04 RX ADMIN — DULOXETINE HYDROCHLORIDE 60 MG: 60 CAPSULE, DELAYED RELEASE ORAL at 12:30

## 2023-04-04 RX ADMIN — Medication 16 UNITS/KG/HR: at 11:19

## 2023-04-04 RX ADMIN — SODIUM CHLORIDE, POTASSIUM CHLORIDE, SODIUM LACTATE AND CALCIUM CHLORIDE: 600; 310; 30; 20 INJECTION, SOLUTION INTRAVENOUS at 11:20

## 2023-04-04 RX ADMIN — SODIUM CHLORIDE, POTASSIUM CHLORIDE, SODIUM LACTATE AND CALCIUM CHLORIDE: 600; 310; 30; 20 INJECTION, SOLUTION INTRAVENOUS at 23:22

## 2023-04-04 RX ADMIN — LOSARTAN POTASSIUM 12.5 MG: 25 TABLET, FILM COATED ORAL at 12:31

## 2023-04-04 RX ADMIN — PRAMIPEXOLE DIHYDROCHLORIDE 0.5 MG: 0.25 TABLET ORAL at 12:31

## 2023-04-04 RX ADMIN — Medication 2 PUFF: at 20:44

## 2023-04-04 RX ADMIN — GABAPENTIN 300 MG: 300 CAPSULE ORAL at 19:57

## 2023-04-04 RX ADMIN — PANTOPRAZOLE SODIUM 40 MG: 40 TABLET, DELAYED RELEASE ORAL at 16:32

## 2023-04-04 RX ADMIN — TROSPIUM CHLORIDE 20 MG: 20 TABLET, FILM COATED ORAL at 12:34

## 2023-04-04 RX ADMIN — TOPIRAMATE 100 MG: 100 TABLET, FILM COATED ORAL at 19:57

## 2023-04-04 RX ADMIN — FLUTICASONE PROPIONATE 2 SPRAY: 50 SPRAY, METERED NASAL at 12:33

## 2023-04-04 ASSESSMENT — PAIN DESCRIPTION - ORIENTATION
ORIENTATION: INNER
ORIENTATION: INNER

## 2023-04-04 ASSESSMENT — PAIN - FUNCTIONAL ASSESSMENT
PAIN_FUNCTIONAL_ASSESSMENT: ACTIVITIES ARE NOT PREVENTED
PAIN_FUNCTIONAL_ASSESSMENT: ACTIVITIES ARE NOT PREVENTED

## 2023-04-04 ASSESSMENT — PAIN SCALES - GENERAL
PAINLEVEL_OUTOF10: 0
PAINLEVEL_OUTOF10: 2
PAINLEVEL_OUTOF10: 2

## 2023-04-04 ASSESSMENT — PAIN DESCRIPTION - FREQUENCY
FREQUENCY: CONTINUOUS
FREQUENCY: CONTINUOUS

## 2023-04-04 ASSESSMENT — PAIN DESCRIPTION - LOCATION
LOCATION: ABDOMEN
LOCATION: ABDOMEN

## 2023-04-04 ASSESSMENT — PAIN DESCRIPTION - DESCRIPTORS
DESCRIPTORS: ACHING
DESCRIPTORS: ACHING

## 2023-04-04 ASSESSMENT — PAIN DESCRIPTION - ONSET
ONSET: ON-GOING
ONSET: ON-GOING

## 2023-04-04 ASSESSMENT — PAIN DESCRIPTION - PAIN TYPE
TYPE: ACUTE PAIN
TYPE: ACUTE PAIN

## 2023-04-04 NOTE — H&P
Neurovascularly intact without any focal sensory/motor deficits. Cranial nerves: II-XII intact, grossly non-focal.  Psychiatric:  Alert and oriented, thought content appropriate, normal insight  Capillary Refill: Brisk, 3 seconds, normal   Peripheral Pulses: +2 palpable, equal bilaterally       Labs:     Recent Labs     04/03/23 1239 04/03/23 1850   WBC 10.9 12.6*   HGB 11.2* 11.2*   HCT 34.7* 35.7*    290     Recent Labs     04/03/23  1239 04/03/23  1850    141   K 3.5 3.3*    106   CO2 22 21   BUN 13 18   CREATININE 1.1 1.0   CALCIUM 9.4 9.4     Recent Labs     04/03/23  1239 04/03/23  1850   AST 11* 16   ALT 13 15   BILITOT 0.4 0.3   ALKPHOS 152* 154*     Recent Labs     04/03/23 2058   INR 1.06     Recent Labs     04/03/23  1239 04/03/23 1850   TROPONINI <0.01 <0.01       Urinalysis:      Lab Results   Component Value Date/Time    NITRU POSITIVE 04/03/2023 08:50 PM    WBCUA 18 04/03/2023 08:50 PM    BACTERIA 4+ 04/03/2023 08:50 PM    RBCUA 1 04/03/2023 08:50 PM    BLOODU Negative 04/03/2023 08:50 PM    SPECGRAV >=1.030 04/03/2023 08:50 PM    GLUCOSEU Negative 04/03/2023 08:50 PM       Radiology:     CT ABDOMEN PELVIS W IV CONTRAST Additional Contrast? None   Final Result   CTA CHEST:      Bilateral pulmonary emboli. No dilation of the right ventricle. CT ABDOMEN AND PELVIS      No acute process      Critical results were called by Dr. Myrna Rodriguez to [place], [place] on   4/3/2023 at 20:37. CT CHEST PULMONARY EMBOLISM W CONTRAST   Final Result   CTA CHEST:      Bilateral pulmonary emboli. No dilation of the right ventricle. CT ABDOMEN AND PELVIS      No acute process      Critical results were called by Dr. Myrna Rodriguez to [place], [place] on   4/3/2023 at 20:37.         [address] organ? LIVER, PANCREAS, GALLBLADDER    (Results Pending)       Medications:  Not in a hospital admission.   Current Facility-Administered Medications   Medication Dose

## 2023-04-04 NOTE — CONSULTS
decreased diffusing capacity      Relevant prior imaging: normal examination- neg CT chest      RLS (restless legs syndrome)     Sleep apnea     uses C-PAP       Past Surgical History:   Procedure Laterality Date    CHOLECYSTECTOMY      COLONOSCOPY      COLONOSCOPY N/A 2020    COLONOSCOPY performed by Anaid Lechuga MD at [address] 2021    COLONOSCOPY performed by Anaid Lechuga MD at [address] [facility], [address], DIAGNOSTIC      ESOPHAGEAL DILATATION  2020    ESOPHAGEAL DILATION Sumit Ana performed by Anaid Lechuga MD at [address] [facility] Bilateral     cataracts    FOOT SURGERY Left 2017    HYSTERECTOMY (CERVIX STATUS UNKNOWN)      HYSTERECTOMY, VAGINAL      REVISION OF TOTAL SHOULDER Right 2019    REVERSE SHOULDER ARTHROPLASTY- RIGHT SHOULDER WITH MINI C-ARM performed by Radha Shay MD at [address]  2012    UPPER GASTROINTESTINAL ENDOSCOPY N/A 2020    EGD BIOPSY performed by Anaid Lechuga MD at [address] EXTRACTION         Allergies   Allergen Reactions    Iodine Hives    Contrast [Iodides]      Hives         Social History     Socioeconomic History    Marital status:      Spouse name: Not on file    Number of children: Not on file    Years of education: Not on file    Highest education level: Not on file   Occupational History    Not on file   Tobacco Use    Smoking status: Former     Packs/day: 2.00     Years: 15.00     Pack years: 30.00     Types: Cigarettes     Quit date: 1989     Years since quittin.8     Passive exposure: Past    Smokeless tobacco: Never    Tobacco comments:     congrats on quitting   Vaping Use    Vaping Use: Never used   Substance and Sexual Activity    Alcohol use: Yes     Alcohol/week: 0.0 standard drinks     Comment: occ.      Drug use: No    Sexual activity: Yes     Partners: Male     Comment:    Other Topics Concern    Not
CT with IV contrast with normal appearing pancreas. Had prior keri and liver enzymes normal other than mildly elevated alk phos. No biliary dilation on CT. Rare alcohol use. . Lipase 764 at admission yesterday and is 57 today. - clear liquids and ADAT  - Future EUS pancreas in 6-8 weeks to ensure no pancreatic abnormalities    2) Bilat PE and DVT  - unprovoked. On heparin drip. Discussed with Dr. Neelam Rogers PA-C  [facility]    I have personally performed a face to face diagnostic evaluation on this patient. I have interviewed and examined the patient and I agree with the findings and recommended plan of care. In summary, my findings and plan are the following:  elevated  lipase along with epigastric pain and chest pain. Lipase >700 but ct normal pancreas. Mild epig ttp so may have had mild pancreatitis but no obvious source noted. She also was noted to have \"unprovoked\" Bilateral PE and DVT. Ok to advance diet as tolerated. Would rec EUS pancreas in  8 wks to r/o pancreatic lesion.         Mohit Medley MD  [address]
HISTORY: Chest pain, unspecified type   TECHNOLOGIST PROVIDED HISTORY:   Reason for Exam: Chest pain, unspecified type       FINDINGS:   Clear lungs. No pleural effusion or pneumothorax. Cardiomediastinal   silhouette is unremarkable. Status post right reverse shoulder arthroplasty. Rest of visualized osseous structures demonstrate no acute pathology. .           Impression   Clear lungs. Narrative   EXAMINATION:   CT OF THE ABDOMEN AND PELVIS WITH CONTRAST; CTA OF THE CHEST 4/3/2023 7:29 pm       TECHNIQUE:   CT of the abdomen and pelvis was performed with the administration of   intravenous contrast. Multiplanar reformatted images are provided for review. Automated exposure control, iterative reconstruction, and/or weight based   adjustment of the mA/kV was utilized to reduce the radiation dose to as low   as reasonably achievable.; CTA of the chest was performed after the   administration of intravenous contrast.  Multiplanar reformatted images are   provided for review. MIP images are provided for review. Automated exposure   control, iterative reconstruction, and/or weight based adjustment of the   mA/kV was utilized to reduce the radiation dose to as low as reasonably   achievable. COMPARISON:   None. HISTORY:   ORDERING SYSTEM PROVIDED HISTORY: epigastric abd pain   TECHNOLOGIST PROVIDED HISTORY:   Reason for exam:->epigastric abd pain   Additional Contrast?->None   Decision Support Exception - unselect if not a suspected or confirmed   emergency medical condition->Emergency Medical Condition (MA)   Reason for Exam: epigastric abd pain       CTA CHEST   Pulmonary Arteries: Embolus in the left lower lobe pulmonary artery extending   into multiple branches. Nonocclusive thrombus also in the right lower lobe   pulmonary arteries. Tiny emboli in left upper lobe arteries. Negative for   saddle embolus. Mediastinum: No evidence of mediastinal lymphadenopathy.   The heart and

## 2023-04-04 NOTE — RT PROTOCOL NOTE
breathing using Per Protocol order mode. 4-6 - enter or revise RT Bronchodilator order(s) to two equivalent RT bronchodilator orders with one order with BID Frequency and one order with Frequency of every 4 hours PRN wheezing or increased work of breathing using Per Protocol order mode. 7-10 - enter or revise RT Bronchodilator order(s) to two equivalent RT bronchodilator orders with one order with TID Frequency and one order with Frequency of every 4 hours PRN wheezing or increased work of breathing using Per Protocol order mode. 11-13 - enter or revise RT Bronchodilator order(s) to one equivalent RT bronchodilator order with QID Frequency and an Albuterol order with Frequency of every 4 hours PRN wheezing or increased work of breathing using Per Protocol order mode. Greater than 13 - enter or revise RT Bronchodilator order(s) to one equivalent RT bronchodilator order with every 4 hours Frequency and an Albuterol order with Frequency of every 2 hours PRN wheezing or increased work of breathing using Per Protocol order mode. RT to enter RT Home Evaluation for COPD & MDI Assessment order using Per Protocol order mode.     Electronically signed by REHANA PAIZ RCP on 4/4/2023 at 6:10 AM

## 2023-04-05 LAB
ALBUMIN SERPL-MCNC: 3.2 G/DL (ref 3.4–5)
ANION GAP SERPL CALCULATED.3IONS-SCNC: 9 MMOL/L (ref 3–16)
ANTI-XA UNFRAC HEPARIN: 0.16 IU/ML (ref 0.3–0.7)
ANTI-XA UNFRAC HEPARIN: 0.49 IU/ML (ref 0.3–0.7)
ANTI-XA UNFRAC HEPARIN: 1.05 IU/ML (ref 0.3–0.7)
BACTERIA UR CULT: ABNORMAL
BACTERIA UR CULT: ABNORMAL
BASOPHILS # BLD: 0.1 K/UL (ref 0–0.2)
BASOPHILS NFR BLD: 0.9 %
BUN SERPL-MCNC: 15 MG/DL (ref 7–20)
CALCIUM SERPL-MCNC: 9.2 MG/DL (ref 8.3–10.6)
CHLORIDE SERPL-SCNC: 111 MMOL/L (ref 99–110)
CO2 SERPL-SCNC: 25 MMOL/L (ref 21–32)
CREAT SERPL-MCNC: 1.1 MG/DL (ref 0.6–1.2)
DEPRECATED RDW RBC AUTO: 18.1 % (ref 12.4–15.4)
EOSINOPHIL # BLD: 0.1 K/UL (ref 0–0.6)
EOSINOPHIL NFR BLD: 0.7 %
GFR SERPLBLD CREATININE-BSD FMLA CKD-EPI: 55 ML/MIN/{1.73_M2}
GLUCOSE SERPL-MCNC: 137 MG/DL (ref 70–99)
HCT VFR BLD AUTO: 29.2 % (ref 36–48)
HGB BLD-MCNC: 9.4 G/DL (ref 12–16)
INR PPP: 1.13 (ref 0.84–1.16)
LYMPHOCYTES # BLD: 1.6 K/UL (ref 1–5.1)
LYMPHOCYTES NFR BLD: 13.9 %
MAGNESIUM SERPL-MCNC: 2.2 MG/DL (ref 1.8–2.4)
MCH RBC QN AUTO: 27.6 PG (ref 26–34)
MCHC RBC AUTO-ENTMCNC: 32.1 G/DL (ref 31–36)
MCV RBC AUTO: 85.9 FL (ref 80–100)
MONOCYTES # BLD: 0.5 K/UL (ref 0–1.3)
MONOCYTES NFR BLD: 4.6 %
NEUTROPHILS # BLD: 9.2 K/UL (ref 1.7–7.7)
NEUTROPHILS NFR BLD: 79.9 %
ORGANISM: ABNORMAL
ORGANISM: ABNORMAL
PHOSPHATE SERPL-MCNC: 3.7 MG/DL (ref 2.5–4.9)
PLATELET # BLD AUTO: 240 K/UL (ref 135–450)
PMV BLD AUTO: 8.3 FL (ref 5–10.5)
POTASSIUM SERPL-SCNC: 3.5 MMOL/L (ref 3.5–5.1)
PROTHROMBIN TIME: 14.5 SEC (ref 11.5–14.8)
RBC # BLD AUTO: 3.41 M/UL (ref 4–5.2)
SODIUM SERPL-SCNC: 145 MMOL/L (ref 136–145)
WBC # BLD AUTO: 11.5 K/UL (ref 4–11)

## 2023-04-05 PROCEDURE — 97116 GAIT TRAINING THERAPY: CPT

## 2023-04-05 PROCEDURE — 84630 ASSAY OF ZINC: CPT

## 2023-04-05 PROCEDURE — 1200000000 HC SEMI PRIVATE

## 2023-04-05 PROCEDURE — APPNB30 APP NON BILLABLE TIME 0-30 MINS: Performed by: NURSE PRACTITIONER

## 2023-04-05 PROCEDURE — 85520 HEPARIN ASSAY: CPT

## 2023-04-05 PROCEDURE — 6370000000 HC RX 637 (ALT 250 FOR IP): Performed by: STUDENT IN AN ORGANIZED HEALTH CARE EDUCATION/TRAINING PROGRAM

## 2023-04-05 PROCEDURE — 6360000002 HC RX W HCPCS: Performed by: NURSE PRACTITIONER

## 2023-04-05 PROCEDURE — 97165 OT EVAL LOW COMPLEX 30 MIN: CPT

## 2023-04-05 PROCEDURE — 85610 PROTHROMBIN TIME: CPT

## 2023-04-05 PROCEDURE — 2580000003 HC RX 258: Performed by: STUDENT IN AN ORGANIZED HEALTH CARE EDUCATION/TRAINING PROGRAM

## 2023-04-05 PROCEDURE — 97535 SELF CARE MNGMENT TRAINING: CPT

## 2023-04-05 PROCEDURE — 94761 N-INVAS EAR/PLS OXIMETRY MLT: CPT

## 2023-04-05 PROCEDURE — 85025 COMPLETE CBC W/AUTO DIFF WBC: CPT

## 2023-04-05 PROCEDURE — 99222 1ST HOSP IP/OBS MODERATE 55: CPT | Performed by: SURGERY

## 2023-04-05 PROCEDURE — 82525 ASSAY OF COPPER: CPT

## 2023-04-05 PROCEDURE — 94640 AIRWAY INHALATION TREATMENT: CPT

## 2023-04-05 PROCEDURE — 97161 PT EVAL LOW COMPLEX 20 MIN: CPT

## 2023-04-05 PROCEDURE — 83735 ASSAY OF MAGNESIUM: CPT

## 2023-04-05 PROCEDURE — 6360000002 HC RX W HCPCS: Performed by: STUDENT IN AN ORGANIZED HEALTH CARE EDUCATION/TRAINING PROGRAM

## 2023-04-05 PROCEDURE — 80069 RENAL FUNCTION PANEL: CPT

## 2023-04-05 PROCEDURE — 6370000000 HC RX 637 (ALT 250 FOR IP): Performed by: NURSE PRACTITIONER

## 2023-04-05 PROCEDURE — 6360000002 HC RX W HCPCS: Performed by: PHYSICIAN ASSISTANT

## 2023-04-05 PROCEDURE — 2580000003 HC RX 258: Performed by: PHYSICIAN ASSISTANT

## 2023-04-05 RX ORDER — WARFARIN SODIUM 5 MG/1
5 TABLET ORAL DAILY
Status: DISCONTINUED | OUTPATIENT
Start: 2023-04-05 | End: 2023-04-06 | Stop reason: HOSPADM

## 2023-04-05 RX ADMIN — Medication 15 UNITS/KG/HR: at 01:39

## 2023-04-05 RX ADMIN — Medication 14 UNITS/KG/HR: at 17:41

## 2023-04-05 RX ADMIN — WARFARIN SODIUM 5 MG: 5 TABLET ORAL at 17:09

## 2023-04-05 RX ADMIN — PANTOPRAZOLE SODIUM 40 MG: 40 TABLET, DELAYED RELEASE ORAL at 05:16

## 2023-04-05 RX ADMIN — DULOXETINE HYDROCHLORIDE 60 MG: 60 CAPSULE, DELAYED RELEASE ORAL at 09:53

## 2023-04-05 RX ADMIN — PRAMIPEXOLE DIHYDROCHLORIDE 0.5 MG: 0.25 TABLET ORAL at 20:11

## 2023-04-05 RX ADMIN — HEPARIN SODIUM 4950 UNITS: 1000 INJECTION INTRAVENOUS; SUBCUTANEOUS at 02:19

## 2023-04-05 RX ADMIN — TROSPIUM CHLORIDE 20 MG: 20 TABLET, FILM COATED ORAL at 05:16

## 2023-04-05 RX ADMIN — LOSARTAN POTASSIUM 12.5 MG: 25 TABLET, FILM COATED ORAL at 09:46

## 2023-04-05 RX ADMIN — Medication 2 PUFF: at 08:12

## 2023-04-05 RX ADMIN — WARFARIN SODIUM 5 MG: 5 TABLET ORAL at 01:38

## 2023-04-05 RX ADMIN — ATORVASTATIN CALCIUM 40 MG: 40 TABLET, FILM COATED ORAL at 20:11

## 2023-04-05 RX ADMIN — Medication 14 UNITS/KG/HR: at 11:45

## 2023-04-05 RX ADMIN — FLUTICASONE PROPIONATE 2 SPRAY: 50 SPRAY, METERED NASAL at 09:48

## 2023-04-05 RX ADMIN — FLUOXETINE 20 MG: 20 CAPSULE ORAL at 09:53

## 2023-04-05 RX ADMIN — GABAPENTIN 300 MG: 300 CAPSULE ORAL at 20:11

## 2023-04-05 RX ADMIN — CEFTRIAXONE SODIUM 1000 MG: 1 INJECTION, POWDER, FOR SOLUTION INTRAMUSCULAR; INTRAVENOUS at 21:55

## 2023-04-05 RX ADMIN — Medication 2 PUFF: at 19:57

## 2023-04-05 RX ADMIN — TROSPIUM CHLORIDE 20 MG: 20 TABLET, FILM COATED ORAL at 15:38

## 2023-04-05 RX ADMIN — PANTOPRAZOLE SODIUM 40 MG: 40 TABLET, DELAYED RELEASE ORAL at 15:38

## 2023-04-05 RX ADMIN — LEVOTHYROXINE SODIUM 125 MCG: 0.12 TABLET ORAL at 05:16

## 2023-04-05 RX ADMIN — TOPIRAMATE 100 MG: 100 TABLET, FILM COATED ORAL at 20:11

## 2023-04-05 RX ADMIN — PRAMIPEXOLE DIHYDROCHLORIDE 0.5 MG: 0.25 TABLET ORAL at 10:15

## 2023-04-05 RX ADMIN — CYANOCOBALAMIN 1000 MCG: 1000 INJECTION, SOLUTION INTRAMUSCULAR; SUBCUTANEOUS at 09:45

## 2023-04-05 RX ADMIN — Medication 10 ML: at 09:52

## 2023-04-05 ASSESSMENT — PAIN SCALES - GENERAL
PAINLEVEL_OUTOF10: 0

## 2023-04-05 NOTE — PLAN OF CARE
Problem: Safety - Adult  Goal: Free from fall injury  4/5/2023 1154 by Althea Menchaca RN  Outcome: Progressing  4/5/2023 1033 by Rg Reyes RN  Outcome: Progressing     Problem: ABCDS Injury Assessment  Goal: Absence of physical injury  4/5/2023 1154 by Althea Menchaca RN  Outcome: Progressing  4/5/2023 1033 by Rg Reyes RN  Outcome: Progressing     Problem: Nutrition Deficit:  Goal: Optimize nutritional status  4/5/2023 1154 by Althea Menchaca RN  Outcome: Progressing  4/5/2023 1033 by Rg Reyes RN  Outcome: Progressing     Problem: Pain  Goal: Verbalizes/displays adequate comfort level or baseline comfort level  4/5/2023 1154 by Althea Menchaca RN  Outcome: Progressing  Flowsheets (Taken 4/5/2023 1130)  Verbalizes/displays adequate comfort level or baseline comfort level:   Encourage patient to monitor pain and request assistance   Administer analgesics based on type and severity of pain and evaluate response   Assess pain using appropriate pain scale   Implement non-pharmacological measures as appropriate and evaluate response  4/5/2023 1033 by Rg Reyes RN  Outcome: Progressing     Problem: Skin/Tissue Integrity  Goal: Absence of new skin breakdown  Description: 1. Monitor for areas of redness and/or skin breakdown  2. Assess vascular access sites hourly  3. Every 4-6 hours minimum:  Change oxygen saturation probe site  4. Every 4-6 hours:  If on nasal continuous positive airway pressure, respiratory therapy assess nares and determine need for appliance change or resting period.   Outcome: Progressing

## 2023-04-05 NOTE — PLAN OF CARE
Problem: Safety - Adult  Goal: Free from fall injury  Outcome: Progressing     Problem: ABCDS Injury Assessment  Goal: Absence of physical injury  Outcome: Progressing     Problem: Nutrition Deficit:  Goal: Optimize nutritional status  Outcome: Progressing     Problem: Pain  Goal: Verbalizes/displays adequate comfort level or baseline comfort level  Outcome: Progressing

## 2023-04-05 NOTE — PLAN OF CARE
Problem: Safety - Adult  Goal: Free from fall injury  Outcome: Progressing  Note: Fall precautions in place, bed alarm on, nonskid foot wear applied, bed in lowest position, and call light within reach. Will continue to monitor. Problem: Pain  Goal: Verbalizes/displays adequate comfort level or baseline comfort level  Outcome: Progressing  Flowsheets (Taken 4/4/2023 2006)  Verbalizes/displays adequate comfort level or baseline comfort level:   Consider cultural and social influences on pain and pain management   Notify Licensed Independent Practitioner if interventions unsuccessful or patient reports new pain   Implement non-pharmacological measures as appropriate and evaluate response   Administer analgesics based on type and severity of pain and evaluate response   Encourage patient to monitor pain and request assistance   Assess pain using appropriate pain scale  Note: Assessed pt's pain on 0 -10 scale. Repositioned, provided emotional support, and administered pain medication. Call light within reach, will continue to monitor.

## 2023-04-05 NOTE — CARE COORDINATION
Chart reviewed and therapy has no recommendations on discharge.     Shameka Quezada RN, BSN  [phone]

## 2023-04-06 ENCOUNTER — TELEPHONE (OUTPATIENT)
Dept: PHARMACY | Age: 68
End: 2023-04-06

## 2023-04-06 VITALS
HEART RATE: 84 BPM | OXYGEN SATURATION: 100 % | BODY MASS INDEX: 49.45 KG/M2 | RESPIRATION RATE: 16 BRPM | TEMPERATURE: 97.6 F | DIASTOLIC BLOOD PRESSURE: 91 MMHG | WEIGHT: 279.1 LBS | SYSTOLIC BLOOD PRESSURE: 133 MMHG | HEIGHT: 63 IN

## 2023-04-06 LAB
ALBUMIN SERPL-MCNC: 3.5 G/DL (ref 3.4–5)
ANION GAP SERPL CALCULATED.3IONS-SCNC: 12 MMOL/L (ref 3–16)
ANTI-XA UNFRAC HEPARIN: 0.5 IU/ML (ref 0.3–0.7)
ANTI-XA UNFRAC HEPARIN: 0.61 IU/ML (ref 0.3–0.7)
AT III ACT/NOR PPP CHRO: 92 % (ref 76–128)
AT III AG ACT/NOR PPP IA: 85 % (ref 82–136)
B2 GLYCOPROT1 IGG SERPL IA-ACNC: <10 SGU
B2 GLYCOPROT1 IGM SERPL IA-ACNC: <10 SMU
BASOPHILS # BLD: 0.1 K/UL (ref 0–0.2)
BASOPHILS NFR BLD: 0.7 %
BUN SERPL-MCNC: 10 MG/DL (ref 7–20)
CALCIUM SERPL-MCNC: 8.8 MG/DL (ref 8.3–10.6)
CHLORIDE SERPL-SCNC: 111 MMOL/L (ref 99–110)
CO2 SERPL-SCNC: 23 MMOL/L (ref 21–32)
CREAT SERPL-MCNC: 1 MG/DL (ref 0.6–1.2)
DEPRECATED RDW RBC AUTO: 18.2 % (ref 12.4–15.4)
EOSINOPHIL # BLD: 0.3 K/UL (ref 0–0.6)
EOSINOPHIL NFR BLD: 2.4 %
GFR SERPLBLD CREATININE-BSD FMLA CKD-EPI: >60 ML/MIN/{1.73_M2}
GLUCOSE SERPL-MCNC: 113 MG/DL (ref 70–99)
HCT VFR BLD AUTO: 32 % (ref 36–48)
HGB BLD-MCNC: 10.2 G/DL (ref 12–16)
INR PPP: 1.1 (ref 0.84–1.16)
LYMPHOCYTES # BLD: 1.8 K/UL (ref 1–5.1)
LYMPHOCYTES NFR BLD: 17.3 %
MAGNESIUM SERPL-MCNC: 2 MG/DL (ref 1.8–2.4)
MCH RBC QN AUTO: 27.6 PG (ref 26–34)
MCHC RBC AUTO-ENTMCNC: 31.8 G/DL (ref 31–36)
MCV RBC AUTO: 86.9 FL (ref 80–100)
MONOCYTES # BLD: 0.5 K/UL (ref 0–1.3)
MONOCYTES NFR BLD: 4.8 %
NEUTROPHILS # BLD: 7.8 K/UL (ref 1.7–7.7)
NEUTROPHILS NFR BLD: 74.8 %
PHOSPHATE SERPL-MCNC: 3.4 MG/DL (ref 2.5–4.9)
PLATELET # BLD AUTO: 267 K/UL (ref 135–450)
PMV BLD AUTO: 8.5 FL (ref 5–10.5)
POTASSIUM SERPL-SCNC: 3.3 MMOL/L (ref 3.5–5.1)
PROTHROMBIN TIME: 14.2 SEC (ref 11.5–14.8)
RBC # BLD AUTO: 3.69 M/UL (ref 4–5.2)
SODIUM SERPL-SCNC: 146 MMOL/L (ref 136–145)
WBC # BLD AUTO: 10.4 K/UL (ref 4–11)

## 2023-04-06 PROCEDURE — 80069 RENAL FUNCTION PANEL: CPT

## 2023-04-06 PROCEDURE — 85025 COMPLETE CBC W/AUTO DIFF WBC: CPT

## 2023-04-06 PROCEDURE — 94761 N-INVAS EAR/PLS OXIMETRY MLT: CPT

## 2023-04-06 PROCEDURE — 83735 ASSAY OF MAGNESIUM: CPT

## 2023-04-06 PROCEDURE — 85610 PROTHROMBIN TIME: CPT

## 2023-04-06 PROCEDURE — 6360000002 HC RX W HCPCS: Performed by: NURSE PRACTITIONER

## 2023-04-06 PROCEDURE — 94640 AIRWAY INHALATION TREATMENT: CPT

## 2023-04-06 PROCEDURE — 6370000000 HC RX 637 (ALT 250 FOR IP): Performed by: STUDENT IN AN ORGANIZED HEALTH CARE EDUCATION/TRAINING PROGRAM

## 2023-04-06 PROCEDURE — 85520 HEPARIN ASSAY: CPT

## 2023-04-06 RX ORDER — ENOXAPARIN SODIUM 150 MG/ML
1 INJECTION SUBCUTANEOUS 2 TIMES DAILY
Status: DISCONTINUED | OUTPATIENT
Start: 2023-04-06 | End: 2023-04-06 | Stop reason: HOSPADM

## 2023-04-06 RX ORDER — WARFARIN SODIUM 5 MG/1
5 TABLET ORAL DAILY
Qty: 30 TABLET | Refills: 0 | Status: SHIPPED | OUTPATIENT
Start: 2023-04-06

## 2023-04-06 RX ORDER — ENOXAPARIN SODIUM 150 MG/ML
1 INJECTION SUBCUTANEOUS 2 TIMES DAILY
Qty: 14 EACH | Refills: 0 | Status: SHIPPED | OUTPATIENT
Start: 2023-04-06

## 2023-04-06 RX ADMIN — Medication 2 PUFF: at 08:53

## 2023-04-06 RX ADMIN — FLUTICASONE PROPIONATE 2 SPRAY: 50 SPRAY, METERED NASAL at 08:50

## 2023-04-06 RX ADMIN — PRAMIPEXOLE DIHYDROCHLORIDE 0.5 MG: 0.25 TABLET ORAL at 08:49

## 2023-04-06 RX ADMIN — CYANOCOBALAMIN 1000 MCG: 1000 INJECTION, SOLUTION INTRAMUSCULAR; SUBCUTANEOUS at 08:50

## 2023-04-06 RX ADMIN — TROSPIUM CHLORIDE 20 MG: 20 TABLET, FILM COATED ORAL at 05:35

## 2023-04-06 RX ADMIN — POTASSIUM CHLORIDE 40 MEQ: 1500 TABLET, EXTENDED RELEASE ORAL at 05:35

## 2023-04-06 RX ADMIN — FLUOXETINE 20 MG: 20 CAPSULE ORAL at 08:49

## 2023-04-06 RX ADMIN — DULOXETINE HYDROCHLORIDE 60 MG: 60 CAPSULE, DELAYED RELEASE ORAL at 08:50

## 2023-04-06 RX ADMIN — PANTOPRAZOLE SODIUM 40 MG: 40 TABLET, DELAYED RELEASE ORAL at 05:35

## 2023-04-06 RX ADMIN — LEVOTHYROXINE SODIUM 125 MCG: 0.12 TABLET ORAL at 05:35

## 2023-04-06 RX ADMIN — ENOXAPARIN SODIUM 120 MG: 150 INJECTION SUBCUTANEOUS at 11:20

## 2023-04-06 RX ADMIN — LOSARTAN POTASSIUM 12.5 MG: 25 TABLET, FILM COATED ORAL at 08:49

## 2023-04-06 ASSESSMENT — PAIN SCALES - GENERAL
PAINLEVEL_OUTOF10: 0
PAINLEVEL_OUTOF10: 0

## 2023-04-06 NOTE — CARE COORDINATION
Patient discharged 2023  to home  All discharge needs met per case management     Juju Feliciano RN, BSN  [phone]

## 2023-04-06 NOTE — PLAN OF CARE
Problem: Safety - Adult  Goal: Free from fall injury  Outcome: Progressing     Problem: ABCDS Injury Assessment  Goal: Absence of physical injury  Outcome: Progressing     Problem: Nutrition Deficit:  Goal: Optimize nutritional status  Outcome: Progressing     Problem: Pain  Goal: Verbalizes/displays adequate comfort level or baseline comfort level  Outcome: Progressing  Verbalizes/displays adequate comfort level or baseline comfort level:   Encourage patient to monitor pain and request assistance   Assess pain using appropriate pain scale   Administer analgesics based on type and severity of pain and evaluate response   Implement non-pharmacological measures as appropriate and evaluate response     Problem: Skin/Tissue Integrity  Goal: Absence of new skin breakdown  Outcome: Progressing

## 2023-04-06 NOTE — DISCHARGE SUMMARY
Regino Portillo MD   4/6/2023      Thank you Gisele Amezquita MD for the opportunity to be involved in this patient's care. If you have any questions or concerns, please feel free to contact me at [phone].

## 2023-04-06 NOTE — CARE COORDINATION
04/06/23 1236   IMM Letter   IMM Letter given to Patient/Family/Significant other/Guardian/POA/by: Melyssa Mcguire RN CM   IMM Letter date given: 04/06/23   IMM Letter time given: 1526  (copy made for hard chart)

## 2023-04-06 NOTE — PLAN OF CARE
Problem: Safety - Adult  Goal: Free from fall injury  4/6/2023 1220 by Ely Mayers RN  Outcome: Adequate for Discharge  4/6/2023 9517 by Ely Mayers RN  Outcome: Progressing  Flowsheets (Taken 4/6/2023 0733)  Free From Fall Injury: Instruct family/caregiver on patient safety  4/6/2023 0223 by Ely Mayers RN  Outcome: Progressing     Problem: ABCDS Injury Assessment  Goal: Absence of physical injury  4/6/2023 1220 by Ely Mayers RN  Outcome: Adequate for Discharge  4/6/2023 0733 by Ely Mayers RN  Outcome: Progressing  Flowsheets (Taken 4/6/2023 5946)  Absence of Physical Injury: Implement safety measures based on patient assessment  4/6/2023 0223 by Ely Mayers RN  Outcome: Progressing     Problem: Nutrition Deficit:  Goal: Optimize nutritional status  4/6/2023 1220 by Ely Mayers RN  Outcome: Adequate for Discharge  4/6/2023 7180 by Ely Mayers RN  Outcome: Progressing  4/6/2023 0223 by Ely Mayers RN  Outcome: Progressing     Problem: Pain  Goal: Verbalizes/displays adequate comfort level or baseline comfort level  4/6/2023 1220 by Ely Mayers RN  Outcome: Adequate for Discharge  Flowsheets (Taken 4/6/2023 0817)  Verbalizes/displays adequate comfort level or baseline comfort level: Encourage patient to monitor pain and request assistance  4/6/2023 0733 by Ely Mayers RN  Outcome: Progressing  4/6/2023 0223 by Ely Mayers RN  Outcome: Progressing  Flowsheets (Taken 4/5/2023 1511 by Nano Bob RN)  Verbalizes/displays adequate comfort level or baseline comfort level:   Encourage patient to monitor pain and request assistance   Assess pain using appropriate pain scale   Administer analgesics based on type and severity of pain and evaluate response   Implement non-pharmacological measures as appropriate and evaluate response     Problem: Skin/Tissue Integrity  Goal: Absence of new skin breakdown  Description: 1. Monitor for areas of redness and/or skin breakdown  2.   Assess

## 2023-04-06 NOTE — PLAN OF CARE
Problem: Safety - Adult  Goal: Free from fall injury  4/6/2023 0733 by Charley Clements RN  Outcome: Progressing  4/6/2023 0223 by Charley Clements RN  Outcome: Progressing     Problem: ABCDS Injury Assessment  Goal: Absence of physical injury  4/6/2023 0733 by Charley Clements RN  Outcome: Progressing  4/6/2023 0223 by Charley Clements RN  Outcome: Progressing     Problem: Nutrition Deficit:  Goal: Optimize nutritional status  4/6/2023 0733 by Charley Clements RN  Outcome: Progressing  4/6/2023 0223 by Charley Clements RN  Outcome: Progressing     Problem: Pain  Goal: Verbalizes/displays adequate comfort level or baseline comfort level  4/6/2023 0733 by Charley Clements RN  Outcome: Progressing  4/6/2023 0223 by Charley Clements RN  Outcome: Progressing  Flowsheets (Taken 4/5/2023 1511 by Kavya Gracia RN)  Verbalizes/displays adequate comfort level or baseline comfort level:   Encourage patient to monitor pain and request assistance   Assess pain using appropriate pain scale   Administer analgesics based on type and severity of pain and evaluate response   Implement non-pharmacological measures as appropriate and evaluate response     Problem: Skin/Tissue Integrity  Goal: Absence of new skin breakdown  Description: 1. Monitor for areas of redness and/or skin breakdown  2. Assess vascular access sites hourly  3. Every 4-6 hours minimum:  Change oxygen saturation probe site  4. Every 4-6 hours:  If on nasal continuous positive airway pressure, respiratory therapy assess nares and determine need for appliance change or resting period.   4/6/2023 0733 by Charley Clements RN  Outcome: Progressing  4/6/2023 0223 by Charley Clements RN  Outcome: Progressing

## 2023-04-06 NOTE — DISCHARGE INSTRUCTIONS
F/u with GI in 8 weeks for consideration of EUSDo you have the help you need at home? What symptoms or health problems do you need to look out for after you leave the hospital? Call your physician if you have any of these symptoms.   :   If you have sudden, severe shortness of breath or shortness of breath while resting. Pain, tenderness, warmth or redness in your calf. Temperature  greater than 101? Pink Reggie Persistent diarrhea, nausea or vomiting. If you are unable to eat, or unable to drink 5 glasses of fluid a day for more than one day. If you experience the same pain or other symptoms that brought you to the hospital.  If you become lightheaded or dizzy. If you think something is seriously wrong go to the nearest emergency room!     Call 911 for any EMERGENCY and go to the nearest hospital!

## 2023-04-06 NOTE — PROGRESS NOTES
04/04/23 0610   RT Protocol   History Pulmonary Disease 1   Respiratory pattern 0   Breath sounds 0   Cough 0   Indications for Bronchodilator Therapy On home bronchodilators   Bronchodilator Assessment Score 1
[address] [facility],[address] Ms[facility] Department   Phone: [phone]    Occupational Therapy    [x] Initial Evaluation            [] Daily Treatment Note         [x] Discharge Summary      Patient: Homero Hansen   : 1955   MRN: [id number]   Date of Service:  2023    Admitting Diagnosis:  Acute pulmonary embolism [facility])  Current Admission Summary:         Chief Complaint   Patient presents with    Abdominal Pain       C/o epigastric pain for the last couple with slight nausea and also had a positive d dimer from PCP. HISTORY OF PRESENT ILLNESS: 1 or more Elements      History from : Patient   History of Present Illness  Patient is a 78 y.o.  female admitted with Epigastric abdominal pain [R10.13]  Elevated lipase [R74.8]  Pulmonary embolism with acute cor pulmonale, unspecified chronicity, unspecified pulmonary embolism type (HCC) [I26.09]  Multiple subsegmental pulmonary emboli without acute cor pulmonale ([facility] [address] [I26.94] who is seen in consult for pancreatitis. History of GERD, HLD, hypothyroidism. S/p keri and gastric sleeve. She was seen by her PCP yesterday for routine visit. Reported mid to lower chest pain with radiation to upper abdomen and wrapping around into the back for a day along with SOB. Had blood work done and was sent to the ED for concern for PE. Was found to have bilat PE and DVT. Lipase was 740. CT abd/pelvis with normal pancreas, no pancreatic mass or pancreatic ductal dilation. Her chest and upper abdominal pain was constant and worse with deep breath. Having less pain today. Had some nausea but no vomiting. No melena or hematochezia. Has had intermittent heartburn lately despite PPI. Reports 20 pound unintentional weight loss over the past 2 months. No h/o pancreatitis. Rare alcohol use. No new meds.       Limitations to history : None   Past Medical History:  has a past medical history of Abnormal finding on
Clinical Pharmacy Note: Pharmacy to Dose Warfarin    Pharmacy consulted by DEA Norman CNP to dose warfarin. Karin Mercado is a 78 y.o. female  is receiving warfarin for indication: DVT/PE. INR Goal Range: 2.0-3.0  Prior to admission warfarin dosing regimen: NA  INR today:   Lab Results   Component Value Date/Time    INR 1.06 04/03/2023 08:58 PM       Assessment/Plan:  INR is subtherapeutic on prior to admission dosing regimen. Based on today's assessment, dose warfarin 5 mg daily. Daily INR is ordered. Pharmacy will continue to monitor and make adjustments to regimen as necessary.      Thank you for the consult,     Mik HamD
Discharge instructions reviewed with patient and family member. Patient and family verbalized understanding. All home medications have been reviewed, questions answered and patient voiced understanding. All medication side effects reviewed and patient and family verbalized understanding. Follow up appointment(s) reviewed with patient and all attempts made to schedule within 7-10 days of discharge. Patient given prescriptions, discharge instructions, and appointment times. Patient discharged to home with family via private car. Taken to lobby via wheelchair.
Heparin gtt increased and half bolus given per order.  Will retime repeat Anti-Xa for 6 hours
Hospitalist Progress Note      PCP: Dario Bai MD    Date of Admission: 4/3/2023    Chief Complaint: Abdominal pain, chest pain    Hospital Course: Sent from PCPs office with abdominal pain and chest pain going on for few days. Found to have bilateral PE. Started on heparin drip. Subjective: Breathing better. Still has some bilateral lower chest pain and upper abdominal pain. No GERD symptoms. History of ulcer long time ago. Takes PPI at home.          Medications:  Reviewed    Infusion Medications    heparin (PORCINE) Infusion 16 Units/kg/hr (04/04/23 1119)    sodium chloride      lactated ringers IV soln 75 mL/hr at 04/04/23 1120     Scheduled Medications    cyanocobalamin  1,000 mcg SubCUTAneous Daily    atorvastatin  40 mg Oral Nightly    DULoxetine  60 mg Oral Daily    pantoprazole  40 mg Oral BID AC    FLUoxetine  20 mg Oral Daily    fluticasone  2 spray Nasal Daily    mometasone-formoterol  2 puff Inhalation BID    gabapentin  300 mg Oral Nightly    levothyroxine  125 mcg Oral Daily    losartan  12.5 mg Oral Daily    pramipexole  0.5 mg Oral BID    topiramate  100 mg Oral Nightly    cefTRIAXone (ROCEPHIN) IV  1,000 mg IntraVENous Q24H    sodium chloride flush  5-40 mL IntraVENous 2 times per day    trospium  20 mg Oral BID AC     PRN Meds: heparin (porcine), heparin (porcine), albuterol sulfate HFA, perflutren lipid microspheres, sodium chloride flush, sodium chloride, ondansetron **OR** ondansetron, polyethylene glycol, acetaminophen **OR** acetaminophen, magnesium sulfate, potassium chloride **OR** potassium alternative oral replacement **OR** potassium chloride      Intake/Output Summary (Last 24 hours) at 4/4/2023 1224  Last data filed at 4/4/2023 1127  Gross per 24 hour   Intake 0 ml   Output --   Net 0 ml       Physical Exam Performed:    /72   Pulse 64   Temp 98 °F (36.7 °C) (Temporal)   Resp 18   Ht 5' 3\" (1.6 m)   Wt 273 lb 9.5 oz (124.1 kg)   SpO2 100%   BMI 48.46 kg/m²
Notified MD of results of dopplers.
Nutrition Note    RECOMMENDATIONS  PO Diet: Resume diet per MD. Recommend low-fat modifier     NUTRITION ASSESSMENT   Pt triggered for positive nutrition screen indicating wt loss. Admitted with 2 to 3 day hx of abdominal, chest, and back pain with associated nausea. Found to have bilateral PE and pancreatitis. Upon visiting, pt was off unit for US but family member in room was able to provide information. Reported pt snacking a little less than normal but overall good appetite and po intake; was about to consume steak hoagie last night when told to go to ER. If accurate, wt hx in EMR indicates 17 lb (5.9%) wt loss since 3/6. RD will continue to monitor for diet to be resumed with adequate po intake. Nutrition Related Findings: LR at 75 mL/hr. Lytes obtained today WNL. No BM documented, GI WDL. +1 BLE edema. Wounds: None  Nutrition Education:  Education not indicated   Nutrition Goals: Initiate PO diet, PO intake 50% or greater, by next RD assessment     MALNUTRITION ASSESSMENT   Acute Illness  Malnutrition Status: At risk for malnutrition (Comment)    NUTRITION DIAGNOSIS   Inadequate oral intake related to inadequate protein-energy intake as evidenced by weight loss    CURRENT NUTRITION THERAPIES  Diet NPO Exceptions are: Ice Chips, Sips of Water with Meds     PO Intake: NPO   PO Supplement Intake:NPO    ANTHROPOMETRICS  Current Height: 5' 3\" (160 cm)  Current Weight: 273 lb 9.5 oz (124.1 kg)    Ideal Body Weight (IBW): 115 lbs  (52 kg)        BMI: 48.4    COMPARATIVE STANDARDS  Energy (kcal):  0469-4449     Protein (g):  104       Fluid (mL/day):  0574-2742    The patient will be monitored per nutrition standards of care. Consult dietitian if additional nutrition interventions are needed prior to RD reassessment.      Delta Scott, MS, RD, LD    Contact: [phone]
ONCOLOGY HEMATOLOGY CARE PROGRESS NOTE      SUBJECTIVE: Patient doing well. Sitting up in chair.  visiting. No chest pain or shortness of breath. Right leg wrapped with ace bandage. Tolerating coumadin. No bleeding or worsening bruising. ROS:     Constitutional:  No fever, No chills, No night sweats. Eyes:  No impairment or change in vision  ENT / Mouth:  No pain, abnormal ulceration, bleeding, nasal drip or change in voice or hearing  Cardiovascular:  No chest pain, palpitations, new edema, or calf discomfort  Respiratory:  No pain, hemoptysis, change to breathing  Gastrointestinal:  No pain, cramping, jaundice, change to eating and bowel habits  Urinary:  No pain, bleeding or change in continence  Musculoskeletal:  No redness, pain, edema or weakness  Skin:  No pruritus, rash, change to nodules or lesions  Neurologic:  No discomfort, change in mental status, speech, sensory or motor activity  Psychiatric:  No change in concentration or change to affect or mood  Endocrine:  No hot flashes, increased thirst, or change to urine production  Hematologic: No petechiae, ecchymosis or bleeding  Lymphatic:  No lymphadenopathy or lymphedema  Allergy / Immunologic:  No eczema, hives, frequent or recurrent infections      OBJECTIVE      Physical      VITALS:  BP (!) 133/91   Pulse 84   Temp 97.6 °F (36.4 °C) (Temporal)   Resp 16   Ht 5' 3\" (1.6 m)   Wt 279 lb 1.6 oz (126.6 kg)   SpO2 100%   BMI 49.44 kg/m²   TEMPERATURE:  Current - Temp: 97.6 °F (36.4 °C); Max - Temp  Av.8 °F (36.6 °C)  Min: 97.5 °F (36.4 °C)  Max: 98 °F (36.7 °C)  PULSE OXIMETRY RANGE: SpO2  Av.6 %  Min: 97 %  Max: 100 %  24HR INTAKE/OUTPUT:    Intake/Output Summary (Last 24 hours) at 2023 1025  Last data filed at 2023 0959  Gross per 24 hour   Intake 1737.27 ml   Output --   Net 1737.27 ml       General appearance:  Appears comfortable. +Pallor  Eyes: Sclera clear.  Pupils equal.  ENT:
Patient returned from testing, echo at bedside.   Patient requesting to eat, message sent to hospitalist.
Patient to testing at this time.
Pharmacy to Dose Warfarin    Pharmacy consulted to dose warfarin for acute PE/DVT. INR Goal: 2-3    INR today: 1.1    Assessment/Plan:  - continue warfarin 5 mg daily  - continue Lovenox 1 mg/kg BID to bridge  - patient has been enrolled in  coumadin clinic with appointment on Monday  - patient has also been counseled on warfarin and Lovenox  - Possible concomitant drug-drug interactions include: Lovenox     Pharmacy will continue to follow.     Juan Drake PharmD, BCPS  [id number]  4/6/2023 12:18 PM
Physical/Occupational Therapy  [place]    Orders received for PT/OT evaluation. Chart reviewed. Patient with acute PE and DVT. Heparin has been started but has been less than 24 hours. Will hold therapy at this time and will follow up with pt tomorrow as medically appropriate.  Thanks, Soni Marques, PT, DPT [id number], Baron Barajas, MOT OTR/L [id number]
Rn states pt.  Is refusing hospital cpap at this time
[facility] [address] Department   Phone: [phone]    Physical Therapy    [x] Initial Evaluation            [] Daily Treatment Note         [x] Discharge Summary      Patient: Vik Glasgow   : 1955   MRN: [id number]   Date of Service:  2023  Admitting Diagnosis: Acute pulmonary embolism [facility])  Current Admission Summary: Patient is a 78 y.o.  female admitted with Epigastric abdominal pain. Found to have acute PE and RLE DVT. Started on heparin. Past Medical History:  has a past medical history of Abnormal finding on pulmonary function testing- nl except decreased diffusing capacity , Activities treadmill- neg GXT arron, EF 82% 3/11, Allergic rhinitis, Asthma, Depression, GERD (gastroesophageal reflux disease), Hearing loss,Tinnitus, Hyperlipidemia, Hypothyroidism, IBS (irritable bowel syndrome), Lab test positive for detection of COVID-19 virus, Migraine headache, Nocturia, PONV (postoperative nausea and vomiting), Premature ventricular contractions, Pulmonary function testing- nl except decreased diffusing capacity , Relevant prior imaging: normal examination- neg CT chest , RLS (restless legs syndrome), and Sleep apnea. Past Surgical History:  has a past surgical history that includes Cholecystectomy; Sleeve Gastrectomy (2012); Foot surgery (Left, 2017); eye surgery (Bilateral); Colonoscopy; Endoscopy, colon, diagnostic; [place] tooth extraction; REVISION OF TOTAL SHOULDER (Right, 2019); Colonoscopy (N/A, 2020); Upper gastrointestinal endoscopy (N/A, 2020); Esophagus dilation (2020); Colonoscopy (N/A, 2021); Hysterectomy, vaginal; and Hysterectomy. Discharge Recommendations: Vik Glasgow scored a 24/24 on the AM-PAC short mobility form. At this time, no further PT is recommended upon discharge due to presentation at baseline function. Recommend patient returns to prior setting with prior services.
VASCULAR    Comfortable - no CP or SOB. R leg feels good - wrapped    VSS Afeb  LLE wrapped - edema not profound    A/P; PE/DVT LLE   Agree with DOAC 6 months unless HC cause identified. KH 20/30 mmHg stockings - will fit today. To wear daily - AM to HD indefinitely. No further recommendations. No need for outpt f/u with me. Will see as needed. Please call for any questions or problems.      Marielos Szymanski
adenopathy. Cardiovascular: Regular rhythm, normal S1, S2. No murmur. No edema in lower extremities  Respiratory: Not using accessory muscles. Good inspiratory effort. Clear to auscultation bilaterally, no wheeze or crackles. GI: Abdomen soft, no tenderness, not distended  Musculoskeletal: No cyanosis in digits, neck supple  Neurology: CN 2-12 grossly intact. No speech or motor deficits  Psych: Normal affect. Alert and oriented in time, place and person  Skin: Warm, dry, normal turgor. Ecchymosis on BLE and right side face.          Data  Recent Labs     04/05/23  0130 04/04/23 0420 04/03/23 1850   WBC 11.5* 10.1 12.6*   NEUTROABS 9.2* 8.9* 9.8*   LYMPHOPCT 13.9 9.7 15.0   RBC 3.41* 3.54* 4.07   HGB 9.4* 9.9* 11.2*   HCT 29.2* 30.3* 35.7*   MCV 85.9 85.6 87.7   MCH 27.6 28.0 27.5   MCHC 32.1 32.7 31.4   RDW 18.1* 17.9* 17.5*    212 290        Recent Labs     04/03/23  1850 04/04/23 0420 04/05/23 0130    141 145   K 3.3* 4.0 3.5    110 111*   CO2 21 22 25   PHOS  --  3.0 3.7   BUN 18 18 15   CREATININE 1.0 1.0 1.1     Recent Labs     04/03/23  1239 04/03/23  1850   AST 11* 16   ALT 13 15   BILITOT 0.4 0.3   ALKPHOS 152* 154*       Magnesium:    Lab Results   Component Value Date/Time    MG 2.20 04/05/2023 01:30 AM    MG 2.10 04/04/2023 04:20 AM       Imaging  VL Extremity Venous Bilateral  Lower Extremities DVT Study     Demographics      [facility]      Date of Study     04/04/2023          Gender              Female      Patient Number    [id number]          Date of Birth       1955      Visit Number      [id number]           Age                 78 year(s)      Accession Number  [id number]          Room Number         0575      Corporate ID      [id number]             [address] Elise Adler,                                                             RVT, RT      Ordering          Phillip Rios DO  Interpreting        [facility]   Physician
maximum 200mg per  day 27 tablet 0    Acetaminophen (TYLENOL ARTHRITIS PAIN PO) Take 1 tablet by mouth 3 times daily      albuterol sulfate  (90 Base) MCG/ACT inhaler Inhale 2 puffs into the lungs every 4 hours as needed for Wheezing May substitute for insurance preferred (Ventolin, Proventil, ProAir) 2 Inhaler 3    clonazePAM (KLONOPIN) 0.5 MG tablet Take 1 tablet by mouth daily as needed. Of note, patient reports taking no medications today: was fasting for a test this morning. Timing of last doses updated. Thank you,  Ellie Ramon
consulted  No interventions advised. ?  Etiology. Hematology consulted. Work-up pending. On heparin drip. Started on Coumadin today. UTI: On IV Rocephin. Cultures growing E. coli. Status pending. Elevated lipase: Slight alk phos elevation noted. CT abdomen without pancreatitis but patient has ongoing abdominal discomfort. Lipase trended down. Patient tolerating diet okay. Advance diet. Needs EUS as outpatient in 6 to 8 weeks. Hypertension    Hyperlipidemia    Hypothyroidism    Restless leg syndrome    B12 Deficiency: Started on supplements. DVT Prophylaxis: Heparin/Coumadin  Diet: ADULT DIET; Regular; No Added Salt (3-4 gm)  Code Status: Full Code  PT/OT Eval Status: Ordered. Dispo -inpatient 1 more day.     Kim Rodriguez MD

## 2023-04-06 NOTE — TELEPHONE ENCOUNTER
Sonido/Jaimie, clinical pharmacists, contacted clinic to enroll patient in anticoagulation clinic. Signed referral received from Dr. Gagan Villalta. Pt started warfarin 4/5 taking 10 mg for one day. Will be d/c from hospital today (4/6) taking 5 mg daily. Is also being d/c on lovenox bridge. Of note, pt lives in [place]. Discuss with pt options of other clinics closer to home. Scheduled initial appt in clinic 4/10. Asked for pt to arrive 15 min early to register.

## 2023-04-06 NOTE — DISCHARGE INSTR - COC
907.45 ml     I/O last 3 completed shifts: In: 4726.3 [P.O.:1330;  I.V.:3246.3; IV Piggyback:150]  Out: 200 [Urine:200]    Safety Concerns:     [address] [employer] Safety Concerns:836892118}    Impairments/Disabilities:      [address] [facility] Impairments/Disabilities:309468344}    Nutrition Therapy:  Current Nutrition Therapy:   [address] [facility] Diet List:568058779}    Routes of Feeding: {CHP DME Other Feedings:620732514}  Liquids: {Slp liquid thickness:25924}  Daily Fluid Restriction: {CHP DME Yes amt example:853120307}  Last Modified Barium Swallow with Video (Video Swallowing Test): {Done Not Done KHPY:833300777}    Treatments at the Time of Hospital Discharge:   Respiratory Treatments: ***  Oxygen Therapy:  {Therapy; copd oxygen:80772}  Ventilator:    { CC Vent GECW:501614550}    Rehab Therapies: {THERAPEUTIC INTERVENTION:5379585058}  Weight Bearing Status/Restrictions: [facility] Weight Bearin}  Other Medical Equipment (for information only, NOT a DME order):  {EQUIPMENT:319474333}  Other Treatments: ***    Patient's personal belongings (please select all that are sent with patient):  {P DME Belongings:600746002}    RN SIGNATURE:  {Esignature:401468179}    CASE MANAGEMENT/SOCIAL WORK SECTION    Inpatient Status Date: ***    Readmission Risk Assessment Score:  Readmission Risk              Risk of Unplanned Readmission:  17           Discharging to Facility/ Agency   Name:   Address:  Phone:  Fax:    Dialysis Facility (if applicable)   Name:  Address:  Dialysis Schedule:  Phone:  Fax:    / signature: {Esignature:284303181}    PHYSICIAN SECTION    Prognosis: {Prognosis:3968684163}    Condition at Discharge: [address] Patient Condition:482393091}    Rehab Potential (if transferring to Rehab): {Prognosis:3442275610}    Recommended Labs or Other Treatments After Discharge: ***    Physician Certification: I certify the above information and transfer of Qi Neely  is necessary for the continuing treatment of the

## 2023-04-07 ENCOUNTER — CARE COORDINATION (OUTPATIENT)
Dept: CASE MANAGEMENT | Age: 68
End: 2023-04-07

## 2023-04-07 DIAGNOSIS — I26.99 ACUTE PULMONARY EMBOLISM, UNSPECIFIED PULMONARY EMBOLISM TYPE, UNSPECIFIED WHETHER ACUTE COR PULMONALE PRESENT (HCC): Primary | ICD-10-CM

## 2023-04-07 LAB
APTT IMM NP PPP: ABNORMAL SEC (ref 32–48)
APTT P HEP NEUT PPP: 45 SEC (ref 32–48)
CARDIOLIPIN IGG SER IA-ACNC: <10 GPL
CARDIOLIPIN IGM SER IA-ACNC: <10 MPL
CONFIRM APTT STACLOT: ABNORMAL
DRVVT SCREEN TO CONFIRM RATIO: ABNORMAL RATIO
IF BLOCK AB SER QL RIA: NEGATIVE
LA 3 SCREEN W REFLEX-IMP: ABNORMAL
LA NT DPL PPP QL: ABNORMAL
MIXING DRVVT: ABNORMAL SEC (ref 33–44)
PCA IGG SER QL IF: 2.8 UNITS (ref 0–24.9)
PROTHROMBIN TIME: 14.3 SEC (ref 12–15.5)
REPTILASE TIME: 16 SEC
SCREEN APTT: 122 SEC (ref 32–48)
SCREEN DRVVT: 35 SEC (ref 33–44)
THROMBIN TIME: >150 SEC (ref 14.7–19.5)

## 2023-04-07 PROCEDURE — 1111F DSCHRG MED/CURRENT MED MERGE: CPT | Performed by: FAMILY MEDICINE

## 2023-04-07 NOTE — CARE COORDINATION
1111F entered: yes    Was patient discharged with a pulse oximeter? no    Non-face-to-face services provided:  Obtained and reviewed discharge summary and/or continuity of care documents    Offered patient enrollment in the Remote Patient Monitoring (RPM) program for in-home monitoring:  future call . Care Transitions 24 Hour Call    Do you have a copy of your discharge instructions?: Yes  Do you have all of your prescriptions and are they filled?: Yes  Have you been contacted by a [address]?: No  Have you scheduled your follow up appointment?: Yes  How are you going to get to your appointment?: Car - family or friend to transport  Do you have support at home?: Partner/Spouse/SO  Do you feel like you have everything you need to keep you well at home?: Yes  Are you an active caregiver in your home?: No  Care Transitions Interventions  No Identified Needs         Discussed follow-up appointments. If no appointment was previously scheduled, appointment scheduling offered: No.   Is follow up appointment scheduled within 7 days of discharge? Yes. Follow Up  Future Appointments   Date Time Provider Roderick Allred   4/10/2023  3:15 PM [facility] ANTICOAGULATION CLINIC [facility] Yonatan Acosta    4/20/2023 11:00 AM Kindra Nair MD [place] SLE[facility]   4/20/2023 11:30 AM Kylie Oh MD [address] [place] [address] Transition Nurse provided contact information. Plan for follow-up call in 5-7 days based on severity of symptoms and risk factors.   Plan for next call: self management-PE, f/u appts, RPM    Josee Ruiz RN

## 2023-04-08 LAB
F2 C.20210G>A GENO BLD/T: NEGATIVE
SPECIMEN SOURCE: NORMAL

## 2023-04-09 LAB
COPPER SERPL-MCNC: 165.1 UG/DL (ref 80–155)
F5 P.R506Q BLD/T QL: NEGATIVE
SPECIMEN SOURCE: NORMAL

## 2023-04-17 ENCOUNTER — ANTI-COAG VISIT (OUTPATIENT)
Dept: PHARMACY | Age: 68
End: 2023-04-17
Payer: MEDICARE

## 2023-04-17 DIAGNOSIS — I82.411 ACUTE DEEP VEIN THROMBOSIS (DVT) OF FEMORAL VEIN OF RIGHT LOWER EXTREMITY (HCC): ICD-10-CM

## 2023-04-17 DIAGNOSIS — I26.99 ACUTE PULMONARY EMBOLISM, UNSPECIFIED PULMONARY EMBOLISM TYPE, UNSPECIFIED WHETHER ACUTE COR PULMONALE PRESENT (HCC): Primary | ICD-10-CM

## 2023-04-17 LAB — INTERNATIONAL NORMALIZATION RATIO, POC: 1.8

## 2023-04-17 PROCEDURE — 99212 OFFICE O/P EST SF 10 MIN: CPT

## 2023-04-17 PROCEDURE — 85610 PROTHROMBIN TIME: CPT

## 2023-04-17 NOTE — PROGRESS NOTES
Ms. Srinivasa Moses is a 78 y.o. y/o female with history of DVT who presents today for anticoagulation monitoring and adjustment. Pertinent PMH:    Patient Reported Findings:  Yes     No  [x]   []       Patient verifies current dosing regimen as listed --> 5mg daily ---> took 2.5mg Tues and Wed ---> confirmed dose   []   [x]       S/S bleeding/bruising/swelling/SOB- denies   []   [x]       Blood in urine or stool - Denies  []   [x]       Procedures scheduled in the future at this time - Denies  []   [x]       Missed Dose - Denies  []   [x]       Extra Dose - Denies   []   [x]       Change in medications -- No changes   []   [x]       Change in health/diet/appetite --> plans for 0 days of green vegetables, some nausea, no VD---> may have occasional salad ---> had 1 salad   []   [x]       Change in alcohol use --> alcohol mostly around the holidays  []   [x]       Change in activity  []   [x]       Hospital admission  4/4- 5mg  4/5- 5mg  4/6-4/10- 5mg  4/11- 2.5mg  4/12- 2.5mg   []   [x]       Emergency department visit  []   [x]       Other complaints    Clinical Outcomes:  Yes     No  []   [x]       Major bleeding event  []   [x]       Thromboembolic event    Duration of warfarin Therapy: Indefinite  INR Range:  2.0-3.0    Warfarin 5mg tablets. Takes in morning but waits on days of INR check. INR is 1.8 today after boosting to get off lovenox. INRs have been fluctuating. Take 7.5mg today then continue 5mg daily until next INR. Continue to take lovenox injections as directed. Encouraged to maintain a consistency of vegetables/salads.    Recheck INR on Friday 4/21 to coordinate with DNAIEL SIMEON    Consent signed 4/10/2023    Referring Delon Fink MD  INR (no units)   Date Value   04/06/2023 1.10   04/05/2023 1.13   04/03/2023 1.06   07/27/2022 1.07     INR,(POC) (no units)   Date Value   04/17/2023 1.8   04/13/2023 1.5     For Pharmacy Admin Tracking Only    Intervention Detail: Dose Adjustment: 1, reason: Therapy

## 2023-04-18 ENCOUNTER — TELEPHONE (OUTPATIENT)
Dept: PHARMACY | Age: 68
End: 2023-04-18

## 2023-04-18 NOTE — TELEPHONE ENCOUNTER
----- Message from Meng Clemens sent at 4/18/2023  3:35 PM EDT -----  Regarding: Needs refill of lovenox  Contact: patient  Patient LVM stating she needs a refill of lovenox and has none on her box. She would like [id number] [facility]vath, [facility] - [address] [address] [address]   [phone] - patient # for any questions.

## 2023-04-18 NOTE — TELEPHONE ENCOUNTER
[facility]ed [facility] and called [facility]. There was a RF given on Rx from 4/13 but patient doesn't think the [facility] has one ready for her. [facility] and they said their [facility] has been cutting out so likely they didn't hear the RF. Need new rx:    [facility] [address] [facility]. [id number] [address] under Dr. Elen Zabala  Lovenox 120 mg syringes  Inject 120 mg SQ BID AD  10 syringes  1 refill    Azael Wheat, PharmD, [facility] Tracking Only    Intervention Detail: Refill(s) Provided  Total # of Interventions Recommended: 1  Total # of Interventions Accepted: 1  Time Spent (min): 15

## 2023-04-19 ENCOUNTER — TELEPHONE (OUTPATIENT)
Dept: PHARMACY | Age: 68
End: 2023-04-19

## 2023-04-19 NOTE — TELEPHONE ENCOUNTER
Returned call to patient. Explained could be from lovenox and warfarin use but could be something else as well. Advised for her to call Dr. Sen Valenzuela office at [facility] to notify of acute bleeding. If no clear answer from Dr. Eliz Bond, clinic is unable to d/c lovenox injections until INR therapeutic. Pt could come in today for INR check to attempt to d/c lovenox.  Pt will call back to let know plan

## 2023-04-19 NOTE — TELEPHONE ENCOUNTER
----- Message from Chioma Glasgow sent at 4/19/2023  8:08 AM EDT -----  Regarding: Please call  Contact: patient  Please call patient back @ [phone]. Patient reports significant blood in her urine, started this morning.

## 2023-04-20 ENCOUNTER — OFFICE VISIT (OUTPATIENT)
Dept: FAMILY MEDICINE CLINIC | Age: 68
End: 2023-04-20

## 2023-04-20 ENCOUNTER — OFFICE VISIT (OUTPATIENT)
Dept: SLEEP MEDICINE | Age: 68
End: 2023-04-20
Payer: MEDICARE

## 2023-04-20 VITALS
RESPIRATION RATE: 16 BRPM | SYSTOLIC BLOOD PRESSURE: 130 MMHG | DIASTOLIC BLOOD PRESSURE: 70 MMHG | HEART RATE: 75 BPM | OXYGEN SATURATION: 100 % | BODY MASS INDEX: 49.96 KG/M2 | HEIGHT: 63 IN | WEIGHT: 282 LBS

## 2023-04-20 VITALS
SYSTOLIC BLOOD PRESSURE: 115 MMHG | HEART RATE: 82 BPM | WEIGHT: 286.4 LBS | HEIGHT: 63 IN | OXYGEN SATURATION: 100 % | TEMPERATURE: 96.9 F | DIASTOLIC BLOOD PRESSURE: 70 MMHG | RESPIRATION RATE: 18 BRPM | BODY MASS INDEX: 50.75 KG/M2

## 2023-04-20 DIAGNOSIS — G47.10 HYPERSOMNIA WITH SLEEP APNEA: ICD-10-CM

## 2023-04-20 DIAGNOSIS — R74.8 ELEVATED LIPASE: ICD-10-CM

## 2023-04-20 DIAGNOSIS — I82.411 ACUTE DEEP VEIN THROMBOSIS (DVT) OF FEMORAL VEIN OF RIGHT LOWER EXTREMITY (HCC): ICD-10-CM

## 2023-04-20 DIAGNOSIS — N30.00 ACUTE CYSTITIS WITHOUT HEMATURIA: ICD-10-CM

## 2023-04-20 DIAGNOSIS — I26.99 OTHER ACUTE PULMONARY EMBOLISM WITHOUT ACUTE COR PULMONALE (HCC): Primary | ICD-10-CM

## 2023-04-20 DIAGNOSIS — Z99.89 OSA ON CPAP: Primary | ICD-10-CM

## 2023-04-20 DIAGNOSIS — E53.8 VITAMIN B12 DEFICIENCY: ICD-10-CM

## 2023-04-20 DIAGNOSIS — G47.30 HYPERSOMNIA WITH SLEEP APNEA: ICD-10-CM

## 2023-04-20 DIAGNOSIS — G47.33 OSA ON CPAP: Primary | ICD-10-CM

## 2023-04-20 DIAGNOSIS — G25.81 RESTLESS LEGS SYNDROME: ICD-10-CM

## 2023-04-20 DIAGNOSIS — R31.0 GROSS HEMATURIA: ICD-10-CM

## 2023-04-20 DIAGNOSIS — G25.81 RLS (RESTLESS LEGS SYNDROME): ICD-10-CM

## 2023-04-20 DIAGNOSIS — Z99.89 DEPENDENCE ON OTHER ENABLING MACHINES AND DEVICES: ICD-10-CM

## 2023-04-20 DIAGNOSIS — E66.01 CLASS 3 SEVERE OBESITY DUE TO EXCESS CALORIES WITH SERIOUS COMORBIDITY AND BODY MASS INDEX (BMI) OF 50.0 TO 59.9 IN ADULT (HCC): ICD-10-CM

## 2023-04-20 PROCEDURE — 3078F DIAST BP <80 MM HG: CPT | Performed by: PSYCHIATRY & NEUROLOGY

## 2023-04-20 PROCEDURE — 99214 OFFICE O/P EST MOD 30 MIN: CPT | Performed by: PSYCHIATRY & NEUROLOGY

## 2023-04-20 PROCEDURE — 3074F SYST BP LT 130 MM HG: CPT | Performed by: PSYCHIATRY & NEUROLOGY

## 2023-04-20 PROCEDURE — 1123F ACP DISCUSS/DSCN MKR DOCD: CPT | Performed by: PSYCHIATRY & NEUROLOGY

## 2023-04-20 RX ORDER — GABAPENTIN 300 MG/1
CAPSULE ORAL
Qty: 60 CAPSULE | Refills: 11 | Status: SHIPPED | OUTPATIENT
Start: 2023-04-20 | End: 2025-04-06

## 2023-04-20 RX ORDER — PRAMIPEXOLE DIHYDROCHLORIDE 0.5 MG/1
0.5 TABLET ORAL 2 TIMES DAILY
Qty: 60 TABLET | Refills: 11 | Status: SHIPPED | OUTPATIENT
Start: 2023-04-20

## 2023-04-20 ASSESSMENT — SLEEP AND FATIGUE QUESTIONNAIRES
HOW LIKELY ARE YOU TO NOD OFF OR FALL ASLEEP WHEN YOU ARE A PASSENGER IN A CAR FOR AN HOUR WITHOUT A BREAK: 2
HOW LIKELY ARE YOU TO NOD OFF OR FALL ASLEEP IN A CAR, WHILE STOPPED FOR A FEW MINUTES IN TRAFFIC: 1
HOW LIKELY ARE YOU TO NOD OFF OR FALL ASLEEP WHILE SITTING QUIETLY AFTER LUNCH WITHOUT ALCOHOL: 2
HOW LIKELY ARE YOU TO NOD OFF OR FALL ASLEEP WHILE WATCHING TV: 3
HOW LIKELY ARE YOU TO NOD OFF OR FALL ASLEEP WHILE SITTING AND READING: 3
ESS TOTAL SCORE: 17
HOW LIKELY ARE YOU TO NOD OFF OR FALL ASLEEP WHILE SITTING AND TALKING TO SOMEONE: 1
HOW LIKELY ARE YOU TO NOD OFF OR FALL ASLEEP WHILE SITTING INACTIVE IN A PUBLIC PLACE: 2
HOW LIKELY ARE YOU TO NOD OFF OR FALL ASLEEP WHILE LYING DOWN TO REST IN THE AFTERNOON WHEN CIRCUMSTANCES PERMIT: 3

## 2023-04-20 NOTE — PATIENT INSTRUCTIONS
PLEASE TAKE THIS FORM TO CHECK-OUT WINDOW TO SCHEDULE NEXT VISIT. Please schedule check-up in 2 months. Return sooner if having any problems. TO DO LIST  PLEASE GET BLOODWORK DRAWN TODAY ON FIRST FLOOR in 170. Lab hours Monday to Friday 7:30 AM for 4 PM.  Take orders with you. Plan anticoagulation at least 6 months. See hem/onc today. Will probably get B12 shot and eventually go to monthly. Hematology will check urine today. Gross hematuria once yesterday and all today. See GI for further pancreas evaluation.

## 2023-04-20 NOTE — PROGRESS NOTES
Inhale 2 puffs into the lungs every 4 hours as needed for Wheezing May substitute for insurance preferred (Ventolin, Proventil, ProAir)  Soraida Ellis MD      Family History   Problem Relation Age of Onset    Lung Cancer Father     Other Brother     Heart Disease Maternal Grandmother     Tuberculosis Maternal Grandmother      Social History     Tobacco Use    Smoking status: Former     Packs/day: 2.00     Years: 15.00     Pack years: 30.00     Types: Cigarettes     Quit date: 1989     Years since quittin.8     Passive exposure: Past    Smokeless tobacco: Never    Tobacco comments:     congrats on quitting   Vaping Use    Vaping Use: Never used   Substance Use Topics    Alcohol use: Yes     Alcohol/week: 0.0 standard drinks     Comment: occ. Drug use: No      LAST LABS  Cholesterol, Total   Date Value Ref Range Status   2023 209 (H) 0 - 199 mg/dL Final     LDL Calculated   Date Value Ref Range Status   2023 110 (H) <100 mg/dL Final     HDL   Date Value Ref Range Status   2023 62 (H) 40 - 60 mg/dL Final     Comment:     An HDL cholesterol less than 40 mg/dL is low and  constitutes a coronary heart disease risk factor. An HDL cholesterol greater than 60 mg/dL is a  negative risk factor for coronary heart disease.      2012 49 40 - 60 mg/dl Final     Triglycerides   Date Value Ref Range Status   2023 186 (H) 0 - 150 mg/dL Final     Lab Results   Component Value Date    GLUCOSE 113 (H) 2023     Lab Results   Component Value Date     (H) 2023    K 3.3 (L) 2023    CREATININE 1.0 2023     Lab Results   Component Value Date    WBC 10.4 2023    HGB 10.2 (L) 2023    HCT 32.0 (L) 2023    MCV 86.9 2023     2023     Lab Results   Component Value Date    ALT 15 2023    AST 16 2023    ALKPHOS 154 (H) 2023    BILITOT 0.3 2023     TSH (uIU/mL)   Date Value   2023 3.57     Lab Results   Component

## 2023-04-21 ENCOUNTER — CARE COORDINATION (OUTPATIENT)
Dept: CASE MANAGEMENT | Age: 68
End: 2023-04-21

## 2023-04-21 ENCOUNTER — ANTI-COAG VISIT (OUTPATIENT)
Dept: PHARMACY | Age: 68
End: 2023-04-21
Payer: MEDICARE

## 2023-04-21 DIAGNOSIS — I26.99 OTHER ACUTE PULMONARY EMBOLISM WITHOUT ACUTE COR PULMONALE (HCC): Primary | ICD-10-CM

## 2023-04-21 DIAGNOSIS — N18.32 STAGE 3B CHRONIC KIDNEY DISEASE (HCC): ICD-10-CM

## 2023-04-21 DIAGNOSIS — R80.9 PROTEINURIA, UNSPECIFIED TYPE: ICD-10-CM

## 2023-04-21 DIAGNOSIS — I82.411 ACUTE DEEP VEIN THROMBOSIS (DVT) OF FEMORAL VEIN OF RIGHT LOWER EXTREMITY (HCC): ICD-10-CM

## 2023-04-21 LAB — INTERNATIONAL NORMALIZATION RATIO, POC: 1.9

## 2023-04-21 PROCEDURE — 85610 PROTHROMBIN TIME: CPT

## 2023-04-21 PROCEDURE — 99211 OFF/OP EST MAY X REQ PHY/QHP: CPT

## 2023-04-21 RX ORDER — LOSARTAN POTASSIUM 25 MG/1
12.5 TABLET ORAL DAILY
Qty: 45 TABLET | Refills: 0 | Status: SHIPPED | OUTPATIENT
Start: 2023-04-21

## 2023-04-21 NOTE — PROGRESS NOTES
04/03/2023 1.06   07/27/2022 1.07     INR,(POC) (no units)   Date Value   04/17/2023 1.8   04/13/2023 1.5   For Pharmacy Admin Tracking Only    Intervention Detail: Dose Adjustment: 1, reason: Therapy Optimization  Total # of Interventions Recommended: 1  Total # of Interventions Accepted: 1  Time Spent (min): 15

## 2023-04-21 NOTE — CARE COORDINATION
Care Transitions Outreach Attempt    Follow up call attempted, left contact info on vm. Patient: Jay Ponce Patient : 1955 MRN: [id number]    Last Discharge [address]       Date Complaint Diagnosis Description Type Department Provider    4/3/23 Abdominal Pain Multiple subsegmental pulmonary emboli without acute cor pulmonale ([facility] [address] . .. ED to Hosp-Admission (Discharged) (ADMITTED) FZ CINDY Barnes MD; Heaven Eastman. ..             Noted following upcoming appointments from discharge chart review:   [facility] follow up appointment(s):   Future Appointments   Date Time Provider Roderick Allred   2023 10:30 AM [facility] [address] Emeli Virk   2023 12:30 PM Alli Galindo MD [facility] [address] [place] [address]     Freddy Guerra RN

## 2023-04-25 ENCOUNTER — CARE COORDINATION (OUTPATIENT)
Dept: CASE MANAGEMENT | Age: 68
End: 2023-04-25

## 2023-04-25 NOTE — CARE COORDINATION
[facility] Care Transitions Follow Up Call    Patient Current Location:  Home: [place] [facility] [id number]    Care Transition Nurse contacted the patient by telephone to follow up after admission. Verified name and  with patient as identifiers. Patient: Lennox Green  Patient : 1955   MRN: [id number]  Reason for Admission:   Discharge Date: 23 RARS: Readmission Risk Score: 11      Needs to be reviewed by the provider   Additional needs identified to be addressed with provider: No  none             Method of communication with provider: none. Pt states doing well, no issues or concerns. No need for further f/u CTC calls per pt. Addressed changes since last contact:  none  Discussed follow-up appointments. If no appointment was previously scheduled, appointment scheduling offered: No.   Is follow up appointment scheduled within 7 days of discharge? Yes. Follow Up  Future Appointments   Date Time Provider Roderick Allred   2023 10:30 AM [facility] [place]    2023 12:30 PM Artist MD Stefani [facility] Cin - DYD     [facility] follow up appointment(s):     Care Transition Nurse reviewed medical action plan with patient and discussed any barriers to care and/or understanding of plan of care after discharge. Discussed appropriate site of care based on symptoms and resources available to patient including: When to call 911. The patient agrees to contact the PCP office for questions related to their healthcare. Advance Care Planning:   reviewed and current. Patients top risk factors for readmission: medical condition-PE  Interventions to address risk factors: Assessment and support for treatment adherence and medication management-PE    Offered patient enrollment in the Remote Patient Monitoring (RPM) program for in-home monitoring: Patient declined.      Care Transitions Subsequent and Final Call    Subsequent and Final Calls  Do you

## 2023-04-27 ENCOUNTER — PATIENT MESSAGE (OUTPATIENT)
Dept: FAMILY MEDICINE CLINIC | Age: 68
End: 2023-04-27

## 2023-04-27 DIAGNOSIS — S42.91XK CLOSED FRACTURE OF RIGHT SHOULDER WITH NONUNION, SUBSEQUENT ENCOUNTER: ICD-10-CM

## 2023-04-27 DIAGNOSIS — R60.0 BILATERAL EDEMA OF LOWER EXTREMITY: ICD-10-CM

## 2023-04-27 RX ORDER — HYDROCODONE BITARTRATE AND ACETAMINOPHEN 5; 325 MG/1; MG/1
1-2 TABLET ORAL EVERY 6 HOURS PRN
Qty: 90 TABLET | Refills: 0 | Status: SHIPPED | OUTPATIENT
Start: 2023-04-27 | End: 2023-05-27

## 2023-04-27 RX ORDER — HYDROCHLOROTHIAZIDE 12.5 MG/1
12.5 CAPSULE, GELATIN COATED ORAL 2 TIMES DAILY
Qty: 180 CAPSULE | Refills: 3 | Status: SHIPPED | OUTPATIENT
Start: 2023-04-27

## 2023-04-27 NOTE — TELEPHONE ENCOUNTER
From: Denise Vasquez  To: Dr. Samaria Esteban  Sent: 4/27/2023 1:34 PM EDT  Subject: Need my hydrocodone ordered    When I was in for my follow-up appointment on 4/20 I meant to ask you to send in a prescription for the hydrocodone that I take. I'm almost out. Please send the prescription to [facility] at [address] [place] [phone]. [phone]. Thank you.     Bailey De Leon

## 2023-04-28 ENCOUNTER — ANTI-COAG VISIT (OUTPATIENT)
Dept: PHARMACY | Age: 68
End: 2023-04-28
Payer: MEDICARE

## 2023-04-28 DIAGNOSIS — I26.99 OTHER ACUTE PULMONARY EMBOLISM WITHOUT ACUTE COR PULMONALE (HCC): Primary | ICD-10-CM

## 2023-04-28 DIAGNOSIS — I82.411 ACUTE DEEP VEIN THROMBOSIS (DVT) OF FEMORAL VEIN OF RIGHT LOWER EXTREMITY (HCC): ICD-10-CM

## 2023-04-28 LAB — INTERNATIONAL NORMALIZATION RATIO, POC: 1.8

## 2023-04-28 PROCEDURE — 85610 PROTHROMBIN TIME: CPT

## 2023-04-28 PROCEDURE — 99212 OFFICE O/P EST SF 10 MIN: CPT

## 2023-04-28 NOTE — TELEPHONE ENCOUNTER
Warfarin prescription phoned into [facility] to [address] [facility] under Dr. Teresa Tinoco  Warfarin 5 mg tabs  Take 7.5 mg on Mon and Fri and 5 mg all other days of the week  90 days   2 refills

## 2023-04-28 NOTE — PROGRESS NOTES
Ms. Denise Vasquez is a 78 y.o. y/o female with history of DVT who presents today for anticoagulation monitoring and adjustment. Pertinent PMH:    Patient Reported Findings:  Yes     No  [x]   []       Patient verifies current dosing regimen as listed --> 5mg daily ---> took 2.5mg Tues and Wed ---> confirmed dose   []   [x]       S/S bleeding/bruising/swelling/SOB- denies   [x]   []       Blood in urine or stool - blood in urine, was started on abx that cleared up the bleeding --> blood in urine resolved   []   [x]       Procedures scheduled in the future at this time - Denies  []   [x]       Missed Dose - Denies  []   [x]       Extra Dose - Denies   []   [x]       Change in medications -- No changes--> started macrobid, will be taking for 2 weeks --> no changes   [x]   []       Change in health/diet/appetite --> plans for 0 days of green vegetables, some nausea, no VD---> may have occasional salad ---> had 1 salad --> appetite less  []   [x]       Change in alcohol use --> alcohol mostly around the holidays  []   [x]       Change in activity  []   [x]       Hospital admission  4/4- 5mg  4/5- 5mg  4/6-4/10- 5mg  4/11- 2.5mg  4/12- 2.5mg   []   [x]       Emergency department visit  []   [x]       Other complaints    Clinical Outcomes:  Yes     No  []   [x]       Major bleeding event  []   [x]       Thromboembolic event    Duration of warfarin Therapy: Indefinite  INR Range:  2.0-3.0    Warfarin 5mg tablets. Takes in morning but waits on days of INR check. INR is 1.8 today  Increase weekly dose to 7.5 mg on Mon and Fri and 5 mg all other days of the week (7% inc)  Encouraged to maintain a consistency of vegetables/salads.    Called in script for warfarin to opp   Recheck INR on Friday 5/5 to coordinate with DANIEL SIMEON    Consent signed 4/10/2023    Referring Festus Jackson MD  INR (no units)   Date Value   04/06/2023 1.10   04/05/2023 1.13   04/03/2023 1.06   07/27/2022 1.07     INR,(POC) (no units)   Date Value

## 2023-05-01 ENCOUNTER — PATIENT MESSAGE (OUTPATIENT)
Dept: FAMILY MEDICINE CLINIC | Age: 68
End: 2023-05-01

## 2023-05-01 DIAGNOSIS — S42.91XK CLOSED FRACTURE OF RIGHT SHOULDER WITH NONUNION, SUBSEQUENT ENCOUNTER: ICD-10-CM

## 2023-05-01 RX ORDER — HYDROCODONE BITARTRATE AND ACETAMINOPHEN 5; 325 MG/1; MG/1
1-2 TABLET ORAL EVERY 6 HOURS PRN
Qty: 90 TABLET | Refills: 0 | Status: SHIPPED | OUTPATIENT
Start: 2023-05-01 | End: 2023-05-31

## 2023-05-03 PROBLEM — N39.0 UTI (URINARY TRACT INFECTION): Status: RESOLVED | Noted: 2023-04-03 | Resolved: 2023-05-03

## 2023-05-05 ENCOUNTER — ANTI-COAG VISIT (OUTPATIENT)
Dept: PHARMACY | Age: 68
End: 2023-05-05
Payer: MEDICARE

## 2023-05-05 DIAGNOSIS — I26.99 OTHER ACUTE PULMONARY EMBOLISM WITHOUT ACUTE COR PULMONALE (HCC): Primary | ICD-10-CM

## 2023-05-05 DIAGNOSIS — I82.411 ACUTE DEEP VEIN THROMBOSIS (DVT) OF FEMORAL VEIN OF RIGHT LOWER EXTREMITY (HCC): ICD-10-CM

## 2023-05-05 LAB — INTERNATIONAL NORMALIZATION RATIO, POC: 1.8

## 2023-05-05 PROCEDURE — 85610 PROTHROMBIN TIME: CPT

## 2023-05-05 PROCEDURE — 99212 OFFICE O/P EST SF 10 MIN: CPT

## 2023-05-05 NOTE — PROGRESS NOTES
Ms. Calli Marie is a 78 y.o. y/o female with history of DVT who presents today for anticoagulation monitoring and adjustment. Pertinent PMH:    Patient Reported Findings:  Yes     No  [x]   []       Patient verifies current dosing regimen as listed --> 5mg daily ---> took 2.5mg Tues and Wed ---> confirmed dose   []   [x]       S/S bleeding/bruising/swelling/SOB- denies   [x]   []       Blood in urine or stool - blood in urine, was started on abx that cleared up the bleeding --> blood in urine resolved   [x]   []       Procedures scheduled in the future at this time - having endoscopy on 5/24. Called GI to determine if received clearance to hold, awaiting return call    []   [x]       Missed Dose - Denies  []   [x]       Extra Dose - Denies   []   [x]       Change in medications -- No changes--> started macrobid, will be taking for 2 weeks --> no changes   []   [x]       Change in health/diet/appetite --> plans for 0 days of green vegetables, some nausea, no VD---> may have occasional salad ---> had 1 salad --> appetite less --> no changes   []   [x]       Change in alcohol use --> alcohol mostly around the holidays  []   [x]       Change in activity  []   [x]       Hospital admission  4/4- 5mg  4/5- 5mg  4/6-4/10- 5mg  4/11- 2.5mg  4/12- 2.5mg   []   [x]       Emergency department visit  []   [x]       Other complaints    Clinical Outcomes:  Yes     No  []   [x]       Major bleeding event  []   [x]       Thromboembolic event    Duration of warfarin Therapy: Indefinite  INR Range:  2.0-3.0    Warfarin 5mg tablets. Takes in morning but waits on days of INR check. INR is 1.8 again today despite increasing weekly dose   Increase weekly dose to 7.5 mg on Mon Wed and Fri and 5 mg all other days of the week (6% inc)  Encouraged to maintain a consistency of vegetables/salads.    Recheck INR in 2 weeks, 5/19    Consent signed 4/10/2023    Referring Leon Persaud MD  INR (no units)   Date Value   04/06/2023 1.10

## 2023-05-08 ENCOUNTER — TELEPHONE (OUTPATIENT)
Dept: PHARMACY | Age: 68
End: 2023-05-08

## 2023-05-08 NOTE — TELEPHONE ENCOUNTER
Received a cardiac clearance request form from Dr Natan Wallis, forwarded form to pts referring MD, Dr Rosa Guzman. Faxed copy of form back to Dr Natan Wallis letting him know that [facility] can not give clearance on procedures.

## 2023-05-15 RX ORDER — LEVOTHYROXINE SODIUM 0.12 MG/1
TABLET ORAL
Qty: 90 TABLET | Refills: 0 | Status: SHIPPED | OUTPATIENT
Start: 2023-05-15

## 2023-05-15 RX ORDER — ATORVASTATIN CALCIUM 20 MG/1
TABLET, FILM COATED ORAL
Qty: 90 TABLET | Refills: 0 | Status: SHIPPED | OUTPATIENT
Start: 2023-05-15

## 2023-05-19 ENCOUNTER — ANTI-COAG VISIT (OUTPATIENT)
Dept: PHARMACY | Age: 68
End: 2023-05-19
Payer: MEDICARE

## 2023-05-19 DIAGNOSIS — I26.99 OTHER ACUTE PULMONARY EMBOLISM WITHOUT ACUTE COR PULMONALE (HCC): Primary | ICD-10-CM

## 2023-05-19 DIAGNOSIS — I82.411 ACUTE DEEP VEIN THROMBOSIS (DVT) OF FEMORAL VEIN OF RIGHT LOWER EXTREMITY (HCC): ICD-10-CM

## 2023-05-19 LAB — INTERNATIONAL NORMALIZATION RATIO, POC: 2.1

## 2023-05-19 PROCEDURE — 85610 PROTHROMBIN TIME: CPT

## 2023-05-19 PROCEDURE — 99211 OFF/OP EST MAY X REQ PHY/QHP: CPT

## 2023-05-19 NOTE — PROGRESS NOTES
Ms. Nette Anaya is a 78 y.o. y/o female with history of DVT who presents today for anticoagulation monitoring and adjustment. Pertinent PMH:    Patient Reported Findings:  Yes     No  [x]   []       Patient verifies current dosing regimen as listed --> 5mg daily ---> took 2.5mg Tues and Wed ---> confirmed dose   []   [x]       S/S bleeding/bruising/swelling/SOB- denies   [x]   []       Blood in urine or stool - blood in urine, was started on abx that cleared up the bleeding --> blood in urine resolved   [x]   []       Procedures scheduled in the future at this time - having endoscopy in July. Called GI to determine if received clearance to hold, awaiting return call   []   [x]       Missed Dose - Denies  []   [x]       Extra Dose - Denies   []   [x]       Change in medications -- No changes--> started macrobid, will be taking for 2 weeks --> no changes   []   [x]       Change in health/diet/appetite --> plans for 0 days of green vegetables, some nausea, no VD---> may have occasional salad ---> had 1 salad --> appetite less --> no changes   []   [x]       Change in alcohol use --> alcohol mostly around the holidays  []   [x]       Change in activity  []   [x]       Hospital admission  4/4- 5mg  4/5- 5mg  4/6-4/10- 5mg  4/11- 2.5mg  4/12- 2.5mg   []   [x]       Emergency department visit  []   [x]       Other complaints    Clinical Outcomes:  Yes     No  []   [x]       Major bleeding event  []   [x]       Thromboembolic event    Duration of warfarin Therapy: Indefinite  INR Range:  2.0-3.0    Warfarin 5mg tablets. Takes in morning but waits on days of INR check. INR is 2.1, number is finally within range after multiple dose adjustments. Patient also provided with a handout listing vitamin K rich foods. Increase weekly dose to 7.5 mg on Mon Wed and Fri and 5 mg all other days of the week (6% inc)  Encouraged to maintain a consistency of vegetables/salads.      Recheck INR in 2 weeks, 6/2/23 then consider

## 2023-06-01 ENCOUNTER — OFFICE VISIT (OUTPATIENT)
Dept: ORTHOPEDIC SURGERY | Age: 68
End: 2023-06-01

## 2023-06-01 VITALS — HEIGHT: 63 IN | WEIGHT: 282 LBS | RESPIRATION RATE: 16 BRPM | BODY MASS INDEX: 49.96 KG/M2

## 2023-06-01 DIAGNOSIS — M70.52 PES ANSERINUS BURSITIS OF BOTH KNEES: ICD-10-CM

## 2023-06-01 DIAGNOSIS — M17.0 PRIMARY OSTEOARTHRITIS OF BOTH KNEES: Primary | ICD-10-CM

## 2023-06-01 DIAGNOSIS — M70.51 PES ANSERINUS BURSITIS OF BOTH KNEES: ICD-10-CM

## 2023-06-01 RX ORDER — BUPIVACAINE HYDROCHLORIDE 2.5 MG/ML
2 INJECTION, SOLUTION INFILTRATION; PERINEURAL ONCE
Status: COMPLETED | OUTPATIENT
Start: 2023-06-01 | End: 2023-06-01

## 2023-06-01 RX ORDER — TRIAMCINOLONE ACETONIDE 40 MG/ML
40 INJECTION, SUSPENSION INTRA-ARTICULAR; INTRAMUSCULAR ONCE
Status: COMPLETED | OUTPATIENT
Start: 2023-06-01 | End: 2023-06-01

## 2023-06-01 RX ADMIN — TRIAMCINOLONE ACETONIDE 40 MG: 40 INJECTION, SUSPENSION INTRA-ARTICULAR; INTRAMUSCULAR at 13:11

## 2023-06-01 RX ADMIN — BUPIVACAINE HYDROCHLORIDE 5 MG: 2.5 INJECTION, SOLUTION INFILTRATION; PERINEURAL at 13:08

## 2023-06-01 RX ADMIN — TRIAMCINOLONE ACETONIDE 40 MG: 40 INJECTION, SUSPENSION INTRA-ARTICULAR; INTRAMUSCULAR at 13:12

## 2023-06-01 RX ADMIN — BUPIVACAINE HYDROCHLORIDE 5 MG: 2.5 INJECTION, SOLUTION INFILTRATION; PERINEURAL at 13:09

## 2023-06-01 RX ADMIN — TRIAMCINOLONE ACETONIDE 40 MG: 40 INJECTION, SUSPENSION INTRA-ARTICULAR; INTRAMUSCULAR at 13:16

## 2023-06-01 RX ADMIN — BUPIVACAINE HYDROCHLORIDE 5 MG: 2.5 INJECTION, SOLUTION INFILTRATION; PERINEURAL at 13:11

## 2023-06-01 RX ADMIN — TRIAMCINOLONE ACETONIDE 40 MG: 40 INJECTION, SUSPENSION INTRA-ARTICULAR; INTRAMUSCULAR at 13:13

## 2023-06-01 RX ADMIN — BUPIVACAINE HYDROCHLORIDE 5 MG: 2.5 INJECTION, SOLUTION INFILTRATION; PERINEURAL at 13:10

## 2023-06-02 ENCOUNTER — ANTI-COAG VISIT (OUTPATIENT)
Dept: PHARMACY | Age: 68
End: 2023-06-02
Payer: MEDICARE

## 2023-06-02 DIAGNOSIS — I82.411 ACUTE DEEP VEIN THROMBOSIS (DVT) OF FEMORAL VEIN OF RIGHT LOWER EXTREMITY (HCC): ICD-10-CM

## 2023-06-02 DIAGNOSIS — I26.99 ACUTE PULMONARY EMBOLISM, UNSPECIFIED PULMONARY EMBOLISM TYPE, UNSPECIFIED WHETHER ACUTE COR PULMONALE PRESENT (HCC): Primary | ICD-10-CM

## 2023-06-02 LAB — INTERNATIONAL NORMALIZATION RATIO, POC: 2.9

## 2023-06-02 PROCEDURE — 99211 OFF/OP EST MAY X REQ PHY/QHP: CPT

## 2023-06-02 PROCEDURE — 85610 PROTHROMBIN TIME: CPT

## 2023-06-02 NOTE — PROGRESS NOTES
vegetables/salads.    Recheck INR in 2 weeks, 6/16 to coordinate with [facility] that day     Consent signed 4/10/2023    Referring Lewis Merrill MD  INR (no units)   Date Value   04/06/2023 1.10   04/05/2023 1.13   04/03/2023 1.06   07/27/2022 1.07     INR,(POC) (no units)   Date Value   06/02/2023 2.9   05/19/2023 2.1   05/05/2023 1.8   04/28/2023 1.8   For Pharmacy Admin Tracking Only    Total # of Interventions Recommended: 0  Total # of Interventions Accepted: 0  Time Spent (min): 15

## 2023-06-08 DIAGNOSIS — J45.40 RAD (REACTIVE AIRWAY DISEASE), MODERATE PERSISTENT, UNCOMPLICATED: ICD-10-CM

## 2023-06-08 RX ORDER — ALBUTEROL SULFATE 90 UG/1
2 AEROSOL, METERED RESPIRATORY (INHALATION) EVERY 4 HOURS PRN
Qty: 2 EACH | Refills: 3 | Status: SHIPPED | OUTPATIENT
Start: 2023-06-08 | End: 2024-06-07

## 2023-06-16 ENCOUNTER — APPOINTMENT (OUTPATIENT)
Dept: GENERAL RADIOLOGY | Age: 68
End: 2023-06-16
Payer: MEDICARE

## 2023-06-16 ENCOUNTER — HOSPITAL ENCOUNTER (EMERGENCY)
Age: 68
Discharge: LEFT BEFORE DISCHARGE | End: 2023-06-16
Payer: MEDICARE

## 2023-06-16 ENCOUNTER — APPOINTMENT (OUTPATIENT)
Dept: CT IMAGING | Age: 68
End: 2023-06-16
Payer: MEDICARE

## 2023-06-16 VITALS
WEIGHT: 275 LBS | BODY MASS INDEX: 48.73 KG/M2 | HEART RATE: 79 BPM | DIASTOLIC BLOOD PRESSURE: 84 MMHG | OXYGEN SATURATION: 100 % | RESPIRATION RATE: 18 BRPM | HEIGHT: 63 IN | SYSTOLIC BLOOD PRESSURE: 128 MMHG | TEMPERATURE: 98 F

## 2023-06-16 DIAGNOSIS — Z53.21 ELOPED FROM EMERGENCY DEPARTMENT: ICD-10-CM
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
## 2023-06-16 DIAGNOSIS — S50.01XA CONTUSION OF RIGHT ELBOW, INITIAL ENCOUNTER: ICD-10-CM

## 2023-06-16 DIAGNOSIS — S80.01XA CONTUSION OF RIGHT KNEE, INITIAL ENCOUNTER: ICD-10-CM

## 2023-06-16 DIAGNOSIS — Z79.01 ANTICOAGULATED: ICD-10-CM

## 2023-06-16 DIAGNOSIS — S51.811A SKIN TEAR OF FOREARM WITHOUT COMPLICATION, RIGHT, INITIAL ENCOUNTER: ICD-10-CM

## 2023-06-16 DIAGNOSIS — S09.90XA CLOSED HEAD INJURY, INITIAL ENCOUNTER: ICD-10-CM

## 2023-06-16 LAB
BASOPHILS # BLD: 0 K/UL (ref 0–0.2)
BASOPHILS NFR BLD: 0.4 %
DEPRECATED RDW RBC AUTO: 19.3 % (ref 12.4–15.4)
EOSINOPHIL # BLD: 0.2 K/UL (ref 0–0.6)
EOSINOPHIL NFR BLD: 1.4 %
HCT VFR BLD AUTO: 36.2 % (ref 36–48)
HGB BLD-MCNC: 11.5 G/DL (ref 12–16)
INR PPP: 2.21 (ref 0.84–1.16)
LYMPHOCYTES # BLD: 1.7 K/UL (ref 1–5.1)
LYMPHOCYTES NFR BLD: 15.9 %
MCH RBC QN AUTO: 27.2 PG (ref 26–34)
MCHC RBC AUTO-ENTMCNC: 31.7 G/DL (ref 31–36)
MCV RBC AUTO: 85.7 FL (ref 80–100)
MONOCYTES # BLD: 0.6 K/UL (ref 0–1.3)
MONOCYTES NFR BLD: 5.8 %
NEUTROPHILS # BLD: 8 K/UL (ref 1.7–7.7)
NEUTROPHILS NFR BLD: 76.5 %
PLATELET # BLD AUTO: 237 K/UL (ref 135–450)
PMV BLD AUTO: 8.2 FL (ref 5–10.5)
PROTHROMBIN TIME: 24.4 SEC (ref 11.5–14.8)
RBC # BLD AUTO: 4.23 M/UL (ref 4–5.2)
WBC # BLD AUTO: 10.5 K/UL (ref 4–11)

## 2023-06-16 PROCEDURE — 6360000002 HC RX W HCPCS: Performed by: PHYSICIAN ASSISTANT

## 2023-06-16 PROCEDURE — 99284 EMERGENCY DEPT VISIT MOD MDM: CPT

## 2023-06-16 PROCEDURE — 90471 IMMUNIZATION ADMIN: CPT | Performed by: PHYSICIAN ASSISTANT

## 2023-06-16 PROCEDURE — 85025 COMPLETE CBC W/AUTO DIFF WBC: CPT

## 2023-06-16 PROCEDURE — 73560 X-RAY EXAM OF KNEE 1 OR 2: CPT

## 2023-06-16 PROCEDURE — 70450 CT HEAD/BRAIN W/O DYE: CPT

## 2023-06-16 PROCEDURE — 85610 PROTHROMBIN TIME: CPT

## 2023-06-16 PROCEDURE — 72125 CT NECK SPINE W/O DYE: CPT

## 2023-06-16 PROCEDURE — 73070 X-RAY EXAM OF ELBOW: CPT

## 2023-06-16 PROCEDURE — 90714 TD VACC NO PRESV 7 YRS+ IM: CPT | Performed by: PHYSICIAN ASSISTANT

## 2023-06-16 RX ADMIN — CLOSTRIDIUM TETANI TOXOID ANTIGEN (FORMALDEHYDE INACTIVATED) AND CORYNEBACTERIUM DIPHTHERIAE TOXOID ANTIGEN (FORMALDEHYDE INACTIVATED) 0.5 ML: 5; 2 INJECTION, SUSPENSION INTRAMUSCULAR at 15:01

## 2023-06-16 ASSESSMENT — PAIN DESCRIPTION - LOCATION: LOCATION: ELBOW

## 2023-06-16 ASSESSMENT — PAIN SCALES - GENERAL: PAINLEVEL_OUTOF10: 3

## 2023-06-16 ASSESSMENT — PAIN DESCRIPTION - ORIENTATION: ORIENTATION: RIGHT

## 2023-06-16 ASSESSMENT — LIFESTYLE VARIABLES
HOW MANY STANDARD DRINKS CONTAINING ALCOHOL DO YOU HAVE ON A TYPICAL DAY: 1 OR 2
HOW OFTEN DO YOU HAVE A DRINK CONTAINING ALCOHOL: MONTHLY OR LESS

## 2023-06-16 ASSESSMENT — PAIN - FUNCTIONAL ASSESSMENT: PAIN_FUNCTIONAL_ASSESSMENT: 0-10

## 2023-06-16 NOTE — ED PROVIDER NOTES
[facility]        Pt Name: Abhinav Jennings  MRN: [id number]  [place] 1955  Date of evaluation: 6/16/2023  Provider: Rand Coffman PA-C  PCP: Adriana Geller MD  Note Started: 3:03 PM EDT 6/16/23      JENNIFER. I have evaluated this patient. CHIEF COMPLAINT       Chief Complaint   Patient presents with    Fall     Pt states fell while going to PCP to get a check up for blood clots, fell and hit head and right elbow knee and shoulder, abrasions noted, blood drawn just now at PCP office, mechanical fall tripped on curb       HISTORY OF PRESENT ILLNESS: 1 or more Elements     History From: patient    Abhinav Jennings is a 78 y.o. female who presents complaining of a fall earlier today. Patient states she was walking into her doctor's office when she tripped on the curb, toe caught the curb. She fell onto her right elbow and knee. She complains of right elbow and knee pain. She thinks she is hit the right side of her head as well. Denies LOC, headache, vomiting, vision change. Does admit to neck pain since the fall. She has been ambulatory. Has a skin tear on the right elbow, unsure of last tetanus. Last INR check was 2 weeks ago, did not go today after the fall. Nursing Notes were all reviewed and agreed with or any disagreements were addressed in the HPI. REVIEW OF SYSTEMS :      Review of Systems   All other systems reviewed and are negative. Positives and Pertinent negatives as per HPI.        PAST MEDICAL HISTORY    has a past medical history of Abnormal finding on pulmonary function testing- nl except decreased diffusing capacity 4/11 (7/11/2011), Activities treadmill- neg GXT arron, EF 82% 3/11, Allergic rhinitis, Asthma, Depression, GERD (gastroesophageal reflux disease), Hearing loss,Tinnitus, Hyperlipidemia, Hypothyroidism, IBS (irritable bowel syndrome), Lab test positive for detection of COVID-19 virus

## 2023-06-16 NOTE — ED NOTES
Patient states she doesn't want to stay any longer and is leaving, PA made aware.       My Davidson RN  06/16/23 [id number]

## 2023-06-19 ENCOUNTER — TELEPHONE (OUTPATIENT)
Dept: PHARMACY | Age: 68
End: 2023-06-19

## 2023-06-19 NOTE — TELEPHONE ENCOUNTER
Called patient about recent ER visit, 6/16, for fall. INR 2.21 (therapeutic), confirmed 7.5 mg (5 mg x 1.5) every Mon, Wed, Fri; 5 mg (5 mg x 1) all other days. Denies missed/doubled doses, still bruising from fall, no med changes, no changes to diet. Patient has endoscopy 7/5 and has to hold warfarin for 5 days, no lovenox injections. Needs to get clearance. Has apt with [facility] on 6/30 and wants to coordinate.      Theodora Larios, PharmD, [address]    Total # of Interventions Recommended: 0  Total # of Interventions Accepted: 0  Time Spent (min): 15

## 2023-06-19 NOTE — TELEPHONE ENCOUNTER
----- Message from Salbador Lowe sent at 6/19/2023  9:32 AM EDT -----  Pt cancelled CC appt on 6/16, she went to the ED ( fell on the way out of MD's office). INR results of 6/16 from ED 2.21. Please call pt w/ dosing instructions and r/s appt.

## 2023-06-21 RX ORDER — WARFARIN SODIUM 7.5 MG/1
7.5 TABLET ORAL SEE ADMIN INSTRUCTIONS
COMMUNITY

## 2023-06-22 ENCOUNTER — OFFICE VISIT (OUTPATIENT)
Dept: FAMILY MEDICINE CLINIC | Age: 68
End: 2023-06-22
Payer: MEDICARE

## 2023-06-22 VITALS
BODY MASS INDEX: 49.43 KG/M2 | WEIGHT: 279 LBS | HEIGHT: 63 IN | HEART RATE: 83 BPM | DIASTOLIC BLOOD PRESSURE: 78 MMHG | OXYGEN SATURATION: 97 % | RESPIRATION RATE: 16 BRPM | SYSTOLIC BLOOD PRESSURE: 134 MMHG

## 2023-06-22 DIAGNOSIS — Z86.711 HISTORY OF PULMONARY EMBOLUS (PE): ICD-10-CM

## 2023-06-22 DIAGNOSIS — S51.811D LACERATION OF RIGHT FOREARM, SUBSEQUENT ENCOUNTER: ICD-10-CM

## 2023-06-22 DIAGNOSIS — R26.89 BALANCE PROBLEM: ICD-10-CM

## 2023-06-22 DIAGNOSIS — R26.9 GAIT DISTURBANCE: ICD-10-CM

## 2023-06-22 DIAGNOSIS — R73.03 PREDIABETES: ICD-10-CM
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
## 2023-06-22 DIAGNOSIS — I10 PRIMARY HYPERTENSION: Primary | ICD-10-CM

## 2023-06-22 DIAGNOSIS — N18.32 STAGE 3B CHRONIC KIDNEY DISEASE (HCC): ICD-10-CM

## 2023-06-22 PROBLEM — K85.90 PANCREATITIS: Status: RESOLVED | Noted: 2023-04-03 | Resolved: 2023-06-22

## 2023-06-22 PROBLEM — I82.411 ACUTE DEEP VEIN THROMBOSIS (DVT) OF FEMORAL VEIN OF RIGHT LOWER EXTREMITY (HCC): Status: RESOLVED | Noted: 2023-04-04 | Resolved: 2023-06-22

## 2023-06-22 PROBLEM — I26.99 ACUTE PULMONARY EMBOLISM (HCC): Status: RESOLVED | Noted: 2023-04-03 | Resolved: 2023-06-22

## 2023-06-22 PROCEDURE — 3078F DIAST BP <80 MM HG: CPT | Performed by: FAMILY MEDICINE

## 2023-06-22 PROCEDURE — 3075F SYST BP GE 130 - 139MM HG: CPT | Performed by: FAMILY MEDICINE

## 2023-06-22 PROCEDURE — 1123F ACP DISCUSS/DSCN MKR DOCD: CPT | Performed by: FAMILY MEDICINE

## 2023-06-22 PROCEDURE — 99213 OFFICE O/P EST LOW 20 MIN: CPT | Performed by: FAMILY MEDICINE

## 2023-06-22 NOTE — PROGRESS NOTES
CHRONIC CONDITION FOLLOW-UP     Assessment and Plan:      Diagnosis Orders   1. Primary hypertension        2. Prediabetes        3. Stage 3b chronic kidney disease ([facility] [address]        4. Laceration of right forearm, subsequent encounter  mupirocin (BACTROBAN) 2 % ointment      5. Injury due to fall, subsequent encounter  Ambulatory referral to Physical Therapy      6. Balance problem  Ambulatory referral to Physical Therapy      7. Gait disturbance  Ambulatory referral to Physical Therapy      8. History of pulmonary embolus (PE) 2023           Continue current Tx plan. Any changes marked below. MA- please bacitracin arm with non-stick light dressing. Also, parking letter on printer. INSTRUCTIONS  NEXT APPOINTMENT: Please schedule check-up in 7 months with Dr. Mary Antunez or her NP, Marilyn Ross. PLEASE TAKE THIS FORM TO CHECK-OUT WINDOW TO SCHEDULE NEXT VISIT. Please get flu vaccine when available in fall. Can get either at this office or at stores such as [employer] and [employer]. Use bactroban on wound to keep moist. Apply light dressing to keep clean. Wash with mild soap and water. Go to PT to work on preventing falls. May need quad cane. Subjective:      Chief Complaint   Patient presents with    Hypertension     Patient is here for a med follow up     Fall     Patient is also here for a fall, the patient fell last Friday, she hits her arm and her head. She did go to the ER      Jeremy Dunn is an 78 y.o. female who presents for follow up    Complaints:   Tripped and fell 6/16, scraped R forearm, R knee contusion, hit head. Head CT neg for bleed. INR 2.1. Superficial lac forearm 6 cm. Got tetanus booster. X-rays OK. Left before final disposition. Forearm bleeds a little at times. CHART REVIEW   reports that she quit smoking about 34 years ago. Her smoking use included cigarettes. She has a 30.00 pack-year smoking history. She has been exposed to tobacco smoke.  She has never used smokeless

## 2023-06-22 NOTE — PATIENT INSTRUCTIONS
MA- please bacitracin arm with non-stick light dressing. Also, parking letter on printer. INSTRUCTIONS  NEXT APPOINTMENT: Please schedule check-up in 7 months with Dr. Angy Conti or her NP, Layne Damon. PLEASE TAKE THIS FORM TO CHECK-OUT WINDOW TO SCHEDULE NEXT VISIT. Please get flu vaccine when available in fall. Can get either at this office or at stores such as [employer] and [employer]. Use bactroban on wound to keep moist. Apply light dressing to keep clean. Wash with mild soap and water. Go to PT to work on preventing falls. May need quad cane.

## 2023-06-30 ENCOUNTER — ANTI-COAG VISIT (OUTPATIENT)
Dept: PHARMACY | Age: 68
End: 2023-06-30
Payer: MEDICARE

## 2023-06-30 LAB — INTERNATIONAL NORMALIZATION RATIO, POC: 2.4

## 2023-06-30 PROCEDURE — 85610 PROTHROMBIN TIME: CPT

## 2023-06-30 PROCEDURE — 99212 OFFICE O/P EST SF 10 MIN: CPT

## 2023-07-03 ENCOUNTER — ANESTHESIA EVENT (OUTPATIENT)
Dept: ENDOSCOPY | Age: 68
End: 2023-07-03
Payer: COMMERCIAL

## 2023-07-05 ENCOUNTER — HOSPITAL ENCOUNTER (OUTPATIENT)
Age: 68
Setting detail: OUTPATIENT SURGERY
Discharge: HOME | End: 2023-07-05
Attending: INTERNAL MEDICINE | Admitting: INTERNAL MEDICINE
Payer: COMMERCIAL

## 2023-07-05 ENCOUNTER — PATIENT MESSAGE (OUTPATIENT)
Dept: FAMILY MEDICINE CLINIC | Age: 68
End: 2023-07-05

## 2023-07-05 ENCOUNTER — ANESTHESIA (OUTPATIENT)
Dept: ENDOSCOPY | Age: 68
End: 2023-07-05
Payer: COMMERCIAL

## 2023-07-05 VITALS
WEIGHT: 275 LBS | DIASTOLIC BLOOD PRESSURE: 73 MMHG | SYSTOLIC BLOOD PRESSURE: 112 MMHG | RESPIRATION RATE: 20 BRPM | TEMPERATURE: 97.5 F | HEART RATE: 72 BPM | OXYGEN SATURATION: 97 % | BODY MASS INDEX: 48.73 KG/M2 | HEIGHT: 63 IN

## 2023-07-05 DIAGNOSIS — B35.1 ONYCHOMYCOSIS: ICD-10-CM

## 2023-07-05 PROCEDURE — 6360000002 HC RX W HCPCS

## 2023-07-05 PROCEDURE — 3700000001 HC ADD 15 MINUTES (ANESTHESIA): Performed by: INTERNAL MEDICINE

## 2023-07-05 PROCEDURE — 3609018500 HC EGD US SCOPE W/ADJACENT STRUCTURES: Performed by: INTERNAL MEDICINE

## 2023-07-05 PROCEDURE — 2500000003 HC RX 250 WO HCPCS

## 2023-07-05 PROCEDURE — 7100000000 HC PACU RECOVERY - FIRST 15 MIN: Performed by: INTERNAL MEDICINE

## 2023-07-05 PROCEDURE — 3700000000 HC ANESTHESIA ATTENDED CARE: Performed by: INTERNAL MEDICINE

## 2023-07-05 PROCEDURE — 7100000010 HC PHASE II RECOVERY - FIRST 15 MIN: Performed by: INTERNAL MEDICINE

## 2023-07-05 PROCEDURE — 2580000003 HC RX 258: Performed by: ANESTHESIOLOGY

## 2023-07-05 PROCEDURE — 2709999900 HC NON-CHARGEABLE SUPPLY: Performed by: INTERNAL MEDICINE

## 2023-07-05 RX ORDER — SODIUM CHLORIDE 0.9 % (FLUSH) 0.9 %
5-40 SYRINGE (ML) INJECTION PRN
Status: CANCELLED | OUTPATIENT
Start: 2023-07-05

## 2023-07-05 RX ORDER — LABETALOL HYDROCHLORIDE 5 MG/ML
10 INJECTION, SOLUTION INTRAVENOUS
Status: CANCELLED | OUTPATIENT
Start: 2023-07-05

## 2023-07-05 RX ORDER — SODIUM CHLORIDE 0.9 % (FLUSH) 0.9 %
5-40 SYRINGE (ML) INJECTION PRN
Status: DISCONTINUED | OUTPATIENT
Start: 2023-07-05 | End: 2023-07-05 | Stop reason: HOSPADM

## 2023-07-05 RX ORDER — TERBINAFINE HYDROCHLORIDE 250 MG/1
250 TABLET ORAL DAILY
Qty: 42 TABLET | Refills: 0 | Status: SHIPPED | OUTPATIENT
Start: 2023-07-05 | End: 2023-08-16

## 2023-07-05 RX ORDER — PROPOFOL 10 MG/ML
INJECTION, EMULSION INTRAVENOUS CONTINUOUS PRN
Status: DISCONTINUED | OUTPATIENT
Start: 2023-07-05 | End: 2023-07-05 | Stop reason: SDUPTHER

## 2023-07-05 RX ORDER — IPRATROPIUM BROMIDE AND ALBUTEROL SULFATE 2.5; .5 MG/3ML; MG/3ML
1 SOLUTION RESPIRATORY (INHALATION)
Status: CANCELLED | OUTPATIENT
Start: 2023-07-05 | End: 2023-07-06

## 2023-07-05 RX ORDER — HYDRALAZINE HYDROCHLORIDE 20 MG/ML
10 INJECTION INTRAMUSCULAR; INTRAVENOUS
Status: CANCELLED | OUTPATIENT
Start: 2023-07-05

## 2023-07-05 RX ORDER — SODIUM CHLORIDE 0.9 % (FLUSH) 0.9 %
5-40 SYRINGE (ML) INJECTION EVERY 12 HOURS SCHEDULED
Status: DISCONTINUED | OUTPATIENT
Start: 2023-07-05 | End: 2023-07-05 | Stop reason: HOSPADM

## 2023-07-05 RX ORDER — SODIUM CHLORIDE 0.9 % (FLUSH) 0.9 %
5-40 SYRINGE (ML) INJECTION EVERY 12 HOURS SCHEDULED
Status: CANCELLED | OUTPATIENT
Start: 2023-07-05

## 2023-07-05 RX ORDER — SODIUM CHLORIDE 9 MG/ML
INJECTION, SOLUTION INTRAVENOUS PRN
Status: DISCONTINUED | OUTPATIENT
Start: 2023-07-05 | End: 2023-07-05 | Stop reason: HOSPADM

## 2023-07-05 RX ORDER — ONDANSETRON 2 MG/ML
4 INJECTION INTRAMUSCULAR; INTRAVENOUS
Status: CANCELLED | OUTPATIENT
Start: 2023-07-05 | End: 2023-07-06

## 2023-07-05 RX ORDER — SODIUM CHLORIDE 9 MG/ML
INJECTION, SOLUTION INTRAVENOUS PRN
Status: CANCELLED | OUTPATIENT
Start: 2023-07-05

## 2023-07-05 RX ORDER — LIDOCAINE HYDROCHLORIDE 20 MG/ML
INJECTION, SOLUTION EPIDURAL; INFILTRATION; INTRACAUDAL; PERINEURAL PRN
Status: DISCONTINUED | OUTPATIENT
Start: 2023-07-05 | End: 2023-07-05 | Stop reason: SDUPTHER

## 2023-07-05 RX ADMIN — LIDOCAINE HYDROCHLORIDE 50 MG: 20 INJECTION, SOLUTION EPIDURAL; INFILTRATION; INTRACAUDAL; PERINEURAL at 09:36

## 2023-07-05 RX ADMIN — SODIUM CHLORIDE: 9 INJECTION, SOLUTION INTRAVENOUS at 09:32

## 2023-07-05 RX ADMIN — PROPOFOL 180 MCG/KG/MIN: 10 INJECTION, EMULSION INTRAVENOUS at 09:36

## 2023-07-05 ASSESSMENT — ENCOUNTER SYMPTOMS: SHORTNESS OF BREATH: 0

## 2023-07-05 ASSESSMENT — PAIN SCALES - GENERAL
PAINLEVEL_OUTOF10: 0
PAINLEVEL_OUTOF10: 0

## 2023-07-05 NOTE — H&P
Pre-operative History and Physical    Patient: Laura Falk  : 1955  Acct#:     HISTORY OF PRESENT ILLNESS:    The patient is a 78 y.o. female who presented 2023 with abdominal pain and SOB and found with bilat PE and DVT. Lipase was 740. CT abd/pelvis with normal pancreas, no pancreatic mass or pancreatic ductal dilation. Her chest and upper abdominal pain was constant and worse with deep breath. Having less pain today. Did have intermittent heartburn despite PPI and 20 lb unexplained weight loss. No h/o pancreatitis. Rare alcohol use. No new meds. Ca normal. TG's 186. US showed prior choley. CBD 8mm. CT with contrast negative for mass lesions. Prior sleeve gastrectomy.      Past Medical History:        Diagnosis Date    Abnormal finding on pulmonary function testing- nl except decreased diffusing capacity 2011    Activities treadmill- neg GXT arron, EF 82% 3/11     Allergic rhinitis     Asthma     exerise induced    Depression     GERD (gastroesophageal reflux disease)     Hearing loss,Tinnitus     Uses hearing aids    Hx of blood clots     Hyperlipidemia     Hypothyroidism     IBS (irritable bowel syndrome)     Lab test positive for detection of COVID-19 virus 2021    Migraine headache     Neuropathy     left foot    Nocturia     PONV (postoperative nausea and vomiting)     Premature ventricular contractions     Pulmonary function testing- nl except decreased diffusing capacity      Relevant prior imaging: normal examination- neg CT chest      RLS (restless legs syndrome)     Sleep apnea     uses C-PAP    Wears dentures     Wears hearing aid in both ears       Past Surgical History:        Procedure Laterality Date    CHOLECYSTECTOMY      COLONOSCOPY      COLONOSCOPY N/A 2020    COLONOSCOPY performed by Jose San MD at [facility] N/A 2021    COLONOSCOPY performed by Jose San MD at [address] [facility]

## 2023-07-05 NOTE — PROGRESS NOTES
Pt to Phase 2 from PACU, pt is alert and oriented and denies pain at this time, vital signs stable on room air. Tolerating a drink and a snack.

## 2023-07-05 NOTE — ANESTHESIA POSTPROCEDURE EVALUATION
Department of Anesthesiology  Postprocedure Note    Patient: Gege Brito  MRN: [id number]  YOB: 1955  Date of evaluation: 7/5/2023      Procedure Summary     Date: 07/05/23 Room / Location: [address] [place] [address]    Anesthesia Start: 0932 Anesthesia Stop: 2710    Procedure: ENDOSCOPIC ULTRASOUND Diagnosis:       Pancreatitis, gallstone      (pancreatitis)    Surgeons: Gabbi Mathur MD Responsible Provider: Artis Mcadams MD    Anesthesia Type: MAC ASA Status: 3          Anesthesia Type: MAC    Live Phase I: Live Score: 9    Live Phase II: Live Score: 10      Anesthesia Post Evaluation    Patient location during evaluation: PACU  Patient participation: complete - patient participated  Level of consciousness: awake  Airway patency: patent  Nausea & Vomiting: no nausea and no vomiting  Complications: no  Cardiovascular status: hemodynamically stable and blood pressure returned to baseline  Respiratory status: spontaneous ventilation, nonlabored ventilation and room air  Hydration status: stable  Comments: Ms. Brigida Hernandez was seen resting comfortably following her procedure. App[facility]ate for [facility] to [facility] for planned discharge home with . No acute concerns.

## 2023-07-05 NOTE — TELEPHONE ENCOUNTER
Will you give her this? She was last seen 6/22/23, last time med was ordered was 10/22/22  Pended for her if you give it.

## 2023-07-05 NOTE — OP NOTE
Endoscopy Note    Patient: Laura Falk   : 1955  Acct#:     Procedure: Endoscopic ultrasound  Doppler of mesenteric vessels  EGD    Surgeon:  Quan Flanagan MD    Referring Physician:  Dr. Olivia Tineo    Anesthesia:  MAC per Anesthesia. Indications: This is a 78y.o. year old female who presented 2023 with abdominal pain and SOB and found with bilat PE and DVT. Lipase was 740. CT abd/pelvis with normal pancreas, no pancreatic mass or pancreatic ductal dilation. Her chest and upper abdominal pain was constant and worse with deep breath. Having less pain today. Did have intermittent heartburn despite PPI and 20 lb unexplained weight loss. No h/o pancreatitis. Rare alcohol use. No new meds. Ca normal. TG's 186. US showed prior choley. CBD 8mm. CT with contrast negative for mass lesions. Consent: Risks, benefits, and alternatives were explained and informed consent was obtained. Monitoring:  Patient was monitored with continuous pulse oximetry, telemetry, and intermittent blood pressures. Details of the Procedure: The patient was then taken to the endoscopy suite. A time-out was performed. The patient and staff were in agreement as to the correct patient and procedure. The above anesthesia was administered by the anesthesia department. The patient was placed in the left lateral position. The Olympus videoendoscope was placed in the patient's mouth and under direct visualization passed into the esophagus and advanced without difficulty to the 2nd portion of the duodenum. Views were good, patient toleration was good. Retroflexion was performed in the stomach. Findings:  1. The esophagus appeared normal without evidence of Conrad's esophagus or reflux esophagitis. 2.  Stomach showed the prior sleeve gastrectomy. Otherwise normal.  3.  Normal duodenum. Next, the curvilinear array echoendoscope was advanced without difficulty to the 2nd portion of the duodenum.

## 2023-07-05 NOTE — DISCHARGE INSTRUCTIONS
100) 5. increased abdominal bloating   2. severe pain   6. excessive bleeding   3. nausea and vomiting  7. chest pain   4. chills    8. shortness of breath       Notify us if these problems occur     Expected as normal and remedies:  Sore throat: use over the counter throat lozenges or gargle with warm salt water. Redness or soreness at the IV site: apply warm compress  Gaseous discomfort: belching or passing flatus (gas).

## 2023-07-05 NOTE — TELEPHONE ENCOUNTER
From: Dunia Fernandez  To: Dr. Fly Adhikari  Sent: 7/5/2023 12:05 PM EDT  Subject: Soheila Mariscal Slider,  I have another fingernail with a fungus. Would you send a prescription in for me for the same medicine I took last time I had this? That cleared it up fairly quickly. Please use [facility] [address]. (It's in [employer])    Thank you.

## 2023-07-05 NOTE — PROGRESS NOTES
Discharge instructions given to Pt and spouse, all questions answered. Pt discharged to home with spouse to transport.

## 2023-07-11 ENCOUNTER — HOSPITAL ENCOUNTER (OUTPATIENT)
Dept: PHYSICAL THERAPY | Age: 68
Setting detail: THERAPIES SERIES
Discharge: HOME | End: 2023-07-11
Payer: COMMERCIAL

## 2023-07-11 PROCEDURE — 97530 THERAPEUTIC ACTIVITIES: CPT

## 2023-07-11 PROCEDURE — 97161 PT EVAL LOW COMPLEX 20 MIN: CPT

## 2023-07-11 PROCEDURE — 97112 NEUROMUSCULAR REEDUCATION: CPT

## 2023-07-11 NOTE — THERAPY EVALUATION
[]NT  Lumbar:  Hip:  Knee/patella: Ankle:    Deep Tendon Reflexes      Bandages/Dressings/Incisions:                          [x] Patient history, allergies, meds reviewed. Medical chart reviewed. See intake form. Review Of Systems (ROS):  [x]Performed Review of systems (Integumentary, CardioPulmonary, Neurological) by intake and observation. Intake form has been scanned into medical record. Patient has been instructed to contact their primary care physician regarding ROS issues if not already being addressed at this time. Barriers to/and or personal factors that will affect rehab potential:              []Age  []Sex    []Smoker              []Motivation/Lack of Motivation                        [x]Co-Morbidities              []Cognitive Function, education/learning barriers              []Environmental, home barriers              []profession/work barriers  []past PT/medical experience  []other:  Justification:     Falls Risk Assessment (30 days):   [x] Falls Risk assessed and no intervention required. [] Falls Risk assessed and Patient requires intervention due to being higher risk   TUG score (>12s at risk):     [] Falls education provided, including:         ASSESSMENT: Pt is  78 Y. O  female, presenting with c/o falls. Assessment reveals deficits in strength, ROM, alignment as well as increased pain. Pt will benefit from cont PT to address these deficits and promote return to highest level of functional independence.       Functional Impairments:     []Noted lumbar/proximal hip/LE hypomobility   []Decreased LE functional ROM   [x]Decreased core/proximal hip strength and neuromuscular control   [x]Decreased LE functional strength   [x]Reduced balance/proprioceptive control   []other:      Functional Activity Limitations (from functional questionnaire and intake)   []Reduced ability to tolerate prolonged functional positions   []Reduced ability or difficulty with changes of positions or transfers

## 2023-07-13 ENCOUNTER — ANTI-COAG VISIT (OUTPATIENT)
Dept: PHARMACY | Age: 68
End: 2023-07-13
Payer: COMMERCIAL

## 2023-07-13 LAB — INTERNATIONAL NORMALIZATION RATIO, POC: 1.6

## 2023-07-13 PROCEDURE — 85610 PROTHROMBIN TIME: CPT

## 2023-07-13 PROCEDURE — 99212 OFFICE O/P EST SF 10 MIN: CPT

## 2023-07-13 NOTE — PROGRESS NOTES
Ms. Parker Tomlin is a 78 y.o. y/o female with history of DVT who presents today for anticoagulation monitoring and adjustment. Pertinent PMH:    Patient Reported Findings:  Yes     No  [x]   []       Patient verifies current dosing regimen as listed --> 5mg daily ---> took 2.5mg Tues and Wed ---> confirmed dose   []   [x]       S/S bleeding/bruising/swelling/SOB- denies   [x]   []       Blood in urine or stool - blood in urine, was started on abx that cleared up the bleeding --> blood in urine resolved---> denies    [x]   []       Procedures scheduled in the future at this time - having endoscopy in July. Called GI to determine if received clearance to hold, awaiting return call ---> 7/5, holding 5 days, no lovenox injections, confirmed she was cleared---> denies upcoming    []   [x]       Missed Dose - Denies  []   [x]       Extra Dose - Denies   []   [x]       Change in medications -- No changes--> started macrobid, will be taking for 2 weeks --> had some extra tylenol---> terbinafine for 6 weeks   []   [x]       Change in health/diet/appetite --> plans for 0 days of green vegetables, some nausea, no VD---> may have occasional salad ---> had 1 salad --> appetite less --> no greens but wants to add salads back in 1-2 times/week, No NVD---> no changes    []   [x]       Change in alcohol use --> alcohol mostly around the holidays  []   [x]       Change in activity  []   [x]       Hospital admission  4/4- 5mg  4/5- 5mg  4/6-4/10- 5mg  4/11- 2.5mg  4/12- 2.5mg   []   [x]       Emergency department visit- ER visit, 6/16, for fall. INR 2.21 (therapeutic), confirmed 7.5 mg (5 mg x 1.5) every Mon, Wed, Fri; 5 mg (5 mg x 1) all other days. Denies missed/doubled doses, still bruising from fall, no med changes, no changes to diet. Patient has endoscopy 7/5 and has to hold warfarin for 5 days, no lovenox injections. Needs to get clearance.  Has apt with [facility] on 6/30 and wants to coordinate.   []   [x]       Other

## 2023-08-07 ENCOUNTER — ANTI-COAG VISIT (OUTPATIENT)
Dept: PHARMACY | Age: 68
End: 2023-08-07
Payer: COMMERCIAL

## 2023-08-07 LAB — INTERNATIONAL NORMALIZATION RATIO, POC: 1.9

## 2023-08-07 PROCEDURE — 99211 OFF/OP EST MAY X REQ PHY/QHP: CPT

## 2023-08-07 PROCEDURE — 85610 PROTHROMBIN TIME: CPT

## 2023-08-07 NOTE — PROGRESS NOTES
wants to coordinate.   []   [x]       Other complaints    Clinical Outcomes:  Yes     No  []   [x]       Major bleeding event  []   [x]       Thromboembolic event    Duration of warfarin Therapy: Indefinite  INR Range:  2.0-3.0    Warfarin 5mg tablets. Takes in morning but waits on days of INR check. INR is 1.9 today w/ missed dose two days ago. Likely therapeutic if that dose is not missed. Will boost tomorrow and maintain weekly dosing. Take 7.5mg tomorrow then continue weekly dose to 7.5 mg on Mon, Wed, and Fri and 5 mg all other days of the week. Encouraged to maintain a consistency of vegetables/salads.    Recheck INR in 2 weeks, 8/21    Consent signed 4/10/2023    Referring Svetlana Carrero MD  INR (no units)   Date Value   06/16/2023 2.21 (H)   04/06/2023 1.10   04/05/2023 1.13   04/03/2023 1.06     INR,(POC) (no units)   Date Value   07/13/2023 1.6   06/30/2023 2.4   06/02/2023 2.9   05/19/2023 2.1     For Pharmacy Admin Tracking Only    Intervention Detail: Dose Adjustment: 1, reason: Therapy Optimization  Total # of Interventions Recommended: 1  Total # of Interventions Accepted: 1  Time Spent (min): 15

## 2023-08-08 ENCOUNTER — HOSPITAL ENCOUNTER (OUTPATIENT)
Dept: PHYSICAL THERAPY | Age: 68
Setting detail: THERAPIES SERIES
Discharge: HOME | End: 2023-08-08
Payer: COMMERCIAL

## 2023-08-08 PROCEDURE — 97110 THERAPEUTIC EXERCISES: CPT

## 2023-08-08 PROCEDURE — 97750 PHYSICAL PERFORMANCE TEST: CPT

## 2023-08-08 NOTE — FLOWSHEET NOTE
report no further falls. [] Progressing: [x] Met: [] Not Met: [] Adjusted    ASSESSMENT:  Pt is  78 Y. O  female, presenting with c/o falls. Assessment reveals deficits in strength, ROM, alignment as well as increased pain. Pt will benefit from cont PT to address these deficits and promote return to highest level of functional independence. Treatment/Activity Tolerance:  [x] Patient tolerated treatment well [] Patient limited by fatique  [] Patient limited by pain  [] Patient limited by other medical complications  [] Other:     Overall Progression Towards Functional goals/ Treatment Progress Update:  [] Patient is progressing as expected towards functional goals listed. [] Progression is slowed due to complexities/Impairments listed. [] Progression has been slowed due to co-morbidities. [x] Plan just implemented, too soon to assess goals progression <30days   [] Goals require adjustment due to lack of progress  [] Patient is not progressing as expected and requires additional follow up with physician  [] Other    Prognosis for POC: [x] Good [] Fair  [] Poor    Patient requires continued skilled intervention: [x] Yes  [] No        PLAN: See eval  [] Continue per plan of care [] Alter current plan (see comments)  [x] Plan of care initiated [] Hold pending MD visit [] Discharge    Electronically signed by: Franko Helms, PT, PT    Note: If patient does not return for scheduled/recommended follow up visits, this note will serve as a discharge from care along with the most recent update on progress.

## 2023-08-10 ENCOUNTER — HOSPITAL ENCOUNTER (OUTPATIENT)
Dept: PHYSICAL THERAPY | Age: 68
Setting detail: THERAPIES SERIES
Discharge: HOME | End: 2023-08-10
Payer: COMMERCIAL

## 2023-08-10 ENCOUNTER — TELEPHONE (OUTPATIENT)
Dept: BARIATRICS/WEIGHT MGMT | Age: 68
End: 2023-08-10

## 2023-08-10 NOTE — TELEPHONE ENCOUNTER
Called as a new pt courtesy call - left message. Told patient to have new pt paperwork completely filled out, insurance card, id, and any copay that may be due. If they didn't have the paperwork filled out and arrive on time may be rescheduled. Also stated if they didn't receive paperwork to let us know so we could get it to them another way. Left office number on message. Reminded patient of the [facility] location. Billing Type: Third-Party Bill

## 2023-08-11 ENCOUNTER — OFFICE VISIT (OUTPATIENT)
Dept: BARIATRICS/WEIGHT MGMT | Age: 68
End: 2023-08-11

## 2023-08-11 VITALS
HEART RATE: 86 BPM | DIASTOLIC BLOOD PRESSURE: 72 MMHG | OXYGEN SATURATION: 97 % | HEIGHT: 63 IN | RESPIRATION RATE: 16 BRPM | SYSTOLIC BLOOD PRESSURE: 114 MMHG | WEIGHT: 285.8 LBS | BODY MASS INDEX: 50.64 KG/M2

## 2023-08-11 DIAGNOSIS — E66.01 MORBID OBESITY WITH BMI OF 50.0-59.9, ADULT (HCC): Primary | ICD-10-CM

## 2023-08-12 DIAGNOSIS — E78.5 HYPERLIPIDEMIA LDL GOAL <130: ICD-10-CM

## 2023-08-12 DIAGNOSIS — E03.9 HYPOTHYROIDISM (ACQUIRED): Primary | ICD-10-CM

## 2023-08-14 RX ORDER — LEVOTHYROXINE SODIUM 0.12 MG/1
TABLET ORAL
Qty: 90 TABLET | Refills: 1 | Status: SHIPPED | OUTPATIENT
Start: 2023-08-14

## 2023-08-14 RX ORDER — ATORVASTATIN CALCIUM 20 MG/1
TABLET, FILM COATED ORAL
Qty: 90 TABLET | Refills: 1 | Status: SHIPPED | OUTPATIENT
Start: 2023-08-14

## 2023-08-15 ENCOUNTER — HOSPITAL ENCOUNTER (OUTPATIENT)
Dept: PHYSICAL THERAPY | Age: 68
Setting detail: THERAPIES SERIES
Discharge: HOME | End: 2023-08-15
Payer: COMMERCIAL

## 2023-08-15 PROCEDURE — 97110 THERAPEUTIC EXERCISES: CPT

## 2023-08-15 PROCEDURE — 97140 MANUAL THERAPY 1/> REGIONS: CPT

## 2023-08-15 NOTE — FLOWSHEET NOTE
[facility] Outpatient Therapy  [address] [place] [address], [address] [facility], [address] [place] [address] Avenue  Phone: [phone]   Fax: [phone]    Physical Therapy Treatment Note/ Progress Report:     [email or web address]  Date:  08/15/2023     Patient Name:  Nadir Sharpe    :  1955  MRN: [id number]    Medical Diagnosis Information:  Injury due to fall, subsequent encounter [W19. XXXD]  Balance problem [R26.89]  Gait disturbance [R26.9]  Treatment Diagnosis Information:   R26.9 gait abnormality  Insurance/Certification information:   [facility]  Physician Information:  Rhea Tafoya MD   Plan of care signed:    [] Yes  [x] No    Date of Patient follow up with Physician:      Progress Report: []  Yes  []  No   If Yes, Date Range for reporting period:  Beginning:    Ending:     Progress report due (10 Rx/or 30 days whichever is less): 22    Recertification due (POC duration/ or 90 days whichever is less):  23    Visit # Insurance Allowable Auth Needed   3 BMN []Yes   []No     RESTRICTIONS/PRECAUTIONS:   Latex Allergy:  [x]NO      []YES  Preferred Language for Healthcare:   [x]English       []other:    Functional Scale:  ABC confidence scale: 55.9375% confidence; TUG: 15.22 sec with SPC; 14.41 without AD; 5x STS 19.00 sec; Functional Scale 23: 5x STS: 12.38 sec; TUG with SPC 15.65; TUG without SPC: 15.76 sec      Pain level:  7/10     SUBJECTIVE:  Pt feels that her LE pain (hip and groin) just keeps getting worse. She does not know why it is hurting. OBJECTIVE: See eval  Observation:   Test measurements:      Exercises/Interventions: Exercises in bold performed in department today. Items not bolded are carried forward from prior visits for continuity of the record.     Exercise/Equipment Resistance/Repetitions HEP Other comments   NuStep 10 min L4 []    Sitting marching 10x []    Sitting LAQ 10x []    Ball squeeze in hooklying 10x []    Pelvic tilt/bridges 10x []    TA isometrics 10

## 2023-08-17 ENCOUNTER — HOSPITAL ENCOUNTER (OUTPATIENT)
Dept: PHYSICAL THERAPY | Age: 68
Setting detail: THERAPIES SERIES
Discharge: HOME | End: 2023-08-17
Payer: COMMERCIAL

## 2023-08-17 PROCEDURE — 97110 THERAPEUTIC EXERCISES: CPT

## 2023-08-17 PROCEDURE — 97140 MANUAL THERAPY 1/> REGIONS: CPT

## 2023-08-17 NOTE — FLOWSHEET NOTE
[facility] Out[facility]  [address] [place] [address], [address] [facility], [address] [place] [address] Avenue  Phone: [phone]   Fax: [phone]    Physical Therapy Treatment Note/ Progress Report:     [email or web address]  Date:  2023     Patient Name:  Larry Bob    :  1955  MRN: [id number]    Medical Diagnosis Information:  Injury due to fall, subsequent encounter [W19. XXXD]  Balance problem [R26.89]  Gait disturbance [R26.9]  Treatment Diagnosis Information:   R26.9 gait abnormality  Insurance/Certification information:   [facility]  Physician Information:  Rosita Hadley MD   Plan of care signed:    [] Yes  [x] No    Date of Patient follow up with Physician:      Progress Report: []  Yes  []  No   If Yes, Date Range for reporting period:  Beginning:    Ending:     Progress report due (10 Rx/or 30 days whichever is less): 32    Recertification due (POC duration/ or 90 days whichever is less):  23    Visit # Insurance Allowable Auth Needed   4 BMN []Yes   []No     RESTRICTIONS/PRECAUTIONS:   Latex Allergy:  [x]NO      []YES  Preferred Language for Healthcare:   [x]English       []other:    Functional Scale:  ABC confidence scale: 55.9375% confidence; TUG: 15.22 sec with SPC; 14.41 without AD; 5x STS 19.00 sec; Functional Scale 23: 5x STS: 12.38 sec; TUG with SPC 15.65; TUG without SPC: 15.76 sec      Pain level:  7/10     SUBJECTIVE:  Pt does feel a little bit better since last session. She felt most improvement the first day and then felt a little worse the next day, but overall still headed the right direction. OBJECTIVE: See eval  Observation:   Test measurements:      Exercises/Interventions: Exercises in bold performed in department today. Items not bolded are carried forward from prior visits for continuity of the record.     Exercise/Equipment Resistance/Repetitions HEP Other comments   NuStep 10 min L4 []    Sitting marching 10x []    Sitting LAQ 10x []    [facility]

## 2023-08-21 ENCOUNTER — ANTI-COAG VISIT (OUTPATIENT)
Dept: PHARMACY | Age: 68
End: 2023-08-21
Payer: COMMERCIAL

## 2023-08-21 LAB — INTERNATIONAL NORMALIZATION RATIO, POC: 2

## 2023-08-21 PROCEDURE — 85610 PROTHROMBIN TIME: CPT

## 2023-08-21 PROCEDURE — 99211 OFF/OP EST MAY X REQ PHY/QHP: CPT

## 2023-08-21 NOTE — PROGRESS NOTES
Ms. Myrtle Berry is a 78 y.o. y/o female with history of DVT who presents today for anticoagulation monitoring and adjustment. Pertinent PMH:    Patient Reported Findings:  Yes     No  [x]   []       Patient verifies current dosing regimen as listed --> 5mg daily ---> took 2.5mg Tues and Wed ---> confirmed dose   []   [x]       S/S bleeding/bruising/swelling/SOB- denies   [x]   []       Blood in urine or stool - blood in urine, was started on abx that cleared up the bleeding --> blood in urine resolved---> denies    [x]   []       Procedures scheduled in the future at this time - having endoscopy in July. Called GI to determine if received clearance to hold, awaiting return call ---> 7/5, holding 5 days, no lovenox injections, confirmed she was cleared---> denies upcoming    []   [x]       Missed Dose - 8/5---> denies   []   [x]       Extra Dose - Denies   []   [x]       Change in medications -- No changes--> started macrobid, will be taking for 2 weeks --> had some extra tylenol---> terbinafine for 6 weeks --> no changes   []   [x]       Change in health/diet/appetite --> plans for 0 days of green vegetables, some nausea, no VD---> may have occasional salad ---> had 1 salad --> appetite less --> no greens but wants to add salads back in 1-2 times/week, No NVD---> diet slightly different d/t being in [place], no NVD---> states back to normal, no NVD  []   [x]       Change in alcohol use --> alcohol mostly around the holidays  []   [x]       Change in activity  []   [x]       Hospital admission  4/4- 5mg  4/5- 5mg  4/6-4/10- 5mg  4/11- 2.5mg  4/12- 2.5mg   []   [x]       Emergency department visit- ER visit, 6/16, for fall. INR 2.21 (therapeutic), confirmed 7.5 mg (5 mg x 1.5) every Mon, Wed, Fri; 5 mg (5 mg x 1) all other days. Denies missed/doubled doses, still bruising from fall, no med changes, no changes to diet. Patient has endoscopy 7/5 and has to hold warfarin for 5 days, no lovenox injections.  Needs

## 2023-08-22 ENCOUNTER — HOSPITAL ENCOUNTER (OUTPATIENT)
Dept: PHYSICAL THERAPY | Age: 68
Setting detail: THERAPIES SERIES
Discharge: HOME | End: 2023-08-22
Payer: COMMERCIAL

## 2023-08-22 NOTE — FLOWSHEET NOTE
proper functional mobility as indicated by patients Functional Deficits. [] Progressing: [] Met: [x] Not Met: [] Adjusted  4. Patient will return to increased daily walking (per pt report) without increased symptoms or restriction. [x] Progressing: [] Met: [] Not Met: [] Adjusted  5. Pt will report no further falls. [] Progressing: [x] Met: [] Not Met: [] Adjusted    ASSESSMENT:  Pt is  78 Y. O  female, presenting with c/o falls. Pt with improved pain levels allowing for improved gait. She has good tolerance for balance activities added this date. Pt will benefit from cont PT to address these deficits and promote return to highest level of functional independence. Treatment/Activity Tolerance:  [x] Patient tolerated treatment well [] Patient limited by fatique  [] Patient limited by pain  [] Patient limited by other medical complications  [] Other:     Overall Progression Towards Functional goals/ Treatment Progress Update:  [] Patient is progressing as expected towards functional goals listed. [x] Progression is slowed due to complexities/Impairments listed. [x] Progression has been slowed due to co-morbidities. [] Plan just implemented, too soon to assess goals progression <30days   [] Goals require adjustment due to lack of progress  [] Patient is not progressing as expected and requires additional follow up with physician  [] Other    Prognosis for POC: [x] Good [] Fair  [] Poor    Patient requires continued skilled intervention: [x] Yes  [] No        PLAN: See eval  [x] Continue per plan of care [] Alter current plan (see comments)  [] Plan of care initiated [] Hold pending MD visit [] Discharge    Electronically signed by: Miquel Gracia PT, DPT    Note: If patient does not return for scheduled/recommended follow up visits, this note will serve as a discharge from care along with the most recent update on progress.

## 2023-08-24 ENCOUNTER — HOSPITAL ENCOUNTER (OUTPATIENT)
Dept: PHYSICAL THERAPY | Age: 68
Setting detail: THERAPIES SERIES
Discharge: HOME | End: 2023-08-24
Payer: COMMERCIAL

## 2023-08-24 PROCEDURE — 97112 NEUROMUSCULAR REEDUCATION: CPT

## 2023-08-24 PROCEDURE — 97110 THERAPEUTIC EXERCISES: CPT

## 2023-08-24 NOTE — FLOWSHEET NOTE
[facility] Out[facility]  [address], [address] [facility], [address] [place] [address]  Phone: [phone]   Fax: [phone]    Physical Therapy Treatment Note/ Progress Report:     [email or web address]  Date:  2023     Patient Name:  Leonardo Ponce    :  1955  MRN: [id number]    Medical Diagnosis Information:  Injury due to fall, subsequent encounter [W19. XXXD]  Balance problem [R26.89]  Gait disturbance [R26.9]  Treatment Diagnosis Information:   R26.9 gait abnormality  Insurance/Certification information:   [facility]  Physician Information:  Carmelina Batres MD   Plan of care signed:    [] Yes  [x] No    Date of Patient follow up with Physician:      Progress Report: []  Yes  []  No   If Yes, Date Range for reporting period:  Beginning:    Ending:     Progress report due (10 Rx/or 30 days whichever is less): 72    Recertification due (POC duration/ or 90 days whichever is less):  23    Visit # Insurance Allowable Auth Needed   6 BMN []Yes   []No     RESTRICTIONS/PRECAUTIONS:   Latex Allergy:  [x]NO      []YES  Preferred Language for Healthcare:   [x]English       []other:    Functional Scale:  ABC confidence scale: 55.9375% confidence; TUG: 15.22 sec with SPC; 14.41 without AD; 5x STS 19.00 sec; Functional Scale 23: 5x STS: 12.38 sec; TUG with SPC 15.65; TUG without SPC: 15.76 sec      Pain level: 5/10     SUBJECTIVE:  Pt notes increased pain in LE beginning at hip and traveling distally to lateral knee. Pt notes having improved strength and balance this past week. Pt notes she is still fearful stepping over obstacles outside but has been more confident stepping up onto curbs. OBJECTIVE: See eval  Observation:   Test measurements:      Exercises/Interventions: Exercises in bold performed in department today. Items not bolded are carried forward from prior visits for continuity of the record.     Exercise/Equipment Resistance/Repetitions HEP Other

## 2023-08-27 DIAGNOSIS — N39.46 MIXED INCONTINENCE: ICD-10-CM

## 2023-08-28 RX ORDER — FESOTERODINE FUMARATE 8 MG/1
TABLET, EXTENDED RELEASE ORAL
Qty: 90 TABLET | Refills: 3 | Status: SHIPPED | OUTPATIENT
Start: 2023-08-28

## 2023-08-29 ENCOUNTER — HOSPITAL ENCOUNTER (OUTPATIENT)
Dept: PHYSICAL THERAPY | Age: 68
Setting detail: THERAPIES SERIES
Discharge: HOME | End: 2023-08-29
Payer: COMMERCIAL

## 2023-08-29 NOTE — CARE COORDINATION
[facility] Outpatient Therapy  [address] [place] [address], [id number] [place] [address], [address] [place] [address] Avenue  Phone: [phone]   Fax: [phone]    Physical Therapy Missed Visit Note     Date:  2023    Patient Name:  Angel Brush      :  1955    MRN: [id number]      Cancelled visits to date: 1  No-shows to date: 0    For today's appointment patient:  [x]  Cancelled  []  Rescheduled appointment  []  No-show     Reason given by patient:  []  Patient ill  []  Conflicting appointment  []  No transportation    []  Conflict with work  [x]  No reason given  []  Other:     Comments:      Electronically signed by:  Benigno Patel PT, DPT

## 2023-08-30 ENCOUNTER — HOSPITAL ENCOUNTER (OUTPATIENT)
Age: 68
Discharge: HOME | End: 2023-08-30
Payer: COMMERCIAL

## 2023-08-30 ENCOUNTER — HOSPITAL ENCOUNTER (OUTPATIENT)
Dept: GENERAL RADIOLOGY | Age: 68
Discharge: HOME | End: 2023-08-30
Payer: COMMERCIAL

## 2023-08-30 PROCEDURE — 72100 X-RAY EXAM L-S SPINE 2/3 VWS: CPT

## 2023-08-31 ENCOUNTER — APPOINTMENT (OUTPATIENT)
Dept: PHYSICAL THERAPY | Age: 68
End: 2023-08-31
Payer: COMMERCIAL

## 2023-09-05 DIAGNOSIS — N18.32 STAGE 3B CHRONIC KIDNEY DISEASE (HCC): ICD-10-CM

## 2023-09-05 DIAGNOSIS — R80.9 PROTEINURIA, UNSPECIFIED TYPE: ICD-10-CM

## 2023-09-05 RX ORDER — LOSARTAN POTASSIUM 25 MG/1
TABLET ORAL
Qty: 45 TABLET | Refills: 0 | Status: SHIPPED | OUTPATIENT
Start: 2023-09-05

## 2023-09-07 ENCOUNTER — OFFICE VISIT (OUTPATIENT)
Dept: ORTHOPEDIC SURGERY | Age: 68
End: 2023-09-07

## 2023-09-07 VITALS — RESPIRATION RATE: 16 BRPM | BODY MASS INDEX: 50.5 KG/M2 | HEIGHT: 63 IN | WEIGHT: 285 LBS

## 2023-09-07 DIAGNOSIS — M70.52 PES ANSERINUS BURSITIS OF BOTH KNEES: ICD-10-CM

## 2023-09-07 DIAGNOSIS — M17.0 PRIMARY OSTEOARTHRITIS OF BOTH KNEES: Primary | ICD-10-CM

## 2023-09-07 DIAGNOSIS — M70.51 PES ANSERINUS BURSITIS OF BOTH KNEES: ICD-10-CM

## 2023-09-07 RX ORDER — TRIAMCINOLONE ACETONIDE 40 MG/ML
40 INJECTION, SUSPENSION INTRA-ARTICULAR; INTRAMUSCULAR ONCE
Status: COMPLETED | OUTPATIENT
Start: 2023-09-07 | End: 2023-09-07

## 2023-09-07 RX ORDER — BUPIVACAINE HYDROCHLORIDE 2.5 MG/ML
2 INJECTION, SOLUTION INFILTRATION; PERINEURAL ONCE
Status: COMPLETED | OUTPATIENT
Start: 2023-09-07 | End: 2023-09-07

## 2023-09-07 RX ADMIN — TRIAMCINOLONE ACETONIDE 40 MG: 40 INJECTION, SUSPENSION INTRA-ARTICULAR; INTRAMUSCULAR at 13:12

## 2023-09-07 RX ADMIN — BUPIVACAINE HYDROCHLORIDE 5 MG: 2.5 INJECTION, SOLUTION INFILTRATION; PERINEURAL at 13:10

## 2023-09-07 RX ADMIN — TRIAMCINOLONE ACETONIDE 40 MG: 40 INJECTION, SUSPENSION INTRA-ARTICULAR; INTRAMUSCULAR at 13:11

## 2023-09-07 RX ADMIN — BUPIVACAINE HYDROCHLORIDE 5 MG: 2.5 INJECTION, SOLUTION INFILTRATION; PERINEURAL at 13:11

## 2023-09-08 ENCOUNTER — ANTI-COAG VISIT (OUTPATIENT)
Dept: PHARMACY | Age: 68
End: 2023-09-08
Payer: COMMERCIAL

## 2023-09-08 LAB — INTERNATIONAL NORMALIZATION RATIO, POC: 2.3

## 2023-09-08 PROCEDURE — 99211 OFF/OP EST MAY X REQ PHY/QHP: CPT

## 2023-09-08 PROCEDURE — 85610 PROTHROMBIN TIME: CPT

## 2023-09-08 RX ORDER — TOPIRAMATE 100 MG/1
TABLET, FILM COATED ORAL
Qty: 90 TABLET | Refills: 1 | Status: SHIPPED | OUTPATIENT
Start: 2023-09-08

## 2023-09-08 NOTE — PROGRESS NOTES
Ms. Audie Opitz is a 78 y.o. y/o female with history of DVT who presents today for anticoagulation monitoring and adjustment. Pertinent PMH:    Patient Reported Findings:  Yes     No  [x]   []       Patient verifies current dosing regimen as listed --> 5mg daily ---> took 2.5mg Tues and Wed ---> confirmed dose   []   [x]       S/S bleeding/bruising/swelling/SOB- denies   [x]   []       Blood in urine or stool - blood in urine, was started on abx that cleared up the bleeding --> blood in urine resolved---> denies    [x]   []       Procedures scheduled in the future at this time - having endoscopy in July. Called GI to determine if received clearance to hold, awaiting return call ---> 7/5, holding 5 days, no lovenox injections, confirmed she was cleared---> denies upcoming    []   [x]       Missed Dose - 8/5---> denies   []   [x]       Extra Dose - Denies   []   [x]       Change in medications -- No changes--> started macrobid, will be taking for 2 weeks --> had some extra tylenol---> terbinafine for 6 weeks --> no changes   []   [x]       Change in health/diet/appetite --> plans for 0 days of green vegetables, some nausea, no VD---> may have occasional salad ---> had 1 salad --> appetite less --> no greens but wants to add salads back in 1-2 times/week, No NVD---> diet slightly different d/t being in [place], no NVD---> states back to normal, no NVD---> doing a meal replacement, will check if vit k in the kit, started Monday- will check and call   []   [x]       Change in alcohol use --> alcohol mostly around the holidays  []   [x]       Change in activity  []   [x]       Hospital admission  4/4- 5mg  4/5- 5mg  4/6-4/10- 5mg  4/11- 2.5mg  4/12- 2.5mg   []   [x]       Emergency department visit- ER visit, 6/16, for fall. INR 2.21 (therapeutic), confirmed 7.5 mg (5 mg x 1.5) every Mon, Wed, Fri; 5 mg (5 mg x 1) all other days.  Denies missed/doubled doses, still bruising from fall, no med changes, no changes to

## 2023-09-09 NOTE — PROGRESS NOTES
for the pes bursitis in both of her knees. PROCEDURE NOTE:  After a discussion of the multiple options, they consented to a cortisone shot. 1 ml of 40mg/ml Kenalog and 2 ml's of 0.25%Marcaine were injected into both the right and the left knee joint spaces. The leg was slightly flexed and injected just lateral to the patella tendon to under the patella. This was done with sterile technique and they tolerated it well.     Next with her consent she was injected with 1 cc Kenalog 2 cc of Marcaine into the Pez anserine area of both the left and the right knee and appropriate sterile conditions she tolerated well we talked about postinjection care        They will schedule a follow up in 3 months

## 2023-09-12 DIAGNOSIS — K21.9 GASTROESOPHAGEAL REFLUX DISEASE WITHOUT ESOPHAGITIS: ICD-10-CM

## 2023-09-12 RX ORDER — ESOMEPRAZOLE MAGNESIUM 40 MG/1
40 CAPSULE, DELAYED RELEASE ORAL 2 TIMES DAILY
Qty: 180 CAPSULE | Refills: 1 | Status: SHIPPED | OUTPATIENT
Start: 2023-09-12

## 2023-09-29 ENCOUNTER — ANTI-COAG VISIT (OUTPATIENT)
Dept: PHARMACY | Age: 68
End: 2023-09-29
Payer: COMMERCIAL

## 2023-09-29 DIAGNOSIS — Z86.711 HISTORY OF PULMONARY EMBOLUS (PE): Primary | ICD-10-CM

## 2023-09-29 LAB — INTERNATIONAL NORMALIZATION RATIO, POC: 1.9

## 2023-09-29 PROCEDURE — 99212 OFFICE O/P EST SF 10 MIN: CPT

## 2023-09-29 PROCEDURE — 85610 PROTHROMBIN TIME: CPT

## 2023-09-29 NOTE — PROGRESS NOTES
Ms. Ernesto Infante is a 78 y.o. y/o female with history of DVT who presents today for anticoagulation monitoring and adjustment. Pertinent PMH:    Patient Reported Findings:  Yes     No  [x]   []       Patient verifies current dosing regimen as listed --> 5mg daily ---> took 2.5mg Tues and Wed ---> confirmed dose   []   [x]       S/S bleeding/bruising/swelling/SOB- denies   [x]   []       Blood in urine or stool - blood in urine, was started on abx that cleared up the bleeding --> blood in urine resolved---> denies    [x]   []       Procedures scheduled in the future at this time - having endoscopy in July. Called GI to determine if received clearance to hold, awaiting return call ---> 7/5, holding 5 days, no lovenox injections, confirmed she was cleared---> denies upcoming    []   [x]       Missed Dose - 8/5---> denies   []   [x]       Extra Dose - Denies   []   [x]       Change in medications -- No changes--> started macrobid, will be taking for 2 weeks --> had some extra tylenol---> terbinafine for 6 weeks --> no changes   [x]   []       Change in health/diet/appetite --> plans for 0 days of green vegetables, some nausea, no VD---> may have occasional salad ---> had 1 salad --> appetite less --> no greens but wants to add salads back in 1-2 times/week, No NVD---> diet slightly different d/t being in [place], no NVD---> states back to normal, no NVD---> doing a meal replacement, will check if vit k in the kit, started Monday- will check and call --> drinking [facility] meal replacement drinks twice daily for past few weeks   []   [x]       Change in alcohol use --> alcohol mostly around the holidays  []   [x]       Change in activity  []   [x]       Hospital admission  4/4- 5mg  4/5- 5mg  4/6-4/10- 5mg  4/11- 2.5mg  4/12- 2.5mg   []   [x]       Emergency department visit- ER visit, 6/16, for fall. INR 2.21 (therapeutic), confirmed 7.5 mg (5 mg x 1.5) every Mon, Wed, Fri; 5 mg (5 mg x 1) all other days.  Denies

## 2023-10-02 ENCOUNTER — HOSPITAL ENCOUNTER (OUTPATIENT)
Dept: VASCULAR LAB | Age: 68
Discharge: HOME | End: 2023-10-02
Payer: COMMERCIAL

## 2023-10-02 DIAGNOSIS — I26.09 OTHER ACUTE PULMONARY EMBOLISM WITH ACUTE COR PULMONALE (HCC): ICD-10-CM

## 2023-10-02 DIAGNOSIS — M79.604 PAIN IN RIGHT LEG: ICD-10-CM

## 2023-10-02 PROCEDURE — 93971 EXTREMITY STUDY: CPT

## 2023-10-05 ENCOUNTER — PATIENT MESSAGE (OUTPATIENT)
Dept: FAMILY MEDICINE CLINIC | Age: 68
End: 2023-10-05

## 2023-10-05 DIAGNOSIS — S42.91XK CLOSED FRACTURE OF RIGHT SHOULDER WITH NONUNION, SUBSEQUENT ENCOUNTER: ICD-10-CM

## 2023-10-05 RX ORDER — HYDROCODONE BITARTRATE AND ACETAMINOPHEN 5; 325 MG/1; MG/1
1-2 TABLET ORAL EVERY 6 HOURS PRN
Qty: 90 TABLET | Refills: 0 | Status: SHIPPED | OUTPATIENT
Start: 2023-10-05 | End: 2023-11-04

## 2023-10-05 NOTE — TELEPHONE ENCOUNTER
From: Dunia Fernandez  To: Dr. Fly Adhikari  Sent: 10/5/2023 8:50 AM EDT  Subject: Refill Hydrocodone    Would you please send a prescription for the hydrocodone I take to the [facility] at [address] [place] [address] (it's in [employer])? Their phone number is [phone]. Thank you.     Conchis Lindsay

## 2023-10-13 ENCOUNTER — ANTI-COAG VISIT (OUTPATIENT)
Dept: PHARMACY | Age: 68
End: 2023-10-13
Payer: COMMERCIAL

## 2023-10-13 DIAGNOSIS — Z86.711 HISTORY OF PULMONARY EMBOLUS (PE): Primary | ICD-10-CM

## 2023-10-13 LAB — INTERNATIONAL NORMALIZATION RATIO, POC: 2

## 2023-10-13 PROCEDURE — 99211 OFF/OP EST MAY X REQ PHY/QHP: CPT

## 2023-10-13 PROCEDURE — 85610 PROTHROMBIN TIME: CPT

## 2023-10-13 NOTE — PROGRESS NOTES
Ms. Laura Falk is a 78 y.o. y/o female with history of DVT who presents today for anticoagulation monitoring and adjustment. Pertinent PMH:    Patient Reported Findings:  Yes     No  [x]   []       Patient verifies current dosing regimen as listed --> 5mg daily ---> took 2.5mg Tues and Wed ---> confirmed dose   []   [x]       S/S bleeding/bruising/swelling/SOB- denies   [x]   []       Blood in urine or stool - blood in urine, was started on abx that cleared up the bleeding --> blood in urine resolved---> denies    [x]   []       Procedures scheduled in the future at this time - having endoscopy in July. Called GI to determine if received clearance to hold, awaiting return call ---> 7/5, holding 5 days, no lovenox injections, confirmed she was cleared---> denies upcoming    []   [x]       Missed Dose - denies   []   [x]       Extra Dose - Denies   []   [x]       Change in medications -- started macrobid, will be taking for 2 weeks --> had some extra tylenol---> terbinafine for 6 weeks --> no changes   []   [x]       Change in health/diet/appetite --> plans for 0 days of green vegetables, some nausea, no VD---> may have occasional salad ---> had 1 salad --> appetite less --> no greens but wants to add salads back in 1-2 times/week, No NVD---> diet slightly different d/t being in [place], no NVD---> states back to normal, no NVD---> doing a meal replacement, will check if vit k in the kit, started Monday- will check and call --> drinking [facility] meal replacement drinks twice daily for past few weeks --> no changes   []   [x]       Change in alcohol use --> alcohol mostly around the holidays  []   [x]       Change in activity  []   [x]       Hospital admission  4/4- 5mg  4/5- 5mg  4/6-4/10- 5mg  4/11- 2.5mg  4/12- 2.5mg   []   [x]       Emergency department visit- ER visit, 6/16, for fall. INR 2.21 (therapeutic), confirmed 7.5 mg (5 mg x 1.5) every Mon, Wed, Fri; 5 mg (5 mg x 1) all other days.  Denies

## 2023-10-13 NOTE — TELEPHONE ENCOUNTER
Warfarin prescription phoned in and LM on VM to [address] [facility] [phone] under [address] [place]  Warfarin 5 mg tabs  Take 5 mg on Sun, Tues and Thurs and 7.5 mg all other days of the week  90 days   3 refills

## 2023-10-26 LAB
ESTIMATED AVERAGE GLUCOSE: 111
HBA1C MFR BLD: 5.5 %

## 2023-11-03 ENCOUNTER — ANTI-COAG VISIT (OUTPATIENT)
Dept: PHARMACY | Age: 68
End: 2023-11-03
Payer: COMMERCIAL

## 2023-11-03 DIAGNOSIS — Z86.711 HISTORY OF PULMONARY EMBOLUS (PE): Primary | ICD-10-CM

## 2023-11-03 LAB — INTERNATIONAL NORMALIZATION RATIO, POC: 3.3

## 2023-11-03 PROCEDURE — 85610 PROTHROMBIN TIME: CPT

## 2023-11-03 PROCEDURE — 99211 OFF/OP EST MAY X REQ PHY/QHP: CPT

## 2023-11-03 NOTE — PROGRESS NOTES
Ms. Larry Bob is a 80 y.o. y/o female with history of DVT who presents today for anticoagulation monitoring and adjustment. Pertinent PMH:    Patient Reported Findings:  Yes     No  [x]   []       Patient verifies current dosing regimen as listed --> 5mg daily ---> took 2.5mg Tues and Wed ---> confirmed dose   []   [x]       S/S bleeding/bruising/swelling/SOB- denies   [x]   []       Blood in urine or stool - blood in urine, was started on abx that cleared up the bleeding --> blood in urine resolved---> denies    [x]   []       Procedures scheduled in the future at this time - having endoscopy in July. Called GI to determine if received clearance to hold, awaiting return call ---> 7/5, holding 5 days, no lovenox injections, confirmed she was cleared---> denies upcoming    []   [x]       Missed Dose - denies   []   [x]       Extra Dose - Denies   []   [x]       Change in medications -- started macrobid, will be taking for 2 weeks --> had some extra tylenol---> terbinafine for 6 weeks --> stopped losartan, tylenol arthritis every day (not new)  []   [x]       Change in health/diet/appetite --> plans for 0 days of green vegetables, some nausea, no VD---> may have occasional salad ---> had 1 salad --> appetite less --> no greens but wants to add salads back in 1-2 times/week, No NVD---> diet slightly different d/t being in [place], no NVD---> states back to normal, no NVD---> doing a meal replacement, will check if vit k in the kit, started Monday- will check and call --> drinking [employer] meal replacement drinks twice daily for past few weeks --> not doing the GNCs as much, going to return to normal routine, no NVD  []   [x]       Change in alcohol use --> alcohol mostly around the holidays  []   [x]       Change in activity  []   [x]       Hospital admission  4/4- 5mg  4/5- 5mg  4/6-4/10- 5mg  4/11- 2.5mg  4/12- 2.5mg   []   [x]       Emergency department visit- ER visit, 6/16, for fall.  INR 2.21 (therapeutic),

## 2023-11-17 ENCOUNTER — APPOINTMENT (OUTPATIENT)
Dept: PHARMACY | Age: 68
End: 2023-11-17
Payer: COMMERCIAL

## 2023-11-17 DIAGNOSIS — Z86.711 HISTORY OF PULMONARY EMBOLUS (PE): Primary | ICD-10-CM

## 2023-11-17 LAB — INTERNATIONAL NORMALIZATION RATIO, POC: 3.1

## 2023-11-17 PROCEDURE — 99212 OFFICE O/P EST SF 10 MIN: CPT

## 2023-11-17 PROCEDURE — 85610 PROTHROMBIN TIME: CPT

## 2023-11-17 NOTE — PROGRESS NOTES
2.21 (therapeutic), confirmed 7.5 mg (5 mg x 1.5) every Mon, Wed, Fri; 5 mg (5 mg x 1) all other days. Denies missed/doubled doses, still bruising from fall, no med changes, no changes to diet. Patient has endoscopy 7/5 and has to hold warfarin for 5 days, no lovenox injections. Needs to get clearance. Has apt with [facility] on 6/30 and wants to coordinate.   []   [x]       Other complaints    Clinical Outcomes:  Yes     No  []   [x]       Major bleeding event  []   [x]       Thromboembolic event  Get refills at [facility] in [employer] on [address]  Duration of warfarin Therapy: Indefinite  INR Range:  2.0-3.0    Warfarin 5mg tablets. Takes in morning but waits on days of INR check. INR is 3.1 today   Decrease dose to 7.5 mg on Mon, Wed, and Fri and 5 mg all other days of the week (5.6%)  Encouraged to maintain a consistency of vegetables/salads.    Recheck INR in 2 weeks, 12/1    Consent signed 4/10/2023    Referring MD Dean Menedz CNP)  INR (no units)   Date Value   06/16/2023 2.21 (H)   04/06/2023 1.10   04/05/2023 1.13   04/03/2023 1.06     INR,(POC) (no units)   Date Value   11/17/2023 3.1   11/03/2023 3.3   10/13/2023 2.0   09/29/2023 1.9     For Pharmacy Admin Tracking Only    Intervention Detail: Dose Adjustment: 1, reason: Therapy Optimization  Total # of Interventions Recommended: 1  Total # of Interventions Accepted: 1  Time Spent (min): 15

## 2023-12-01 ENCOUNTER — ANTI-COAG VISIT (OUTPATIENT)
Dept: PHARMACY | Age: 68
End: 2023-12-01
Payer: COMMERCIAL

## 2023-12-01 DIAGNOSIS — Z86.711 HISTORY OF PULMONARY EMBOLUS (PE): Primary | ICD-10-CM

## 2023-12-01 LAB — INTERNATIONAL NORMALIZATION RATIO, POC: 3.4

## 2023-12-01 PROCEDURE — 99212 OFFICE O/P EST SF 10 MIN: CPT

## 2023-12-01 PROCEDURE — 85610 PROTHROMBIN TIME: CPT

## 2023-12-01 NOTE — PROGRESS NOTES
Ms. Audie Opitz is a 80 y.o. y/o female with history of DVT who presents today for anticoagulation monitoring and adjustment. Pertinent PMH:    Patient Reported Findings:  Yes     No  [x]   []       Patient verifies current dosing regimen as listed --> 5mg daily ---> took 2.5mg Tues and Wed ---> confirmed dose   []   [x]       S/S bleeding/bruising/swelling/SOB- denies   [x]   []       Blood in urine or stool - blood in urine, was started on abx that cleared up the bleeding --> blood in urine resolved---> denies    [x]   []       Procedures scheduled in the future at this time - having endoscopy in July.  Called GI to determine if received clearance to hold, awaiting return call ---> 7/5, holding 5 days, no lovenox injections, confirmed she was cleared---> denies upcoming    []   [x]       Missed Dose - denies   []   [x]       Extra Dose - Denies   []   [x]       Change in medications -- started macrobid, will be taking for 2 weeks --> had some extra tylenol---> terbinafine for 6 weeks --> stopped losartan, tylenol arthritis every day (not new)---> decreased nexium---> getting a medrol ankit today and then anticipating an abx for UTI- will call with name and duration once prescribed   []   [x]       Change in health/diet/appetite --> plans for 0 days of green vegetables, some nausea, no VD---> may have occasional salad ---> had 1 salad --> appetite less --> no greens but wants to add salads back in 1-2 times/week, No NVD---> diet slightly different d/t being in [place], no NVD---> states back to normal, no NVD---> doing a meal replacement, will check if vit k in the kit, started Monday- will check and call --> drinking [employer] meal replacement drinks twice daily for past few weeks --> not doing the GNCs as much, going to return to normal routine, no NVD---> no changes  []   [x]       Change in alcohol use --> alcohol mostly around the holidays  []   [x]       Change in activity  []   [x]       TYLOR LANDRUM

## 2023-12-06 ENCOUNTER — TELEPHONE (OUTPATIENT)
Dept: PHARMACY | Age: 68
End: 2023-12-06

## 2023-12-06 DIAGNOSIS — N18.32 STAGE 3B CHRONIC KIDNEY DISEASE (HCC): ICD-10-CM

## 2023-12-06 DIAGNOSIS — R80.9 PROTEINURIA, UNSPECIFIED TYPE: ICD-10-CM

## 2023-12-06 RX ORDER — LOSARTAN POTASSIUM 25 MG/1
TABLET ORAL
Qty: 45 TABLET | Refills: 0 | Status: SHIPPED | OUTPATIENT
Start: 2023-12-06

## 2023-12-06 NOTE — TELEPHONE ENCOUNTER
Called patient about Cipro. Started yesterday for 3 days. Take 2.5mg today 12/6 then return to AVS dosing.      Rod Thapa, PharmD, [address] [place] [address] Tracking Only    Intervention Detail: Dose Adjustment: 1, reason: Interaction  Total # of Interventions Recommended: 1  Total # of Interventions Accepted: 1  Time Spent (min): 15

## 2023-12-09 DIAGNOSIS — F32.4 MAJOR DEPRESSIVE DISORDER WITH SINGLE EPISODE, IN PARTIAL REMISSION (HCC): ICD-10-CM

## 2023-12-09 DIAGNOSIS — F40.10 SOCIAL PHOBIA: ICD-10-CM

## 2023-12-11 RX ORDER — FLUOXETINE HYDROCHLORIDE 20 MG/1
CAPSULE ORAL
Qty: 90 CAPSULE | Refills: 1 | Status: SHIPPED | OUTPATIENT
Start: 2023-12-11

## 2023-12-15 ENCOUNTER — ANTI-COAG VISIT (OUTPATIENT)
Dept: PHARMACY | Age: 68
End: 2023-12-15
Payer: COMMERCIAL

## 2023-12-15 DIAGNOSIS — Z86.711 HISTORY OF PULMONARY EMBOLUS (PE): Primary | ICD-10-CM

## 2023-12-15 LAB — INTERNATIONAL NORMALIZATION RATIO, POC: 2.5

## 2023-12-15 PROCEDURE — 99211 OFF/OP EST MAY X REQ PHY/QHP: CPT

## 2023-12-15 PROCEDURE — 85610 PROTHROMBIN TIME: CPT

## 2023-12-15 NOTE — PROGRESS NOTES
Ms. Ashtyn Ray is a 80 y.o. y/o female with history of DVT who presents today for anticoagulation monitoring and adjustment. Pertinent PMH:    Patient Reported Findings:  Yes     No  [x]   []       Patient verifies current dosing regimen as listed --> 5mg daily ---> took 2.5mg Tues and Wed ---> confirmed dose   []   [x]       S/S bleeding/bruising/swelling/SOB- denies   [x]   []       Blood in urine or stool - blood in urine, was started on abx that cleared up the bleeding --> blood in urine resolved---> denies    [x]   []       Procedures scheduled in the future at this time - having endoscopy in July.  Called GI to determine if received clearance to hold, awaiting return call ---> 7/5, holding 5 days, no lovenox injections, confirmed she was cleared---> denies upcoming    []   [x]       Missed Dose - denies   []   [x]       Extra Dose - Denies   []   [x]       Change in medications -- started macrobid, will be taking for 2 weeks --> had some extra tylenol---> terbinafine for 6 weeks --> stopped losartan, tylenol arthritis every day (not new)---> decreased nexium---> getting a medrol ankit today and then anticipating an abx for UTI- will call with name and duration once prescribed   []   [x]       Change in health/diet/appetite --> plans for 0 days of green vegetables, some nausea, no VD---> may have occasional salad ---> had 1 salad --> appetite less --> no greens but wants to add salads back in 1-2 times/week, No NVD---> diet slightly different d/t being in [place], no NVD---> states back to normal, no NVD---> doing a meal replacement, will check if vit k in the kit, started Monday- will check and call --> drinking [employer] meal replacement drinks twice daily for past few weeks --> not doing the GNCs as much, going to return to normal routine, no NVD---> no changes  []   [x]       Change in alcohol use --> alcohol mostly around the holidays  []   [x]       Change in activity  []   [x]       TYLOR SKAGGS - GAGAN

## 2023-12-29 ENCOUNTER — ANTI-COAG VISIT (OUTPATIENT)
Dept: PHARMACY | Age: 68
End: 2023-12-29
Payer: COMMERCIAL

## 2023-12-29 DIAGNOSIS — Z86.711 HISTORY OF PULMONARY EMBOLUS (PE): Primary | ICD-10-CM

## 2023-12-29 LAB — INTERNATIONAL NORMALIZATION RATIO, POC: 3.2

## 2023-12-29 PROCEDURE — 85610 PROTHROMBIN TIME: CPT

## 2023-12-29 PROCEDURE — 99211 OFF/OP EST MAY X REQ PHY/QHP: CPT

## 2023-12-29 NOTE — PROGRESS NOTES
Ms. Angel Brush is a 80 y.o. y/o female with history of DVT who presents today for anticoagulation monitoring and adjustment. Pertinent PMH:    Patient Reported Findings:  Yes     No  [x]   []       Patient verifies current dosing regimen as listed --> 5mg daily ---> took 2.5mg Tues and Wed ---> confirmed dose   []   [x]       S/S bleeding/bruising/swelling/SOB- denies   [x]   []       Blood in urine or stool - blood in urine, was started on abx that cleared up the bleeding --> blood in urine resolved---> denies    [x]   []       Procedures scheduled in the future at this time - having endoscopy in July.  Called GI to determine if received clearance to hold, awaiting return call ---> 7/5, holding 5 days, no lovenox injections, confirmed she was cleared---> denies upcoming    []   [x]       Missed Dose - denies   []   [x]       Extra Dose - Denies   []   [x]       Change in medications -- started macrobid, will be taking for 2 weeks --> had some extra tylenol---> terbinafine for 6 weeks --> stopped losartan, tylenol arthritis every day (not new)---> decreased nexium---> getting a medrol ankit today and then anticipating an abx for UTI- will call with name and duration once prescribed---> Denies changes    []   [x]       Change in health/diet/appetite --> plans for 0 days of green vegetables, some nausea, no VD---> may have occasional salad ---> had 1 salad --> appetite less --> no greens but wants to add salads back in 1-2 times/week, No NVD---> diet slightly different d/t being in [place], no NVD---> states back to normal, no NVD---> doing a meal replacement, will check if vit k in the kit, started Monday- will check and call --> drinking [employer] meal replacement drinks twice daily for past few weeks --> not doing the GNCs as much, going to return to normal routine, no NVD---> no changes---> less greens, no NVD  []   [x]       Change in alcohol use --> alcohol mostly around the holidays  []   [x]       Change in

## 2024-01-02 DIAGNOSIS — G25.81 RESTLESS LEGS SYNDROME: ICD-10-CM

## 2024-01-02 RX ORDER — PRAMIPEXOLE DIHYDROCHLORIDE 0.5 MG/1
TABLET ORAL
Qty: 270 TABLET | Refills: 1 | Status: SHIPPED | OUTPATIENT
Start: 2024-01-02

## 2024-01-12 ENCOUNTER — ANTI-COAG VISIT (OUTPATIENT)
Dept: PHARMACY | Age: 69
End: 2024-01-12
Payer: COMMERCIAL

## 2024-01-12 DIAGNOSIS — J45.41 MODERATE PERSISTENT ASTHMA WITH ACUTE EXACERBATION: ICD-10-CM

## 2024-01-12 DIAGNOSIS — Z86.711 HISTORY OF PULMONARY EMBOLUS (PE): Primary | ICD-10-CM

## 2024-01-12 LAB — INTERNATIONAL NORMALIZATION RATIO, POC: 2.4

## 2024-01-12 PROCEDURE — 99211 OFF/OP EST MAY X REQ PHY/QHP: CPT

## 2024-01-12 PROCEDURE — 85610 PROTHROMBIN TIME: CPT

## 2024-01-12 RX ORDER — FLUTICASONE FUROATE AND VILANTEROL TRIFENATATE 100; 25 UG/1; UG/1
POWDER RESPIRATORY (INHALATION)
Qty: 180 G | Refills: 3 | Status: SHIPPED | OUTPATIENT
Start: 2024-01-12

## 2024-01-12 NOTE — PROGRESS NOTES
Ms. Uma Schulte is a 68 y.o. y/o female with history of DVT who presents today for anticoagulation monitoring and adjustment.    Pertinent PMH:    Patient Reported Findings:  Yes     No  [x]   []       Patient verifies current dosing regimen as listed --> 5mg daily ---> took 2.5mg Tues and Wed ---> confirmed dose   []   [x]       S/S bleeding/bruising/swelling/SOB- denies   [x]   []       Blood in urine or stool - blood in urine, was started on abx that cleared up the bleeding --> blood in urine resolved---> denies    [x]   []       Procedures scheduled in the future at this time - having endoscopy in July. Called GI to determine if received clearance to hold, awaiting return call ---> 7/5, holding 5 days, no lovenox injections, confirmed she was cleared---> denies upcoming    []   [x]       Missed Dose - denies   []   [x]       Extra Dose - Denies   []   [x]       Change in medications -- started macrobid, will be taking for 2 weeks --> had some extra tylenol---> terbinafine for 6 weeks --> stopped losartan, tylenol arthritis every day (not new)---> decreased nexium---> getting a medrol ankit today and then anticipating an abx for UTI- will call with name and duration once prescribed---> Denies changes    []   [x]       Change in health/diet/appetite --> plans for 0 days of green vegetables, some nausea, no VD---> may have occasional salad ---> had 1 salad --> appetite less --> no greens but wants to add salads back in 1-2 times/week, No NVD---> diet slightly different d/t being in [place], no NVD---> states back to normal, no NVD---> doing a meal replacement, will check if vit k in the kit, started Monday- will check and call --> drinking [employer] meal replacement drinks twice daily for past few weeks --> not doing the GNCs as much, going to return to normal routine, no NVD---> no changes---> less greens, no NVD---> no greens  []   [x]       Change in alcohol use --> alcohol mostly around the holidays  []   [x]

## 2024-01-30 ENCOUNTER — TELEPHONE (OUTPATIENT)
Dept: PHARMACY | Age: 69
End: 2024-01-30

## 2024-02-01 ENCOUNTER — ANTI-COAG VISIT (OUTPATIENT)
Dept: PHARMACY | Age: 69
End: 2024-02-01
Payer: COMMERCIAL

## 2024-02-01 DIAGNOSIS — E78.5 HYPERLIPIDEMIA LDL GOAL <130: ICD-10-CM

## 2024-02-01 DIAGNOSIS — Z86.711 HISTORY OF PULMONARY EMBOLUS (PE): Primary | ICD-10-CM

## 2024-02-01 LAB — INTERNATIONAL NORMALIZATION RATIO, POC: 2.8

## 2024-02-01 PROCEDURE — 99211 OFF/OP EST MAY X REQ PHY/QHP: CPT

## 2024-02-01 PROCEDURE — 85610 PROTHROMBIN TIME: CPT

## 2024-02-01 RX ORDER — TOPIRAMATE 100 MG/1
TABLET, FILM COATED ORAL
Qty: 90 TABLET | Refills: 1 | Status: SHIPPED | OUTPATIENT
Start: 2024-02-01

## 2024-02-01 RX ORDER — ATORVASTATIN CALCIUM 20 MG/1
TABLET, FILM COATED ORAL
Qty: 90 TABLET | Refills: 1 | Status: SHIPPED | OUTPATIENT
Start: 2024-02-01

## 2024-02-15 LAB — MAMMOGRAPHY, EXTERNAL: NORMAL

## 2024-02-24 DIAGNOSIS — E03.9 HYPOTHYROIDISM (ACQUIRED): ICD-10-CM

## 2024-02-26 RX ORDER — LEVOTHYROXINE SODIUM 0.12 MG/1
TABLET ORAL
Qty: 30 TABLET | Refills: 0 | Status: SHIPPED | OUTPATIENT
Start: 2024-02-26

## 2024-02-26 NOTE — TELEPHONE ENCOUNTER
Was due in January for follow up- sent in 30 day refill, but she needs to schedule for follow up visit for further refills

## 2024-02-27 ENCOUNTER — TELEPHONE (OUTPATIENT)
Dept: PHARMACY | Age: 69
End: 2024-02-27

## 2024-02-27 NOTE — TELEPHONE ENCOUNTER
Returned call to patient. Explained significant interaction between warfarin and bactrim and medrol dose ankit. Advised for pt to hold dose warfarin tonight (2/27), take 2.5 mg tomorrow (2/28). Will assess INR on thurs as scheduled.

## 2024-02-27 NOTE — TELEPHONE ENCOUNTER
----- Message from Shaneka Kaur sent at 2/27/2024 10:43 AM EST -----  Regarding: new medications  Contact: patient  Patient called to say she had been taking some new medications. [phone]  Tramadol, has had one course and is starting a second today. Cyclobenzaprine,   Sulfamethoxazole-Trimethoprim-started 2/20 and she has 3 more days to take this.  Prednisone-1st course on 2/10 and  is starting 2nd course today.  Ordered by Belén Foy @ the [facility].

## 2024-02-29 ENCOUNTER — ANTI-COAG VISIT (OUTPATIENT)
Dept: PHARMACY | Age: 69
End: 2024-02-29
Payer: COMMERCIAL

## 2024-02-29 DIAGNOSIS — Z86.711 HISTORY OF PULMONARY EMBOLUS (PE): Primary | ICD-10-CM

## 2024-02-29 LAB — INTERNATIONAL NORMALIZATION RATIO, POC: 4

## 2024-02-29 PROCEDURE — 85610 PROTHROMBIN TIME: CPT

## 2024-02-29 PROCEDURE — 99212 OFFICE O/P EST SF 10 MIN: CPT

## 2024-02-29 NOTE — PROGRESS NOTES
Ms. Uma Schulte is a 68 y.o. y/o female with history of DVT who presents today for anticoagulation monitoring and adjustment.    Pertinent PMH:    Patient Reported Findings:  Yes     No  [x]   []       Patient verifies current dosing regimen as listed --> 5mg daily ---> took 2.5mg Tues and Wed ---> confirmed dose   []   [x]       S/S bleeding/bruising/swelling/SOB- denies   [x]   []       Blood in urine or stool - blood in urine, was started on abx that cleared up the bleeding --> blood in urine resolved---> denies    [x]   []       Procedures scheduled in the future at this time - having endoscopy in July. Called GI to determine if received clearance to hold, awaiting return call ---> 7/5, holding 5 days, no lovenox injections, confirmed she was cleared---> denies upcoming    []   [x]       Missed Dose - denies ---> adjusted for abx  []   [x]       Extra Dose - Denies   []   [x]       Change in medications -- started macrobid, will be taking for 2 weeks --> had some extra tylenol---> terbinafine for 6 weeks --> stopped losartan, tylenol arthritis every day (not new)---> decreased nexium---> getting a medrol ankit today and then anticipating an abx for UTI- will call with name and duration once prescribed---> tramadol, cyclobenzaprine, bactrim started 2/20, medrol dosepak, states almost done   []   [x]       Change in health/diet/appetite --> plans for 0 days of green vegetables, some nausea, no VD---> may have occasional salad ---> had 1 salad --> appetite less --> no greens but wants to add salads back in 1-2 times/week, No NVD---> diet slightly different d/t being in [place], no NVD---> states back to normal, no NVD---> doing a meal replacement, will check if vit k in the kit, started Monday- will check and call --> drinking [employer] meal replacement drinks twice daily for past few weeks --> not doing the GNCs as much, going to return to normal routine, no NVD---> no changes---> less greens, no NVD---> no

## 2024-03-11 ENCOUNTER — TELEPHONE (OUTPATIENT)
Dept: PHARMACY | Age: 69
End: 2024-03-11

## 2024-03-11 NOTE — TELEPHONE ENCOUNTER
----- Message from Shaneka Kaur sent at 3/11/2024  1:56 PM EDT -----  Regarding: new medication  Contact: patient  Patient started prednisone dose pack on Saturday.  10mg x 6, then 5,4,3,2,1.  Please call patient back regarding warfarin dosing. [phone].

## 2024-03-11 NOTE — TELEPHONE ENCOUNTER
Returned call to patient. Explained prednisone will increase INR. Advised for patient to take 2.5 mg today (3/11) then continue normal weekly dose. Will assess INR on Fri

## 2024-03-15 ENCOUNTER — ANTI-COAG VISIT (OUTPATIENT)
Dept: PHARMACY | Age: 69
End: 2024-03-15
Payer: COMMERCIAL

## 2024-03-15 DIAGNOSIS — Z86.711 HISTORY OF PULMONARY EMBOLUS (PE): Primary | ICD-10-CM

## 2024-03-15 LAB — INTERNATIONAL NORMALIZATION RATIO, POC: 3.1

## 2024-03-15 PROCEDURE — 85610 PROTHROMBIN TIME: CPT

## 2024-03-15 PROCEDURE — 99212 OFFICE O/P EST SF 10 MIN: CPT

## 2024-03-15 NOTE — PROGRESS NOTES
[facility] --> no changes   []   [x]       Change in alcohol use --> alcohol mostly around the holidays  []   [x]       Change in activity  []   [x]       Hospital admission  []   [x]       Emergency department visit- ER visit, 6/16, for fall. INR 2.21 (therapeutic), confirmed 7.5 mg (5 mg x 1.5) every Mon, Wed, Fri; 5 mg (5 mg x 1) all other days. Denies missed/doubled doses, still bruising from fall, no med changes, no changes to diet. Patient has endoscopy 7/5 and has to hold warfarin for 5 days, no lovenox injections. Needs to get clearance. Has apt with [facility] on 6/30 and wants to coordinate.   []   [x]       Other complaints    Clinical Outcomes:  Yes     No  []   [x]       Major bleeding event  []   [x]       Thromboembolic event  Get refills at [facility] in [facility] on [address]  Duration of warfarin Therapy: Indefinite  INR Range:  2.0-3.0    Warfarin 5mg tablets. Takes in morning but waits on days of INR check.       INR is 3.1 today after recent prednisone use   Decrease weekly dose to 7.5 mg on Mon and 5 mg all other days of the week (6% dec)  Encouraged to maintain a consistency of vegetables/salads.   Recheck INR in 2.5 weeks, 4/2  Consent signed 8/7/23    Referring Niall Lazo MD (Patti Zen CNP)  INR (no units)   Date Value   06/16/2023 2.21 (H)   04/06/2023 1.10   04/05/2023 1.13   04/03/2023 1.06     INR,(POC) (no units)   Date Value   02/29/2024 4.0   02/01/2024 2.8   01/12/2024 2.4   12/29/2023 3.2     For Pharmacy Admin Tracking Only    Intervention Detail: Dose Adjustment: 1, reason: Therapy Optimization  Total # of Interventions Recommended: 1  Total # of Interventions Accepted: 1  Time Spent (min): 15

## 2024-03-18 ENCOUNTER — TELEPHONE (OUTPATIENT)
Dept: PHARMACY | Age: 69
End: 2024-03-18

## 2024-03-18 DIAGNOSIS — E03.9 HYPOTHYROIDISM (ACQUIRED): ICD-10-CM

## 2024-03-18 RX ORDER — LEVOTHYROXINE SODIUM 0.12 MG/1
TABLET ORAL
Qty: 30 TABLET | Refills: 0 | OUTPATIENT
Start: 2024-03-18

## 2024-03-18 NOTE — TELEPHONE ENCOUNTER
Called and LVM asking for return call to adjust dose.    Wilda Petersen, PharmD, [facility]    For Pharmacy Admin Tracking Only  Total # of Interventions Recommended: 0  Total # of Interventions Accepted: 0  Time Spent (min): 5

## 2024-03-18 NOTE — TELEPHONE ENCOUNTER
Patient called back, states that her doctor told her she needed to get her INR checked 4 days after starting the abx. Started Saturday for 10 days. States that she already took her dose today. Will have patient hold warfarin tomorrow,  and then pt wanted to come in Wed 3/20 for INR check and further adjustments.     Wilda Petersen, PharmD, [facility]    For Pharmacy Admin Tracking Only    Intervention Detail: Adherence Monitorin and Dose Adjustment: 1, reason: Therapy Optimization  Total # of Interventions Recommended: 2  Total # of Interventions Accepted: 2  Time Spent (min): 15

## 2024-03-18 NOTE — TELEPHONE ENCOUNTER
----- Message from Shaneka Kaur sent at 3/18/2024 10:17 AM EDT -----  Regarding: new antibiotic  Contact: patient  Patient states she started \"Sulfamethoxazole DS\" on Saturday, for 10 days. Dr. Ríos ordered Please call patient back regarding warfarin. [phone]

## 2024-03-20 ENCOUNTER — ANTI-COAG VISIT (OUTPATIENT)
Dept: PHARMACY | Age: 69
End: 2024-03-20
Payer: COMMERCIAL

## 2024-03-20 DIAGNOSIS — Z86.711 HISTORY OF PULMONARY EMBOLUS (PE): Primary | ICD-10-CM

## 2024-03-20 LAB — INTERNATIONAL NORMALIZATION RATIO, POC: 3.9

## 2024-03-20 PROCEDURE — 99212 OFFICE O/P EST SF 10 MIN: CPT

## 2024-03-20 PROCEDURE — 85610 PROTHROMBIN TIME: CPT

## 2024-03-20 NOTE — PROGRESS NOTES
Ms. Uma Schulte is a 68 y.o. y/o female with history of DVT who presents today for anticoagulation monitoring and adjustment.    Pertinent PMH:    Patient Reported Findings:  Yes     No  [x]   []       Patient verifies current dosing regimen as listed --> 5mg daily ---> took 2.5mg Tues and Wed ---> confirmed dose   []   [x]       S/S bleeding/bruising/swelling/SOB- denies   []   [x]       Blood in urine or stool - blood in urine, was started on abx that cleared up the bleeding --> blood in urine resolved---> denies    []   [x]       Procedures scheduled in the future at this time - having endoscopy in July. Called GI to determine if received clearance to hold, awaiting return call ---> 7/5, holding 5 days, no lovenox injections, confirmed she was cleared---> denies upcoming    [x]   []       Missed Dose - yesterday (3/19) as instructed for abx    []   [x]       Extra Dose - Denies   [x]   []       Change in medications -- started macrobid, will be taking for 2 weeks --> had some extra tylenol---> terbinafine for 6 weeks --> stopped losartan, tylenol arthritis every day (not new)---> decreased nexium---> getting a medrol ankit today and then anticipating an abx for UTI- will call with name and duration once prescribed---> tramadol, cyclobenzaprine, bactrim started 2/20, medrol dosepak, states almost done --> on prednisone 10mg x 6, then 5,4,3,2,1. (Finished yesterday) warfarin dose adjusted. Started keflex x 10 d 3/14 --> on bactrim x 10 d (started 3/16). Started lasix  []   [x]       Change in health/diet/appetite --> plans for 0 days of green vegetables, some nausea, no VD---> may have occasional salad ---> had 1 salad --> appetite less --> no greens but wants to add salads back in 1-2 times/week, No NVD---> doing a meal replacement, will check if vit k in the kit, started Monday- will check and call --> drinking [employer] meal replacement drinks twice daily for past few weeks --> not doing the GNCs as much, going to

## 2024-04-02 ENCOUNTER — ANTI-COAG VISIT (OUTPATIENT)
Dept: PHARMACY | Age: 69
End: 2024-04-02
Payer: COMMERCIAL

## 2024-04-02 DIAGNOSIS — Z86.711 HISTORY OF PULMONARY EMBOLUS (PE): Primary | ICD-10-CM

## 2024-04-02 LAB — INTERNATIONAL NORMALIZATION RATIO, POC: 4.4

## 2024-04-02 PROCEDURE — 99212 OFFICE O/P EST SF 10 MIN: CPT

## 2024-04-02 PROCEDURE — 85610 PROTHROMBIN TIME: CPT

## 2024-04-02 NOTE — PROGRESS NOTES
Ms. Uma Schulte is a 68 y.o. y/o female with history of DVT who presents today for anticoagulation monitoring and adjustment.    Pertinent PMH:    Patient Reported Findings:  Yes     No  [x]   []       Patient verifies current dosing regimen as listed --> 5mg daily ---> took 2.5mg Tues and Wed ---> confirmed dose ---> 7.5mg Mon only   []   [x]       S/S bleeding/bruising/swelling/SOB- denies   []   [x]       Blood in urine or stool - blood in urine, was started on abx that cleared up the bleeding --> blood in urine resolved---> denies    []   [x]       Procedures scheduled in the future at this time - having endoscopy in July. 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 (Finished yesterday) warfarin dose adjusted. Started keflex x 10 d 3/14 --> on bactrim x 10 d (started 3/16). Started lasix---> finished abx, taking pain meds for back   []   [x]       Change in health/diet/appetite --> plans for 0 days of green vegetables, some nausea, no VD---> may have occasional salad ---> had 1 salad --> appetite less --> no greens but wants to add salads back in 1-2 times/week, No NVD---> doing a meal replacement, will check if vit k in the kit, started 
normal...

## 2024-04-05 ENCOUNTER — TELEPHONE (OUTPATIENT)
Dept: PHARMACY | Age: 69
End: 2024-04-05

## 2024-04-05 NOTE — TELEPHONE ENCOUNTER
Pt called stating she's having procedure on 4/12. She's waiting to hear back from ref md regarding clearance and holding instructions.

## 2024-04-16 ENCOUNTER — ANTI-COAG VISIT (OUTPATIENT)
Dept: PHARMACY | Age: 69
End: 2024-04-16
Payer: COMMERCIAL

## 2024-04-16 DIAGNOSIS — Z86.711 HISTORY OF PULMONARY EMBOLUS (PE): Primary | ICD-10-CM

## 2024-04-16 LAB — INTERNATIONAL NORMALIZATION RATIO, POC: 1.3

## 2024-04-16 PROCEDURE — 85610 PROTHROMBIN TIME: CPT

## 2024-04-16 PROCEDURE — 99211 OFF/OP EST MAY X REQ PHY/QHP: CPT

## 2024-04-16 NOTE — PROGRESS NOTES
Ms. Uma Schulte is a 68 y.o. y/o female with history of DVT who presents today for anticoagulation monitoring and adjustment.    Pertinent PMH:    Patient Reported Findings:  Yes     No  [x]   []       Patient verifies current dosing regimen as listed --> 5mg daily ---> took 2.5mg Tues and Wed ---> confirmed dose ---> 7.5mg Mon only --> resumed warfarin day after procedure taking 5 mg daily (did not boost as instructed)    []   [x]       S/S bleeding/bruising/swelling/SOB- denies   []   [x]       Blood in urine or stool - blood in urine, was started on abx that cleared up the bleeding --> blood in urine resolved---> denies    [x]   []       Procedures scheduled in the future at this time - having endoscopy in July. Called GI to determine if received clearance to hold, awaiting return call ---> 7/5, holding 5 days, no lovenox injections, confirmed she was cleared---> had procedure on 4/12, held warfarin 5 days prior and bridged by Dr. Hammond   []   [x]       Missed Dose -  denies     []   [x]       Extra Dose - Denies   [x]   []       Change in medications -- stopped losartan, tylenol arthritis every day (not new)---> on prednisone 10mg x 6, then 5,4,3,2,1. (Finished yesterday) warfarin dose adjusted. Started keflex x 10 d 3/14 --> on bactrim x 10 d (started 3/16). Started lasix---> finished abx, taking pain meds for back --> percocet following back surgery   []   [x]       Change in health/diet/appetite --> plans for 0 days of green vegetables, some nausea, no VD---> had 1 salad --> appetite less --> no greens but wants to add salads back in 1-2 times/week, No NVD---> doing a meal replacement, will check if vit k in the kit, started Monday- will check and call --> drinking [employer] meal replacement drinks twice daily for past few weeks --> not doing the GNCs as much, going to return to normal routine, no NVD---> less greens, no NVD---> no greens --> no changes   []   [x]       Change in alcohol use --> alcohol mostly

## 2024-04-19 ENCOUNTER — ANTI-COAG VISIT (OUTPATIENT)
Dept: PHARMACY | Age: 69
End: 2024-04-19
Payer: COMMERCIAL

## 2024-04-19 DIAGNOSIS — Z86.711 HISTORY OF PULMONARY EMBOLUS (PE): Primary | ICD-10-CM

## 2024-04-19 LAB — INTERNATIONAL NORMALIZATION RATIO, POC: 2.1

## 2024-04-19 PROCEDURE — 85610 PROTHROMBIN TIME: CPT

## 2024-04-19 PROCEDURE — 99211 OFF/OP EST MAY X REQ PHY/QHP: CPT

## 2024-04-19 NOTE — PROGRESS NOTES
Ms. mUa Schulte is a 68 y.o. y/o female with history of DVT who presents today for anticoagulation monitoring and adjustment.    Pertinent PMH:    Patient Reported Findings:  Yes     No  [x]   []       Patient verifies current dosing regimen as listed --> 5mg daily ---> took 2.5mg Tues and Wed ---> confirmed dose ---> 7.5mg Mon only --> resumed warfarin day after procedure taking 5 mg daily (did not boost as instructed)    []   [x]       S/S bleeding/bruising/swelling/SOB- denies   []   [x]       Blood in urine or stool - blood in urine, was started on abx that cleared up the bleeding --> blood in urine resolved---> denies    [x]   []       Procedures scheduled in the future at this time - having endoscopy in July. Called GI to determine if received clearance to hold, awaiting return call ---> 7/5, holding 5 days, no lovenox injections, confirmed she was cleared---> had procedure on 4/12, held warfarin 5 days prior and bridged by Dr. Hammond   []   [x]       Missed Dose -  denies     []   [x]       Extra Dose - Denies   [x]   []       Change in medications -- stopped losartan, tylenol arthritis every day (not new)---> on prednisone 10mg x 6, then 5,4,3,2,1. (Finished yesterday) warfarin dose adjusted. Started keflex x 10 d 3/14 --> on bactrim x 10 d (started 3/16). Started lasix---> finished abx, taking pain meds for back --> percocet following back surgery   []   [x]       Change in health/diet/appetite --> plans for 0 days of green vegetables, some nausea, no VD---> had 1 salad --> appetite less --> no greens but wants to add salads back in 1-2 times/week, No NVD---> doing a meal replacement, will check if vit k in the kit, started Monday- will check and call --> drinking [employer] meal replacement drinks twice daily for past few weeks --> not doing the GNCs as much, going to return to normal routine, no NVD---> less greens, no NVD---> no greens --> no changes   []   [x]       Change in alcohol use --> alcohol mostly

## 2024-04-24 LAB
ESTIMATED AVERAGE GLUCOSE: 117
HBA1C MFR BLD: 5.7 %

## 2024-04-26 ENCOUNTER — ANTI-COAG VISIT (OUTPATIENT)
Dept: PHARMACY | Age: 69
End: 2024-04-26
Payer: COMMERCIAL

## 2024-04-26 DIAGNOSIS — Z86.711 HISTORY OF PULMONARY EMBOLUS (PE): Primary | ICD-10-CM

## 2024-04-26 LAB — INTERNATIONAL NORMALIZATION RATIO, POC: 3.8

## 2024-04-26 PROCEDURE — 85610 PROTHROMBIN TIME: CPT

## 2024-04-26 PROCEDURE — 99212 OFFICE O/P EST SF 10 MIN: CPT

## 2024-04-26 NOTE — PROGRESS NOTES
Ms. Uma Schulte is a 68 y.o. y/o female with history of DVT who presents today for anticoagulation monitoring and adjustment.    Pertinent PMH:    Patient Reported Findings:  Yes     No  [x]   []       Patient verifies current dosing regimen as listed --> 5mg daily ---> took 2.5mg Tues and Wed ---> confirmed dose ---> 7.5mg Mon only --> resumed warfarin day after procedure taking 5 mg daily (did not boost as instructed)  --> confirmed dose   []   [x]       S/S bleeding/bruising/swelling/SOB- denies   []   [x]       Blood in urine or stool - blood in urine, was started on abx that cleared up the bleeding --> blood in urine resolved---> denies    [x]   []       Procedures scheduled in the future at this time - having endoscopy in July. Called GI to determine if received clearance to hold, awaiting return call ---> 7/5, holding 5 days, no lovenox injections, confirmed she was cleared---> had procedure on 4/12, held warfarin 5 days prior and bridged by Dr. Hammond   []   [x]       Missed Dose -  denies     []   [x]       Extra Dose - Denies   [x]   []       Change in medications -- stopped losartan, tylenol arthritis every day (not new)---> on prednisone 10mg x 6, then 5,4,3,2,1. (Finished yesterday) warfarin dose adjusted. Started keflex x 10 d 3/14 --> on bactrim x 10 d (started 3/16). Started lasix---> finished abx, taking pain meds for back --> percocet following back surgery --> prolia injection yesterday   []   [x]       Change in health/diet/appetite --> plans for 0 days of green vegetables, some nausea, no VD---> had 1 salad --> appetite less --> no greens but wants to add salads back in 1-2 times/week, No NVD---> doing a meal replacement, will check if vit k in the kit, started Monday- will check and call --> drinking [employer] meal replacement drinks twice daily for past few weeks --> not doing the GNCs as much, going to return to normal routine, no NVD---> less greens, no NVD---> no greens --> no changes   []

## 2024-05-01 DIAGNOSIS — G25.81 RLS (RESTLESS LEGS SYNDROME): ICD-10-CM

## 2024-05-01 RX ORDER — GABAPENTIN 300 MG/1
CAPSULE ORAL
Qty: 60 CAPSULE | Refills: 11 | OUTPATIENT
Start: 2024-05-01

## 2024-05-01 NOTE — TELEPHONE ENCOUNTER
Last appt: 4/20/2023    Next appt: Visit date not found    Medication matches medication on Epic list

## 2024-05-06 ENCOUNTER — ANTI-COAG VISIT (OUTPATIENT)
Dept: PHARMACY | Age: 69
End: 2024-05-06
Payer: COMMERCIAL

## 2024-05-06 DIAGNOSIS — Z86.711 HISTORY OF PULMONARY EMBOLUS (PE): Primary | ICD-10-CM

## 2024-05-06 LAB — INTERNATIONAL NORMALIZATION RATIO, POC: 2.9

## 2024-05-06 PROCEDURE — 85610 PROTHROMBIN TIME: CPT

## 2024-05-06 PROCEDURE — 99211 OFF/OP EST MAY X REQ PHY/QHP: CPT

## 2024-05-06 NOTE — PROGRESS NOTES
Ms. Uma Schulte is a 68 y.o. y/o female with history of DVT who presents today for anticoagulation monitoring and adjustment.    Pertinent PMH:    Patient Reported Findings:  Yes     No  [x]   []       Patient verifies current dosing regimen as listed --> 5mg daily ---> took 2.5mg Tues and Wed ---> confirmed dose ---> 7.5mg Mon only --> resumed warfarin day after procedure taking 5 mg daily (did not boost as instructed)  --> confirmed dose   []   [x]       S/S bleeding/bruising/swelling/SOB- denies   []   [x]       Blood in urine or stool - blood in urine, was started on abx that cleared up the bleeding --> blood in urine resolved---> denies    [x]   []       Procedures scheduled in the future at this time - having endoscopy in July. Called GI to determine if received clearance to hold, awaiting return call ---> 7/5, holding 5 days, no lovenox injections, confirmed she was cleared---> had procedure on 4/12, held warfarin 5 days prior and bridged by Dr. Hammond---> meeting to discuss back plans   []   [x]       Missed Dose -  denies     []   [x]       Extra Dose - Denies   [x]   []       Change in medications -- stopped losartan, tylenol arthritis every day (not new)---> on prednisone 10mg x 6, then 5,4,3,2,1. (Finished yesterday) warfarin dose adjusted. Started keflex x 10 d 3/14 --> on bactrim x 10 d (started 3/16). Started lasix---> finished abx, taking pain meds for back --> percocet following back surgery --> prolia injection yesterday---> weaning percocet   []   [x]       Change in health/diet/appetite --> plans for 0 days of green vegetables, some nausea, no VD---> had 1 salad --> appetite less --> no greens but wants to add salads back in 1-2 times/week, No NVD---> doing a meal replacement, will check if vit k in the kit, started Monday- will check and call --> drinking [facility] meal replacement drinks twice daily for past few weeks --> not doing the GNCs as much, going to return to normal routine, no NVD--->

## 2024-05-20 ENCOUNTER — TELEPHONE (OUTPATIENT)
Dept: PHARMACY | Age: 69
End: 2024-05-20

## 2024-05-20 NOTE — TELEPHONE ENCOUNTER
called to say patient has been admitted to [facility].  She is scheduled fot clinic on 5/23.  Kept appt, incase patient is discharged ad can keep her appt.

## 2024-05-24 NOTE — TELEPHONE ENCOUNTER
Patient discharge home 5/23 with [facility].  Will contact [facility] next week, after they have had an opportunity to establish care with the patient.

## 2024-05-30 DIAGNOSIS — F40.10 SOCIAL PHOBIA: ICD-10-CM

## 2024-05-30 DIAGNOSIS — F32.4 MAJOR DEPRESSIVE DISORDER WITH SINGLE EPISODE, IN PARTIAL REMISSION (HCC): ICD-10-CM

## 2024-05-30 RX ORDER — FLUOXETINE HYDROCHLORIDE 20 MG/1
CAPSULE ORAL
Qty: 90 CAPSULE | Refills: 1 | OUTPATIENT
Start: 2024-05-30

## 2024-06-03 ENCOUNTER — ANTI-COAG VISIT (OUTPATIENT)
Dept: PHARMACY | Age: 69
End: 2024-06-03

## 2024-06-03 DIAGNOSIS — Z86.711 HISTORY OF PULMONARY EMBOLUS (PE): Primary | ICD-10-CM

## 2024-06-03 LAB — INR BLD: 2.3

## 2024-06-03 NOTE — PROGRESS NOTES
INR 2.3 Patient taking 2.5mg warfarin on Monday and 5mg all other days.  Please call Janna TORRES [facility] [phone] with warfarin dosing and order for next INR check.     In hospital 5/19-5/23 for acute respiratory failure, CHF. Was given IV lasix. Started spironolactone, potassium, vit d and folic acid. INR therapeutic in patient then trended up to 3.2.   5/20- 2.5 mg  5/21- 4 mg  5/22- 2 mg  5/23- held      Returned call to Janna TORRES. Instructed for patient to take 2.5 mg on Mon and 5 mg all other days of the week. Recheck INR in 1 week, 6/10. MELISSA states they might see pt on 6/11 if unavailable to check on Mon       For Pharmacy Admin Tracking Only    Total # of Interventions Recommended: 0  Total # of Interventions Accepted: 0  Time Spent (min): 10

## 2024-06-11 ENCOUNTER — ANTI-COAG VISIT (OUTPATIENT)
Dept: PHARMACY | Age: 69
End: 2024-06-11

## 2024-06-11 DIAGNOSIS — Z86.711 HISTORY OF PULMONARY EMBOLUS (PE): Primary | ICD-10-CM

## 2024-06-11 LAB — INR BLD: 2.9

## 2024-06-11 NOTE — PROGRESS NOTES
Janna TORRES from [facility] called in results for patient. INR 2.9 today, Pt took 2.5 mg on Mon and 5 mg all other days of the week. Denies any changes in medications or diet.     Advised for patient to continue to take 2.5 mg on Mon and 5 mg all other days of the week. Recheck INR in 1 week, 6/18.       For Pharmacy Admin Tracking Only    Total # of Interventions Recommended: 0  Total # of Interventions Accepted: 0  Time Spent (min): 10

## 2024-06-30 DIAGNOSIS — G25.81 RESTLESS LEGS SYNDROME: ICD-10-CM

## 2024-07-01 RX ORDER — PRAMIPEXOLE DIHYDROCHLORIDE 0.5 MG/1
TABLET ORAL
Qty: 270 TABLET | Refills: 1 | Status: SHIPPED | OUTPATIENT
Start: 2024-07-01

## 2024-07-02 ENCOUNTER — APPOINTMENT (OUTPATIENT)
Dept: PHARMACY | Age: 69
End: 2024-07-02
Payer: COMMERCIAL

## 2024-07-03 ENCOUNTER — ANTI-COAG VISIT (OUTPATIENT)
Dept: PHARMACY | Age: 69
End: 2024-07-03
Payer: COMMERCIAL

## 2024-07-03 DIAGNOSIS — Z86.711 HISTORY OF PULMONARY EMBOLUS (PE): Primary | ICD-10-CM

## 2024-07-03 LAB
INTERNATIONAL NORMALIZATION RATIO, POC: 2.4
PROTHROMBIN TIME, POC: NORMAL

## 2024-07-03 PROCEDURE — 85610 PROTHROMBIN TIME: CPT

## 2024-07-03 PROCEDURE — 99211 OFF/OP EST MAY X REQ PHY/QHP: CPT

## 2024-07-03 NOTE — PROGRESS NOTES
Ms. Uma Schulte is a 68 y.o. y/o female with history of DVT who presents today for anticoagulation monitoring and adjustment.    Pertinent PMH:    Patient Reported Findings:  Yes     No  [x]   []       Patient verifies current dosing regimen as listed --> 5mg daily ---> took 2.5mg Tues and Wed ---> confirmed dose ---> 7.5mg Mon only --> resumed warfarin day after procedure taking 5 mg daily (did not boost as instructed)  --> confirmed dose   []   [x]       S/S bleeding/bruising/swelling/SOB- denies   []   [x]       Blood in urine or stool - blood in urine, was started on abx that cleared up the bleeding --> blood in urine resolved---> denies    [x]   []       Procedures scheduled in the future at this time - having endoscopy in July. Called GI to determine if received clearance to hold, awaiting return call ---> 7/5, holding 5 days, no lovenox injections, confirmed she was cleared---> had procedure on 4/12, held warfarin 5 days prior and bridged by Dr. Hammond---> meeting to discuss back plans   []   [x]       Missed Dose -  denies---> unsure if missed a dose 2 weeks ago     []   [x]       Extra Dose - Denies   [x]   []       Change in medications -- stopped losartan, tylenol arthritis every day (not new)---> on prednisone 10mg x 6, then 5,4,3,2,1. (Finished yesterday) warfarin dose adjusted. Started keflex x 10 d 3/14 --> on bactrim x 10 d (started 3/16). Started lasix---> finished abx, taking pain meds for back --> percocet following back surgery --> prolia injection yesterday---> weaning percocet ---> Folic acid for several weeks now, lasix inc for several days, pain meds for fractured ribs    []   [x]       Change in health/diet/appetite --> plans for 0 days of green vegetables, some nausea, no VD---> had 1 salad --> appetite less --> no greens but wants to add salads back in 1-2 times/week, No NVD---> doing a meal replacement, will check if vit k in the kit, started Monday- will check and call --> drinking

## 2024-07-17 ENCOUNTER — ANTI-COAG VISIT (OUTPATIENT)
Dept: PHARMACY | Age: 69
End: 2024-07-17
Payer: COMMERCIAL

## 2024-07-17 DIAGNOSIS — Z86.711 HISTORY OF PULMONARY EMBOLUS (PE): Primary | ICD-10-CM

## 2024-07-17 LAB
INTERNATIONAL NORMALIZATION RATIO, POC: 2.7
PROTHROMBIN TIME, POC: NORMAL

## 2024-07-17 PROCEDURE — 99211 OFF/OP EST MAY X REQ PHY/QHP: CPT

## 2024-07-17 PROCEDURE — 85610 PROTHROMBIN TIME: CPT

## 2024-07-17 NOTE — PROGRESS NOTES
Ms. Uma Schulte is a 68 y.o. y/o female with history of DVT who presents today for anticoagulation monitoring and adjustment.    Pertinent PMH:    Patient Reported Findings:  Yes     No  [x]   []       Patient verifies current dosing regimen as listed --> 5mg daily ---> took 2.5mg Tues and Wed ---> confirmed dose ---> 7.5mg Mon only --> resumed warfarin day after procedure taking 5 mg daily (did not boost as instructed)  --> confirmed dose   []   [x]       S/S bleeding/bruising/swelling/SOB- denies   []   [x]       Blood in urine or stool - blood in urine, was started on abx that cleared up the bleeding --> blood in urine resolved---> denies    [x]   []       Procedures scheduled in the future at this time - having endoscopy in July. Called GI to determine if received clearance to hold, awaiting return call ---> 7/5, holding 5 days, no lovenox injections, confirmed she was cleared---> had procedure on 4/12, held warfarin 5 days prior and bridged by Dr. Hammond---> meeting to discuss back plans   []   [x]       Missed Dose -  denies---> unsure if missed a dose 2 weeks ago     []   [x]       Extra Dose - Denies   [x]   []       Change in medications -- stopped losartan, tylenol arthritis every day (not new)---> on prednisone 10mg x 6, then 5,4,3,2,1. (Finished yesterday) warfarin dose adjusted. Started keflex x 10 d 3/14 --> on bactrim x 10 d (started 3/16). Started lasix---> finished abx, taking pain meds for back --> percocet following back surgery --> prolia injection yesterday---> weaning percocet ---> Folic acid for several weeks now, lasix inc for several days, pain meds for fractured ribs--> holding losartan   []   [x]       Change in health/diet/appetite --> plans for 0 days of green vegetables, some nausea, no VD---> had 1 salad --> appetite less --> no greens but wants to add salads back in 1-2 times/week, No NVD---> doing a meal replacement, will check if vit k in the kit, started Monday- will check and

## 2024-07-23 DIAGNOSIS — E78.5 HYPERLIPIDEMIA LDL GOAL <130: ICD-10-CM

## 2024-07-23 RX ORDER — ATORVASTATIN CALCIUM 20 MG/1
TABLET, FILM COATED ORAL
Qty: 90 TABLET | Refills: 1 | OUTPATIENT
Start: 2024-07-23

## 2024-08-09 DIAGNOSIS — N39.46 MIXED INCONTINENCE: ICD-10-CM

## 2024-08-09 RX ORDER — FESOTERODINE FUMARATE 8 MG/1
TABLET, FILM COATED, EXTENDED RELEASE ORAL
Qty: 90 TABLET | Refills: 3 | OUTPATIENT
Start: 2024-08-09

## 2024-08-14 ENCOUNTER — ANTI-COAG VISIT (OUTPATIENT)
Dept: PHARMACY | Age: 69
End: 2024-08-14
Payer: COMMERCIAL

## 2024-08-14 DIAGNOSIS — Z86.711 HISTORY OF PULMONARY EMBOLUS (PE): Primary | ICD-10-CM

## 2024-08-14 LAB
INTERNATIONAL NORMALIZATION RATIO, POC: 2.6
PROTHROMBIN TIME, POC: 0

## 2024-08-14 PROCEDURE — 85610 PROTHROMBIN TIME: CPT

## 2024-08-14 PROCEDURE — 99211 OFF/OP EST MAY X REQ PHY/QHP: CPT

## 2024-08-20 ENCOUNTER — TELEPHONE (OUTPATIENT)
Dept: PHARMACY | Age: 69
End: 2024-08-20

## 2024-08-20 NOTE — TELEPHONE ENCOUNTER
Patient called, was on Azithromycin for 5 days, last day tomorrow. Advised to take 2.5mg Wed 8/21 then continue normal dose. Kept apt for 9/11.    Wilda Petersen, PharmD, [facility]    For Pharmacy Admin Tracking Only    Intervention Detail: Dose Adjustment: 1, reason: Interaction  Total # of Interventions Recommended: 1  Total # of Interventions Accepted: 1  Time Spent (min): 15

## 2024-08-26 RX ORDER — TOPIRAMATE 100 MG/1
TABLET, FILM COATED ORAL
Qty: 90 TABLET | Refills: 1 | OUTPATIENT
Start: 2024-08-26

## 2024-09-11 ENCOUNTER — ANTI-COAG VISIT (OUTPATIENT)
Dept: PHARMACY | Age: 69
End: 2024-09-11
Payer: COMMERCIAL

## 2024-09-11 DIAGNOSIS — Z86.711 HISTORY OF PULMONARY EMBOLUS (PE): Primary | ICD-10-CM

## 2024-09-11 LAB
INTERNATIONAL NORMALIZATION RATIO, POC: 2.7
PROTHROMBIN TIME, POC: 0

## 2024-09-11 PROCEDURE — 99211 OFF/OP EST MAY X REQ PHY/QHP: CPT

## 2024-09-11 PROCEDURE — 85610 PROTHROMBIN TIME: CPT

## 2024-09-25 ENCOUNTER — TELEPHONE (OUTPATIENT)
Dept: PHARMACY | Age: 69
End: 2024-09-25

## 2024-10-11 ENCOUNTER — ANTI-COAG VISIT (OUTPATIENT)
Dept: PHARMACY | Age: 69
End: 2024-10-11
Payer: COMMERCIAL

## 2024-10-11 DIAGNOSIS — Z86.711 HISTORY OF PULMONARY EMBOLUS (PE): Primary | ICD-10-CM

## 2024-10-11 LAB
INTERNATIONAL NORMALIZATION RATIO, POC: 2
PROTHROMBIN TIME, POC: 0

## 2024-10-11 PROCEDURE — 85610 PROTHROMBIN TIME: CPT | Performed by: PHYSICIAN ASSISTANT

## 2024-10-11 PROCEDURE — 99211 OFF/OP EST MAY X REQ PHY/QHP: CPT | Performed by: PHYSICIAN ASSISTANT

## 2024-10-11 NOTE — PROGRESS NOTES
losartan ---> Azith for 5 days, done now, Augmentin x1 for dental work --> no changes   []   [x]       Change in health/diet/appetite --> plans for 0 days of green vegetables, some nausea, no VD---> had 1 salad --> appetite less --> no greens but wants to add salads back in 1-2 times/week, No NVD---> doing a meal replacement, will check if vit k in the kit, started Monday- will check and call --> drinking [employer] meal replacement drinks twice daily for past few weeks --> not doing the GNCs as much, going to return to normal routine, no NVD---> less greens, no NVD---> no greens --> no changes   []   [x]       Change in alcohol use --> alcohol mostly around the holidays  []   [x]       Change in activity  []   [x]       Hospital admission  []   [x]       Emergency department visit- ER visit, 6/16, for fall. INR 2.21 (therapeutic), confirmed 7.5 mg (5 mg x 1.5) every Mon, Wed, Fri; 5 mg (5 mg x 1) all other days. Denies missed/doubled doses, still bruising from fall, no med changes, no changes to diet. Patient has endoscopy 7/5 and has to hold warfarin for 5 days, no lovenox injections. Needs to get clearance. Has apt with [facility] on 6/30 and wants to coordinate.     ER 6/19 for CP, had several fractured ribs   []   [x]       Other complaints    Clinical Outcomes:  Yes     No  []   [x]       Major bleeding event  []   [x]       Thromboembolic event  Get refills at [facility] [employer] [facility] [address] [facility]  Duration of warfarin Therapy: Indefinite  INR Range:  2.0-3.0    Warfarin 5mg tablets. Takes in morning but waits on days of INR check.     INR is 2 today  Take 2.5 mg on Mon and 5 mg all other days of the week   Encouraged to maintain a consistency of vegetables/salads.   Recheck INR in 4 weeks, 11/7    Consent signed 9/11/24    Referring Niall Lazo MD (Patti Zen CNP)  INR (no units)   Date Value   06/11/2024 2.90   06/03/2024 2.30   06/16/2023 2.21 (H)   04/06/2023 1.10     INR,(POC) (no units)   Date Value   09/11/2024 2.7

## 2024-10-14 ENCOUNTER — TELEPHONE (OUTPATIENT)
Dept: PHARMACY | Age: 69
End: 2024-10-14

## 2024-10-14 NOTE — TELEPHONE ENCOUNTER
Pt called stating that she was started on cefuroxime on Sat and will be on for one week. Explained no interaction with warfarin so no need to adjust warfarin dose. Verified understanding

## 2024-11-04 ENCOUNTER — TELEPHONE (OUTPATIENT)
Dept: PHARMACY | Age: 69
End: 2024-11-04

## 2024-11-04 NOTE — TELEPHONE ENCOUNTER
Trying to get nto [facility]. It is closer for her.   Will check on progress with ELSIE next week.

## 2024-11-14 ENCOUNTER — ANTI-COAG VISIT (OUTPATIENT)
Dept: PHARMACY | Age: 69
End: 2024-11-14

## 2024-11-14 PROBLEM — Z86.711 HISTORY OF PULMONARY EMBOLUS (PE): Status: RESOLVED | Noted: 2023-06-22 | Resolved: 2024-11-14

## 2024-12-21 DIAGNOSIS — G25.81 RESTLESS LEGS SYNDROME: ICD-10-CM

## 2024-12-21 DIAGNOSIS — J45.41 MODERATE PERSISTENT ASTHMA WITH ACUTE EXACERBATION: ICD-10-CM

## 2024-12-23 RX ORDER — PRAMIPEXOLE DIHYDROCHLORIDE 0.5 MG/1
TABLET ORAL
Qty: 270 TABLET | Refills: 1 | Status: SHIPPED | OUTPATIENT
Start: 2024-12-23

## 2024-12-23 RX ORDER — FLUTICASONE FUROATE AND VILANTEROL TRIFENATATE 100; 25 UG/1; UG/1
POWDER RESPIRATORY (INHALATION)
Qty: 180 G | Refills: 3 | OUTPATIENT
Start: 2024-12-23

## 2025-01-07 DIAGNOSIS — J45.41 MODERATE PERSISTENT ASTHMA WITH ACUTE EXACERBATION: ICD-10-CM

## 2025-01-07 RX ORDER — FLUTICASONE FUROATE AND VILANTEROL TRIFENATATE 100; 25 UG/1; UG/1
POWDER RESPIRATORY (INHALATION)
Qty: 180 G | Refills: 3 | OUTPATIENT
Start: 2025-01-07

## 2025-06-27 DIAGNOSIS — G25.81 RESTLESS LEGS SYNDROME: ICD-10-CM

## 2025-07-01 RX ORDER — PRAMIPEXOLE DIHYDROCHLORIDE 0.5 MG/1
TABLET ORAL
Qty: 270 TABLET | Refills: 1 | OUTPATIENT
Start: 2025-07-01

## (undated) DEVICE — E-Z CLEAN, NON-STICK, PTFE COATED, ELECTROSURGICAL BLADE ELECTRODE, 4 INCH (10.2 CM): Brand: MEGADYNE

## (undated) DEVICE — SUTURE VCRL SZ 1 L27IN ABSRB UD CT-1 L36MM 1/2 CIR J261H

## (undated) DEVICE — Device

## (undated) DEVICE — KIT SHLDR STBL MARCO FOR SPIDER LIMB POS

## (undated) DEVICE — PAD,ABDOMINAL,8"X7.5",STERILE,LF,1/PK: Brand: MEDLINE

## (undated) DEVICE — CANISTER, RIGID, 1200CC: Brand: MEDLINE INDUSTRIES, INC.

## (undated) DEVICE — ENDOSCOPY KIT: Brand: MEDLINE INDUSTRIES, INC.

## (undated) DEVICE — SYRINGE MED 10ML LUERLOCK TIP W/O SFTY DISP

## (undated) DEVICE — SUTURE VCRL SZ 2-0 L18IN ABSRB UD CT-1 L36MM 1/2 CIR J839D

## (undated) DEVICE — DRAPE,SPLIT ,77X120: Brand: MEDLINE

## (undated) DEVICE — GLOVE SURG SZ 75 L12IN FNGR THK87MIL WHT LTX FREE

## (undated) DEVICE — CHLORAPREP 26ML ORANGE

## (undated) DEVICE — 450 ML BOTTLE OF 0.05% CHLORHEXIDINE GLUCONATE IN 99.95% STERILE WATER FOR IRRIGATION, USP AND APPLICATOR.: Brand: IRRISEPT ANTIMICROBIAL WOUND LAVAGE

## (undated) DEVICE — COVER,TABLE,77X90,STERILE: Brand: MEDLINE

## (undated) DEVICE — Z DUP USE 2715828 BIT DRL DIA3.2MM PERIPH SCR FOR AEQUALIS PERFORM REVERSED

## (undated) DEVICE — INTENDED TO SUPPORT AND MAINTAIN THE POSITION OF AN ANESTHETIZED PATIENT DURING SURGERY: Brand: ERIN BEACH CHAIR FACE MASK

## (undated) DEVICE — DUAL CUT SAGITTAL BLADE

## (undated) DEVICE — SYRINGE IRRIG 60ML SFT PLIABLE BLB EZ TO GRP 1 HND USE W/

## (undated) DEVICE — BITE BLOCK ENDOSCP AD 60 FR W/ ADJ STRP PLAS GRN BLOX

## (undated) DEVICE — Z DUP USE 2715602 GUIDEWIRE SURG L220MM DIA25MM SHLDR FOR GLEN KEELED AEQUALIS

## (undated) DEVICE — GOWN SIRUS NONREIN XL W/TWL: Brand: MEDLINE INDUSTRIES, INC.

## (undated) DEVICE — STRIP,CLOSURE,WOUND,MEDI-STRIP,1/2X4: Brand: MEDLINE

## (undated) DEVICE — ELECTRODE PT RET AD L9FT HI MOIST COND ADH HYDRGEL CORDED

## (undated) DEVICE — NEEDLE HYPO 18GA L1.5IN THN WALL PIVOTING SHLD BVL ORIENTED

## (undated) DEVICE — KIT OR ROOM TURNOVER W/STRAP

## (undated) DEVICE — SOLUTION IV IRRIG POUR BRL 0.9% SODIUM CHL 2F7124

## (undated) DEVICE — MATTRESS MAXI AIR TRNSF SGL PT USE DCS 37 45 48 52 75

## (undated) DEVICE — FORCEPS BX 240CM 2.4MM L NDL RAD JAW 4 M00513334

## (undated) DEVICE — COVER LT HNDL BLU PLAS

## (undated) DEVICE — COVER,MAYO STAND,STERILE: Brand: MEDLINE

## (undated) DEVICE — DECANTER BAG 9": Brand: MEDLINE INDUSTRIES, INC.

## (undated) DEVICE — DRESSING,GAUZE,XEROFORM,CURAD,5"X9",ST: Brand: CURAD

## (undated) DEVICE — CONTAINER SPEC 480ML CLR POLYSTYR 10% NEUT BUFF FRMLN ZN

## (undated) DEVICE — TUBE IRRIG HNDPC HI FLO TP INTRPULS W/SUCTION TUBE

## (undated) DEVICE — TOTAL BASIC PK

## (undated) DEVICE — SYRINGE MED 30ML STD CLR PLAS LUERLOCK TIP N CTRL DISP

## (undated) DEVICE — SPONGE LAP W18XL18IN WHT COT 4 PLY FLD STRUNG RADPQ DISP ST

## (undated) DEVICE — DRAPE,U/SHT,SPLIT,FILM,60X84,STERILE: Brand: MEDLINE

## (undated) DEVICE — SHEET,DRAPE,53X77,STERILE: Brand: MEDLINE

## (undated) DEVICE — SUTURE MCRYL SZ 3-0 L18IN ABSRB UD L19MM PS-2 3/8 CIR PRIM Y497G

## (undated) DEVICE — GLOVE SURG SZ 8 L12IN FNGR THK79MIL GRN LTX FREE

## (undated) DEVICE — GAUZE,SPONGE,4"X4",16PLY,XRAY,STRL,LF: Brand: MEDLINE